# Patient Record
Sex: FEMALE | Race: WHITE | Employment: OTHER | URBAN - METROPOLITAN AREA
[De-identification: names, ages, dates, MRNs, and addresses within clinical notes are randomized per-mention and may not be internally consistent; named-entity substitution may affect disease eponyms.]

---

## 2018-02-09 VITALS
BODY MASS INDEX: 30.91 KG/M2 | DIASTOLIC BLOOD PRESSURE: 70 MMHG | WEIGHT: 168 LBS | HEIGHT: 62 IN | HEART RATE: 68 BPM | SYSTOLIC BLOOD PRESSURE: 136 MMHG

## 2018-02-09 RX ORDER — SIMVASTATIN 20 MG
20 TABLET ORAL NIGHTLY
COMMUNITY
End: 2018-04-02 | Stop reason: CLARIF

## 2018-02-19 PROBLEM — M81.0 OSTEOPOROSIS: Status: ACTIVE | Noted: 2018-02-19

## 2018-02-19 PROBLEM — E78.5 HYPERLIPIDEMIA: Status: ACTIVE | Noted: 2018-02-19

## 2018-02-19 PROBLEM — E03.9 HYPOTHYROIDISM: Status: ACTIVE | Noted: 2018-02-19

## 2018-02-19 PROBLEM — I10 HYPERTENSION: Status: ACTIVE | Noted: 2018-02-19

## 2018-03-26 ENCOUNTER — HOSPITAL ENCOUNTER (OUTPATIENT)
Age: 83
Discharge: HOME | End: 2018-03-28
Payer: MEDICARE

## 2018-03-26 DIAGNOSIS — E78.2 MIXED HYPERLIPIDEMIA: ICD-10-CM

## 2018-03-26 DIAGNOSIS — I10 ESSENTIAL HYPERTENSION: ICD-10-CM

## 2018-03-26 LAB
ALBUMIN SERPL-MCNC: 3.8 G/DL (ref 3.5–5.2)
ALP BLD-CCNC: 90 U/L (ref 35–104)
ALT SERPL-CCNC: 16 U/L (ref 0–32)
ANION GAP SERPL CALCULATED.3IONS-SCNC: 6 MMOL/L (ref 7–16)
AST SERPL-CCNC: 21 U/L (ref 0–31)
BILIRUB SERPL-MCNC: 0.3 MG/DL (ref 0–1.2)
BUN BLDV-MCNC: 25 MG/DL (ref 8–23)
CALCIUM SERPL-MCNC: 9.4 MG/DL (ref 8.6–10.2)
CHLORIDE BLD-SCNC: 105 MMOL/L (ref 98–107)
CHOLESTEROL, TOTAL: 162 MG/DL (ref 0–199)
CO2: 33 MMOL/L (ref 22–29)
CREAT SERPL-MCNC: 0.7 MG/DL (ref 0.5–1)
GFR AFRICAN AMERICAN: >60
GFR NON-AFRICAN AMERICAN: >60 ML/MIN/1.73
GLUCOSE BLD-MCNC: 90 MG/DL (ref 74–109)
HDLC SERPL-MCNC: 69 MG/DL
LDL CHOLESTEROL CALCULATED: 79 MG/DL (ref 0–99)
POTASSIUM SERPL-SCNC: 5 MMOL/L (ref 3.5–5)
SODIUM BLD-SCNC: 144 MMOL/L (ref 132–146)
TOTAL PROTEIN: 6.5 G/DL (ref 6.4–8.3)
TRIGL SERPL-MCNC: 69 MG/DL (ref 0–149)
VLDLC SERPL CALC-MCNC: 14 MG/DL

## 2018-03-26 PROCEDURE — 80053 COMPREHEN METABOLIC PANEL: CPT

## 2018-03-26 PROCEDURE — 36415 COLL VENOUS BLD VENIPUNCTURE: CPT

## 2018-03-26 PROCEDURE — 80061 LIPID PANEL: CPT

## 2018-04-02 ENCOUNTER — OFFICE VISIT (OUTPATIENT)
Dept: PRIMARY CARE CLINIC | Age: 83
End: 2018-04-02
Payer: MEDICARE

## 2018-04-02 VITALS
DIASTOLIC BLOOD PRESSURE: 70 MMHG | HEART RATE: 74 BPM | BODY MASS INDEX: 28.34 KG/M2 | OXYGEN SATURATION: 96 % | WEIGHT: 166 LBS | HEIGHT: 64 IN | RESPIRATION RATE: 16 BRPM | SYSTOLIC BLOOD PRESSURE: 120 MMHG

## 2018-04-02 DIAGNOSIS — E78.2 MIXED HYPERLIPIDEMIA: Primary | ICD-10-CM

## 2018-04-02 DIAGNOSIS — I10 ESSENTIAL HYPERTENSION: ICD-10-CM

## 2018-04-02 DIAGNOSIS — E03.9 ACQUIRED HYPOTHYROIDISM: ICD-10-CM

## 2018-04-02 PROCEDURE — 99213 OFFICE O/P EST LOW 20 MIN: CPT | Performed by: FAMILY MEDICINE

## 2018-04-02 RX ORDER — AMPICILLIN TRIHYDRATE 250 MG
600 CAPSULE ORAL 2 TIMES DAILY
COMMUNITY
End: 2018-07-12

## 2018-04-02 RX ORDER — SAFFLOWER OIL
1 OIL (ML) MISCELLANEOUS
COMMUNITY
End: 2018-08-16

## 2018-04-02 ASSESSMENT — ENCOUNTER SYMPTOMS
HEMOPTYSIS: 0
ORTHOPNEA: 0
BLURRED VISION: 0
ABDOMINAL PAIN: 0
NAUSEA: 0
EYE DISCHARGE: 0
SHORTNESS OF BREATH: 0
BLOOD IN STOOL: 0

## 2018-04-13 RX ORDER — MECLIZINE HYDROCHLORIDE 25 MG/1
25 TABLET ORAL 3 TIMES DAILY PRN
Qty: 30 TABLET | Refills: 0 | Status: SHIPPED | OUTPATIENT
Start: 2018-04-13 | End: 2018-05-21 | Stop reason: SDUPTHER

## 2018-05-18 RX ORDER — LEVOTHYROXINE SODIUM 0.1 MG/1
100 TABLET ORAL DAILY
Qty: 90 TABLET | Refills: 0 | Status: SHIPPED | OUTPATIENT
Start: 2018-05-18 | End: 2018-08-16 | Stop reason: SDUPTHER

## 2018-05-21 RX ORDER — MECLIZINE HYDROCHLORIDE 25 MG/1
25 TABLET ORAL 3 TIMES DAILY PRN
Qty: 30 TABLET | Refills: 0 | Status: SHIPPED | OUTPATIENT
Start: 2018-05-21 | End: 2018-07-06 | Stop reason: SDUPTHER

## 2018-06-28 ENCOUNTER — HOSPITAL ENCOUNTER (OUTPATIENT)
Age: 83
Discharge: HOME | End: 2018-06-28
Payer: MEDICARE

## 2018-06-28 DIAGNOSIS — E78.2 MIXED HYPERLIPIDEMIA: ICD-10-CM

## 2018-06-28 DIAGNOSIS — E03.9 ACQUIRED HYPOTHYROIDISM: ICD-10-CM

## 2018-06-28 DIAGNOSIS — I10 ESSENTIAL HYPERTENSION: ICD-10-CM

## 2018-06-28 LAB
ALBUMIN SERPL-MCNC: 3.9 G/DL (ref 3.5–5.2)
ALP BLD-CCNC: 79 U/L (ref 35–104)
ALT SERPL-CCNC: 15 U/L (ref 0–32)
ANION GAP SERPL CALCULATED.3IONS-SCNC: 11 MMOL/L (ref 7–16)
AST SERPL-CCNC: 24 U/L (ref 0–31)
BILIRUB SERPL-MCNC: 0.4 MG/DL (ref 0–1.2)
BUN BLDV-MCNC: 22 MG/DL (ref 8–23)
CALCIUM SERPL-MCNC: 9.7 MG/DL (ref 8.6–10.2)
CHLORIDE BLD-SCNC: 101 MMOL/L (ref 98–107)
CHOLESTEROL, TOTAL: 150 MG/DL (ref 0–199)
CO2: 30 MMOL/L (ref 22–29)
CREAT SERPL-MCNC: 0.8 MG/DL (ref 0.5–1)
GFR AFRICAN AMERICAN: >60
GFR NON-AFRICAN AMERICAN: >60 ML/MIN/1.73
GLUCOSE FASTING: 85 MG/DL (ref 74–109)
HCT VFR BLD CALC: 36.1 % (ref 34–48)
HDLC SERPL-MCNC: 63 MG/DL
HEMOGLOBIN: 12 G/DL (ref 11.5–15.5)
LDL CHOLESTEROL CALCULATED: 73 MG/DL (ref 0–99)
MCH RBC QN AUTO: 29.5 PG (ref 26–35)
MCHC RBC AUTO-ENTMCNC: 33.2 % (ref 32–34.5)
MCV RBC AUTO: 88.7 FL (ref 80–99.9)
PDW BLD-RTO: 14.5 FL (ref 11.5–15)
PLATELET # BLD: 249 E9/L (ref 130–450)
PMV BLD AUTO: 10.8 FL (ref 7–12)
POTASSIUM SERPL-SCNC: 4.4 MMOL/L (ref 3.5–5)
RBC # BLD: 4.07 E12/L (ref 3.5–5.5)
SODIUM BLD-SCNC: 142 MMOL/L (ref 132–146)
T4 TOTAL: 11.1 MCG/DL (ref 4.5–11.7)
TOTAL PROTEIN: 6.7 G/DL (ref 6.4–8.3)
TRIGL SERPL-MCNC: 72 MG/DL (ref 0–149)
TSH SERPL DL<=0.05 MIU/L-ACNC: 0.83 UIU/ML (ref 0.27–4.2)
VLDLC SERPL CALC-MCNC: 14 MG/DL
WBC # BLD: 4.6 E9/L (ref 4.5–11.5)

## 2018-06-28 PROCEDURE — 84443 ASSAY THYROID STIM HORMONE: CPT

## 2018-06-28 PROCEDURE — 36415 COLL VENOUS BLD VENIPUNCTURE: CPT

## 2018-06-28 PROCEDURE — 80061 LIPID PANEL: CPT

## 2018-06-28 PROCEDURE — 80053 COMPREHEN METABOLIC PANEL: CPT

## 2018-06-28 PROCEDURE — 85027 COMPLETE CBC AUTOMATED: CPT

## 2018-06-28 PROCEDURE — 84436 ASSAY OF TOTAL THYROXINE: CPT

## 2018-07-02 ENCOUNTER — OFFICE VISIT (OUTPATIENT)
Dept: PRIMARY CARE CLINIC | Age: 83
End: 2018-07-02
Payer: MEDICARE

## 2018-07-02 VITALS
WEIGHT: 165 LBS | TEMPERATURE: 97 F | SYSTOLIC BLOOD PRESSURE: 120 MMHG | DIASTOLIC BLOOD PRESSURE: 64 MMHG | HEART RATE: 76 BPM | BODY MASS INDEX: 28.32 KG/M2

## 2018-07-02 DIAGNOSIS — R35.0 URINARY FREQUENCY: Primary | ICD-10-CM

## 2018-07-02 DIAGNOSIS — E03.9 ACQUIRED HYPOTHYROIDISM: ICD-10-CM

## 2018-07-02 DIAGNOSIS — E78.2 MIXED HYPERLIPIDEMIA: ICD-10-CM

## 2018-07-02 DIAGNOSIS — I10 ESSENTIAL HYPERTENSION: ICD-10-CM

## 2018-07-02 LAB
BILIRUBIN, POC: NEGATIVE
BLOOD URINE, POC: NEGATIVE
CLARITY, POC: ABNORMAL
COLOR, POC: YELLOW
GLUCOSE URINE, POC: NEGATIVE
KETONES, POC: ABNORMAL
LEUKOCYTE EST, POC: ABNORMAL
NITRITE, POC: ABNORMAL
PH, POC: 6
PROTEIN, POC: ABNORMAL
SPECIFIC GRAVITY, POC: 1.02
UROBILINOGEN, POC: 4

## 2018-07-02 PROCEDURE — 81002 URINALYSIS NONAUTO W/O SCOPE: CPT | Performed by: FAMILY MEDICINE

## 2018-07-02 PROCEDURE — 99213 OFFICE O/P EST LOW 20 MIN: CPT | Performed by: FAMILY MEDICINE

## 2018-07-02 PROCEDURE — G8419 CALC BMI OUT NRM PARAM NOF/U: HCPCS | Performed by: FAMILY MEDICINE

## 2018-07-02 PROCEDURE — 1090F PRES/ABSN URINE INCON ASSESS: CPT | Performed by: FAMILY MEDICINE

## 2018-07-02 PROCEDURE — 4040F PNEUMOC VAC/ADMIN/RCVD: CPT | Performed by: FAMILY MEDICINE

## 2018-07-02 PROCEDURE — 1123F ACP DISCUSS/DSCN MKR DOCD: CPT | Performed by: FAMILY MEDICINE

## 2018-07-02 PROCEDURE — 1036F TOBACCO NON-USER: CPT | Performed by: FAMILY MEDICINE

## 2018-07-02 PROCEDURE — G8427 DOCREV CUR MEDS BY ELIG CLIN: HCPCS | Performed by: FAMILY MEDICINE

## 2018-07-02 RX ORDER — SULFAMETHOXAZOLE AND TRIMETHOPRIM 800; 160 MG/1; MG/1
1 TABLET ORAL 2 TIMES DAILY
Qty: 20 TABLET | Refills: 0 | Status: SHIPPED | OUTPATIENT
Start: 2018-07-02 | End: 2018-07-12

## 2018-07-02 ASSESSMENT — ENCOUNTER SYMPTOMS
NAUSEA: 0
BLURRED VISION: 0
SHORTNESS OF BREATH: 0
ORTHOPNEA: 0
HEMOPTYSIS: 0
BLOOD IN STOOL: 0
EYE DISCHARGE: 0
ABDOMINAL PAIN: 0

## 2018-07-02 NOTE — PROGRESS NOTES
OFFICE PROGRESS NOTE      SUBJECTIVE:        Patient ID:   Corinne Dyer is a 80 y.o. female who presents for   Chief Complaint   Patient presents with    Urinary Tract Infection     C/o  urinary  frequency with scant results. States symptoms appeared this  am.    Hypothyroidism     Here for recheck on HT,Hyperlipidemia and Hypothyroidism. Lab work done,here for review. HPI:   Patient got urinary difficulty  Lab work is within normal limits  Patient stated that she is not exercising  No other specific problems  Have been taking medication as prescribed  Urine shows nitrates      Prior to Admission medications    Medication Sig Start Date End Date Taking?  Authorizing Provider   Omega-3 Fatty Acids (OMEGA-3 PLUS PO) Take 2 capsules by mouth daily   Yes Historical Provider, MD   GARCINIA CAMBOGIA-CHROMIUM PO Take 1 tablet by mouth daily   Yes Historical Provider, MD   Misc Natural Products (7-KETO LEAN) CAPS Take 1 capsule by mouth daily   Yes Historical Provider, MD   Misc Natural Products (COLON CARE PO) Take 1 capsule by mouth daily   Yes Historical Provider, MD   sulfamethoxazole-trimethoprim (BACTRIM DS) 800-160 MG per tablet Take 1 tablet by mouth 2 times daily for 10 days 7/2/18 7/12/18 Yes Fanny Florence MD   meclizine (ANTIVERT) 25 MG tablet Take 1 tablet by mouth 3 times daily as needed for Dizziness 5/21/18  Yes Fanny Florence MD   levothyroxine (SYNTHROID) 100 MCG tablet Take 1 tablet by mouth Daily 5/18/18  Yes Fanny Florence MD   BIOTIN 5000 PO Take 5,000 mcg by mouth Daily with lunch   Yes Historical Provider, MD   Safflower Oil OIL 1 capsule by Does not apply route   Yes Historical Provider, MD   simvastatin (ZOCOR) 20 MG tablet Take 1 tablet by mouth every evening 2/19/18 2/19/19 Yes Fanny Florence MD   GARLIC PO Take 1 tablet by mouth daily   Yes Historical Provider, MD   hydrochlorothiazide (MICROZIDE) 12.5 MG capsule Take 12.5 mg by mouth daily   Yes Historical Provider, MD   Pyridoxine HCl (B-6 PO) Take 1 tablet by mouth daily   Yes Historical Provider, MD   lisinopril (PRINIVIL;ZESTRIL) 20 MG tablet Take 20 mg by mouth daily  11/9/15  Yes Historical Provider, MD   Cholecalciferol (VITAMIN D-3 PO) Take by mouth daily   Yes Historical Provider, MD   vitamin B-12 (CYANOCOBALAMIN) 1000 MCG tablet Take 1,000 mcg by mouth daily   Yes Historical Provider, MD   Multiple Vitamin (ONE-A-DAY 55 PLUS PO) Take by mouth daily   Yes Historical Provider, MD   atenolol (TENORMIN) 25 MG tablet Take 25 mg by mouth daily   Yes Historical Provider, MD   Red Yeast Rice 600 MG CAPS Take 600 mg by mouth 2 times daily    Historical Provider, MD   isosorbide mononitrate (IMDUR) 30 MG extended release tablet Take 30 mg by mouth daily    Historical Provider, MD   cetirizine (ZYRTEC ALLERGY) 10 MG tablet Take 10 mg by mouth daily    Historical Provider, MD   Magnesium 500 MG CAPS Take by mouth    Historical Provider, MD     Social History     Social History    Marital status:      Spouse name: N/A    Number of children: N/A    Years of education: N/A     Occupational History    retired      Social History Main Topics    Smoking status: Never Smoker    Smokeless tobacco: Never Used    Alcohol use No    Drug use: No    Sexual activity: No     Other Topics Concern    Not on file     Social History Narrative    No narrative on file       I have reviewed Jena's allergies, medications, problem list, medical, social and family history and have updated as needed in the electronic medical record  Review Of Systems:    Review of Systems   Constitutional: Negative for chills and fever. HENT: Negative for congestion and ear pain. Eyes: Negative for blurred vision and discharge. Respiratory: Negative for hemoptysis and shortness of breath (No new SOB). Cardiovascular: Negative for chest pain, orthopnea and leg swelling.    Gastrointestinal: Negative for 06/28/2018    TSH 0.830 06/28/2018    GLUF 85 06/28/2018     Lab Results   Component Value Date    COLORU yellow 07/02/2018    COLORU Yellow 06/26/2015    NITRU Negative 06/26/2015    GLUCOSEU negative 07/02/2018    GLUCOSEU Negative 06/26/2015    KETUA trace 07/02/2018    KETUA Negative 06/26/2015    UROBILINOGEN 0.2 06/26/2015    BILIRUBINUR negative 07/02/2018     No results found for: PSA, CEA, , [id number],       Controlled Substances Monitoring:                                    ASSESSMENT     Patient Active Problem List    Diagnosis Date Noted    Hypertension 02/19/2018    Hypothyroidism 02/19/2018    Hyperlipidemia 02/19/2018    Osteoporosis 02/19/2018        Diagnosis:     ICD-10-CM ICD-9-CM    1. Urinary frequency R35.0 788.41 POCT Urinalysis no Micro   2. Essential hypertension I10 401.9    3. Acquired hypothyroidism E03.9 244.9    4. Mixed hyperlipidemia E78.2 272.2        PLAN:   Force fluids  Bactrim DS 1 twice a day  Continue the medications  Regular exercise program  Low-sodium low-fat diet        Patient Instructions   Continue present medication  Continued low-sodium low-fat diet  Force fluids  Take antibiotics as prescribed      Return in about 1 week (around 7/9/2018). I have reviewed my findings and recommendations with Dallin Lemos.     Electronically signed by Ivania Delgado MD on 7/2/18 at 2:31 PM

## 2018-07-06 RX ORDER — MECLIZINE HYDROCHLORIDE 25 MG/1
25 TABLET ORAL 3 TIMES DAILY PRN
Qty: 30 TABLET | Refills: 0 | Status: SHIPPED | OUTPATIENT
Start: 2018-07-06 | End: 2018-08-16

## 2018-07-12 ENCOUNTER — OFFICE VISIT (OUTPATIENT)
Dept: PRIMARY CARE CLINIC | Age: 83
End: 2018-07-12
Payer: MEDICARE

## 2018-07-12 VITALS
TEMPERATURE: 97.2 F | SYSTOLIC BLOOD PRESSURE: 118 MMHG | HEART RATE: 80 BPM | DIASTOLIC BLOOD PRESSURE: 72 MMHG | BODY MASS INDEX: 28.84 KG/M2 | WEIGHT: 168 LBS

## 2018-07-12 DIAGNOSIS — F32.A DEPRESSION, UNSPECIFIED DEPRESSION TYPE: ICD-10-CM

## 2018-07-12 DIAGNOSIS — I10 ESSENTIAL HYPERTENSION: Primary | ICD-10-CM

## 2018-07-12 DIAGNOSIS — E78.2 MIXED HYPERLIPIDEMIA: ICD-10-CM

## 2018-07-12 DIAGNOSIS — E03.9 ACQUIRED HYPOTHYROIDISM: ICD-10-CM

## 2018-07-12 DIAGNOSIS — M81.0 AGE-RELATED OSTEOPOROSIS WITHOUT CURRENT PATHOLOGICAL FRACTURE: ICD-10-CM

## 2018-07-12 PROCEDURE — 1101F PT FALLS ASSESS-DOCD LE1/YR: CPT | Performed by: FAMILY MEDICINE

## 2018-07-12 PROCEDURE — 4040F PNEUMOC VAC/ADMIN/RCVD: CPT | Performed by: FAMILY MEDICINE

## 2018-07-12 PROCEDURE — 1123F ACP DISCUSS/DSCN MKR DOCD: CPT | Performed by: FAMILY MEDICINE

## 2018-07-12 PROCEDURE — G8419 CALC BMI OUT NRM PARAM NOF/U: HCPCS | Performed by: FAMILY MEDICINE

## 2018-07-12 PROCEDURE — G8427 DOCREV CUR MEDS BY ELIG CLIN: HCPCS | Performed by: FAMILY MEDICINE

## 2018-07-12 PROCEDURE — 1090F PRES/ABSN URINE INCON ASSESS: CPT | Performed by: FAMILY MEDICINE

## 2018-07-12 PROCEDURE — 1036F TOBACCO NON-USER: CPT | Performed by: FAMILY MEDICINE

## 2018-07-12 PROCEDURE — 99213 OFFICE O/P EST LOW 20 MIN: CPT | Performed by: FAMILY MEDICINE

## 2018-07-12 RX ORDER — CITALOPRAM 10 MG/1
10 TABLET ORAL DAILY
Qty: 30 TABLET | Refills: 3 | Status: SHIPPED | OUTPATIENT
Start: 2018-07-12 | End: 2018-08-16

## 2018-07-12 ASSESSMENT — ENCOUNTER SYMPTOMS
BLOOD IN STOOL: 0
NAUSEA: 0
HEMOPTYSIS: 0
SHORTNESS OF BREATH: 0
ORTHOPNEA: 0
EYE DISCHARGE: 0
BLURRED VISION: 0
ABDOMINAL PAIN: 0

## 2018-07-12 NOTE — PROGRESS NOTES
daily   Yes Historical Provider, MD   lisinopril (PRINIVIL;ZESTRIL) 20 MG tablet Take 20 mg by mouth daily  11/9/15  Yes Historical Provider, MD   Cholecalciferol (VITAMIN D-3 PO) Take by mouth daily   Yes Historical Provider, MD   vitamin B-12 (CYANOCOBALAMIN) 1000 MCG tablet Take 1,000 mcg by mouth daily   Yes Historical Provider, MD   Multiple Vitamin (ONE-A-DAY 55 PLUS PO) Take by mouth daily   Yes Historical Provider, MD   atenolol (TENORMIN) 25 MG tablet Take 25 mg by mouth daily   Yes Historical Provider, MD   isosorbide mononitrate (IMDUR) 30 MG extended release tablet Take 30 mg by mouth daily    Historical Provider, MD   Magnesium 500 MG CAPS Take by mouth    Historical Provider, MD     Social History     Social History    Marital status:      Spouse name: N/A    Number of children: N/A    Years of education: N/A     Occupational History    retired      Social History Main Topics    Smoking status: Never Smoker    Smokeless tobacco: Never Used    Alcohol use No    Drug use: No    Sexual activity: No     Other Topics Concern    Not on file     Social History Narrative    No narrative on file       I have reviewed Jena's allergies, medications, problem list, medical, social and family history and have updated as needed in the electronic medical record  Review Of Systems:    Review of Systems   Constitutional: Negative for chills and fever. HENT: Negative for congestion and ear pain. Eyes: Negative for blurred vision and discharge. Respiratory: Negative for hemoptysis and shortness of breath (No new SOB). Cardiovascular: Negative for chest pain, orthopnea and leg swelling. Gastrointestinal: Negative for abdominal pain, blood in stool and nausea. Genitourinary: Negative for flank pain and hematuria. Musculoskeletal: Negative for myalgias and neck pain. Skin: Negative for rash. Neurological: Negative for dizziness, focal weakness and seizures.    Endo/Heme/Allergies: 06/26/2015    UROBILINOGEN 0.2 06/26/2015    BILIRUBINUR negative 07/02/2018     No results found for: PSA, CEA, , [id number],       Controlled Substances Monitoring:                                    ASSESSMENT     Patient Active Problem List    Diagnosis Date Noted    Hypertension 02/19/2018    Hypothyroidism 02/19/2018    Hyperlipidemia 02/19/2018    Osteoporosis 02/19/2018        Diagnosis:     ICD-10-CM ICD-9-CM    1. Essential hypertension I10 401.9    2. Acquired hypothyroidism E03.9 244.9    3. Mixed hyperlipidemia E78.2 272.2    4. Age-related osteoporosis without current pathological fracture M81.0 733.01    5. Depression, unspecified depression type F32.9 311        PLAN:      Celexa 10 mg daily  Continue the medication  Continue low-sodium low-fat diet      Patient Instructions   Take the medication as prescribed  Continue diet and exercise  Return to clinic earlier if any problems      Return in about 4 weeks (around 8/9/2018). I have reviewed my findings and recommendations with Julian Poe.     Electronically signed by Rustam Willoughby MD on 7/12/18 at 11:56 AM

## 2018-08-16 ENCOUNTER — OFFICE VISIT (OUTPATIENT)
Dept: PRIMARY CARE CLINIC | Age: 83
End: 2018-08-16
Payer: MEDICARE

## 2018-08-16 VITALS
SYSTOLIC BLOOD PRESSURE: 136 MMHG | TEMPERATURE: 96.2 F | DIASTOLIC BLOOD PRESSURE: 60 MMHG | WEIGHT: 165 LBS | HEART RATE: 88 BPM | BODY MASS INDEX: 28.32 KG/M2

## 2018-08-16 DIAGNOSIS — K59.1 FUNCTIONAL DIARRHEA: ICD-10-CM

## 2018-08-16 DIAGNOSIS — M81.0 AGE-RELATED OSTEOPOROSIS WITHOUT CURRENT PATHOLOGICAL FRACTURE: ICD-10-CM

## 2018-08-16 DIAGNOSIS — I10 ESSENTIAL HYPERTENSION: Primary | ICD-10-CM

## 2018-08-16 DIAGNOSIS — E03.9 ACQUIRED HYPOTHYROIDISM: ICD-10-CM

## 2018-08-16 DIAGNOSIS — E78.2 MIXED HYPERLIPIDEMIA: ICD-10-CM

## 2018-08-16 DIAGNOSIS — Z23 NEED FOR PROPHYLACTIC VACCINATION AND INOCULATION AGAINST VARICELLA: ICD-10-CM

## 2018-08-16 DIAGNOSIS — Z23 NEED FOR PROPHYLACTIC VACCINATION AGAINST DIPHTHERIA-TETANUS-PERTUSSIS (DTP): ICD-10-CM

## 2018-08-16 PROCEDURE — 4040F PNEUMOC VAC/ADMIN/RCVD: CPT | Performed by: FAMILY MEDICINE

## 2018-08-16 PROCEDURE — 1090F PRES/ABSN URINE INCON ASSESS: CPT | Performed by: FAMILY MEDICINE

## 2018-08-16 PROCEDURE — G8427 DOCREV CUR MEDS BY ELIG CLIN: HCPCS | Performed by: FAMILY MEDICINE

## 2018-08-16 PROCEDURE — 1036F TOBACCO NON-USER: CPT | Performed by: FAMILY MEDICINE

## 2018-08-16 PROCEDURE — 1123F ACP DISCUSS/DSCN MKR DOCD: CPT | Performed by: FAMILY MEDICINE

## 2018-08-16 PROCEDURE — 1101F PT FALLS ASSESS-DOCD LE1/YR: CPT | Performed by: FAMILY MEDICINE

## 2018-08-16 PROCEDURE — 99213 OFFICE O/P EST LOW 20 MIN: CPT | Performed by: FAMILY MEDICINE

## 2018-08-16 PROCEDURE — G8419 CALC BMI OUT NRM PARAM NOF/U: HCPCS | Performed by: FAMILY MEDICINE

## 2018-08-16 RX ORDER — LEVOTHYROXINE SODIUM 0.1 MG/1
100 TABLET ORAL DAILY
Qty: 90 TABLET | Refills: 1 | Status: SHIPPED | OUTPATIENT
Start: 2018-08-16 | End: 2019-03-01 | Stop reason: SDUPTHER

## 2018-08-16 ASSESSMENT — ENCOUNTER SYMPTOMS
ABDOMINAL PAIN: 0
SHORTNESS OF BREATH: 0
HEMOPTYSIS: 0
EYE DISCHARGE: 0
BLURRED VISION: 0
BLOOD IN STOOL: 0
NAUSEA: 0
DIARRHEA: 1
ORTHOPNEA: 0

## 2018-11-19 DIAGNOSIS — E78.2 MIXED HYPERLIPIDEMIA: ICD-10-CM

## 2018-11-19 RX ORDER — SIMVASTATIN 20 MG
20 TABLET ORAL EVERY EVENING
Qty: 90 TABLET | Refills: 1 | Status: SHIPPED | OUTPATIENT
Start: 2018-11-19 | End: 2019-06-13 | Stop reason: SDUPTHER

## 2018-11-21 ENCOUNTER — HOSPITAL ENCOUNTER (OUTPATIENT)
Age: 83
Discharge: HOME | End: 2018-11-21
Payer: MEDICARE

## 2018-11-21 DIAGNOSIS — E78.2 MIXED HYPERLIPIDEMIA: ICD-10-CM

## 2018-11-21 DIAGNOSIS — E03.9 ACQUIRED HYPOTHYROIDISM: ICD-10-CM

## 2018-11-21 DIAGNOSIS — I10 ESSENTIAL HYPERTENSION: ICD-10-CM

## 2018-11-21 DIAGNOSIS — M81.0 AGE-RELATED OSTEOPOROSIS WITHOUT CURRENT PATHOLOGICAL FRACTURE: ICD-10-CM

## 2018-11-21 LAB
ALBUMIN SERPL-MCNC: 4.2 G/DL (ref 3.5–5.2)
ALP BLD-CCNC: 78 U/L (ref 35–104)
ALT SERPL-CCNC: 17 U/L (ref 0–32)
ANION GAP SERPL CALCULATED.3IONS-SCNC: 8 MMOL/L (ref 7–16)
AST SERPL-CCNC: 23 U/L (ref 0–31)
BILIRUB SERPL-MCNC: 0.3 MG/DL (ref 0–1.2)
BUN BLDV-MCNC: 26 MG/DL (ref 8–23)
CALCIUM SERPL-MCNC: 10 MG/DL (ref 8.6–10.2)
CHLORIDE BLD-SCNC: 102 MMOL/L (ref 98–107)
CHOLESTEROL, TOTAL: 172 MG/DL (ref 0–199)
CO2: 31 MMOL/L (ref 22–29)
CREAT SERPL-MCNC: 0.7 MG/DL (ref 0.5–1)
GFR AFRICAN AMERICAN: >60
GFR NON-AFRICAN AMERICAN: >60 ML/MIN/1.73
GLUCOSE BLD-MCNC: 88 MG/DL (ref 74–99)
HCT VFR BLD CALC: 36.4 % (ref 34–48)
HDLC SERPL-MCNC: 62 MG/DL
HEMOGLOBIN: 12.2 G/DL (ref 11.5–15.5)
LDL CHOLESTEROL CALCULATED: 93 MG/DL (ref 0–99)
MCH RBC QN AUTO: 30 PG (ref 26–35)
MCHC RBC AUTO-ENTMCNC: 33.5 % (ref 32–34.5)
MCV RBC AUTO: 89.4 FL (ref 80–99.9)
PDW BLD-RTO: 14.2 FL (ref 11.5–15)
PLATELET # BLD: 278 E9/L (ref 130–450)
PMV BLD AUTO: 9.9 FL (ref 7–12)
POTASSIUM SERPL-SCNC: 4.7 MMOL/L (ref 3.5–5)
RBC # BLD: 4.07 E12/L (ref 3.5–5.5)
SODIUM BLD-SCNC: 141 MMOL/L (ref 132–146)
TOTAL PROTEIN: 7.4 G/DL (ref 6.4–8.3)
TRIGL SERPL-MCNC: 86 MG/DL (ref 0–149)
TSH SERPL DL<=0.05 MIU/L-ACNC: 0.62 UIU/ML (ref 0.27–4.2)
VLDLC SERPL CALC-MCNC: 17 MG/DL
WBC # BLD: 6.3 E9/L (ref 4.5–11.5)

## 2018-11-21 PROCEDURE — 80053 COMPREHEN METABOLIC PANEL: CPT

## 2018-11-21 PROCEDURE — 84443 ASSAY THYROID STIM HORMONE: CPT

## 2018-11-21 PROCEDURE — 80061 LIPID PANEL: CPT

## 2018-11-21 PROCEDURE — 85027 COMPLETE CBC AUTOMATED: CPT

## 2018-11-21 PROCEDURE — 36415 COLL VENOUS BLD VENIPUNCTURE: CPT

## 2018-11-26 ENCOUNTER — OFFICE VISIT (OUTPATIENT)
Dept: PRIMARY CARE CLINIC | Age: 83
End: 2018-11-26
Payer: MEDICARE

## 2018-11-26 VITALS
DIASTOLIC BLOOD PRESSURE: 66 MMHG | HEART RATE: 80 BPM | WEIGHT: 166 LBS | SYSTOLIC BLOOD PRESSURE: 130 MMHG | TEMPERATURE: 96.6 F | BODY MASS INDEX: 28.49 KG/M2

## 2018-11-26 DIAGNOSIS — E78.2 MIXED HYPERLIPIDEMIA: Primary | ICD-10-CM

## 2018-11-26 DIAGNOSIS — I10 ESSENTIAL HYPERTENSION: ICD-10-CM

## 2018-11-26 DIAGNOSIS — L30.9 DERMATITIS: ICD-10-CM

## 2018-11-26 DIAGNOSIS — Z12.39 BREAST CANCER SCREENING: ICD-10-CM

## 2018-11-26 DIAGNOSIS — I25.10 CORONARY ARTERY DISEASE INVOLVING NATIVE CORONARY ARTERY OF NATIVE HEART WITHOUT ANGINA PECTORIS: ICD-10-CM

## 2018-11-26 DIAGNOSIS — E03.9 ACQUIRED HYPOTHYROIDISM: ICD-10-CM

## 2018-11-26 DIAGNOSIS — Z12.11 COLON CANCER SCREENING: ICD-10-CM

## 2018-11-26 DIAGNOSIS — M81.0 AGE-RELATED OSTEOPOROSIS WITHOUT CURRENT PATHOLOGICAL FRACTURE: ICD-10-CM

## 2018-11-26 PROCEDURE — G8482 FLU IMMUNIZE ORDER/ADMIN: HCPCS | Performed by: FAMILY MEDICINE

## 2018-11-26 PROCEDURE — 1036F TOBACCO NON-USER: CPT | Performed by: FAMILY MEDICINE

## 2018-11-26 PROCEDURE — 1123F ACP DISCUSS/DSCN MKR DOCD: CPT | Performed by: FAMILY MEDICINE

## 2018-11-26 PROCEDURE — 90662 IIV NO PRSV INCREASED AG IM: CPT | Performed by: FAMILY MEDICINE

## 2018-11-26 PROCEDURE — 99213 OFFICE O/P EST LOW 20 MIN: CPT | Performed by: FAMILY MEDICINE

## 2018-11-26 PROCEDURE — 4040F PNEUMOC VAC/ADMIN/RCVD: CPT | Performed by: FAMILY MEDICINE

## 2018-11-26 PROCEDURE — G0008 ADMIN INFLUENZA VIRUS VAC: HCPCS | Performed by: FAMILY MEDICINE

## 2018-11-26 PROCEDURE — 1090F PRES/ABSN URINE INCON ASSESS: CPT | Performed by: FAMILY MEDICINE

## 2018-11-26 PROCEDURE — G8419 CALC BMI OUT NRM PARAM NOF/U: HCPCS | Performed by: FAMILY MEDICINE

## 2018-11-26 PROCEDURE — G8427 DOCREV CUR MEDS BY ELIG CLIN: HCPCS | Performed by: FAMILY MEDICINE

## 2018-11-26 PROCEDURE — G8599 NO ASA/ANTIPLAT THER USE RNG: HCPCS | Performed by: FAMILY MEDICINE

## 2018-11-26 PROCEDURE — 1101F PT FALLS ASSESS-DOCD LE1/YR: CPT | Performed by: FAMILY MEDICINE

## 2018-11-26 RX ORDER — NEOMYCIN SULFATE, POLYMYXIN B SULFATE AND DEXAMETHASONE 3.5; 10000; 1 MG/ML; [USP'U]/ML; MG/ML
SUSPENSION/ DROPS OPHTHALMIC
Refills: 0 | COMMUNITY
Start: 2018-10-17 | End: 2019-07-18

## 2018-11-26 RX ORDER — KETOROLAC TROMETHAMINE 5 MG/ML
SOLUTION OPHTHALMIC
Refills: 0 | COMMUNITY
Start: 2018-10-17 | End: 2019-04-18

## 2018-11-26 RX ORDER — TRIAMCINOLONE ACETONIDE 0.25 MG/G
CREAM TOPICAL
Qty: 30 G | Refills: 0 | Status: SHIPPED | OUTPATIENT
Start: 2018-11-26 | End: 2019-07-18

## 2018-11-26 ASSESSMENT — ENCOUNTER SYMPTOMS
EYES NEGATIVE: 1
ALLERGIC/IMMUNOLOGIC NEGATIVE: 1
RESPIRATORY NEGATIVE: 1
GASTROINTESTINAL NEGATIVE: 1

## 2018-11-26 NOTE — PROGRESS NOTES
OFFICE PROGRESS NOTE      SUBJECTIVE:        Patient ID:   Keith Adkins is a 80 y.o. female who presents for   Chief Complaint   Patient presents with    Hypertension     Here for  recheck on Hypertension and Hyperlipidemia. Lab work done,here for review. C/o pain in Lt upper arm where nodules are present. Has had areas for 3 years and was evaluated in [facility] in the past.           HPI:   Complaining of rash on the neck  Lab work is within normal limits  Patient said that she has some lump on the left arm which has been followed up by back doctor in [facility]  He said does not need a biopsy  Patient says her depression is better        Prior to Admission medications    Medication Sig Start Date End Date Taking? Authorizing Provider   neomycin-polymyxin-dexameth (MAXITROL) 3.5-08382-0.1 ophthalmic suspension instill 1 drop into affected eye once daily 10/17/18  Yes Historical Provider, MD   triamcinolone (KENALOG) 0.025 % cream Apply topically 2 times daily.  11/26/18  Yes Miesha Mercer MD   simvastatin (ZOCOR) 20 MG tablet Take 1 tablet by mouth every evening 11/19/18 11/19/19 Yes Joshua Solis MD   levothyroxine (SYNTHROID) 100 MCG tablet Take 1 tablet by mouth Daily 8/16/18  Yes Miesha Mercer MD   Omega-3 Fatty Acids (OMEGA-3 PLUS PO) Take 2 capsules by mouth daily   Yes Historical Provider, MD   GARCINIA CAMBOGIA-CHROMIUM PO Take 1 tablet by mouth daily   Yes Historical Provider, MD   BIOTIN 5000 PO Take 5,000 mcg by mouth Daily with lunch   Yes Historical Provider, MD   GARLIC PO Take 1 tablet by mouth daily   Yes Historical Provider, MD   hydrochlorothiazide (MICROZIDE) 12.5 MG capsule Take 12.5 mg by mouth daily   Yes Historical Provider, MD   Pyridoxine HCl (B-6 PO) Take 1 tablet by mouth daily   Yes Historical Provider, MD   lisinopril (PRINIVIL;ZESTRIL) 20 MG tablet Take 20 mg by mouth daily  11/9/15  Yes Historical Provider, MD   Cholecalciferol (VITAMIN D-3

## 2018-12-13 ENCOUNTER — OFFICE VISIT (OUTPATIENT)
Dept: PRIMARY CARE CLINIC | Age: 83
End: 2018-12-13
Payer: MEDICARE

## 2018-12-13 VITALS
SYSTOLIC BLOOD PRESSURE: 120 MMHG | DIASTOLIC BLOOD PRESSURE: 60 MMHG | HEART RATE: 76 BPM | BODY MASS INDEX: 28.32 KG/M2 | WEIGHT: 165 LBS | TEMPERATURE: 97 F

## 2018-12-13 DIAGNOSIS — E78.2 MIXED HYPERLIPIDEMIA: ICD-10-CM

## 2018-12-13 DIAGNOSIS — Z12.11 COLON CANCER SCREENING: ICD-10-CM

## 2018-12-13 DIAGNOSIS — K62.5 RECTAL BLEED: ICD-10-CM

## 2018-12-13 DIAGNOSIS — I25.10 CORONARY ARTERY DISEASE INVOLVING NATIVE CORONARY ARTERY OF NATIVE HEART WITHOUT ANGINA PECTORIS: ICD-10-CM

## 2018-12-13 DIAGNOSIS — I10 ESSENTIAL HYPERTENSION: ICD-10-CM

## 2018-12-13 DIAGNOSIS — L30.9 DERMATITIS: ICD-10-CM

## 2018-12-13 DIAGNOSIS — Z12.9 SCREENING FOR CANCER: Primary | ICD-10-CM

## 2018-12-13 DIAGNOSIS — E03.9 ACQUIRED HYPOTHYROIDISM: ICD-10-CM

## 2018-12-13 LAB
CONTROL: POSITIVE
HEMOCCULT STL QL: POSITIVE

## 2018-12-13 PROCEDURE — G8599 NO ASA/ANTIPLAT THER USE RNG: HCPCS | Performed by: FAMILY MEDICINE

## 2018-12-13 PROCEDURE — G8419 CALC BMI OUT NRM PARAM NOF/U: HCPCS | Performed by: FAMILY MEDICINE

## 2018-12-13 PROCEDURE — 1101F PT FALLS ASSESS-DOCD LE1/YR: CPT | Performed by: FAMILY MEDICINE

## 2018-12-13 PROCEDURE — 4040F PNEUMOC VAC/ADMIN/RCVD: CPT | Performed by: FAMILY MEDICINE

## 2018-12-13 PROCEDURE — 82274 ASSAY TEST FOR BLOOD FECAL: CPT | Performed by: FAMILY MEDICINE

## 2018-12-13 PROCEDURE — G8427 DOCREV CUR MEDS BY ELIG CLIN: HCPCS | Performed by: FAMILY MEDICINE

## 2018-12-13 PROCEDURE — 1123F ACP DISCUSS/DSCN MKR DOCD: CPT | Performed by: FAMILY MEDICINE

## 2018-12-13 PROCEDURE — 99213 OFFICE O/P EST LOW 20 MIN: CPT | Performed by: FAMILY MEDICINE

## 2018-12-13 PROCEDURE — G8482 FLU IMMUNIZE ORDER/ADMIN: HCPCS | Performed by: FAMILY MEDICINE

## 2018-12-13 PROCEDURE — 1036F TOBACCO NON-USER: CPT | Performed by: FAMILY MEDICINE

## 2018-12-13 PROCEDURE — 1090F PRES/ABSN URINE INCON ASSESS: CPT | Performed by: FAMILY MEDICINE

## 2018-12-13 ASSESSMENT — ENCOUNTER SYMPTOMS
EYES NEGATIVE: 1
RESPIRATORY NEGATIVE: 1
BLOOD IN STOOL: 1
ALLERGIC/IMMUNOLOGIC NEGATIVE: 1

## 2019-03-01 RX ORDER — LEVOTHYROXINE SODIUM 0.1 MG/1
100 TABLET ORAL DAILY
Qty: 90 TABLET | Refills: 1 | Status: SHIPPED | OUTPATIENT
Start: 2019-03-01 | End: 2019-08-19 | Stop reason: SDUPTHER

## 2019-03-14 DIAGNOSIS — R92.2 INCONCLUSIVE MAMMOGRAM: Primary | ICD-10-CM

## 2019-03-18 ENCOUNTER — OFFICE VISIT (OUTPATIENT)
Dept: PRIMARY CARE CLINIC | Age: 84
End: 2019-03-18
Payer: MEDICARE

## 2019-03-18 VITALS
TEMPERATURE: 98.1 F | HEART RATE: 79 BPM | OXYGEN SATURATION: 91 % | SYSTOLIC BLOOD PRESSURE: 138 MMHG | BODY MASS INDEX: 30.62 KG/M2 | DIASTOLIC BLOOD PRESSURE: 82 MMHG | RESPIRATION RATE: 16 BRPM | WEIGHT: 162.2 LBS | HEIGHT: 61 IN

## 2019-03-18 DIAGNOSIS — E78.2 MIXED HYPERLIPIDEMIA: Primary | ICD-10-CM

## 2019-03-18 DIAGNOSIS — E03.9 ACQUIRED HYPOTHYROIDISM: ICD-10-CM

## 2019-03-18 DIAGNOSIS — I10 ESSENTIAL HYPERTENSION: ICD-10-CM

## 2019-03-18 DIAGNOSIS — I25.10 CORONARY ARTERY DISEASE INVOLVING NATIVE CORONARY ARTERY OF NATIVE HEART WITHOUT ANGINA PECTORIS: ICD-10-CM

## 2019-03-18 PROCEDURE — 1123F ACP DISCUSS/DSCN MKR DOCD: CPT | Performed by: FAMILY MEDICINE

## 2019-03-18 PROCEDURE — G8427 DOCREV CUR MEDS BY ELIG CLIN: HCPCS | Performed by: FAMILY MEDICINE

## 2019-03-18 PROCEDURE — G8482 FLU IMMUNIZE ORDER/ADMIN: HCPCS | Performed by: FAMILY MEDICINE

## 2019-03-18 PROCEDURE — 1036F TOBACCO NON-USER: CPT | Performed by: FAMILY MEDICINE

## 2019-03-18 PROCEDURE — 4040F PNEUMOC VAC/ADMIN/RCVD: CPT | Performed by: FAMILY MEDICINE

## 2019-03-18 PROCEDURE — G8599 NO ASA/ANTIPLAT THER USE RNG: HCPCS | Performed by: FAMILY MEDICINE

## 2019-03-18 PROCEDURE — 99213 OFFICE O/P EST LOW 20 MIN: CPT | Performed by: FAMILY MEDICINE

## 2019-03-18 PROCEDURE — 1101F PT FALLS ASSESS-DOCD LE1/YR: CPT | Performed by: FAMILY MEDICINE

## 2019-03-18 PROCEDURE — G8417 CALC BMI ABV UP PARAM F/U: HCPCS | Performed by: FAMILY MEDICINE

## 2019-03-18 PROCEDURE — 1090F PRES/ABSN URINE INCON ASSESS: CPT | Performed by: FAMILY MEDICINE

## 2019-03-18 RX ORDER — ATENOLOL 25 MG/1
25 TABLET ORAL DAILY
Qty: 30 TABLET | Refills: 1 | Status: SHIPPED | OUTPATIENT
Start: 2019-03-18 | End: 2019-05-23 | Stop reason: SDUPTHER

## 2019-03-18 ASSESSMENT — ENCOUNTER SYMPTOMS
RESPIRATORY NEGATIVE: 1
EYES NEGATIVE: 1
GASTROINTESTINAL NEGATIVE: 1
ALLERGIC/IMMUNOLOGIC NEGATIVE: 1

## 2019-03-18 ASSESSMENT — PATIENT HEALTH QUESTIONNAIRE - PHQ9
SUM OF ALL RESPONSES TO PHQ QUESTIONS 1-9: 0
SUM OF ALL RESPONSES TO PHQ QUESTIONS 1-9: 0
1. LITTLE INTEREST OR PLEASURE IN DOING THINGS: 0
2. FEELING DOWN, DEPRESSED OR HOPELESS: 0
SUM OF ALL RESPONSES TO PHQ9 QUESTIONS 1 & 2: 0

## 2019-04-08 ENCOUNTER — HOSPITAL ENCOUNTER (OUTPATIENT)
Age: 84
Discharge: HOME | End: 2019-04-08
Payer: MEDICARE

## 2019-04-08 DIAGNOSIS — E78.2 MIXED HYPERLIPIDEMIA: ICD-10-CM

## 2019-04-08 DIAGNOSIS — E03.9 ACQUIRED HYPOTHYROIDISM: ICD-10-CM

## 2019-04-08 DIAGNOSIS — I25.10 CORONARY ARTERY DISEASE INVOLVING NATIVE CORONARY ARTERY OF NATIVE HEART WITHOUT ANGINA PECTORIS: ICD-10-CM

## 2019-04-08 DIAGNOSIS — I10 ESSENTIAL HYPERTENSION: ICD-10-CM

## 2019-04-08 LAB
ALBUMIN SERPL-MCNC: 3.9 G/DL (ref 3.5–5.2)
ALP BLD-CCNC: 94 U/L (ref 35–104)
ALT SERPL-CCNC: 15 U/L (ref 0–32)
ANION GAP SERPL CALCULATED.3IONS-SCNC: 7 MMOL/L (ref 7–16)
AST SERPL-CCNC: 24 U/L (ref 0–31)
BILIRUB SERPL-MCNC: 0.3 MG/DL (ref 0–1.2)
BUN BLDV-MCNC: 27 MG/DL (ref 8–23)
CALCIUM SERPL-MCNC: 9.7 MG/DL (ref 8.6–10.2)
CHLORIDE BLD-SCNC: 103 MMOL/L (ref 98–107)
CHOLESTEROL, TOTAL: 126 MG/DL (ref 0–199)
CO2: 31 MMOL/L (ref 22–29)
CREAT SERPL-MCNC: 0.8 MG/DL (ref 0.5–1)
GFR AFRICAN AMERICAN: >60
GFR NON-AFRICAN AMERICAN: >60 ML/MIN/1.73
GLUCOSE BLD-MCNC: 96 MG/DL (ref 74–99)
HCT VFR BLD CALC: 36.1 % (ref 34–48)
HDLC SERPL-MCNC: 63 MG/DL
HEMOGLOBIN: 11.5 G/DL (ref 11.5–15.5)
LDL CHOLESTEROL CALCULATED: 51 MG/DL (ref 0–99)
MCH RBC QN AUTO: 28.5 PG (ref 26–35)
MCHC RBC AUTO-ENTMCNC: 31.9 % (ref 32–34.5)
MCV RBC AUTO: 89.4 FL (ref 80–99.9)
PDW BLD-RTO: 13.9 FL (ref 11.5–15)
PLATELET # BLD: 241 E9/L (ref 130–450)
PMV BLD AUTO: 9.4 FL (ref 7–12)
POTASSIUM SERPL-SCNC: 4.7 MMOL/L (ref 3.5–5)
RBC # BLD: 4.04 E12/L (ref 3.5–5.5)
SODIUM BLD-SCNC: 141 MMOL/L (ref 132–146)
TOTAL PROTEIN: 6.6 G/DL (ref 6.4–8.3)
TRIGL SERPL-MCNC: 58 MG/DL (ref 0–149)
TSH SERPL DL<=0.05 MIU/L-ACNC: 1.12 UIU/ML (ref 0.27–4.2)
VLDLC SERPL CALC-MCNC: 12 MG/DL
WBC # BLD: 4.8 E9/L (ref 4.5–11.5)

## 2019-04-08 PROCEDURE — 85027 COMPLETE CBC AUTOMATED: CPT

## 2019-04-08 PROCEDURE — 80053 COMPREHEN METABOLIC PANEL: CPT

## 2019-04-08 PROCEDURE — 84443 ASSAY THYROID STIM HORMONE: CPT

## 2019-04-08 PROCEDURE — 80061 LIPID PANEL: CPT

## 2019-04-08 PROCEDURE — 36415 COLL VENOUS BLD VENIPUNCTURE: CPT

## 2019-04-18 ENCOUNTER — OFFICE VISIT (OUTPATIENT)
Dept: PRIMARY CARE CLINIC | Age: 84
End: 2019-04-18
Payer: MEDICARE

## 2019-04-18 VITALS
HEART RATE: 72 BPM | DIASTOLIC BLOOD PRESSURE: 80 MMHG | BODY MASS INDEX: 31.55 KG/M2 | TEMPERATURE: 98.4 F | SYSTOLIC BLOOD PRESSURE: 120 MMHG | WEIGHT: 167 LBS

## 2019-04-18 DIAGNOSIS — R92.2 INCONCLUSIVE MAMMOGRAM: ICD-10-CM

## 2019-04-18 DIAGNOSIS — E78.2 MIXED HYPERLIPIDEMIA: Primary | ICD-10-CM

## 2019-04-18 DIAGNOSIS — E03.9 ACQUIRED HYPOTHYROIDISM: ICD-10-CM

## 2019-04-18 DIAGNOSIS — I10 ESSENTIAL HYPERTENSION: ICD-10-CM

## 2019-04-18 DIAGNOSIS — I25.10 CORONARY ARTERY DISEASE INVOLVING NATIVE CORONARY ARTERY OF NATIVE HEART WITHOUT ANGINA PECTORIS: ICD-10-CM

## 2019-04-18 PROBLEM — K62.5 RECTAL BLEED: Status: RESOLVED | Noted: 2018-12-13 | Resolved: 2019-04-18

## 2019-04-18 PROBLEM — K59.1 FUNCTIONAL DIARRHEA: Status: RESOLVED | Noted: 2018-08-16 | Resolved: 2019-04-18

## 2019-04-18 PROCEDURE — G8427 DOCREV CUR MEDS BY ELIG CLIN: HCPCS | Performed by: FAMILY MEDICINE

## 2019-04-18 PROCEDURE — 1036F TOBACCO NON-USER: CPT | Performed by: FAMILY MEDICINE

## 2019-04-18 PROCEDURE — 1090F PRES/ABSN URINE INCON ASSESS: CPT | Performed by: FAMILY MEDICINE

## 2019-04-18 PROCEDURE — 1123F ACP DISCUSS/DSCN MKR DOCD: CPT | Performed by: FAMILY MEDICINE

## 2019-04-18 PROCEDURE — 4040F PNEUMOC VAC/ADMIN/RCVD: CPT | Performed by: FAMILY MEDICINE

## 2019-04-18 PROCEDURE — 99213 OFFICE O/P EST LOW 20 MIN: CPT | Performed by: FAMILY MEDICINE

## 2019-04-18 PROCEDURE — G8599 NO ASA/ANTIPLAT THER USE RNG: HCPCS | Performed by: FAMILY MEDICINE

## 2019-04-18 PROCEDURE — G8417 CALC BMI ABV UP PARAM F/U: HCPCS | Performed by: FAMILY MEDICINE

## 2019-04-18 ASSESSMENT — ENCOUNTER SYMPTOMS
ALLERGIC/IMMUNOLOGIC NEGATIVE: 1
EYES NEGATIVE: 1
RESPIRATORY NEGATIVE: 1
GASTROINTESTINAL NEGATIVE: 1

## 2019-04-18 NOTE — PROGRESS NOTES
OFFICE PROGRESS NOTE      SUBJECTIVE:        Patient ID:   Paz Jones is a 80 y.o. female who presents for   Chief Complaint   Patient presents with    Hyperlipidemia     Here for recheck on Hypertension, Hyperlipidemia and Hypothyroidism. Patient reports feeling well. Lab work and mammogram done,here for review. HPI:   Patient feeling better  Lab work is within normal limit  Breast sonogram is nonconclusive  Radiologist has recommended to repeat in 6 months  Patient is not exercising        Prior to Admission medications    Medication Sig Start Date End Date Taking?  Authorizing Provider   atenolol (TENORMIN) 25 MG tablet Take 1 tablet by mouth daily 3/18/19  Yes Robyn Felty, MD   levothyroxine (SYNTHROID) 100 MCG tablet Take 1 tablet by mouth Daily 3/1/19  Yes Robyn Felty, MD   simvastatin (ZOCOR) 20 MG tablet Take 1 tablet by mouth every evening 11/19/18 11/19/19 Yes Pat Lawrence MD   Omega-3 Fatty Acids (OMEGA-3 PLUS PO) Take 2 capsules by mouth daily   Yes Historical Provider, MD   GARCINIA CAMBOGIA-CHROMIUM PO Take 1 tablet by mouth daily   Yes Historical Provider, MD   GARLIC PO Take 1 tablet by mouth daily   Yes Historical Provider, MD   hydrochlorothiazide (MICROZIDE) 12.5 MG capsule Take 12.5 mg by mouth daily   Yes Historical Provider, MD   isosorbide mononitrate (IMDUR) 30 MG extended release tablet Take 30 mg by mouth daily   Yes Historical Provider, MD   Pyridoxine HCl (B-6 PO) Take 1 tablet by mouth daily   Yes Historical Provider, MD   lisinopril (PRINIVIL;ZESTRIL) 20 MG tablet Take 20 mg by mouth daily  11/9/15  Yes Historical Provider, MD   Cholecalciferol (VITAMIN D-3 PO) Take by mouth daily   Yes Historical Provider, MD   vitamin B-12 (CYANOCOBALAMIN) 1000 MCG tablet Take 1,000 mcg by mouth daily   Yes Historical Provider, MD   Multiple Vitamin (ONE-A-DAY 55 PLUS PO) Take by mouth daily   Yes Historical Provider, MD neomycin-polymyxin-dexameth (MAXITROL) 3.5-68058-6.1 ophthalmic suspension instill 1 drop into affected eye once daily 10/17/18   Historical Provider, MD   triamcinolone (KENALOG) 0.025 % cream Apply topically 2 times daily. 11/26/18   Nithya Cazares MD        Social History     Socioeconomic History    Marital status:      Spouse name: None    Number of children: None    Years of education: None    Highest education level: None   Occupational History    Occupation: retired   Social Needs    Financial resource strain: None    Food insecurity:     Worry: None     Inability: None    Transportation needs:     Medical: None     Non-medical: None   Tobacco Use    Smoking status: Never Smoker    Smokeless tobacco: Never Used   Substance and Sexual Activity    Alcohol use: No    Drug use: No    Sexual activity: Never   Lifestyle    Physical activity:     Days per week: None     Minutes per session: None    Stress: None   Relationships    Social connections:     Talks on phone: None     Gets together: None     Attends [employer] service: None     Active member of club or organization: None     Attends meetings of clubs or organizations: None     Relationship status: None    Intimate partner violence:     Fear of current or ex partner: None     Emotionally abused: None     Physically abused: None     Forced sexual activity: None   Other Topics Concern    None   Social History Narrative    None       I have reviewed Jena's allergies, medications, problem list, medical, social and family history and have updated as needed in the electronic medical record  Review Of Systems:    Review of Systems   Constitutional: Negative. HENT: Negative. Eyes: Negative. Respiratory: Negative. Cardiovascular: Negative. Gastrointestinal: Negative. Endocrine: Negative. Musculoskeletal: Negative. Skin: Negative. Allergic/Immunologic: Negative. Neurological: Negative.     Hematological: Negative. Psychiatric/Behavioral: Negative. OBJECTIVE:     VS:  Wt Readings from Last 3 Encounters:   04/18/19 167 lb (75.8 kg)   03/18/19 162 lb 3.2 oz (73.6 kg)   12/13/18 165 lb (74.8 kg)     Temp Readings from Last 3 Encounters:   04/18/19 98.4 °F (36.9 °C) (Oral)   03/18/19 98.1 °F (36.7 °C) (Oral)   12/13/18 97 °F (36.1 °C) (Temporal)     BP Readings from Last 3 Encounters:   04/18/19 120/80   03/18/19 138/82   12/13/18 120/60        Physical Exam   Constitutional: She is oriented to person, place, and time. She appears well-developed and well-nourished. HENT:   Head: Normocephalic and atraumatic. Mouth/Throat: Oropharynx is clear and moist.   Eyes: Pupils are equal, round, and reactive to light. Conjunctivae are normal.   Neck: Normal range of motion. Neck supple. Cardiovascular: Normal rate, regular rhythm, normal heart sounds and intact distal pulses. Pulmonary/Chest: Effort normal and breath sounds normal.   Abdominal: Soft. Bowel sounds are normal.   Musculoskeletal: Normal range of motion. Neurological: She is alert and oriented to person, place, and time. Skin: Skin is warm and dry.    Psychiatric: Thought content normal.          Labs :    Lab Results   Component Value Date    WBC 4.8 04/08/2019    HGB 11.5 04/08/2019    HCT 36.1 04/08/2019     04/08/2019    CHOL 126 04/08/2019    TRIG 58 04/08/2019    HDL 63 04/08/2019    ALT 15 04/08/2019    AST 24 04/08/2019     04/08/2019    K 4.7 04/08/2019     04/08/2019    CREATININE 0.8 04/08/2019    BUN 27 (H) 04/08/2019    CO2 31 (H) 04/08/2019    TSH 1.120 04/08/2019    GLUF 85 06/28/2018     Lab Results   Component Value Date    COLORU yellow 07/02/2018    COLORU Yellow 06/26/2015    NITRU Negative 06/26/2015    GLUCOSEU negative 07/02/2018    GLUCOSEU Negative 06/26/2015    KETUA trace 07/02/2018    KETUA Negative 06/26/2015    UROBILINOGEN 0.2 06/26/2015    BILIRUBINUR negative 07/02/2018     No results found

## 2019-04-18 NOTE — PATIENT INSTRUCTIONS
Continue present treatment  Low-sodium low-fat diet  Regular exercises at his  Repeat the lab work  Return to clinic earlier if any problems  Repeat the breast studies again in 6 months

## 2019-05-23 RX ORDER — ATENOLOL 25 MG/1
TABLET ORAL
Qty: 30 TABLET | Refills: 1 | Status: SHIPPED | OUTPATIENT
Start: 2019-05-23 | End: 2019-07-18 | Stop reason: SDUPTHER

## 2019-06-13 DIAGNOSIS — E78.2 MIXED HYPERLIPIDEMIA: ICD-10-CM

## 2019-06-13 RX ORDER — SIMVASTATIN 20 MG
20 TABLET ORAL EVERY EVENING
Qty: 90 TABLET | Refills: 1 | Status: SHIPPED | OUTPATIENT
Start: 2019-06-13 | End: 2019-12-09 | Stop reason: SDUPTHER

## 2019-07-12 ENCOUNTER — HOSPITAL ENCOUNTER (OUTPATIENT)
Age: 84
Discharge: HOME | End: 2019-07-12
Payer: MEDICARE

## 2019-07-12 DIAGNOSIS — E78.2 MIXED HYPERLIPIDEMIA: ICD-10-CM

## 2019-07-12 LAB
ALBUMIN SERPL-MCNC: 4.1 G/DL (ref 3.5–5.2)
ALP BLD-CCNC: 97 U/L (ref 35–104)
ALT SERPL-CCNC: 15 U/L (ref 0–32)
ANION GAP SERPL CALCULATED.3IONS-SCNC: 9 MMOL/L (ref 7–16)
AST SERPL-CCNC: 21 U/L (ref 0–31)
BILIRUB SERPL-MCNC: 0.4 MG/DL (ref 0–1.2)
BUN BLDV-MCNC: 21 MG/DL (ref 8–23)
CALCIUM SERPL-MCNC: 9.8 MG/DL (ref 8.6–10.2)
CHLORIDE BLD-SCNC: 102 MMOL/L (ref 98–107)
CHOLESTEROL, TOTAL: 151 MG/DL (ref 0–199)
CO2: 31 MMOL/L (ref 22–29)
CREAT SERPL-MCNC: 0.8 MG/DL (ref 0.5–1)
GFR AFRICAN AMERICAN: >60
GFR NON-AFRICAN AMERICAN: >60 ML/MIN/1.73
GLUCOSE BLD-MCNC: 99 MG/DL (ref 74–99)
HDLC SERPL-MCNC: 65 MG/DL
LDL CHOLESTEROL CALCULATED: 69 MG/DL (ref 0–99)
POTASSIUM SERPL-SCNC: 4.6 MMOL/L (ref 3.5–5)
SODIUM BLD-SCNC: 142 MMOL/L (ref 132–146)
TOTAL PROTEIN: 6.8 G/DL (ref 6.4–8.3)
TRIGL SERPL-MCNC: 86 MG/DL (ref 0–149)
VLDLC SERPL CALC-MCNC: 17 MG/DL

## 2019-07-12 PROCEDURE — 80053 COMPREHEN METABOLIC PANEL: CPT

## 2019-07-12 PROCEDURE — 80061 LIPID PANEL: CPT

## 2019-07-12 PROCEDURE — 36415 COLL VENOUS BLD VENIPUNCTURE: CPT

## 2019-07-18 ENCOUNTER — OFFICE VISIT (OUTPATIENT)
Dept: PRIMARY CARE CLINIC | Age: 84
End: 2019-07-18
Payer: MEDICARE

## 2019-07-18 VITALS
WEIGHT: 160 LBS | HEART RATE: 80 BPM | SYSTOLIC BLOOD PRESSURE: 130 MMHG | BODY MASS INDEX: 30.23 KG/M2 | DIASTOLIC BLOOD PRESSURE: 70 MMHG | TEMPERATURE: 98.6 F

## 2019-07-18 DIAGNOSIS — E03.9 ACQUIRED HYPOTHYROIDISM: ICD-10-CM

## 2019-07-18 DIAGNOSIS — E78.2 MIXED HYPERLIPIDEMIA: ICD-10-CM

## 2019-07-18 DIAGNOSIS — G45.9 TIA (TRANSIENT ISCHEMIC ATTACK): ICD-10-CM

## 2019-07-18 DIAGNOSIS — I77.9 CAROTID ARTERY DISEASE, UNSPECIFIED LATERALITY, UNSPECIFIED TYPE (HCC): ICD-10-CM

## 2019-07-18 DIAGNOSIS — I10 ESSENTIAL HYPERTENSION: Primary | ICD-10-CM

## 2019-07-18 PROCEDURE — 1036F TOBACCO NON-USER: CPT | Performed by: FAMILY MEDICINE

## 2019-07-18 PROCEDURE — G8417 CALC BMI ABV UP PARAM F/U: HCPCS | Performed by: FAMILY MEDICINE

## 2019-07-18 PROCEDURE — G8599 NO ASA/ANTIPLAT THER USE RNG: HCPCS | Performed by: FAMILY MEDICINE

## 2019-07-18 PROCEDURE — 99214 OFFICE O/P EST MOD 30 MIN: CPT | Performed by: FAMILY MEDICINE

## 2019-07-18 PROCEDURE — G8427 DOCREV CUR MEDS BY ELIG CLIN: HCPCS | Performed by: FAMILY MEDICINE

## 2019-07-18 PROCEDURE — 1090F PRES/ABSN URINE INCON ASSESS: CPT | Performed by: FAMILY MEDICINE

## 2019-07-18 PROCEDURE — 4040F PNEUMOC VAC/ADMIN/RCVD: CPT | Performed by: FAMILY MEDICINE

## 2019-07-18 PROCEDURE — 1123F ACP DISCUSS/DSCN MKR DOCD: CPT | Performed by: FAMILY MEDICINE

## 2019-07-18 RX ORDER — ATENOLOL 25 MG/1
25 TABLET ORAL DAILY
Qty: 90 TABLET | Refills: 1 | Status: SHIPPED
Start: 2019-07-18 | End: 2020-02-10 | Stop reason: SDUPTHER

## 2019-07-18 NOTE — PROGRESS NOTES
04/18/19 167 lb (75.8 kg)   03/18/19 162 lb 3.2 oz (73.6 kg)     Temp Readings from Last 3 Encounters:   07/18/19 98.6 °F (37 °C) (Oral)   04/18/19 98.4 °F (36.9 °C) (Oral)   03/18/19 98.1 °F (36.7 °C) (Oral)     BP Readings from Last 3 Encounters:   07/18/19 130/70   04/18/19 120/80   03/18/19 138/82        Physical Exam   Constitutional: She is oriented to person, place, and time. She appears well-developed and well-nourished. HENT:   Head: Normocephalic and atraumatic. Mouth/Throat: Oropharynx is clear and moist.   Eyes: Pupils are equal, round, and reactive to light. Conjunctivae are normal.   Neck: Normal range of motion. Neck supple. Cardiovascular: Normal rate, regular rhythm, normal heart sounds and intact distal pulses. Pulmonary/Chest: Effort normal and breath sounds normal.   Abdominal: Soft. Bowel sounds are normal.   Musculoskeletal: Normal range of motion. Neurological: She is alert and oriented to person, place, and time. Skin: Skin is warm and dry.    Psychiatric: Thought content normal.          Labs :    Lab Results   Component Value Date    WBC 4.8 04/08/2019    HGB 11.5 04/08/2019    HCT 36.1 04/08/2019     04/08/2019    CHOL 151 07/12/2019    TRIG 86 07/12/2019    HDL 65 07/12/2019    ALT 15 07/12/2019    AST 21 07/12/2019     07/12/2019    K 4.6 07/12/2019     07/12/2019    CREATININE 0.8 07/12/2019    BUN 21 07/12/2019    CO2 31 (H) 07/12/2019    TSH 1.120 04/08/2019    GLUF 85 06/28/2018     Lab Results   Component Value Date    COLORU yellow 07/02/2018    COLORU Yellow 06/26/2015    NITRU Negative 06/26/2015    GLUCOSEU negative 07/02/2018    GLUCOSEU Negative 06/26/2015    KETUA trace 07/02/2018    KETUA Negative 06/26/2015    UROBILINOGEN 0.2 06/26/2015    BILIRUBINUR negative 07/02/2018     No results found for: PSA, CEA, , [id number],       Controlled Substances Monitoring:                                    ASSESSMENT     Patient Active Problem List

## 2019-07-19 ENCOUNTER — HOSPITAL ENCOUNTER (OUTPATIENT)
Dept: CT IMAGING | Age: 84
Discharge: HOME | End: 2019-07-19
Payer: MEDICARE

## 2019-07-19 DIAGNOSIS — G45.9 TIA (TRANSIENT ISCHEMIC ATTACK): ICD-10-CM

## 2019-07-19 PROCEDURE — 70470 CT HEAD/BRAIN W/O & W/DYE: CPT

## 2019-07-19 PROCEDURE — 6360000004 HC RX CONTRAST MEDICATION: Performed by: RADIOLOGY

## 2019-07-19 RX ADMIN — IOPAMIDOL 50 ML: 755 INJECTION, SOLUTION INTRAVENOUS at 16:25

## 2019-08-19 ENCOUNTER — OFFICE VISIT (OUTPATIENT)
Dept: PRIMARY CARE CLINIC | Age: 84
End: 2019-08-19
Payer: MEDICARE

## 2019-08-19 VITALS
WEIGHT: 164 LBS | SYSTOLIC BLOOD PRESSURE: 130 MMHG | BODY MASS INDEX: 30.99 KG/M2 | DIASTOLIC BLOOD PRESSURE: 62 MMHG | TEMPERATURE: 99.2 F | HEART RATE: 80 BPM

## 2019-08-19 DIAGNOSIS — I10 ESSENTIAL HYPERTENSION: Primary | ICD-10-CM

## 2019-08-19 DIAGNOSIS — I25.10 CORONARY ARTERY DISEASE INVOLVING NATIVE CORONARY ARTERY OF NATIVE HEART WITHOUT ANGINA PECTORIS: ICD-10-CM

## 2019-08-19 DIAGNOSIS — E78.2 MIXED HYPERLIPIDEMIA: ICD-10-CM

## 2019-08-19 DIAGNOSIS — E03.9 ACQUIRED HYPOTHYROIDISM: ICD-10-CM

## 2019-08-19 DIAGNOSIS — G45.9 TIA (TRANSIENT ISCHEMIC ATTACK): ICD-10-CM

## 2019-08-19 PROCEDURE — G8599 NO ASA/ANTIPLAT THER USE RNG: HCPCS | Performed by: FAMILY MEDICINE

## 2019-08-19 PROCEDURE — G8427 DOCREV CUR MEDS BY ELIG CLIN: HCPCS | Performed by: FAMILY MEDICINE

## 2019-08-19 PROCEDURE — G8417 CALC BMI ABV UP PARAM F/U: HCPCS | Performed by: FAMILY MEDICINE

## 2019-08-19 PROCEDURE — 99214 OFFICE O/P EST MOD 30 MIN: CPT | Performed by: FAMILY MEDICINE

## 2019-08-19 PROCEDURE — 1090F PRES/ABSN URINE INCON ASSESS: CPT | Performed by: FAMILY MEDICINE

## 2019-08-19 PROCEDURE — 4040F PNEUMOC VAC/ADMIN/RCVD: CPT | Performed by: FAMILY MEDICINE

## 2019-08-19 PROCEDURE — 1036F TOBACCO NON-USER: CPT | Performed by: FAMILY MEDICINE

## 2019-08-19 PROCEDURE — 1123F ACP DISCUSS/DSCN MKR DOCD: CPT | Performed by: FAMILY MEDICINE

## 2019-08-19 RX ORDER — LEVOTHYROXINE SODIUM 0.1 MG/1
TABLET ORAL
Qty: 90 TABLET | Refills: 1 | Status: SHIPPED
Start: 2019-08-19 | End: 2020-02-14

## 2019-08-19 ASSESSMENT — ENCOUNTER SYMPTOMS
ALLERGIC/IMMUNOLOGIC NEGATIVE: 1
GASTROINTESTINAL NEGATIVE: 1
RESPIRATORY NEGATIVE: 1

## 2019-08-19 NOTE — PROGRESS NOTES
Last 3 Encounters:   08/19/19 99.2 °F (37.3 °C) (Oral)   07/18/19 98.6 °F (37 °C) (Oral)   04/18/19 98.4 °F (36.9 °C) (Oral)     BP Readings from Last 3 Encounters:   08/19/19 130/62   07/18/19 130/70   04/18/19 120/80        Physical Exam   Constitutional: She is oriented to person, place, and time. She appears well-developed and well-nourished. HENT:   Head: Normocephalic and atraumatic. Mouth/Throat: Oropharynx is clear and moist.   Eyes: Pupils are equal, round, and reactive to light. Conjunctivae are normal.   Neck: Normal range of motion. Neck supple. Cardiovascular: Normal rate, regular rhythm, normal heart sounds and intact distal pulses. Pulmonary/Chest: Effort normal and breath sounds normal.   Abdominal: Soft. Bowel sounds are normal.   Musculoskeletal: Normal range of motion. Neurological: She is alert and oriented to person, place, and time. Skin: Skin is warm and dry.    Psychiatric: Thought content normal.          Labs :    Lab Results   Component Value Date    WBC 4.8 04/08/2019    HGB 11.5 04/08/2019    HCT 36.1 04/08/2019     04/08/2019    CHOL 151 07/12/2019    TRIG 86 07/12/2019    HDL 65 07/12/2019    ALT 15 07/12/2019    AST 21 07/12/2019     07/12/2019    K 4.6 07/12/2019     07/12/2019    CREATININE 0.8 07/12/2019    BUN 21 07/12/2019    CO2 31 (H) 07/12/2019    TSH 1.120 04/08/2019    GLUF 85 06/28/2018     Lab Results   Component Value Date    COLORU yellow 07/02/2018    COLORU Yellow 06/26/2015    NITRU Negative 06/26/2015    GLUCOSEU negative 07/02/2018    GLUCOSEU Negative 06/26/2015    KETUA trace 07/02/2018    KETUA Negative 06/26/2015    UROBILINOGEN 0.2 06/26/2015    BILIRUBINUR negative 07/02/2018     No results found for: PSA, CEA, , [id number],       Controlled Substances Monitoring:                                    ASSESSMENT     Patient Active Problem List    Diagnosis Date Noted    Coronary artery disease involving native coronary artery

## 2019-09-04 ENCOUNTER — OFFICE VISIT (OUTPATIENT)
Dept: NEUROLOGY | Age: 84
End: 2019-09-04
Payer: MEDICARE

## 2019-09-04 VITALS
HEIGHT: 64 IN | SYSTOLIC BLOOD PRESSURE: 170 MMHG | WEIGHT: 164 LBS | HEART RATE: 76 BPM | BODY MASS INDEX: 28 KG/M2 | DIASTOLIC BLOOD PRESSURE: 90 MMHG

## 2019-09-04 DIAGNOSIS — R42 VERTIGO: Primary | ICD-10-CM

## 2019-09-04 DIAGNOSIS — G46.3 BRAIN STEM STROKE SYNDROME: ICD-10-CM

## 2019-09-04 DIAGNOSIS — H81.09 MENIERE'S DISEASE, UNSPECIFIED LATERALITY: ICD-10-CM

## 2019-09-04 PROCEDURE — 99205 OFFICE O/P NEW HI 60 MIN: CPT | Performed by: PSYCHIATRY & NEUROLOGY

## 2019-09-04 RX ORDER — MECLIZINE HCL 12.5 MG/1
12.5 TABLET ORAL 3 TIMES DAILY PRN
Qty: 15 TABLET | Refills: 0 | Status: SHIPPED | OUTPATIENT
Start: 2019-09-04 | End: 2019-09-14

## 2019-09-04 ASSESSMENT — ENCOUNTER SYMPTOMS
SHORTNESS OF BREATH: 0
VOMITING: 0
NAUSEA: 0
TROUBLE SWALLOWING: 0
PHOTOPHOBIA: 0

## 2019-09-04 NOTE — PROGRESS NOTES
euthymic. CRANIAL NERVES:  CN I: Not tested. CN II: Fundoscopic exam deferred. CN III, IV, VI: Pupils equal, round and reactive to light. Extraocular movements full and intact. Smooth pursuit noted for saccadic breakdown. CN V: Facial sensation intact. CN VII: Facial asymmetry absent. CN VIII: Hearing grossly normal and equal bilaterally. No pathologic nystagmus. CN IX, X: Palate elevates symmetrically. Speech/articulation is clear without dysarthria. CN XI: Shoulder shrug and chin rotation equal with good strength. CN XII: Tongue protrusion midline. MOTOR:   Muscle bulk normal. Tone normal and symmetric throughout. Abnormal movements absent. Tremor: not present at rest.  Strength 5/5 throughout. REFLEXES:  DTRs 2+ throughout. Plantar response downgoing bilaterally. SENSATION:   Fine touch grossly intact throughout. Pain/temperature  grossly intact throughout. Vibration/position  grossly intact throughout. COORDINATION: normal finger-to-nose. STATION: normal.  GAIT: normal.  Nystagmus was noted with the right lateral gaze with fast phase towards the right.   DIAGNOSTIC TESTS:   Sodium   Date Value Ref Range Status   07/12/2019 142 132 - 146 mmol/L Final   04/08/2019 141 132 - 146 mmol/L Final   11/21/2018 141 132 - 146 mmol/L Final     Potassium   Date Value Ref Range Status   07/12/2019 4.6 3.5 - 5.0 mmol/L Final   04/08/2019 4.7 3.5 - 5.0 mmol/L Final   11/21/2018 4.7 3.5 - 5.0 mmol/L Final     Chloride   Date Value Ref Range Status   07/12/2019 102 98 - 107 mmol/L Final   04/08/2019 103 98 - 107 mmol/L Final   11/21/2018 102 98 - 107 mmol/L Final     CO2   Date Value Ref Range Status   07/12/2019 31 (H) 22 - 29 mmol/L Final   04/08/2019 31 (H) 22 - 29 mmol/L Final   11/21/2018 31 (H) 22 - 29 mmol/L Final     BUN   Date Value Ref Range Status   07/12/2019 21 8 - 23 mg/dL Final   04/08/2019 27 (H) 8 - 23 mg/dL Final   11/21/2018 26 (H) 8 - 23 mg/dL Final     CREATININE   Date Value Final     Total Protein   Date Value Ref Range Status   07/12/2019 6.8 6.4 - 8.3 g/dL Final   04/08/2019 6.6 6.4 - 8.3 g/dL Final   11/21/2018 7.4 6.4 - 8.3 g/dL Final     Total Bilirubin   Date Value Ref Range Status   07/12/2019 0.4 0.0 - 1.2 mg/dL Final   04/08/2019 0.3 0.0 - 1.2 mg/dL Final   11/21/2018 0.3 0.0 - 1.2 mg/dL Final     Alkaline Phosphatase   Date Value Ref Range Status   07/12/2019 97 35 - 104 U/L Final   04/08/2019 94 35 - 104 U/L Final   11/21/2018 78 35 - 104 U/L Final     ALT   Date Value Ref Range Status   07/12/2019 15 0 - 32 U/L Final   04/08/2019 15 0 - 32 U/L Final   11/21/2018 17 0 - 32 U/L Final     AST   Date Value Ref Range Status   07/12/2019 21 0 - 31 U/L Final   04/08/2019 24 0 - 31 U/L Final   11/21/2018 23 0 - 31 U/L Final     No results found for: PTT, INR  Lab Results   Component Value Date    TRIG 86 07/12/2019    HDL 65 07/12/2019     No components found for: HGBA1C  No results found for: PROTEINCSF, GLUCCSF, WBCCSF    Controlled Substance Monitoring:    Acute and Chronic Pain Monitoring:   No flowsheet data found. MEDICAL DECISION MAKING  ASSESSMENT/PLAN    Waldo Bruce was seen today for transient ischemic attack. Diagnoses and all orders for this visit:    Vertigo  -     meclizine (ANTIVERT) 12.5 MG tablet; Take 1 tablet by mouth 3 times daily as needed for Dizziness    Brain stem stroke syndrome    Meniere's disease, unspecified laterality       · I have recommended MRI brain to look for any brainstem stroke and MRA head and neck to look for intracranial stenosis. However the patient would like to hold off on it. · The patient has hearing loss associated with ringing in her ear and vertigo, the other differential diagnosis also includes Ménière's disease, she is already on hydrochlorothiazide, however the patient does eat a lot of packaged food like chips which has a lot of salt in it.   I have advised her to decrease her salt intake and we will monitor her for resolution of symptoms. She would like to hold off on ENT consultation at this time. · Meclizine as needed for dizziness. Return in about 6 months (around 3/4/2020). Today, I personally spent 60 minutes in direct face-to-face time with the patient, of which greater than 50% was spent in patient education, counseling,about etiology causes diagnosis and management of central and peripheral vertigo, Ménière's disease, brainstem stroke, stroke prevention strategies, importance of diet and exercise and coordination of care as described above.         Jennifer Monroe MD    [facility]  [address] [place] [address]

## 2019-09-19 ENCOUNTER — OFFICE VISIT (OUTPATIENT)
Dept: PRIMARY CARE CLINIC | Age: 84
End: 2019-09-19
Payer: MEDICARE

## 2019-09-19 VITALS
WEIGHT: 161 LBS | BODY MASS INDEX: 27.64 KG/M2 | SYSTOLIC BLOOD PRESSURE: 116 MMHG | HEART RATE: 76 BPM | DIASTOLIC BLOOD PRESSURE: 82 MMHG | TEMPERATURE: 98.2 F

## 2019-09-19 DIAGNOSIS — E78.2 MIXED HYPERLIPIDEMIA: ICD-10-CM

## 2019-09-19 DIAGNOSIS — Z23 NEED FOR PROPHYLACTIC VACCINATION AND INOCULATION AGAINST INFLUENZA: Primary | ICD-10-CM

## 2019-09-19 DIAGNOSIS — E03.9 ACQUIRED HYPOTHYROIDISM: ICD-10-CM

## 2019-09-19 DIAGNOSIS — I10 ESSENTIAL HYPERTENSION: ICD-10-CM

## 2019-09-19 DIAGNOSIS — R42 VERTIGO: ICD-10-CM

## 2019-09-19 DIAGNOSIS — I25.10 CORONARY ARTERY DISEASE INVOLVING NATIVE CORONARY ARTERY OF NATIVE HEART WITHOUT ANGINA PECTORIS: ICD-10-CM

## 2019-09-19 PROCEDURE — 99214 OFFICE O/P EST MOD 30 MIN: CPT | Performed by: FAMILY MEDICINE

## 2019-09-19 PROCEDURE — G8427 DOCREV CUR MEDS BY ELIG CLIN: HCPCS | Performed by: FAMILY MEDICINE

## 2019-09-19 PROCEDURE — 4040F PNEUMOC VAC/ADMIN/RCVD: CPT | Performed by: FAMILY MEDICINE

## 2019-09-19 PROCEDURE — G8417 CALC BMI ABV UP PARAM F/U: HCPCS | Performed by: FAMILY MEDICINE

## 2019-09-19 PROCEDURE — 1036F TOBACCO NON-USER: CPT | Performed by: FAMILY MEDICINE

## 2019-09-19 PROCEDURE — G8599 NO ASA/ANTIPLAT THER USE RNG: HCPCS | Performed by: FAMILY MEDICINE

## 2019-09-19 PROCEDURE — 90756 CCIIV4 VACC ABX FREE IM: CPT | Performed by: FAMILY MEDICINE

## 2019-09-19 PROCEDURE — G0008 ADMIN INFLUENZA VIRUS VAC: HCPCS | Performed by: FAMILY MEDICINE

## 2019-09-19 PROCEDURE — 1123F ACP DISCUSS/DSCN MKR DOCD: CPT | Performed by: FAMILY MEDICINE

## 2019-09-19 PROCEDURE — 1090F PRES/ABSN URINE INCON ASSESS: CPT | Performed by: FAMILY MEDICINE

## 2019-09-19 RX ORDER — MECLIZINE HCL 12.5 MG/1
12.5 TABLET ORAL 3 TIMES DAILY PRN
COMMUNITY
End: 2020-08-13 | Stop reason: SDUPTHER

## 2019-09-19 ASSESSMENT — ENCOUNTER SYMPTOMS
RESPIRATORY NEGATIVE: 1
EYES NEGATIVE: 1
ALLERGIC/IMMUNOLOGIC NEGATIVE: 1
GASTROINTESTINAL NEGATIVE: 1

## 2019-09-19 NOTE — PROGRESS NOTES
ASSESSMENT     Patient Active Problem List    Diagnosis Date Noted    Coronary artery disease involving native coronary artery of native heart without angina pectoris 11/26/2018    Hypertension 02/19/2018    Hypothyroidism 02/19/2018    Hyperlipidemia 02/19/2018    Osteoporosis 02/19/2018        Diagnosis:     ICD-10-CM    1. Need for prophylactic vaccination and inoculation against influenza Z23 INFLUENZA, MDCK QUADV, 4 YRS AND OLDER, IM, MDV, 0.5ML (FLUCELVAX QUADV)   2. Essential hypertension I10    3. Mixed hyperlipidemia E78.2    4. Acquired hypothyroidism E03.9    5. Coronary artery disease involving native coronary artery of native heart without angina pectoris I25.10    6. Vertigo R42        PLAN:   Continue present treatment  Low-sodium low-fat diet  Regular exercises  Vertigo exercises  Low with the consultants  Clinic earlier if any problems        Patient Instructions   Continue present treatment  Low-sodium low-fat diet  Regular exercises  Vertigo exercises  Follow-up with the consultants  Gweneth November for the wellness exam  Return to clinic earlier for any other problems      Return in about 4 weeks (around 10/17/2019) for annaul visit. Susana Rivas reviewed my findings and recommendations with Raegan Hatch.     Electronicallysigned by Kristyn Shaver MD on 9/19/19 at 2:14 PM

## 2019-10-17 ENCOUNTER — OFFICE VISIT (OUTPATIENT)
Dept: PRIMARY CARE CLINIC | Age: 84
End: 2019-10-17
Payer: MEDICARE

## 2019-10-17 VITALS
HEIGHT: 62 IN | DIASTOLIC BLOOD PRESSURE: 80 MMHG | SYSTOLIC BLOOD PRESSURE: 136 MMHG | WEIGHT: 164 LBS | BODY MASS INDEX: 30.18 KG/M2 | HEART RATE: 80 BPM | TEMPERATURE: 98 F

## 2019-10-17 DIAGNOSIS — I25.10 CORONARY ARTERY DISEASE INVOLVING NATIVE CORONARY ARTERY OF NATIVE HEART WITHOUT ANGINA PECTORIS: ICD-10-CM

## 2019-10-17 DIAGNOSIS — Z00.00 ROUTINE GENERAL MEDICAL EXAMINATION AT A HEALTH CARE FACILITY: ICD-10-CM

## 2019-10-17 DIAGNOSIS — I10 ESSENTIAL HYPERTENSION: Primary | ICD-10-CM

## 2019-10-17 DIAGNOSIS — E78.2 MIXED HYPERLIPIDEMIA: ICD-10-CM

## 2019-10-17 DIAGNOSIS — E03.9 ACQUIRED HYPOTHYROIDISM: ICD-10-CM

## 2019-10-17 PROCEDURE — G0438 PPPS, INITIAL VISIT: HCPCS | Performed by: FAMILY MEDICINE

## 2019-10-17 PROCEDURE — 1123F ACP DISCUSS/DSCN MKR DOCD: CPT | Performed by: FAMILY MEDICINE

## 2019-10-17 PROCEDURE — G8599 NO ASA/ANTIPLAT THER USE RNG: HCPCS | Performed by: FAMILY MEDICINE

## 2019-10-17 PROCEDURE — G8482 FLU IMMUNIZE ORDER/ADMIN: HCPCS | Performed by: FAMILY MEDICINE

## 2019-10-17 PROCEDURE — 4040F PNEUMOC VAC/ADMIN/RCVD: CPT | Performed by: FAMILY MEDICINE

## 2019-10-17 ASSESSMENT — PATIENT HEALTH QUESTIONNAIRE - PHQ9
SUM OF ALL RESPONSES TO PHQ QUESTIONS 1-9: 0
SUM OF ALL RESPONSES TO PHQ QUESTIONS 1-9: 0

## 2019-10-17 ASSESSMENT — LIFESTYLE VARIABLES: HOW OFTEN DO YOU HAVE A DRINK CONTAINING ALCOHOL: 0

## 2019-11-12 ENCOUNTER — TELEPHONE (OUTPATIENT)
Dept: PRIMARY CARE CLINIC | Age: 84
End: 2019-11-12

## 2019-11-12 DIAGNOSIS — R92.8 ABNORMAL FINDINGS ON DIAGNOSTIC IMAGING OF BREAST: Primary | ICD-10-CM

## 2019-12-10 DIAGNOSIS — R92.8 ABNORMAL FINDINGS ON DIAGNOSTIC IMAGING OF BREAST: ICD-10-CM

## 2020-01-11 ENCOUNTER — HOSPITAL ENCOUNTER (OUTPATIENT)
Age: 85
Discharge: HOME | End: 2020-01-11
Payer: MEDICARE

## 2020-01-11 LAB
ALBUMIN SERPL-MCNC: 4 G/DL (ref 3.5–5.2)
ALP BLD-CCNC: 83 U/L (ref 35–104)
ALT SERPL-CCNC: 17 U/L (ref 0–32)
ANION GAP SERPL CALCULATED.3IONS-SCNC: 10 MMOL/L (ref 7–16)
AST SERPL-CCNC: 22 U/L (ref 0–31)
BASOPHILS ABSOLUTE: 0.01 E9/L (ref 0–0.2)
BASOPHILS RELATIVE PERCENT: 0.2 % (ref 0–2)
BILIRUB SERPL-MCNC: 0.5 MG/DL (ref 0–1.2)
BUN BLDV-MCNC: 17 MG/DL (ref 8–23)
CALCIUM SERPL-MCNC: 10.3 MG/DL (ref 8.6–10.2)
CHLORIDE BLD-SCNC: 98 MMOL/L (ref 98–107)
CHOLESTEROL, TOTAL: 137 MG/DL (ref 0–199)
CO2: 30 MMOL/L (ref 22–29)
CREAT SERPL-MCNC: 0.8 MG/DL (ref 0.5–1)
EOSINOPHILS ABSOLUTE: 0.08 E9/L (ref 0.05–0.5)
EOSINOPHILS RELATIVE PERCENT: 1.3 % (ref 0–6)
GFR AFRICAN AMERICAN: >60
GFR NON-AFRICAN AMERICAN: >60 ML/MIN/1.73
GLUCOSE BLD-MCNC: 98 MG/DL (ref 74–99)
HCT VFR BLD CALC: 37.5 % (ref 34–48)
HDLC SERPL-MCNC: 66 MG/DL
HEMOGLOBIN: 12.3 G/DL (ref 11.5–15.5)
IMMATURE GRANULOCYTES #: 0.01 E9/L
IMMATURE GRANULOCYTES %: 0.2 % (ref 0–5)
LDL CHOLESTEROL CALCULATED: 58 MG/DL (ref 0–99)
LYMPHOCYTES ABSOLUTE: 1.1 E9/L (ref 1.5–4)
LYMPHOCYTES RELATIVE PERCENT: 17.7 % (ref 20–42)
MCH RBC QN AUTO: 29.5 PG (ref 26–35)
MCHC RBC AUTO-ENTMCNC: 32.8 % (ref 32–34.5)
MCV RBC AUTO: 89.9 FL (ref 80–99.9)
MONOCYTES ABSOLUTE: 0.61 E9/L (ref 0.1–0.95)
MONOCYTES RELATIVE PERCENT: 9.8 % (ref 2–12)
NEUTROPHILS ABSOLUTE: 4.42 E9/L (ref 1.8–7.3)
NEUTROPHILS RELATIVE PERCENT: 70.8 % (ref 43–80)
PDW BLD-RTO: 14.7 FL (ref 11.5–15)
PLATELET # BLD: 374 E9/L (ref 130–450)
PMV BLD AUTO: 9.2 FL (ref 7–12)
POTASSIUM SERPL-SCNC: 4.8 MMOL/L (ref 3.5–5)
RBC # BLD: 4.17 E12/L (ref 3.5–5.5)
SODIUM BLD-SCNC: 138 MMOL/L (ref 132–146)
TOTAL PROTEIN: 7 G/DL (ref 6.4–8.3)
TRIGL SERPL-MCNC: 67 MG/DL (ref 0–149)
TSH SERPL DL<=0.05 MIU/L-ACNC: 0.69 UIU/ML (ref 0.27–4.2)
VLDLC SERPL CALC-MCNC: 13 MG/DL
WBC # BLD: 6.2 E9/L (ref 4.5–11.5)

## 2020-01-11 PROCEDURE — 36415 COLL VENOUS BLD VENIPUNCTURE: CPT

## 2020-01-11 PROCEDURE — 85025 COMPLETE CBC W/AUTO DIFF WBC: CPT

## 2020-01-11 PROCEDURE — 80061 LIPID PANEL: CPT

## 2020-01-11 PROCEDURE — 80053 COMPREHEN METABOLIC PANEL: CPT

## 2020-01-11 PROCEDURE — 84443 ASSAY THYROID STIM HORMONE: CPT

## 2020-01-16 ENCOUNTER — OFFICE VISIT (OUTPATIENT)
Dept: PRIMARY CARE CLINIC | Age: 85
End: 2020-01-16
Payer: MEDICARE

## 2020-01-16 VITALS
BODY MASS INDEX: 28.76 KG/M2 | WEIGHT: 156 LBS | TEMPERATURE: 98.4 F | DIASTOLIC BLOOD PRESSURE: 70 MMHG | HEART RATE: 60 BPM | SYSTOLIC BLOOD PRESSURE: 136 MMHG

## 2020-01-16 PROCEDURE — 4040F PNEUMOC VAC/ADMIN/RCVD: CPT | Performed by: FAMILY MEDICINE

## 2020-01-16 PROCEDURE — G8427 DOCREV CUR MEDS BY ELIG CLIN: HCPCS | Performed by: FAMILY MEDICINE

## 2020-01-16 PROCEDURE — 1123F ACP DISCUSS/DSCN MKR DOCD: CPT | Performed by: FAMILY MEDICINE

## 2020-01-16 PROCEDURE — G8417 CALC BMI ABV UP PARAM F/U: HCPCS | Performed by: FAMILY MEDICINE

## 2020-01-16 PROCEDURE — 99214 OFFICE O/P EST MOD 30 MIN: CPT | Performed by: FAMILY MEDICINE

## 2020-01-16 PROCEDURE — G8482 FLU IMMUNIZE ORDER/ADMIN: HCPCS | Performed by: FAMILY MEDICINE

## 2020-01-16 PROCEDURE — 1090F PRES/ABSN URINE INCON ASSESS: CPT | Performed by: FAMILY MEDICINE

## 2020-01-16 PROCEDURE — 1036F TOBACCO NON-USER: CPT | Performed by: FAMILY MEDICINE

## 2020-01-16 ASSESSMENT — ENCOUNTER SYMPTOMS
EYES NEGATIVE: 1
ALLERGIC/IMMUNOLOGIC NEGATIVE: 1
GASTROINTESTINAL NEGATIVE: 1
RESPIRATORY NEGATIVE: 1

## 2020-01-16 ASSESSMENT — PATIENT HEALTH QUESTIONNAIRE - PHQ9
SUM OF ALL RESPONSES TO PHQ9 QUESTIONS 1 & 2: 0
2. FEELING DOWN, DEPRESSED OR HOPELESS: 0
SUM OF ALL RESPONSES TO PHQ QUESTIONS 1-9: 0
1. LITTLE INTEREST OR PLEASURE IN DOING THINGS: 0
SUM OF ALL RESPONSES TO PHQ QUESTIONS 1-9: 0

## 2020-01-16 NOTE — PROGRESS NOTES
01/16/20 98.4 °F (36.9 °C) (Oral)   10/17/19 98 °F (36.7 °C) (Oral)   09/19/19 98.2 °F (36.8 °C) (Oral)     BP Readings from Last 3 Encounters:   01/16/20 136/70   10/17/19 136/80   09/19/19 116/82        Physical Exam  Constitutional:       Appearance: She is well-developed. HENT:      Head: Normocephalic and atraumatic. Eyes:      Conjunctiva/sclera: Conjunctivae normal.      Pupils: Pupils are equal, round, and reactive to light. Neck:      Musculoskeletal: Normal range of motion and neck supple. Cardiovascular:      Rate and Rhythm: Normal rate and regular rhythm. Heart sounds: Normal heart sounds. Pulmonary:      Effort: Pulmonary effort is normal.      Breath sounds: Normal breath sounds. Abdominal:      General: Bowel sounds are normal.      Palpations: Abdomen is soft. Musculoskeletal: Normal range of motion. Skin:     General: Skin is warm and dry. Neurological:      Mental Status: She is alert and oriented to person, place, and time. Psychiatric:         Thought Content:  Thought content normal.            Labs :    Lab Results   Component Value Date    WBC 6.2 01/11/2020    HGB 12.3 01/11/2020    HCT 37.5 01/11/2020     01/11/2020    CHOL 137 01/11/2020    TRIG 67 01/11/2020    HDL 66 01/11/2020    ALT 17 01/11/2020    AST 22 01/11/2020     01/11/2020    K 4.8 01/11/2020    CL 98 01/11/2020    CREATININE 0.8 01/11/2020    BUN 17 01/11/2020    CO2 30 (H) 01/11/2020    TSH 0.694 01/11/2020    GLUF 85 06/28/2018     Lab Results   Component Value Date    COLORU yellow 07/02/2018    COLORU Yellow 06/26/2015    NITRU Negative 06/26/2015    GLUCOSEU negative 07/02/2018    GLUCOSEU Negative 06/26/2015    KETUA trace 07/02/2018    KETUA Negative 06/26/2015    UROBILINOGEN 0.2 06/26/2015    BILIRUBINUR negative 07/02/2018     No results found for: PSA, CEA, , [id number],       Controlled Substances Monitoring:                                    ASSESSMENT     Patient Active Problem List    Diagnosis Date Noted    Coronary artery disease involving native coronary artery of native heart without angina pectoris 11/26/2018    Hypertension 02/19/2018    Hypothyroidism 02/19/2018    Hyperlipidemia 02/19/2018    Osteoporosis 02/19/2018        Diagnosis:     ICD-10-CM    1. Mixed hyperlipidemia E78.2 CBC WITH AUTO DIFFERENTIAL     Comprehensive Metabolic Panel     Lipid Panel   2. Essential hypertension I10 CBC WITH AUTO DIFFERENTIAL     Comprehensive Metabolic Panel   3. Acquired hypothyroidism E03.9 CBC WITH AUTO DIFFERENTIAL   4. Coronary artery disease involving native coronary artery of native heart without angina pectoris I25.10 CBC WITH AUTO DIFFERENTIAL       PLAN:   Continue present treatment  Low-sodium low-fat diet  Regular exercises  Follow-up with the consultants  Return to clinic earlier if any problems        Patient Instructions   Continue present treatment  Low-sodium low-fat diet  Regular exercises  Repeat the lab work  Return to clinic earlier if any problems      No follow-ups on file. Charisma Ford reviewed my findings and recommendations with José Brumfield.     Electronicallysigned by Chase Carmona MD on 1/16/20 at 2:36 PM

## 2020-02-10 RX ORDER — ATENOLOL 25 MG/1
25 TABLET ORAL DAILY
Qty: 90 TABLET | Refills: 1 | Status: SHIPPED
Start: 2020-02-10 | End: 2020-09-14

## 2020-02-14 RX ORDER — LEVOTHYROXINE SODIUM 0.1 MG/1
TABLET ORAL
Qty: 90 TABLET | Refills: 1 | Status: SHIPPED
Start: 2020-02-14 | End: 2020-09-16

## 2020-03-12 RX ORDER — SIMVASTATIN 20 MG
20 TABLET ORAL EVERY EVENING
Qty: 90 TABLET | Refills: 0 | OUTPATIENT
Start: 2020-03-12 | End: 2021-03-12

## 2020-06-24 ENCOUNTER — TELEPHONE (OUTPATIENT)
Dept: ADMINISTRATIVE | Age: 85
End: 2020-06-24

## 2020-06-24 ENCOUNTER — NURSE TRIAGE (OUTPATIENT)
Dept: OTHER | Facility: CLINIC | Age: 85
End: 2020-06-24

## 2020-06-24 NOTE — TELEPHONE ENCOUNTER
Nurse triage wanted patient seen today. Dr [facility] office said patient needs to go to emergency room because she has a history of pneumonia. Patient said she will go today.

## 2020-06-24 NOTE — TELEPHONE ENCOUNTER
80 y.o calling with c/o CHF flare with moderate SOB and cough  for last few days; not on O2, but would like to request such. May need a med adjustment. Hospitalized in March 2020 for 21 days with pneumonia and was O2 there. Reason for Disposition   Longstanding difficulty breathing (e.g., CHF, COPD, emphysema) and worse than normal    Answer Assessment - Initial Assessment Questions  1. RESPIRATORY STATUS: \"Describe your breathing? \" (e.g., wheezing, shortness of breath, unable to speak, severe coughing)       SOB, labored, wheezing at times with a cough  2. ONSET: \"When did this breathing problem begin? \"       Last few days  3. PATTERN \"Does the difficult breathing come and go, or has it been constant since it started? \"       Intermittent; lasts for a few hours at a time. 4. SEVERITY: \"How bad is your breathing? \" (e.g., mild, moderate, severe)     - MILD: No SOB at rest, mild SOB with walking, speaks normally in sentences, can lay down, no retractions, pulse < 100.     - MODERATE: SOB at rest, SOB with minimal exertion and prefers to sit, cannot lie down flat, speaks in phrases, mild retractions, audible wheezing, pulse 100-120.     - SEVERE: Very SOB at rest, speaks in single words, struggling to breathe, sitting hunched forward, retractions, pulse > 120       Moderate to severe  5. RECURRENT SYMPTOM: \"Have you had difficulty breathing before? \" If so, ask: \"When was the last time? \" and \"What happened that time? \"       Yes, pt has CHF  6. CARDIAC HISTORY: \"Do you have any history of heart disease? \" (e.g., heart attack, angina, bypass surgery, angioplasty)       CHF; history of a stroke in the past some years ago. 7. LUNG HISTORY: \"Do you have any history of lung disease? \"  (e.g., pulmonary embolus, asthma, emphysema)      No  8. CAUSE: \"What do you think is causing the breathing problem? \"       Maybe r/t the CHF  9. OTHER SYMPTOMS: \"Do you have any other symptoms? (e.g., dizziness, runny nose, cough, chest pain, fever)      Cough only none of others; hospitalized in March 202 for pneumonia. 10. PREGNANCY: \"Is there any chance you are pregnant? \" \"When was your last menstrual period? \"        N/A  11. TRAVEL: \"Have you traveled out of the country in the last month? \" (e.g., travel history, exposures)        No    Protocols used: BREATHING DIFFICULTY-ADULT-OH  Pt triaged to be seen today. Transferred to [facility], [place] for appointment.

## 2020-06-30 ENCOUNTER — TELEPHONE (OUTPATIENT)
Dept: FAMILY MEDICINE CLINIC | Age: 85
End: 2020-06-30

## 2020-07-22 ENCOUNTER — HOSPITAL ENCOUNTER (OUTPATIENT)
Age: 85
Discharge: HOME | End: 2020-07-22
Payer: MEDICARE

## 2020-07-22 ENCOUNTER — OFFICE VISIT (OUTPATIENT)
Dept: FAMILY MEDICINE CLINIC | Age: 85
End: 2020-07-22
Payer: MEDICARE

## 2020-07-22 VITALS
SYSTOLIC BLOOD PRESSURE: 136 MMHG | TEMPERATURE: 98.2 F | BODY MASS INDEX: 30.24 KG/M2 | WEIGHT: 164 LBS | HEART RATE: 81 BPM | OXYGEN SATURATION: 93 % | DIASTOLIC BLOOD PRESSURE: 67 MMHG

## 2020-07-22 PROBLEM — I77.9 CAROTID ARTERY DISEASE (HCC): Status: ACTIVE | Noted: 2020-07-22

## 2020-07-22 LAB
ALBUMIN SERPL-MCNC: 4.3 G/DL (ref 3.5–5.2)
ALP BLD-CCNC: 95 U/L (ref 35–104)
ALT SERPL-CCNC: 13 U/L (ref 0–32)
ANION GAP SERPL CALCULATED.3IONS-SCNC: 10 MMOL/L (ref 7–16)
AST SERPL-CCNC: 22 U/L (ref 0–31)
BASOPHILS ABSOLUTE: 0.01 E9/L (ref 0–0.2)
BASOPHILS RELATIVE PERCENT: 0.2 % (ref 0–2)
BILIRUB SERPL-MCNC: 0.5 MG/DL (ref 0–1.2)
BUN BLDV-MCNC: 21 MG/DL (ref 8–23)
CALCIUM SERPL-MCNC: 10 MG/DL (ref 8.6–10.2)
CHLORIDE BLD-SCNC: 99 MMOL/L (ref 98–107)
CHOLESTEROL, TOTAL: 142 MG/DL (ref 0–199)
CO2: 32 MMOL/L (ref 22–29)
CREAT SERPL-MCNC: 0.8 MG/DL (ref 0.5–1)
EOSINOPHILS ABSOLUTE: 0.08 E9/L (ref 0.05–0.5)
EOSINOPHILS RELATIVE PERCENT: 1.5 % (ref 0–6)
GFR AFRICAN AMERICAN: >60
GFR NON-AFRICAN AMERICAN: >60 ML/MIN/1.73
GLUCOSE BLD-MCNC: 90 MG/DL (ref 74–99)
HCT VFR BLD CALC: 36.8 % (ref 34–48)
HDLC SERPL-MCNC: 70 MG/DL
HEMOGLOBIN: 12.1 G/DL (ref 11.5–15.5)
IMMATURE GRANULOCYTES #: 0.01 E9/L
IMMATURE GRANULOCYTES %: 0.2 % (ref 0–5)
LDL CHOLESTEROL CALCULATED: 60 MG/DL (ref 0–99)
LYMPHOCYTES ABSOLUTE: 0.91 E9/L (ref 1.5–4)
LYMPHOCYTES RELATIVE PERCENT: 16.7 % (ref 20–42)
MCH RBC QN AUTO: 30 PG (ref 26–35)
MCHC RBC AUTO-ENTMCNC: 32.9 % (ref 32–34.5)
MCV RBC AUTO: 91.1 FL (ref 80–99.9)
MONOCYTES ABSOLUTE: 0.57 E9/L (ref 0.1–0.95)
MONOCYTES RELATIVE PERCENT: 10.5 % (ref 2–12)
NEUTROPHILS ABSOLUTE: 3.87 E9/L (ref 1.8–7.3)
NEUTROPHILS RELATIVE PERCENT: 70.9 % (ref 43–80)
PDW BLD-RTO: 13.9 FL (ref 11.5–15)
PLATELET # BLD: 212 E9/L (ref 130–450)
PMV BLD AUTO: 9 FL (ref 7–12)
POTASSIUM SERPL-SCNC: 4.8 MMOL/L (ref 3.5–5)
RBC # BLD: 4.04 E12/L (ref 3.5–5.5)
SODIUM BLD-SCNC: 141 MMOL/L (ref 132–146)
TOTAL PROTEIN: 7.2 G/DL (ref 6.4–8.3)
TRIGL SERPL-MCNC: 58 MG/DL (ref 0–149)
TSH SERPL DL<=0.05 MIU/L-ACNC: 0.59 UIU/ML (ref 0.27–4.2)
VLDLC SERPL CALC-MCNC: 12 MG/DL
WBC # BLD: 5.5 E9/L (ref 4.5–11.5)

## 2020-07-22 PROCEDURE — 36415 COLL VENOUS BLD VENIPUNCTURE: CPT

## 2020-07-22 PROCEDURE — 1123F ACP DISCUSS/DSCN MKR DOCD: CPT | Performed by: FAMILY MEDICINE

## 2020-07-22 PROCEDURE — 4040F PNEUMOC VAC/ADMIN/RCVD: CPT | Performed by: FAMILY MEDICINE

## 2020-07-22 PROCEDURE — G8427 DOCREV CUR MEDS BY ELIG CLIN: HCPCS | Performed by: FAMILY MEDICINE

## 2020-07-22 PROCEDURE — G8417 CALC BMI ABV UP PARAM F/U: HCPCS | Performed by: FAMILY MEDICINE

## 2020-07-22 PROCEDURE — 1036F TOBACCO NON-USER: CPT | Performed by: FAMILY MEDICINE

## 2020-07-22 PROCEDURE — 80061 LIPID PANEL: CPT

## 2020-07-22 PROCEDURE — 1090F PRES/ABSN URINE INCON ASSESS: CPT | Performed by: FAMILY MEDICINE

## 2020-07-22 PROCEDURE — 99214 OFFICE O/P EST MOD 30 MIN: CPT | Performed by: FAMILY MEDICINE

## 2020-07-22 PROCEDURE — 84443 ASSAY THYROID STIM HORMONE: CPT

## 2020-07-22 PROCEDURE — 85025 COMPLETE CBC W/AUTO DIFF WBC: CPT

## 2020-07-22 PROCEDURE — 80053 COMPREHEN METABOLIC PANEL: CPT

## 2020-07-22 RX ORDER — FUROSEMIDE 40 MG/1
40 TABLET ORAL 2 TIMES DAILY
Qty: 180 TABLET | Refills: 1 | Status: SHIPPED
Start: 2020-07-22 | End: 2021-08-23 | Stop reason: SDUPTHER

## 2020-07-22 RX ORDER — ROSUVASTATIN CALCIUM 5 MG/1
5 TABLET, COATED ORAL DAILY
Qty: 90 TABLET | Refills: 1 | Status: SHIPPED
Start: 2020-07-22 | End: 2020-08-24

## 2020-07-22 RX ORDER — FUROSEMIDE 40 MG/1
TABLET ORAL 2 TIMES DAILY
COMMUNITY
Start: 2020-06-23 | End: 2020-07-22 | Stop reason: SDUPTHER

## 2020-07-22 RX ORDER — ROSUVASTATIN CALCIUM 5 MG/1
TABLET, COATED ORAL DAILY
COMMUNITY
Start: 2020-04-20 | End: 2020-07-22 | Stop reason: SDUPTHER

## 2020-07-22 ASSESSMENT — ENCOUNTER SYMPTOMS
RESPIRATORY NEGATIVE: 1
ALLERGIC/IMMUNOLOGIC NEGATIVE: 1
EYES NEGATIVE: 1
GASTROINTESTINAL NEGATIVE: 1

## 2020-07-22 NOTE — PROGRESS NOTES
OFFICE PROGRESS NOTE      SUBJECTIVE:        Patient ID:   No Emmanuel is a 80 y.o. female who presents for   Chief Complaint   Patient presents with    Hypertension     Here for recheck on Hypertension and Hyperlipidemia. Recently discharged from [facility] for pneumonia. Does use oxygen at home. Is past due for Lt breast ultrasound  recheck.  Other     1           HPI:   Patient states he is feeling okay now  Feeling hospital for pneumonia in March  Had not got the lab work done  Did not get breast exam done  Is not taking her Zocor  Is not exercising        Prior to Visit Medications    Medication Sig Taking?  Authorizing Provider   rosuvastatin (CRESTOR) 5 MG tablet daily Yes Historical Provider, MD   levothyroxine (SYNTHROID) 100 MCG tablet take 1 tablet by mouth once daily Yes Maritza Quezada MD   atenolol (TENORMIN) 25 MG tablet Take 1 tablet by mouth daily Yes Maritza Quezada MD   meclizine (ANTIVERT) 12.5 MG tablet Take 12.5 mg by mouth 3 times daily as needed  Yes Historical Provider, MD   Omega-3 Fatty Acids (OMEGA-3 PLUS PO) Take 2 capsules by mouth daily Yes Historical Provider, MD   GARCINIA CAMBOGIA-CHROMIUM PO Take 1 tablet by mouth daily Yes Historical Provider, MD   GARLIC PO Take 1 tablet by mouth daily Yes Historical Provider, MD   hydrochlorothiazide (MICROZIDE) 12.5 MG capsule Take 12.5 mg by mouth daily Yes Historical Provider, MD   isosorbide mononitrate (IMDUR) 30 MG extended release tablet Take 30 mg by mouth daily Yes Historical Provider, MD   Pyridoxine HCl (B-6 PO) Take 1 tablet by mouth daily Yes Historical Provider, MD   lisinopril (PRINIVIL;ZESTRIL) 20 MG tablet Take 20 mg by mouth daily  Yes Historical Provider, MD   Cholecalciferol (VITAMIN D-3 PO) Take by mouth daily Yes Historical Provider, MD   vitamin B-12 (CYANOCOBALAMIN) 1000 MCG tablet Take 1,000 mcg by mouth daily as needed  Yes Historical Provider, MD   Multiple Vitamin Hematological: Negative. Psychiatric/Behavioral: Negative. OBJECTIVE:     VS:  Wt Readings from Last 3 Encounters:   07/22/20 164 lb (74.4 kg)   01/16/20 156 lb (70.8 kg)   10/17/19 164 lb (74.4 kg)     Temp Readings from Last 3 Encounters:   07/22/20 98.2 °F (36.8 °C) (Oral)   01/16/20 98.4 °F (36.9 °C) (Oral)   10/17/19 98 °F (36.7 °C) (Oral)     BP Readings from Last 3 Encounters:   07/22/20 136/67   01/16/20 136/70   10/17/19 136/80        Physical Exam  Constitutional:       Appearance: She is well-developed. HENT:      Head: Normocephalic and atraumatic. Eyes:      Conjunctiva/sclera: Conjunctivae normal.      Pupils: Pupils are equal, round, and reactive to light. Neck:      Musculoskeletal: Normal range of motion and neck supple. Cardiovascular:      Rate and Rhythm: Normal rate and regular rhythm. Heart sounds: Normal heart sounds. Pulmonary:      Effort: Pulmonary effort is normal.      Breath sounds: Normal breath sounds. Abdominal:      General: Bowel sounds are normal.      Palpations: Abdomen is soft. Musculoskeletal: Normal range of motion. Skin:     General: Skin is warm and dry. Neurological:      Mental Status: She is alert and oriented to person, place, and time. Psychiatric:         Thought Content:  Thought content normal.            Labs :    Lab Results   Component Value Date    WBC 6.2 01/11/2020    HGB 12.3 01/11/2020    HCT 37.5 01/11/2020     01/11/2020    CHOL 137 01/11/2020    TRIG 67 01/11/2020    HDL 66 01/11/2020    ALT 17 01/11/2020    AST 22 01/11/2020     01/11/2020    K 4.8 01/11/2020    CL 98 01/11/2020    CREATININE 0.8 01/11/2020    BUN 17 01/11/2020    CO2 30 (H) 01/11/2020    TSH 0.694 01/11/2020    GLUF 85 06/28/2018     Lab Results   Component Value Date    COLORU yellow 07/02/2018    COLORU Yellow 06/26/2015    NITRU Negative 06/26/2015    GLUCOSEU negative 07/02/2018    GLUCOSEU Negative 06/26/2015    KETUA trace 07/02/2018    KETUA Negative 06/26/2015    UROBILINOGEN 0.2 06/26/2015    BILIRUBINUR negative 07/02/2018     No results found for: PSA, CEA, , [id number],       Controlled Substances Monitoring:                                    ASSESSMENT     Patient Active Problem List    Diagnosis Date Noted    Carotid artery disease ([facility] [address] 07/22/2020    Coronary artery disease involving native coronary artery of native heart without angina pectoris 11/26/2018    Hypertension 02/19/2018    Hypothyroidism 02/19/2018    Hyperlipidemia 02/19/2018    Osteoporosis 02/19/2018        Diagnosis:     ICD-10-CM    1. Abnormal radiologic density  R93.89 US BREAST COMPLETE LEFT   2. Hyperlipidemia, unspecified hyperlipidemia type  E78.5 rosuvastatin (CRESTOR) 5 MG tablet     COMPREHENSIVE METABOLIC PANEL     LIPID PANEL   3. Hypertension, unspecified type  I10 furosemide (LASIX) 40 MG tablet     CBC WITH AUTO DIFFERENTIAL     COMPREHENSIVE METABOLIC PANEL   4. Acquired hypothyroidism  E03.9 CBC WITH AUTO DIFFERENTIAL     TSH   5. Carotid artery disease, unspecified laterality, unspecified type (HCC)  I73.9    6. Age-related osteoporosis without current pathological fracture  M81.0 CBC WITH AUTO DIFFERENTIAL   7. Coronary artery disease involving native coronary artery of native heart without angina pectoris  I25.10    8. Mixed hyperlipidemia  E78.2        PLAN:   Continue present treatment  Low-sodium low-fat diet  Regular exercises  Breast imaging to be done  Return to clinic earlier if any problems        Patient Instructions   Continue present treatment  Low-sodium low-fat diet  Regular exercises  Get your lab work done today  Get the breast exam done  Return to clinic earlier if any problems      Return in about 4 weeks (around 8/19/2020). Stewart Chacon reviewed my findings and recommendations with Joy Le.     Electronicallysigned by Jessa Baer MD on 7/22/20 at 11:38 AM EDT

## 2020-07-24 ENCOUNTER — TELEPHONE (OUTPATIENT)
Dept: FAMILY MEDICINE CLINIC | Age: 85
End: 2020-07-24

## 2020-07-24 NOTE — TELEPHONE ENCOUNTER
Returned patient call. No message left other than she needed to talk to me. Message left on answering machine.

## 2020-07-27 NOTE — PROGRESS NOTES
[facility] called and diagnosis needed changed for billing department.

## 2020-07-28 RX ORDER — SIMVASTATIN 20 MG
20 TABLET ORAL NIGHTLY
COMMUNITY
End: 2020-07-28 | Stop reason: SDUPTHER

## 2020-07-29 RX ORDER — SIMVASTATIN 20 MG
20 TABLET ORAL NIGHTLY
Qty: 90 TABLET | Refills: 1 | Status: SHIPPED
Start: 2020-07-29 | End: 2021-01-19 | Stop reason: SDUPTHER

## 2020-08-13 RX ORDER — MECLIZINE HCL 12.5 MG/1
12.5 TABLET ORAL 3 TIMES DAILY PRN
Qty: 30 TABLET | Refills: 0 | Status: SHIPPED | OUTPATIENT
Start: 2020-08-13

## 2020-08-24 ENCOUNTER — OFFICE VISIT (OUTPATIENT)
Dept: FAMILY MEDICINE CLINIC | Age: 85
End: 2020-08-24
Payer: MEDICARE

## 2020-08-24 VITALS
OXYGEN SATURATION: 90 % | TEMPERATURE: 98.5 F | BODY MASS INDEX: 30.79 KG/M2 | HEART RATE: 92 BPM | SYSTOLIC BLOOD PRESSURE: 142 MMHG | DIASTOLIC BLOOD PRESSURE: 71 MMHG | WEIGHT: 167 LBS

## 2020-08-24 PROCEDURE — 1123F ACP DISCUSS/DSCN MKR DOCD: CPT | Performed by: FAMILY MEDICINE

## 2020-08-24 PROCEDURE — 1036F TOBACCO NON-USER: CPT | Performed by: FAMILY MEDICINE

## 2020-08-24 PROCEDURE — 4040F PNEUMOC VAC/ADMIN/RCVD: CPT | Performed by: FAMILY MEDICINE

## 2020-08-24 PROCEDURE — G8427 DOCREV CUR MEDS BY ELIG CLIN: HCPCS | Performed by: FAMILY MEDICINE

## 2020-08-24 PROCEDURE — G8417 CALC BMI ABV UP PARAM F/U: HCPCS | Performed by: FAMILY MEDICINE

## 2020-08-24 PROCEDURE — 1090F PRES/ABSN URINE INCON ASSESS: CPT | Performed by: FAMILY MEDICINE

## 2020-08-24 PROCEDURE — 99214 OFFICE O/P EST MOD 30 MIN: CPT | Performed by: FAMILY MEDICINE

## 2020-08-24 ASSESSMENT — ENCOUNTER SYMPTOMS
ALLERGIC/IMMUNOLOGIC NEGATIVE: 1
RESPIRATORY NEGATIVE: 1
EYES NEGATIVE: 1
GASTROINTESTINAL NEGATIVE: 1

## 2020-08-24 NOTE — PROGRESS NOTES
OFFICE PROGRESS NOTE      SUBJECTIVE:        Patient ID:   Ronnie Mora is a 80 y.o. female who presents for   Chief Complaint   Patient presents with    Hypertension     Here for recheck on Hypertension and Hyperlipidemia. Reports her oxygen tank is to heavy to carry. Needs evaluated for Oxi-light system. HPI:     Patient been doing okay  Lab work is within normal limits  Dizziness is better  Has been taking her medication as prescribed  States that she is following the diet      Prior to Visit Medications    Medication Sig Taking?  Authorizing Provider   meclizine (ANTIVERT) 12.5 MG tablet Take 1 tablet by mouth 3 times daily as needed for Dizziness Yes Tello Savage MD   simvastatin (ZOCOR) 20 MG tablet Take 1 tablet by mouth nightly Yes Tello Savage MD   furosemide (LASIX) 40 MG tablet Take 1 tablet by mouth 2 times daily  Patient taking differently: Take 40 mg by mouth 2 times daily Takes when she is at home Yes Tello Savage MD   levothyroxine (SYNTHROID) 100 MCG tablet take 1 tablet by mouth once daily Yes Tello Savage MD   Omega-3 Fatty Acids (OMEGA-3 PLUS PO) Take 2 capsules by mouth daily Yes Historical Provider, MD   GARLIC PO Take 1 tablet by mouth daily Yes Historical Provider, MD   hydrochlorothiazide (MICROZIDE) 12.5 MG capsule Take 12.5 mg by mouth daily Yes Historical Provider, MD   lisinopril (PRINIVIL;ZESTRIL) 20 MG tablet Take 20 mg by mouth daily  Yes Historical Provider, MD   Cholecalciferol (VITAMIN D-3 PO) Take by mouth daily Yes Historical Provider, MD   vitamin B-12 (CYANOCOBALAMIN) 1000 MCG tablet Take 1,000 mcg by mouth daily as needed  Yes Historical Provider, MD   Multiple Vitamin (ONE-A-DAY 55 PLUS PO) Take by mouth daily Yes Historical Provider, MD   rosuvastatin (CRESTOR) 5 MG tablet Take 1 tablet by mouth daily  Patient not taking: Reported on 8/24/2020  Tello Savage MD   atenolol (TENORMIN) 25 MG tablet Take 1 tablet by mouth daily  MD ANTON Jaramillo-CHROMIUM PO Take 1 tablet by mouth daily  Historical Provider, MD   isosorbide mononitrate (IMDUR) 30 MG extended release tablet Take 30 mg by mouth daily  Historical Provider, MD   Pyridoxine HCl (B-6 PO) Take 1 tablet by mouth daily  Historical Provider, MD      Social History     Socioeconomic History    Marital status:      Spouse name: None    Number of children: None    Years of education: None    Highest education level: None   Occupational History    Occupation: retired   Social Needs    Financial resource strain: None    Food insecurity     Worry: None     Inability: None    Transportation needs     Medical: None     Non-medical: None   Tobacco Use    Smoking status: Never Smoker    Smokeless tobacco: Never Used   Substance and Sexual Activity    Alcohol use: No    Drug use: No    Sexual activity: Never   Lifestyle    Physical activity     Days per week: None     Minutes per session: None    Stress: None   Relationships    Social connections     Talks on phone: None     Gets together: None     Attends [employer] service: None     Active member of club or organization: None     Attends meetings of clubs or organizations: None     Relationship status: None    Intimate partner violence     Fear of current or ex partner: None     Emotionally abused: None     Physically abused: None     Forced sexual activity: None   Other Topics Concern    None   Social History Narrative    None       I have reviewed Jena's allergies, medications, problem list, medical, social and family history and have updated as needed in the electronic medical record  Review Of Systems:    Review of Systems   Constitutional: Negative. HENT: Negative. Eyes: Negative. Respiratory: Negative. Cardiovascular: Negative. Gastrointestinal: Negative. Endocrine: Negative. Musculoskeletal: Negative. Skin: Negative. negative 07/02/2018    GLUCOSEU Negative 06/26/2015    KETUA trace 07/02/2018    KETUA Negative 06/26/2015    UROBILINOGEN 0.2 06/26/2015    BILIRUBINUR negative 07/02/2018     No results found for: PSA, CEA, , [id number],       Controlled Substances Monitoring:                                    ASSESSMENT     Patient Active Problem List    Diagnosis Date Noted    Carotid artery disease ([facility] 07/22/2020    Coronary artery disease involving native coronary artery of native heart without angina pectoris 11/26/2018    Hypertension 02/19/2018    Hypothyroidism 02/19/2018    Hyperlipidemia 02/19/2018    Osteoporosis 02/19/2018        Diagnosis:     ICD-10-CM    1. Hyperlipidemia, unspecified hyperlipidemia type  E78.5 CBC WITH AUTO DIFFERENTIAL     Comprehensive Metabolic Panel     Lipid Panel   2. Hypertension, unspecified type  I10 CBC WITH AUTO DIFFERENTIAL   3. Acquired hypothyroidism  E03.9 CBC WITH AUTO DIFFERENTIAL     TSH   4. Carotid artery disease, unspecified laterality, unspecified type ([facility]  I73.9 CBC WITH AUTO DIFFERENTIAL       PLAN:   Continue present treatment  Low-sodium low-fat diet  Regular exercises  Lab work before the next visit  Return to clinic earlier if any problem        Patient Instructions   Continue present treatment  Low-sodium low-fat diet  Regular exercises    Lab work before the next visit  Return to clinic earlier if any problems      Return in about 3 months (around 11/24/2020). Nirmal Dunbar reviewed my findings and recommendations with Rocky Deleon.     Electronicallysigned by Katy Yi MD on 8/24/20 at 2:41 PM EDT

## 2020-08-24 NOTE — PATIENT INSTRUCTIONS
Continue present treatment  Low-sodium low-fat diet  Regular exercises    Lab work before the next visit  Return to clinic earlier if any problems

## 2020-09-09 ENCOUNTER — TELEPHONE (OUTPATIENT)
Dept: FAMILY MEDICINE CLINIC | Age: 85
End: 2020-09-09

## 2020-09-09 NOTE — TELEPHONE ENCOUNTER
Patient phoned and left message regarding wanting information on how to obtain one of those emogen oxygen tanks that you can carry over your shoulder and is lighter that the big tanks that gets from [employer]. Wants to have nurse call back regarding this.

## 2020-09-11 NOTE — TELEPHONE ENCOUNTER
Called and LVM with the patient, this is usually not covered by insurance and she will have to verify that they do and the DME location that accepts her insurance.

## 2020-09-14 RX ORDER — ATENOLOL 25 MG/1
TABLET ORAL
Qty: 90 TABLET | Refills: 1 | Status: SHIPPED
Start: 2020-09-14 | End: 2021-05-17 | Stop reason: ALTCHOICE

## 2020-09-15 ENCOUNTER — TELEPHONE (OUTPATIENT)
Dept: FAMILY MEDICINE CLINIC | Age: 85
End: 2020-09-15

## 2020-09-16 RX ORDER — LEVOTHYROXINE SODIUM 0.1 MG/1
TABLET ORAL
Qty: 90 TABLET | Refills: 1 | Status: SHIPPED
Start: 2020-09-16 | End: 2021-04-02 | Stop reason: SDUPTHER

## 2020-11-19 ENCOUNTER — HOSPITAL ENCOUNTER (OUTPATIENT)
Age: 85
Discharge: HOME | End: 2020-11-19
Payer: MEDICARE

## 2020-11-19 LAB
ALBUMIN SERPL-MCNC: 4 G/DL (ref 3.5–5.2)
ALP BLD-CCNC: 108 U/L (ref 35–104)
ALT SERPL-CCNC: 13 U/L (ref 0–32)
ANION GAP SERPL CALCULATED.3IONS-SCNC: 8 MMOL/L (ref 7–16)
AST SERPL-CCNC: 22 U/L (ref 0–31)
BASOPHILS ABSOLUTE: 0 E9/L (ref 0–0.2)
BASOPHILS RELATIVE PERCENT: 0 % (ref 0–2)
BILIRUB SERPL-MCNC: 0.4 MG/DL (ref 0–1.2)
BUN BLDV-MCNC: 21 MG/DL (ref 8–23)
CALCIUM SERPL-MCNC: 9.8 MG/DL (ref 8.6–10.2)
CHLORIDE BLD-SCNC: 103 MMOL/L (ref 98–107)
CHOLESTEROL, TOTAL: 131 MG/DL (ref 0–199)
CO2: 29 MMOL/L (ref 22–29)
CREAT SERPL-MCNC: 0.6 MG/DL (ref 0.5–1)
EOSINOPHILS ABSOLUTE: 0.08 E9/L (ref 0.05–0.5)
EOSINOPHILS RELATIVE PERCENT: 1.6 % (ref 0–6)
GFR AFRICAN AMERICAN: >60
GFR NON-AFRICAN AMERICAN: >60 ML/MIN/1.73
GLUCOSE BLD-MCNC: 100 MG/DL (ref 74–99)
HCT VFR BLD CALC: 34.1 % (ref 34–48)
HDLC SERPL-MCNC: 60 MG/DL
HEMOGLOBIN: 11 G/DL (ref 11.5–15.5)
IMMATURE GRANULOCYTES #: 0.01 E9/L
IMMATURE GRANULOCYTES %: 0.2 % (ref 0–5)
LDL CHOLESTEROL CALCULATED: 59 MG/DL (ref 0–99)
LYMPHOCYTES ABSOLUTE: 0.68 E9/L (ref 1.5–4)
LYMPHOCYTES RELATIVE PERCENT: 13.7 % (ref 20–42)
MCH RBC QN AUTO: 29.2 PG (ref 26–35)
MCHC RBC AUTO-ENTMCNC: 32.3 % (ref 32–34.5)
MCV RBC AUTO: 90.5 FL (ref 80–99.9)
MONOCYTES ABSOLUTE: 0.45 E9/L (ref 0.1–0.95)
MONOCYTES RELATIVE PERCENT: 9.1 % (ref 2–12)
NEUTROPHILS ABSOLUTE: 3.75 E9/L (ref 1.8–7.3)
NEUTROPHILS RELATIVE PERCENT: 75.4 % (ref 43–80)
PDW BLD-RTO: 14.6 FL (ref 11.5–15)
PLATELET # BLD: 200 E9/L (ref 130–450)
PMV BLD AUTO: 9.7 FL (ref 7–12)
POTASSIUM SERPL-SCNC: 4.4 MMOL/L (ref 3.5–5)
RBC # BLD: 3.77 E12/L (ref 3.5–5.5)
SODIUM BLD-SCNC: 140 MMOL/L (ref 132–146)
TOTAL PROTEIN: 6.8 G/DL (ref 6.4–8.3)
TRIGL SERPL-MCNC: 61 MG/DL (ref 0–149)
TSH SERPL DL<=0.05 MIU/L-ACNC: 0.53 UIU/ML (ref 0.27–4.2)
VLDLC SERPL CALC-MCNC: 12 MG/DL
WBC # BLD: 5 E9/L (ref 4.5–11.5)

## 2020-11-19 PROCEDURE — 80053 COMPREHEN METABOLIC PANEL: CPT

## 2020-11-19 PROCEDURE — 84443 ASSAY THYROID STIM HORMONE: CPT

## 2020-11-19 PROCEDURE — 80061 LIPID PANEL: CPT

## 2020-11-19 PROCEDURE — 85025 COMPLETE CBC W/AUTO DIFF WBC: CPT

## 2020-11-19 PROCEDURE — 36415 COLL VENOUS BLD VENIPUNCTURE: CPT

## 2020-11-23 ENCOUNTER — OFFICE VISIT (OUTPATIENT)
Dept: FAMILY MEDICINE CLINIC | Age: 85
End: 2020-11-23
Payer: MEDICARE

## 2020-11-23 VITALS
WEIGHT: 163 LBS | SYSTOLIC BLOOD PRESSURE: 150 MMHG | HEART RATE: 84 BPM | TEMPERATURE: 98.8 F | BODY MASS INDEX: 30.05 KG/M2 | DIASTOLIC BLOOD PRESSURE: 63 MMHG | OXYGEN SATURATION: 93 %

## 2020-11-23 PROCEDURE — 4040F PNEUMOC VAC/ADMIN/RCVD: CPT | Performed by: FAMILY MEDICINE

## 2020-11-23 PROCEDURE — G8427 DOCREV CUR MEDS BY ELIG CLIN: HCPCS | Performed by: FAMILY MEDICINE

## 2020-11-23 PROCEDURE — 1090F PRES/ABSN URINE INCON ASSESS: CPT | Performed by: FAMILY MEDICINE

## 2020-11-23 PROCEDURE — G8484 FLU IMMUNIZE NO ADMIN: HCPCS | Performed by: FAMILY MEDICINE

## 2020-11-23 PROCEDURE — G8417 CALC BMI ABV UP PARAM F/U: HCPCS | Performed by: FAMILY MEDICINE

## 2020-11-23 PROCEDURE — 1036F TOBACCO NON-USER: CPT | Performed by: FAMILY MEDICINE

## 2020-11-23 PROCEDURE — 1123F ACP DISCUSS/DSCN MKR DOCD: CPT | Performed by: FAMILY MEDICINE

## 2020-11-23 PROCEDURE — 99214 OFFICE O/P EST MOD 30 MIN: CPT | Performed by: FAMILY MEDICINE

## 2020-11-23 NOTE — PROGRESS NOTES
OFFICE PROGRESS NOTE      SUBJECTIVE:        Patient ID:   Flash Pop is a 80 y.o. female who presents for   Chief Complaint   Patient presents with    Hypertension     Here for recheck on Hypertension,Hyperlipidemia and Hypothyroidism. Lab work done,here for review. C/o pain in bilateral upper arms. Reports hearing a grinding sounds in her upper arms. HPI:   Patient states is feeling better  Does the pain in both the shoulders  Lab work is within normal limit  Complaining of backache radiating to both legs  Patient states is following the diet and exercise          Prior to Visit Medications    Medication Sig Taking?  Authorizing Provider   levothyroxine (SYNTHROID) 100 MCG tablet take 1 tablet by mouth once daily Yes Marge Dykes MD   atenolol (TENORMIN) 25 MG tablet take 1 tablet by mouth once daily Yes Marge Dykes MD   meclizine (ANTIVERT) 12.5 MG tablet Take 1 tablet by mouth 3 times daily as needed for Dizziness Yes Marge Dykes MD   simvastatin (ZOCOR) 20 MG tablet Take 1 tablet by mouth nightly Yes Marge Dykes MD   furosemide (LASIX) 40 MG tablet Take 1 tablet by mouth 2 times daily  Patient taking differently: Take 40 mg by mouth 2 times daily Takes when she is at home Yes Marge Dykes MD   Omega-3 Fatty Acids (OMEGA-3 PLUS PO) Take 2 capsules by mouth daily Yes Historical Provider, MD   GARLIC PO Take 1 tablet by mouth daily Yes Historical Provider, MD   hydrochlorothiazide (MICROZIDE) 12.5 MG capsule Take 12.5 mg by mouth daily Yes Historical Provider, MD   isosorbide mononitrate (IMDUR) 30 MG extended release tablet Take 30 mg by mouth daily Yes Historical Provider, MD   Pyridoxine HCl (B-6 PO) Take 1 tablet by mouth daily Yes Historical Provider, MD   lisinopril (PRINIVIL;ZESTRIL) 20 MG tablet Take 20 mg by mouth daily  Yes Historical Provider, MD   Cholecalciferol (VITAMIN D-3 PO) Take by mouth daily Yes Historical Provider, MD   vitamin B-12 (CYANOCOBALAMIN) 1000 MCG tablet Take 1,000 mcg by mouth daily as needed  Yes Historical Provider, MD   Multiple Vitamin (ONE-A-DAY 55 PLUS PO) Take by mouth daily Yes Historical Provider, MD ANTON OLMOS-CHROMIUM PO Take 1 tablet by mouth daily  Historical Provider, MD      Social History     Socioeconomic History    Marital status:      Spouse name: None    Number of children: None    Years of education: None    Highest education level: None   Occupational History    Occupation: retired   Social Needs    Financial resource strain: None    Food insecurity     Worry: None     Inability: None    Transportation needs     Medical: None     Non-medical: None   Tobacco Use    Smoking status: Never Smoker    Smokeless tobacco: Never Used   Substance and Sexual Activity    Alcohol use: No    Drug use: No    Sexual activity: Never   Lifestyle    Physical activity     Days per week: None     Minutes per session: None    Stress: None   Relationships    Social connections     Talks on phone: None     Gets together: None     Attends Roman Catholic service: None     Active member of club or organization: None     Attends meetings of clubs or organizations: None     Relationship status: None    Intimate partner violence     Fear of current or ex partner: None     Emotionally abused: None     Physically abused: None     Forced sexual activity: None   Other Topics Concern    None   Social History Narrative    None       I have reviewed Jena's allergies, medications, problem list, medical, social and family history and have updated as needed in the electronic medical record  Review Of Systems:    Review of Systems   Constitutional: Negative. HENT: Negative. Eyes: Negative. Respiratory: Negative. Cardiovascular: Negative. Gastrointestinal: Negative. Endocrine: Negative. Musculoskeletal: Positive for arthralgias. Skin: Negative.     Allergic/Immunologic: Negative. Neurological: Negative. Hematological: Negative. Psychiatric/Behavioral: Negative. OBJECTIVE:     VS:  Wt Readings from Last 3 Encounters:   11/23/20 163 lb (73.9 kg)   08/24/20 167 lb (75.8 kg)   07/22/20 164 lb (74.4 kg)     Temp Readings from Last 3 Encounters:   11/23/20 98.8 °F (37.1 °C) (Oral)   08/24/20 98.5 °F (36.9 °C) (Oral)   07/22/20 98.2 °F (36.8 °C) (Oral)     BP Readings from Last 3 Encounters:   11/23/20 (!) 150/63   08/24/20 (!) 142/71   07/22/20 136/67        Physical Exam  Constitutional:       Appearance: She is well-developed. HENT:      Head: Normocephalic and atraumatic. Eyes:      Conjunctiva/sclera: Conjunctivae normal.      Pupils: Pupils are equal, round, and reactive to light. Neck:      Musculoskeletal: Normal range of motion and neck supple. Cardiovascular:      Rate and Rhythm: Normal rate and regular rhythm. Heart sounds: Normal heart sounds. Pulmonary:      Effort: Pulmonary effort is normal.      Breath sounds: Normal breath sounds. Abdominal:      General: Bowel sounds are normal.      Palpations: Abdomen is soft. Musculoskeletal: Normal range of motion. Skin:     General: Skin is warm and dry. Neurological:      Mental Status: She is alert and oriented to person, place, and time. Psychiatric:         Thought Content:  Thought content normal.            Labs :    Lab Results   Component Value Date    WBC 5.0 11/19/2020    HGB 11.0 (L) 11/19/2020    HCT 34.1 11/19/2020     11/19/2020    CHOL 131 11/19/2020    TRIG 61 11/19/2020    HDL 60 11/19/2020    ALT 13 11/19/2020    AST 22 11/19/2020     11/19/2020    K 4.4 11/19/2020     11/19/2020    CREATININE 0.6 11/19/2020    BUN 21 11/19/2020    CO2 29 11/19/2020    TSH 0.533 11/19/2020    GLUF 85 06/28/2018     Lab Results   Component Value Date    COLORU yellow 07/02/2018    COLORU Yellow 06/26/2015    NITRU Negative 06/26/2015    GLUCOSEU negative 07/02/2018 GLUCOSEU Negative 06/26/2015    KETUA trace 07/02/2018    KETUA Negative 06/26/2015    UROBILINOGEN 0.2 06/26/2015    BILIRUBINUR negative 07/02/2018     No results found for: PSA, CEA, , VH2132,       Controlled Substances Monitoring:                                    ASSESSMENT     Patient Active Problem List    Diagnosis Date Noted    Carotid artery disease ([facility] 07/22/2020    Coronary artery disease involving native coronary artery of native heart without angina pectoris 11/26/2018    Hypertension 02/19/2018    Hypothyroidism 02/19/2018    Hyperlipidemia 02/19/2018    Osteoporosis 02/19/2018        Diagnosis:     ICD-10-CM    1. Hyperlipidemia, unspecified hyperlipidemia type  E78.5    2. Hypertension, unspecified type  I10    3. Acquired hypothyroidism  E03.9    4. Carotid artery disease, unspecified laterality, unspecified type ([facility]  I77.9        PLAN:   Continue present treatment  X-ray of the shoulders patient refuses  Low-sodium low-fat diet  With the medication as prescribed  Keep the legs elevated  Return to clinic earlier if any problems  Follow-up with the back doctor      Patient Instructions   Continue present treatment  Low-sodium low-fat diet  Keep the legs elevated  Take the medication as prescribed  Follow-up with the back doctor  We'll order x-ray of the shoulder if you're not better  Compresses to the shoulders      Return in about 3 months (around 2/23/2021) for annaul visit. Travon Biggs reviewed my findings and recommendations with Raisa Ramirez.     Electronicallysigned by Escobar Johns MD on 11/23/20 at 2:26 PM EST

## 2020-11-23 NOTE — PATIENT INSTRUCTIONS
Continue present treatment  Low-sodium low-fat diet  Keep the legs elevated  Take the medication as prescribed  Follow-up with the back doctor  We'll order x-ray of the shoulder if you're not better  Compresses to the shoulders

## 2021-01-19 DIAGNOSIS — E78.5 HYPERLIPIDEMIA, UNSPECIFIED HYPERLIPIDEMIA TYPE: Primary | ICD-10-CM

## 2021-01-19 RX ORDER — SIMVASTATIN 20 MG
20 TABLET ORAL NIGHTLY
Qty: 90 TABLET | Refills: 1 | Status: SHIPPED
Start: 2021-01-19 | End: 2021-07-21

## 2021-02-19 DIAGNOSIS — N63.0 BREAST NODULE: Primary | ICD-10-CM

## 2021-02-22 DIAGNOSIS — R92.2 BREAST DENSITY: Primary | ICD-10-CM

## 2021-03-19 ENCOUNTER — TELEPHONE (OUTPATIENT)
Dept: FAMILY MEDICINE CLINIC | Age: 86
End: 2021-03-19

## 2021-03-19 DIAGNOSIS — I25.10 CORONARY ARTERY DISEASE INVOLVING NATIVE CORONARY ARTERY OF NATIVE HEART WITHOUT ANGINA PECTORIS: Primary | ICD-10-CM

## 2021-03-25 ENCOUNTER — OFFICE VISIT (OUTPATIENT)
Dept: FAMILY MEDICINE CLINIC | Age: 86
End: 2021-03-25
Payer: MEDICARE

## 2021-03-25 VITALS
WEIGHT: 162 LBS | BODY MASS INDEX: 28.7 KG/M2 | TEMPERATURE: 98.4 F | DIASTOLIC BLOOD PRESSURE: 82 MMHG | HEIGHT: 63 IN | HEART RATE: 83 BPM | OXYGEN SATURATION: 93 % | SYSTOLIC BLOOD PRESSURE: 132 MMHG

## 2021-03-25 DIAGNOSIS — I10 HYPERTENSION, UNSPECIFIED TYPE: ICD-10-CM

## 2021-03-25 DIAGNOSIS — R92.30 BREAST DENSITY: ICD-10-CM

## 2021-03-25 DIAGNOSIS — E78.5 HYPERLIPIDEMIA, UNSPECIFIED HYPERLIPIDEMIA TYPE: ICD-10-CM

## 2021-03-25 DIAGNOSIS — E03.9 ACQUIRED HYPOTHYROIDISM: ICD-10-CM

## 2021-03-25 DIAGNOSIS — I25.10 CORONARY ARTERY DISEASE INVOLVING NATIVE CORONARY ARTERY OF NATIVE HEART WITHOUT ANGINA PECTORIS: Primary | ICD-10-CM

## 2021-03-25 DIAGNOSIS — I77.9 CAROTID ARTERY DISEASE, UNSPECIFIED LATERALITY, UNSPECIFIED TYPE (HCC): ICD-10-CM

## 2021-03-25 PROBLEM — Z99.81 DEPENDENCE ON SUPPLEMENTAL OXYGEN: Status: ACTIVE | Noted: 2021-03-25

## 2021-03-25 PROBLEM — I35.2 AORTIC VALVE STENOSIS WITH INSUFFICIENCY: Status: ACTIVE | Noted: 2021-03-25

## 2021-03-25 PROBLEM — N90.89 IRRITATION OF VULVA: Status: ACTIVE | Noted: 2021-03-25

## 2021-03-25 PROBLEM — G45.9 TRANSIENT ISCHEMIC ATTACK: Status: ACTIVE | Noted: 2020-07-22

## 2021-03-25 PROBLEM — I27.20 PULMONARY HYPERTENSION (HCC): Status: ACTIVE | Noted: 2021-03-25

## 2021-03-25 PROBLEM — R06.09 DYSPNEA ON EXERTION: Status: ACTIVE | Noted: 2021-03-25

## 2021-03-25 PROCEDURE — 1123F ACP DISCUSS/DSCN MKR DOCD: CPT | Performed by: FAMILY MEDICINE

## 2021-03-25 PROCEDURE — 1036F TOBACCO NON-USER: CPT | Performed by: FAMILY MEDICINE

## 2021-03-25 PROCEDURE — 99214 OFFICE O/P EST MOD 30 MIN: CPT | Performed by: FAMILY MEDICINE

## 2021-03-25 PROCEDURE — G8427 DOCREV CUR MEDS BY ELIG CLIN: HCPCS | Performed by: FAMILY MEDICINE

## 2021-03-25 PROCEDURE — 1090F PRES/ABSN URINE INCON ASSESS: CPT | Performed by: FAMILY MEDICINE

## 2021-03-25 PROCEDURE — G8417 CALC BMI ABV UP PARAM F/U: HCPCS | Performed by: FAMILY MEDICINE

## 2021-03-25 PROCEDURE — 4040F PNEUMOC VAC/ADMIN/RCVD: CPT | Performed by: FAMILY MEDICINE

## 2021-03-25 PROCEDURE — G8484 FLU IMMUNIZE NO ADMIN: HCPCS | Performed by: FAMILY MEDICINE

## 2021-03-25 SDOH — ECONOMIC STABILITY: INCOME INSECURITY: HOW HARD IS IT FOR YOU TO PAY FOR THE VERY BASICS LIKE FOOD, HOUSING, MEDICAL CARE, AND HEATING?: NOT HARD AT ALL

## 2021-03-25 SDOH — ECONOMIC STABILITY: FOOD INSECURITY: WITHIN THE PAST 12 MONTHS, THE FOOD YOU BOUGHT JUST DIDN'T LAST AND YOU DIDN'T HAVE MONEY TO GET MORE.: NEVER TRUE

## 2021-03-25 ASSESSMENT — PATIENT HEALTH QUESTIONNAIRE - PHQ9
2. FEELING DOWN, DEPRESSED OR HOPELESS: 0
1. LITTLE INTEREST OR PLEASURE IN DOING THINGS: 0
SUM OF ALL RESPONSES TO PHQ QUESTIONS 1-9: 0

## 2021-03-25 ASSESSMENT — ENCOUNTER SYMPTOMS
SHORTNESS OF BREATH: 1
ALLERGIC/IMMUNOLOGIC NEGATIVE: 1
GASTROINTESTINAL NEGATIVE: 1
EYES NEGATIVE: 1

## 2021-03-25 NOTE — PROGRESS NOTES
OFFICE PROGRESS NOTE      SUBJECTIVE:        Patient ID:   Danny Black is a 80 y.o. female who presents for   Chief Complaint   Patient presents with    Hypertension     Here for recheck on Hypertension. Scheduled for Heart Valve replacement surgery on 4-9 at [facility]. HPI:   Patient states is feeling okay  He is to go to [facility] for the cardiac surgery  Patient states is following the diet  He has to use oxygen because of shortness of breath  Been followed by the cardiologist        Prior to Visit Medications    Medication Sig Taking?  Authorizing Provider   simvastatin (ZOCOR) 20 MG tablet Take 1 tablet by mouth nightly  Nesha Amador MD   levothyroxine (SYNTHROID) 100 MCG tablet take 1 tablet by mouth once daily  Nesha Amador MD   atenolol (TENORMIN) 25 MG tablet take 1 tablet by mouth once daily  Nesha Amador MD   meclizine (ANTIVERT) 12.5 MG tablet Take 1 tablet by mouth 3 times daily as needed for Dizziness  Nesha Amador MD   furosemide (LASIX) 40 MG tablet Take 1 tablet by mouth 2 times daily  Patient taking differently: Take 40 mg by mouth 2 times daily Takes when she is at home  Nesha Amador MD   Omega-3 Fatty Acids (OMEGA-3 PLUS PO) Take 2 capsules by mouth daily  Historical Provider, MD   GARCINIA CAMBOGIA-CHROMIUM PO Take 1 tablet by mouth daily  Historical Provider, MD   GARLIC PO Take 1 tablet by mouth daily  Historical Provider, MD   hydrochlorothiazide (MICROZIDE) 12.5 MG capsule Take 12.5 mg by mouth daily  Historical Provider, MD   isosorbide mononitrate (IMDUR) 30 MG extended release tablet Take 30 mg by mouth daily  Historical Provider, MD   Pyridoxine HCl (B-6 PO) Take 1 tablet by mouth daily  Historical Provider, MD   lisinopril (PRINIVIL;ZESTRIL) 20 MG tablet Take 20 mg by mouth daily   Historical Provider, MD   Cholecalciferol (VITAMIN D-3 PO) Take by mouth daily  Historical Provider, MD   vitamin B-12 (CYANOCOBALAMIN) 1000 MCG tablet Take 1,000 mcg by mouth daily as needed   Historical Provider, MD   Multiple Vitamin (ONE-A-DAY 55 PLUS PO) Take by mouth daily  Historical Provider, MD      Social History     Socioeconomic History    Marital status:      Spouse name: None    Number of children: None    Years of education: None    Highest education level: None   Occupational History    Occupation: retired   Social Needs    Financial resource strain: Not hard at all   [place] insecurity     Worry: Never true     Inability: Never true   [employer] needs     Medical: No     Non-medical: No   Tobacco Use    Smoking status: Never Smoker    Smokeless tobacco: Never Used   Substance and Sexual Activity    Alcohol use: No    Drug use: No    Sexual activity: Never   Lifestyle    Physical activity     Days per week: None     Minutes per session: None    Stress: None   Relationships    Social connections     Talks on phone: None     Gets together: None     Attends Buddhist service: None     Active member of club or organization: None     Attends meetings of clubs or organizations: None     Relationship status: None    Intimate partner violence     Fear of current or ex partner: None     Emotionally abused: None     Physically abused: None     Forced sexual activity: None   Other Topics Concern    None   Social History Narrative    None       I have reviewed Jena's allergies, medications, problem list, medical, social and family history and have updated as needed in the electronic medical record  Review Of Systems:    Review of Systems   Constitutional: Negative. HENT: Negative. Eyes: Negative. Respiratory: Positive for shortness of breath. Cardiovascular: Negative. Gastrointestinal: Negative. Endocrine: Negative. Musculoskeletal: Negative. Skin: Negative. Allergic/Immunologic: Negative. Neurological: Negative. Hematological: Negative.     Psychiatric/Behavioral: Negative. OBJECTIVE:     VS:  Wt Readings from Last 3 Encounters:   03/25/21 162 lb (73.5 kg)   11/23/20 163 lb (73.9 kg)   08/24/20 167 lb (75.8 kg)     Temp Readings from Last 3 Encounters:   03/25/21 98.4 °F (36.9 °C) (Oral)   11/23/20 98.8 °F (37.1 °C) (Oral)   08/24/20 98.5 °F (36.9 °C) (Oral)     BP Readings from Last 3 Encounters:   03/25/21 132/82   11/23/20 (!) 150/63   08/24/20 (!) 142/71        Physical Exam  Constitutional:       Appearance: She is well-developed. HENT:      Head: Normocephalic and atraumatic. Eyes:      Conjunctiva/sclera: Conjunctivae normal.      Pupils: Pupils are equal, round, and reactive to light. Neck:      Musculoskeletal: Normal range of motion and neck supple. Cardiovascular:      Rate and Rhythm: Normal rate and regular rhythm. Heart sounds: Normal heart sounds. Pulmonary:      Effort: Pulmonary effort is normal.      Breath sounds: Normal breath sounds. Abdominal:      General: Bowel sounds are normal.      Palpations: Abdomen is soft. Musculoskeletal: Normal range of motion. Skin:     General: Skin is warm and dry. Neurological:      Mental Status: She is alert and oriented to person, place, and time. Psychiatric:         Thought Content:  Thought content normal.            Labs :    Lab Results   Component Value Date    WBC 5.0 11/19/2020    HGB 11.0 (L) 11/19/2020    HCT 34.1 11/19/2020     11/19/2020    CHOL 131 11/19/2020    TRIG 61 11/19/2020    HDL 60 11/19/2020    ALT 13 11/19/2020    AST 22 11/19/2020     11/19/2020    K 4.4 11/19/2020     11/19/2020    CREATININE 0.6 11/19/2020    BUN 21 11/19/2020    CO2 29 11/19/2020    TSH 0.533 11/19/2020    GLUF 85 06/28/2018     Lab Results   Component Value Date    COLORU yellow 07/02/2018    COLORU Yellow 06/26/2015    NITRU Negative 06/26/2015    GLUCOSEU negative 07/02/2018    GLUCOSEU Negative 06/26/2015    KETUA trace 07/02/2018    KETUA Negative 06/26/2015 UROBILINOGEN 0.2 06/26/2015    BILIRUBINUR negative 07/02/2018     No results found for: PSA, CEA, , [id number],       Controlled Substances Monitoring:                                    ASSESSMENT     Patient Active Problem List    Diagnosis Date Noted    Aortic valve stenosis with insufficiency 03/25/2021    Dependence on supplemental oxygen 03/25/2021    Dyspnea on exertion 03/25/2021    Irritation of vulva 03/25/2021    Pulmonary hypertension ([facility] 03/25/2021    Transient ischemic attack 07/22/2020    Coronary artery disease involving native coronary artery of native heart without angina pectoris 11/26/2018    Hypertensive disorder 02/19/2018    Hypothyroidism 02/19/2018    Hyperlipidemia 02/19/2018    Osteoporosis 02/19/2018        Diagnosis:     ICD-10-CM    1. Coronary artery disease involving native coronary artery of native heart without angina pectoris  I25.10    2. Hyperlipidemia, unspecified hyperlipidemia type  E78.5    3. Hypertension, unspecified type  I10    4. Acquired hypothyroidism  E03.9    5. Carotid artery disease, unspecified laterality, unspecified type ([facility]  I77.9        PLAN:   Continue present treatment  Follow-up with the cardiologist  Low-sodium low-fat diet  Continue oxygen  Lab work to be ordered patient wants to wait till she comes back  Return to clinic earlier if any problems      Patient Instructions   Continue present treatment  Low-sodium low-fat diet  Follow-up with the cardiologist  We will do the lab work after he come back as per your request  Return to clinic earlier if any problems  Continue using oxygen      Return in about 6 weeks (around 5/6/2021). Dorys Mayer reviewed my findings and recommendations with Xander Waldron.     Electronicallysigned by Gabriel Barbosa MD on 3/25/21 at 1:36 PM EDT

## 2021-03-25 NOTE — PATIENT INSTRUCTIONS
Continue present treatment  Low-sodium low-fat diet  Follow-up with the cardiologist  We will do the lab work after he come back as per your request  Return to clinic earlier if any problems  Continue using oxygen

## 2021-04-02 RX ORDER — LEVOTHYROXINE SODIUM 0.1 MG/1
TABLET ORAL
Qty: 90 TABLET | Refills: 1 | Status: SHIPPED
Start: 2021-04-02 | End: 2021-10-05

## 2021-05-17 ENCOUNTER — OFFICE VISIT (OUTPATIENT)
Dept: FAMILY MEDICINE CLINIC | Age: 86
End: 2021-05-17
Payer: MEDICARE

## 2021-05-17 VITALS
BODY MASS INDEX: 28.87 KG/M2 | OXYGEN SATURATION: 96 % | HEART RATE: 105 BPM | TEMPERATURE: 97 F | SYSTOLIC BLOOD PRESSURE: 135 MMHG | DIASTOLIC BLOOD PRESSURE: 72 MMHG | WEIGHT: 163 LBS

## 2021-05-17 DIAGNOSIS — E78.5 HYPERLIPIDEMIA, UNSPECIFIED HYPERLIPIDEMIA TYPE: ICD-10-CM

## 2021-05-17 DIAGNOSIS — E03.9 ACQUIRED HYPOTHYROIDISM: ICD-10-CM

## 2021-05-17 DIAGNOSIS — R14.0 ABDOMINAL BLOATING: ICD-10-CM

## 2021-05-17 DIAGNOSIS — R32 URINARY INCONTINENCE, UNSPECIFIED TYPE: ICD-10-CM

## 2021-05-17 DIAGNOSIS — I25.10 CORONARY ARTERY DISEASE INVOLVING NATIVE CORONARY ARTERY OF NATIVE HEART WITHOUT ANGINA PECTORIS: Primary | ICD-10-CM

## 2021-05-17 DIAGNOSIS — I10 HYPERTENSION, UNSPECIFIED TYPE: ICD-10-CM

## 2021-05-17 DIAGNOSIS — I27.20 PULMONARY HYPERTENSION (HCC): ICD-10-CM

## 2021-05-17 PROBLEM — I35.0 NONRHEUMATIC AORTIC VALVE STENOSIS: Status: ACTIVE | Noted: 2021-03-25

## 2021-05-17 PROCEDURE — 1123F ACP DISCUSS/DSCN MKR DOCD: CPT | Performed by: FAMILY MEDICINE

## 2021-05-17 PROCEDURE — 1036F TOBACCO NON-USER: CPT | Performed by: FAMILY MEDICINE

## 2021-05-17 PROCEDURE — 4040F PNEUMOC VAC/ADMIN/RCVD: CPT | Performed by: FAMILY MEDICINE

## 2021-05-17 PROCEDURE — 0509F URINE INCON PLAN DOCD: CPT | Performed by: FAMILY MEDICINE

## 2021-05-17 PROCEDURE — G8427 DOCREV CUR MEDS BY ELIG CLIN: HCPCS | Performed by: FAMILY MEDICINE

## 2021-05-17 PROCEDURE — 99214 OFFICE O/P EST MOD 30 MIN: CPT | Performed by: FAMILY MEDICINE

## 2021-05-17 PROCEDURE — G8417 CALC BMI ABV UP PARAM F/U: HCPCS | Performed by: FAMILY MEDICINE

## 2021-05-17 PROCEDURE — 1090F PRES/ABSN URINE INCON ASSESS: CPT | Performed by: FAMILY MEDICINE

## 2021-05-17 RX ORDER — CLOPIDOGREL BISULFATE 75 MG/1
75 TABLET ORAL DAILY
COMMUNITY

## 2021-05-17 RX ORDER — ASPIRIN 81 MG/1
81 TABLET ORAL DAILY
COMMUNITY

## 2021-05-17 ASSESSMENT — ENCOUNTER SYMPTOMS
EYES NEGATIVE: 1
CONSTIPATION: 1
ABDOMINAL DISTENTION: 1
ALLERGIC/IMMUNOLOGIC NEGATIVE: 1
RESPIRATORY NEGATIVE: 1

## 2021-05-17 NOTE — PROGRESS NOTES
OFFICE PROGRESS NOTE      SUBJECTIVE:        Patient ID:   Rojelio Boyle is a 80 y.o. female who presents for   Chief Complaint   Patient presents with    Coronary Artery Disease     Here for recheck on CAD. Had heart valve placed since last visit at [facility]. Patient reports feeling well. HPI:   Patient is here with the daughter  A lot of questions about patient's care  Patient been complaining of urinary incontinence  Complaining of constipation abdominal bloating  Patient and her got the lab work done in a long time  Had not seen a gynecologist in a long time  States that she had a colonoscopy about 3 years ago  Her shortness of breath is better  Complaining of chronic cough        Prior to Visit Medications    Medication Sig Taking?  Authorizing Provider   aspirin 81 MG EC tablet Take 81 mg by mouth daily Yes Historical Provider, MD   clopidogrel (PLAVIX) 75 MG tablet Take 75 mg by mouth daily Yes Historical Provider, MD   levothyroxine (SYNTHROID) 100 MCG tablet take 1 tablet by mouth once daily Yes Fani Nielsen MD   simvastatin (ZOCOR) 20 MG tablet Take 1 tablet by mouth nightly Yes Fani Nielsen MD   meclizine (ANTIVERT) 12.5 MG tablet Take 1 tablet by mouth 3 times daily as needed for Dizziness Yes Fani Nielsen MD   furosemide (LASIX) 40 MG tablet Take 1 tablet by mouth 2 times daily  Patient taking differently: Take 40 mg by mouth daily Takes when she is at home Yes Fani Nielsen MD   Omega-3 Fatty Acids (OMEGA-3 PLUS PO) Take 2 capsules by mouth daily Yes Historical Provider, MD   GARLIC PO Take 1 tablet by mouth daily Yes Historical Provider, MD   Pyridoxine HCl (B-6 PO) Take 1 tablet by mouth daily Yes Historical Provider, MD   lisinopril (PRINIVIL;ZESTRIL) 20 MG tablet Take 20 mg by mouth daily  Yes Historical Provider, MD   Cholecalciferol (VITAMIN D-3 PO) Take by mouth daily Yes Historical Provider, MD   vitamin B-12 (CYANOCOBALAMIN) 1000 MCG tablet Take 1,000 mcg by mouth daily as needed  Yes Historical Provider, MD   Multiple Vitamin (ONE-A-DAY 55 PLUS PO) Take by mouth daily Yes Historical Provider, MD   hydrochlorothiazide (MICROZIDE) 12.5 MG capsule Take 12.5 mg by mouth daily  Historical Provider, MD   isosorbide mononitrate (IMDUR) 30 MG extended release tablet Take 30 mg by mouth daily  Historical Provider, MD      Social History     Socioeconomic History    Marital status:      Spouse name: None    Number of children: None    Years of education: None    Highest education level: None   Occupational History    Occupation: retired   Tobacco Use    Smoking status: Never Smoker    Smokeless tobacco: Never Used   Vaping Use    Vaping Use: Never used   Substance and Sexual Activity    Alcohol use: No    Drug use: No    Sexual activity: Never   Other Topics Concern    None   Social History Narrative    None     Social Determinants of Health     Financial Resource Strain: Low Risk     Difficulty of Paying Living Expenses: Not hard at all   Food Insecurity: No Food Insecurity    Worried About Running Out of Food in the Last Year: Never true    [place] of Food in the Last Year: Never true   Transportation Needs: No Transportation Needs    Lack of Transportation (Medical): No    Lack of Transportation (Non-Medical):  No   Physical Activity:     Days of Exercise per Week:     Minutes of Exercise per Session:    Stress:     Feeling of Stress :    Social Connections:     Frequency of Communication with Friends and Family:     Frequency of Social Gatherings with Friends and Family:     Attends Druze Services:     Active Member of Clubs or Organizations:     Attends Club or Organization Meetings:     Marital Status:    Intimate Partner Violence:     Fear of Current or Ex-Partner:     Emotionally Abused:     Physically Abused:     Sexually Abused:        I have reviewed Jena's allergies, medications, problem list, 11/19/2020    HCT 34.1 11/19/2020     11/19/2020    CHOL 131 11/19/2020    TRIG 61 11/19/2020    HDL 60 11/19/2020    ALT 13 11/19/2020    AST 22 11/19/2020     11/19/2020    K 4.4 11/19/2020     11/19/2020    CREATININE 0.6 11/19/2020    BUN 21 11/19/2020    CO2 29 11/19/2020    TSH 0.533 11/19/2020    GLUF 85 06/28/2018     Lab Results   Component Value Date    COLORU yellow 07/02/2018    COLORU Yellow 06/26/2015    NITRU Negative 06/26/2015    GLUCOSEU negative 07/02/2018    GLUCOSEU Negative 06/26/2015    [place] trace 07/02/2018    KETUA Negative 06/26/2015    UROBILINOGEN 0.2 06/26/2015    BILIRUBINUR negative 07/02/2018     No results found for: PSA, CEA, , [id number],       Controlled Substances Monitoring:                                    ASSESSMENT     Patient Active Problem List    Diagnosis Date Noted    Nonrheumatic aortic valve stenosis 03/25/2021    Dependence on supplemental oxygen 03/25/2021    Dyspnea on exertion 03/25/2021    Irritation of vulva 03/25/2021    Pulmonary hypertension ([facility] 03/25/2021    Transient ischemic attack 07/22/2020    Coronary artery disease involving native coronary artery of native heart without angina pectoris 11/26/2018    Hypertension 02/19/2018    Hypothyroidism 02/19/2018    Hyperlipidemia 02/19/2018    Osteoporosis 02/19/2018        Diagnosis:     ICD-10-CM    1. Coronary artery disease involving native coronary artery of native heart without angina pectoris  I25.10    2. Pulmonary hypertension (HCC)  I27.20    3. Hypertension, unspecified type  I10    4. Hyperlipidemia, unspecified hyperlipidemia type  E78.5 COMPREHENSIVE METABOLIC PANEL     LIPID PANEL     XR CHEST PA INSPIRATION 1 VW   5. Acquired hypothyroidism  E03.9 TSH   6. Abdominal bloating  R14.0 POCT Occult Blood Stool     CBC WITH AUTO DIFFERENTIAL     XR ABDOMEN (2 VIEWS)   7.  Urinary incontinence, unspecified type  R32        PLAN:   Continue present treatment  Low-sodium low-fat diet  Follow with the gynecologist  Follow-up with the cardiologist  Get the lab work x-ray done before the next visit  Return to clinic earlier if any problems        Patient Instructions   Continue present treatment  Get the x-rays and lab work done  Follow-up with a gynecologist for the urinary incontinence  Follow-up with a cardiologist  Continue low-sodium low-fat diet  Return to clinic earlier if any problems      Return in about 2 weeks (around 5/31/2021). [facility] reviewed my findings and recommendations with Irina Henderson.     Electronicallysigned by Fred Saxena MD on 5/17/21 at 1:12 PM EDT

## 2021-05-17 NOTE — PATIENT INSTRUCTIONS
Continue present treatment  Get the x-rays and lab work done  Follow-up with a gynecologist for the urinary incontinence  Follow-up with a cardiologist  Continue low-sodium low-fat diet  Return to clinic earlier if any problems

## 2021-05-29 ENCOUNTER — HOSPITAL ENCOUNTER (OUTPATIENT)
Age: 86
Discharge: HOME | End: 2021-05-31
Payer: MEDICARE

## 2021-05-29 ENCOUNTER — HOSPITAL ENCOUNTER (OUTPATIENT)
Age: 86
Discharge: HOME | End: 2021-05-29
Payer: MEDICARE

## 2021-05-29 ENCOUNTER — HOSPITAL ENCOUNTER (OUTPATIENT)
Dept: GENERAL RADIOLOGY | Age: 86
Discharge: HOME | End: 2021-05-31
Payer: MEDICARE

## 2021-05-29 DIAGNOSIS — E78.5 HYPERLIPIDEMIA, UNSPECIFIED HYPERLIPIDEMIA TYPE: ICD-10-CM

## 2021-05-29 DIAGNOSIS — R14.0 ABDOMINAL BLOATING: ICD-10-CM

## 2021-05-29 DIAGNOSIS — E03.9 ACQUIRED HYPOTHYROIDISM: ICD-10-CM

## 2021-05-29 LAB
ALBUMIN SERPL-MCNC: 4.1 G/DL (ref 3.5–5.2)
ALP BLD-CCNC: 129 U/L (ref 35–104)
ALT SERPL-CCNC: 15 U/L (ref 0–32)
ANION GAP SERPL CALCULATED.3IONS-SCNC: 9 MMOL/L (ref 7–16)
AST SERPL-CCNC: 22 U/L (ref 0–31)
BASOPHILS ABSOLUTE: 0.01 E9/L (ref 0–0.2)
BASOPHILS RELATIVE PERCENT: 0.2 % (ref 0–2)
BILIRUB SERPL-MCNC: 0.3 MG/DL (ref 0–1.2)
BUN BLDV-MCNC: 20 MG/DL (ref 6–23)
CALCIUM SERPL-MCNC: 10 MG/DL (ref 8.6–10.2)
CHLORIDE BLD-SCNC: 104 MMOL/L (ref 98–107)
CHOLESTEROL, TOTAL: 149 MG/DL (ref 0–199)
CO2: 28 MMOL/L (ref 22–29)
CREAT SERPL-MCNC: 0.7 MG/DL (ref 0.5–1)
EOSINOPHILS ABSOLUTE: 0.35 E9/L (ref 0.05–0.5)
EOSINOPHILS RELATIVE PERCENT: 5.6 % (ref 0–6)
GFR AFRICAN AMERICAN: >60
GFR NON-AFRICAN AMERICAN: >60 ML/MIN/1.73
GLUCOSE BLD-MCNC: 95 MG/DL (ref 74–99)
HCT VFR BLD CALC: 33.4 % (ref 34–48)
HDLC SERPL-MCNC: 72 MG/DL
HEMOGLOBIN: 10.7 G/DL (ref 11.5–15.5)
IMMATURE GRANULOCYTES #: 0.02 E9/L
IMMATURE GRANULOCYTES %: 0.3 % (ref 0–5)
LDL CHOLESTEROL CALCULATED: 66 MG/DL (ref 0–99)
LYMPHOCYTES ABSOLUTE: 0.82 E9/L (ref 1.5–4)
LYMPHOCYTES RELATIVE PERCENT: 13.1 % (ref 20–42)
MCH RBC QN AUTO: 29 PG (ref 26–35)
MCHC RBC AUTO-ENTMCNC: 32 % (ref 32–34.5)
MCV RBC AUTO: 90.5 FL (ref 80–99.9)
MONOCYTES ABSOLUTE: 0.59 E9/L (ref 0.1–0.95)
MONOCYTES RELATIVE PERCENT: 9.4 % (ref 2–12)
NEUTROPHILS ABSOLUTE: 4.48 E9/L (ref 1.8–7.3)
NEUTROPHILS RELATIVE PERCENT: 71.4 % (ref 43–80)
PDW BLD-RTO: 14.3 FL (ref 11.5–15)
PLATELET # BLD: 294 E9/L (ref 130–450)
PMV BLD AUTO: 9.2 FL (ref 7–12)
POTASSIUM SERPL-SCNC: 4.8 MMOL/L (ref 3.5–5)
RBC # BLD: 3.69 E12/L (ref 3.5–5.5)
SODIUM BLD-SCNC: 141 MMOL/L (ref 132–146)
TOTAL PROTEIN: 7.2 G/DL (ref 6.4–8.3)
TRIGL SERPL-MCNC: 57 MG/DL (ref 0–149)
TSH SERPL DL<=0.05 MIU/L-ACNC: 1.93 UIU/ML (ref 0.27–4.2)
VLDLC SERPL CALC-MCNC: 11 MG/DL
WBC # BLD: 6.3 E9/L (ref 4.5–11.5)

## 2021-05-29 PROCEDURE — 80053 COMPREHEN METABOLIC PANEL: CPT

## 2021-05-29 PROCEDURE — 80061 LIPID PANEL: CPT

## 2021-05-29 PROCEDURE — 84443 ASSAY THYROID STIM HORMONE: CPT

## 2021-05-29 PROCEDURE — 36415 COLL VENOUS BLD VENIPUNCTURE: CPT

## 2021-05-29 PROCEDURE — 85025 COMPLETE CBC W/AUTO DIFF WBC: CPT

## 2021-05-29 PROCEDURE — 74022 RADEX COMPL AQT ABD SERIES: CPT

## 2021-06-02 DIAGNOSIS — Z12.11 SCREEN FOR COLON CANCER: ICD-10-CM

## 2021-06-02 DIAGNOSIS — R14.0 ABDOMINAL BLOATING: ICD-10-CM

## 2021-06-02 DIAGNOSIS — Z12.11 SCREEN FOR COLON CANCER: Primary | ICD-10-CM

## 2021-06-02 LAB
CONTROL: POSITIVE
HEMOCCULT STL QL: NEGATIVE

## 2021-06-02 PROCEDURE — 82274 ASSAY TEST FOR BLOOD FECAL: CPT | Performed by: FAMILY MEDICINE

## 2021-06-03 ENCOUNTER — OFFICE VISIT (OUTPATIENT)
Dept: FAMILY MEDICINE CLINIC | Age: 86
End: 2021-06-03
Payer: MEDICARE

## 2021-06-03 VITALS
TEMPERATURE: 97.6 F | DIASTOLIC BLOOD PRESSURE: 80 MMHG | WEIGHT: 161 LBS | SYSTOLIC BLOOD PRESSURE: 153 MMHG | BODY MASS INDEX: 28.52 KG/M2 | OXYGEN SATURATION: 94 % | HEART RATE: 96 BPM

## 2021-06-03 DIAGNOSIS — E78.5 HYPERLIPIDEMIA, UNSPECIFIED HYPERLIPIDEMIA TYPE: ICD-10-CM

## 2021-06-03 DIAGNOSIS — I10 HYPERTENSION, UNSPECIFIED TYPE: ICD-10-CM

## 2021-06-03 DIAGNOSIS — I27.20 PULMONARY HYPERTENSION (HCC): ICD-10-CM

## 2021-06-03 DIAGNOSIS — K59.00 CONSTIPATION, UNSPECIFIED CONSTIPATION TYPE: Primary | ICD-10-CM

## 2021-06-03 DIAGNOSIS — I25.10 CORONARY ARTERY DISEASE INVOLVING NATIVE CORONARY ARTERY OF NATIVE HEART WITHOUT ANGINA PECTORIS: ICD-10-CM

## 2021-06-03 PROCEDURE — 1090F PRES/ABSN URINE INCON ASSESS: CPT | Performed by: FAMILY MEDICINE

## 2021-06-03 PROCEDURE — 4040F PNEUMOC VAC/ADMIN/RCVD: CPT | Performed by: FAMILY MEDICINE

## 2021-06-03 PROCEDURE — 99213 OFFICE O/P EST LOW 20 MIN: CPT | Performed by: FAMILY MEDICINE

## 2021-06-03 PROCEDURE — G8417 CALC BMI ABV UP PARAM F/U: HCPCS | Performed by: FAMILY MEDICINE

## 2021-06-03 PROCEDURE — 1123F ACP DISCUSS/DSCN MKR DOCD: CPT | Performed by: FAMILY MEDICINE

## 2021-06-03 PROCEDURE — G8427 DOCREV CUR MEDS BY ELIG CLIN: HCPCS | Performed by: FAMILY MEDICINE

## 2021-06-03 PROCEDURE — 1036F TOBACCO NON-USER: CPT | Performed by: FAMILY MEDICINE

## 2021-06-03 ASSESSMENT — ENCOUNTER SYMPTOMS
CONSTIPATION: 1
RESPIRATORY NEGATIVE: 1
EYES NEGATIVE: 1
ALLERGIC/IMMUNOLOGIC NEGATIVE: 1

## 2021-06-03 NOTE — PROGRESS NOTES
Provider, MD   vitamin B-12 (CYANOCOBALAMIN) 1000 MCG tablet Take 1,000 mcg by mouth daily as needed  Yes Historical Provider, MD   Multiple Vitamin (ONE-A-DAY 55 PLUS PO) Take by mouth daily Yes Historical Provider, MD      Social History     Socioeconomic History    Marital status:      Spouse name: None    Number of children: None    Years of education: None    Highest education level: None   Occupational History    Occupation: retired   Tobacco Use    Smoking status: Never Smoker    Smokeless tobacco: Never Used   Vaping Use    Vaping Use: Never used   Substance and Sexual Activity    Alcohol use: No    Drug use: No    Sexual activity: Never   Other Topics Concern    None   Social History Narrative    None     Social Determinants of Health     Financial Resource Strain: Low Risk     Difficulty of Paying Living Expenses: Not hard at all   Food Insecurity: No Food Insecurity    Worried About Running Out of Food in the Last Year: Never true    Dexter of Food in the Last Year: Never true   Transportation Needs: No Transportation Needs    Lack of Transportation (Medical): No    Lack of Transportation (Non-Medical): No   Physical Activity:     Days of Exercise per Week:     Minutes of Exercise per Session:    Stress:     Feeling of Stress :    Social Connections:     Frequency of Communication with Friends and Family:     Frequency of Social Gatherings with Friends and Family:     Attends Yarsanism Services:     Active Member of Clubs or Organizations:     Attends Club or Organization Meetings:     Marital Status:    Intimate Partner Violence:     Fear of Current or Ex-Partner:     Emotionally Abused:     Physically Abused:     Sexually Abused:        I have reviewed Jena's allergies, medications, problem list, medical, social and family history and have updated as needed in the electronic medical record  Review Of Systems:    Review of Systems   Constitutional: Negative. HENT: Negative. Eyes: Negative. Respiratory: Negative. Cardiovascular: Negative. Gastrointestinal: Positive for constipation. Endocrine: Negative. Musculoskeletal: Negative. Skin: Negative. Allergic/Immunologic: Negative. Neurological: Negative. Hematological: Negative. Psychiatric/Behavioral: Negative. OBJECTIVE:     VS:  Wt Readings from Last 3 Encounters:   06/03/21 161 lb (73 kg)   05/17/21 163 lb (73.9 kg)   03/25/21 162 lb (73.5 kg)     Temp Readings from Last 3 Encounters:   06/03/21 97.6 °F (36.4 °C) (Infrared)   05/17/21 97 °F (36.1 °C) (Infrared)   03/25/21 98.4 °F (36.9 °C) (Oral)     BP Readings from Last 3 Encounters:   06/03/21 (!) 153/80   05/17/21 135/72   03/25/21 132/82        Physical Exam  Constitutional:       Appearance: She is well-developed. HENT:      Head: Normocephalic and atraumatic. Eyes:      Conjunctiva/sclera: Conjunctivae normal.      Pupils: Pupils are equal, round, and reactive to light. Cardiovascular:      Rate and Rhythm: Normal rate and regular rhythm. Heart sounds: Normal heart sounds. Pulmonary:      Effort: Pulmonary effort is normal.      Breath sounds: Normal breath sounds. Abdominal:      General: Bowel sounds are normal.      Palpations: Abdomen is soft. Musculoskeletal:         General: Normal range of motion. Cervical back: Normal range of motion and neck supple. Skin:     General: Skin is warm and dry. Neurological:      Mental Status: She is alert and oriented to person, place, and time. Psychiatric:         Thought Content:  Thought content normal.            Labs :    Lab Results   Component Value Date    WBC 6.3 05/29/2021    HGB 10.7 (L) 05/29/2021    HCT 33.4 (L) 05/29/2021     05/29/2021    CHOL 149 05/29/2021    TRIG 57 05/29/2021    HDL 72 05/29/2021    ALT 15 05/29/2021    AST 22 05/29/2021     05/29/2021    K 4.8 05/29/2021     05/29/2021    CREATININE 0.7 by Isela Mathew MD on 6/3/21 at 12:01 PM EDT

## 2021-06-03 NOTE — PATIENT INSTRUCTIONS
Take the stool softener and the MiraLAX  Low-sodium low-fat diet  Follow-up with a cardiologist  Continue the medication  Return to clinic earlier if any problems

## 2021-06-08 ENCOUNTER — HOSPITAL ENCOUNTER (EMERGENCY)
Age: 86
Discharge: HOME | End: 2021-06-08
Attending: FAMILY MEDICINE
Payer: MEDICARE

## 2021-06-08 VITALS
BODY MASS INDEX: 28.17 KG/M2 | HEART RATE: 100 BPM | SYSTOLIC BLOOD PRESSURE: 159 MMHG | WEIGHT: 159 LBS | RESPIRATION RATE: 20 BRPM | TEMPERATURE: 97.1 F | OXYGEN SATURATION: 90 % | DIASTOLIC BLOOD PRESSURE: 89 MMHG

## 2021-06-08 DIAGNOSIS — S29.019A THORACIC MYOFASCIAL STRAIN, INITIAL ENCOUNTER: Primary | ICD-10-CM

## 2021-06-08 PROCEDURE — 96372 THER/PROPH/DIAG INJ SC/IM: CPT

## 2021-06-08 PROCEDURE — 6360000002 HC RX W HCPCS: Performed by: FAMILY MEDICINE

## 2021-06-08 PROCEDURE — 99283 EMERGENCY DEPT VISIT LOW MDM: CPT

## 2021-06-08 RX ORDER — METHYLPREDNISOLONE SODIUM SUCCINATE 125 MG/2ML
125 INJECTION, POWDER, LYOPHILIZED, FOR SOLUTION INTRAMUSCULAR; INTRAVENOUS ONCE
Status: COMPLETED | OUTPATIENT
Start: 2021-06-08 | End: 2021-06-08

## 2021-06-08 RX ORDER — PREDNISONE 20 MG/1
40 TABLET ORAL DAILY
Qty: 8 TABLET | Refills: 0 | Status: SHIPPED | OUTPATIENT
Start: 2021-06-08 | End: 2021-06-12

## 2021-06-08 RX ADMIN — METHYLPREDNISOLONE SODIUM SUCCINATE 125 MG: 125 INJECTION, POWDER, FOR SOLUTION INTRAMUSCULAR; INTRAVENOUS at 14:14

## 2021-06-08 ASSESSMENT — PAIN DESCRIPTION - PAIN TYPE: TYPE: ACUTE PAIN

## 2021-06-08 ASSESSMENT — PAIN - FUNCTIONAL ASSESSMENT: PAIN_FUNCTIONAL_ASSESSMENT: 0-10

## 2021-06-08 ASSESSMENT — PAIN SCALES - GENERAL
PAINLEVEL_OUTOF10: 10
PAINLEVEL_OUTOF10: 10

## 2021-06-08 ASSESSMENT — PAIN DESCRIPTION - ORIENTATION: ORIENTATION: UPPER;POSTERIOR

## 2021-06-08 ASSESSMENT — PAIN DESCRIPTION - FREQUENCY: FREQUENCY: CONTINUOUS

## 2021-06-08 ASSESSMENT — PAIN DESCRIPTION - LOCATION: LOCATION: BACK

## 2021-06-08 ASSESSMENT — PAIN DESCRIPTION - DESCRIPTORS: DESCRIPTORS: STABBING

## 2021-06-08 NOTE — ED PROVIDER NOTES
HPI:  6/8/21,   Time: 1:56 PM EDT         Kashmir Galvan is a 80 y.o. female presenting to the ED for 1 day history of cute onset of pain in the left upper back in the scapular area. She describes as a sharp, intermittent pain that is episodic. It lasts only a few seconds, but occurring her variable intervals, sometimes every few minutes and sometimes up to an hour. She denies injury. She denies any rash. ROS:   Pertinent positives and negatives are stated within HPI, all other systems reviewed and are negative.  --------------------------------------------- PAST HISTORY ---------------------------------------------  Past Medical History:  has a past medical history of Aortic valve insufficiency, CAD (coronary artery disease), Cancer ([facility] [address] Cardiac arrhythmia, Fracture of femur ([facility] [address] GERD (gastroesophageal reflux disease), Hyperlipidemia, Hypertension, Hypothyroidism, Incontinence, Mass, Mitral valve prolapse, Osteoarthritis, Osteoporosis, Peripheral vascular disease ([facility] [address] Radiation, Sciatica, Thyroid disease, Urinary incontinence, and Vertigo. Past Surgical History:  has a past surgical history that includes Soft Tissue Tumor Resection (Left, 2002); other surgical history (Left); and Cardiac surgery. Social History:  reports that she has never smoked. She has never used smokeless tobacco. She reports that she does not drink alcohol and does not use drugs. Family History: family history includes Cancer in her father and mother; Rheumatologic Disease in her mother; Thyroid Disease in her daughter, sister, and son. The patients home medications have been reviewed.     Allergies: Adhesive tape, Hydrocodone, and Lamictal [lamotrigine]    -------------------------------------------------- RESULTS -------------------------------------------------  All laboratory and radiology results have been personally reviewed by myself   LABS:  No results found for this visit on 06/08/21. RADIOLOGY:  Interpreted by Radiologist.  No orders to display       ------------------------- NURSING NOTES AND VITALS REVIEWED ---------------------------   The nursing notes within the ED encounter and vital signs as below have been reviewed. BP (!) 159/89   Pulse 100   Temp 97.1 °F (36.2 °C) (Temporal)   Resp 20   Wt 159 lb (72.1 kg)   SpO2 90% Comment: pt states she recently \"to off oxygen\"  BMI 28.17 kg/m²   Oxygen Saturation Interpretation: Normal      ---------------------------------------------------PHYSICAL EXAM--------------------------------------    Constitutional/General: Alert and oriented x3, well appearing, non toxic in NAD  Head: NC/AT  Eyes: PERRL, EOMI  Mouth: Oropharynx clear, handling secretions, no trismus  Neck: Supple, full ROM, no meningeal signs  Pulmonary: Lungs clear to auscultation bilaterally, no wheezes, rales, or rhonchi. Not in respiratory distress  Cardiovascular:  Regular rate and rhythm, no murmurs, gallops, or rubs. 2+ distal pulses  Abdomen: Soft, non tender, non distended,   Back:  There is a localized area in the left side of the mid thoracic area, close to the inferior border the scapula, it is severely tender to palpation. No rashes visualized. There are no vesicles. Extremities: Moves all extremities x 4. Warm and well perfused  Skin: warm and dry without rash  Neurologic: GCS 15,  Psych: Normal Affect      ------------------------------ ED COURSE/MEDICAL DECISION MAKING----------------------  Medications   methylPREDNISolone sodium (SOLU-MEDROL) injection 125 mg (has no administration in time range)         Medical Decision Making:    Simple    I counseled the patient regarding any kind of rash that she develops on her back or rash that goes from the back around to the front of her chest on the left side that it could be as indication that she is she is developing shingles. Sometimes the pain from shingles preempt the rash.   Otherwise, I think she

## 2021-07-01 ENCOUNTER — TELEPHONE (OUTPATIENT)
Dept: FAMILY MEDICINE CLINIC | Age: 86
End: 2021-07-01

## 2021-07-01 NOTE — TELEPHONE ENCOUNTER
Patient called stating that she has not been using the oxygen since May and wants it out of her house, got it from [facility], fax to [phone] order needs to day \"d/c O2\"        6/3/2021     Last Appointment  Visit date not found

## 2021-07-12 ENCOUNTER — OFFICE VISIT (OUTPATIENT)
Dept: FAMILY MEDICINE CLINIC | Age: 86
End: 2021-07-12
Payer: MEDICARE

## 2021-07-12 VITALS
DIASTOLIC BLOOD PRESSURE: 80 MMHG | HEART RATE: 90 BPM | BODY MASS INDEX: 28.34 KG/M2 | TEMPERATURE: 97.5 F | WEIGHT: 160 LBS | SYSTOLIC BLOOD PRESSURE: 138 MMHG | OXYGEN SATURATION: 92 %

## 2021-07-12 DIAGNOSIS — I77.9 CAROTID ARTERY DISEASE, UNSPECIFIED LATERALITY, UNSPECIFIED TYPE (HCC): ICD-10-CM

## 2021-07-12 DIAGNOSIS — E03.9 ACQUIRED HYPOTHYROIDISM: ICD-10-CM

## 2021-07-12 DIAGNOSIS — I25.10 CORONARY ARTERY DISEASE INVOLVING NATIVE CORONARY ARTERY OF NATIVE HEART WITHOUT ANGINA PECTORIS: Primary | ICD-10-CM

## 2021-07-12 DIAGNOSIS — I27.20 PULMONARY HYPERTENSION (HCC): ICD-10-CM

## 2021-07-12 DIAGNOSIS — I10 HYPERTENSION, UNSPECIFIED TYPE: ICD-10-CM

## 2021-07-12 DIAGNOSIS — E78.5 HYPERLIPIDEMIA, UNSPECIFIED HYPERLIPIDEMIA TYPE: ICD-10-CM

## 2021-07-12 PROCEDURE — 1036F TOBACCO NON-USER: CPT | Performed by: FAMILY MEDICINE

## 2021-07-12 PROCEDURE — 99214 OFFICE O/P EST MOD 30 MIN: CPT | Performed by: FAMILY MEDICINE

## 2021-07-12 PROCEDURE — 1090F PRES/ABSN URINE INCON ASSESS: CPT | Performed by: FAMILY MEDICINE

## 2021-07-12 PROCEDURE — 1123F ACP DISCUSS/DSCN MKR DOCD: CPT | Performed by: FAMILY MEDICINE

## 2021-07-12 PROCEDURE — G8417 CALC BMI ABV UP PARAM F/U: HCPCS | Performed by: FAMILY MEDICINE

## 2021-07-12 PROCEDURE — G8427 DOCREV CUR MEDS BY ELIG CLIN: HCPCS | Performed by: FAMILY MEDICINE

## 2021-07-12 PROCEDURE — 4040F PNEUMOC VAC/ADMIN/RCVD: CPT | Performed by: FAMILY MEDICINE

## 2021-07-12 ASSESSMENT — ENCOUNTER SYMPTOMS
ALLERGIC/IMMUNOLOGIC NEGATIVE: 1
GASTROINTESTINAL NEGATIVE: 1
RESPIRATORY NEGATIVE: 1
EYES NEGATIVE: 1

## 2021-07-12 NOTE — PROGRESS NOTES
mouth daily as needed  Yes Historical Provider, MD   Multiple Vitamin (ONE-A-DAY 55 PLUS PO) Take by mouth daily Yes Historical Provider, MD      Social History     Socioeconomic History    Marital status:      Spouse name: None    Number of children: None    Years of education: None    Highest education level: None   Occupational History    Occupation: retired   Tobacco Use    Smoking status: Never Smoker    Smokeless tobacco: Never Used   Vaping Use    Vaping Use: Never used   Substance and Sexual Activity    Alcohol use: No    Drug use: No    Sexual activity: Never   Other Topics Concern    None   Social History Narrative    None     Social Determinants of Health     Financial Resource Strain: Low Risk     Difficulty of Paying Living Expenses: Not hard at all   Food Insecurity: No Food Insecurity    Worried About Running Out of Food in the Last Year: Never true    Dexter of Food in the Last Year: Never true   Transportation Needs: No Transportation Needs    Lack of Transportation (Medical): No    Lack of Transportation (Non-Medical): No   Physical Activity:     Days of Exercise per Week:     Minutes of Exercise per Session:    Stress:     Feeling of Stress :    Social Connections:     Frequency of Communication with Friends and Family:     Frequency of Social Gatherings with Friends and Family:     Attends Uatsdin Services:     Active Member of Clubs or Organizations:     Attends Club or Organization Meetings:     Marital Status:    Intimate Partner Violence:     Fear of Current or Ex-Partner:     Emotionally Abused:     Physically Abused:     Sexually Abused:        I have reviewed Jena's allergies, medications, problem list, medical, social and family history and have updated as needed in the electronic medical record  Review Of Systems:    Review of Systems   Constitutional: Negative. HENT: Negative. Eyes: Negative. Respiratory: Negative.     Cardiovascular: Negative. Gastrointestinal: Negative. Endocrine: Negative. Musculoskeletal: Negative. Skin: Negative. Allergic/Immunologic: Negative. Neurological: Negative. Hematological: Negative. Psychiatric/Behavioral: Negative. OBJECTIVE:     VS:  Wt Readings from Last 3 Encounters:   07/12/21 160 lb (72.6 kg)   06/08/21 159 lb (72.1 kg)   06/03/21 161 lb (73 kg)     Temp Readings from Last 3 Encounters:   07/12/21 97.5 °F (36.4 °C) (Infrared)   06/08/21 97.1 °F (36.2 °C) (Temporal)   06/03/21 97.6 °F (36.4 °C) (Infrared)     BP Readings from Last 3 Encounters:   07/12/21 138/80   06/08/21 (!) 159/89   06/03/21 (!) 153/80        Physical Exam  Constitutional:       Appearance: She is well-developed. HENT:      Head: Normocephalic and atraumatic. Eyes:      Conjunctiva/sclera: Conjunctivae normal.      Pupils: Pupils are equal, round, and reactive to light. Cardiovascular:      Rate and Rhythm: Normal rate and regular rhythm. Heart sounds: Normal heart sounds. Pulmonary:      Effort: Pulmonary effort is normal.      Breath sounds: Normal breath sounds. Abdominal:      General: Bowel sounds are normal.      Palpations: Abdomen is soft. Musculoskeletal:         General: Normal range of motion. Cervical back: Normal range of motion and neck supple. Skin:     General: Skin is warm and dry. Neurological:      Mental Status: She is alert and oriented to person, place, and time. Psychiatric:         Thought Content:  Thought content normal.            Labs :    Lab Results   Component Value Date    WBC 6.3 05/29/2021    HGB 10.7 (L) 05/29/2021    HCT 33.4 (L) 05/29/2021     05/29/2021    CHOL 149 05/29/2021    TRIG 57 05/29/2021    HDL 72 05/29/2021    ALT 15 05/29/2021    AST 22 05/29/2021     05/29/2021    K 4.8 05/29/2021     05/29/2021    CREATININE 0.7 05/29/2021    BUN 20 05/29/2021    CO2 28 05/29/2021    TSH 1.930 05/29/2021    GLUF 85 06/28/2018     Lab Results   Component Value Date    COLORU yellow 07/02/2018    COLORU Yellow 06/26/2015    NITRU Negative 06/26/2015    GLUCOSEU negative 07/02/2018    GLUCOSEU Negative 06/26/2015    KETUA trace 07/02/2018    KETUA Negative 06/26/2015    UROBILINOGEN 0.2 06/26/2015    BILIRUBINUR negative 07/02/2018     No results found for: PSA, CEA, , US1138,       Controlled Substances Monitoring:                                    ASSESSMENT     Patient Active Problem List    Diagnosis Date Noted    Nonrheumatic aortic valve stenosis 03/25/2021    Dependence on supplemental oxygen 03/25/2021    Dyspnea on exertion 03/25/2021    Irritation of vulva 03/25/2021    Pulmonary hypertension ([facility] [address] 03/25/2021    Transient ischemic attack 07/22/2020    Coronary artery disease involving native coronary artery of native heart without angina pectoris 11/26/2018    Hypertension 02/19/2018    Hypothyroidism 02/19/2018    Hyperlipidemia 02/19/2018    Osteoporosis 02/19/2018        Diagnosis:     ICD-10-CM    1. Coronary artery disease involving native coronary artery of native heart without angina pectoris  I25.10 CBC WITH AUTO DIFFERENTIAL   2. Hypertension, unspecified type  I10 CBC WITH AUTO DIFFERENTIAL   3. Pulmonary hypertension (HCC)  I27.20 CBC WITH AUTO DIFFERENTIAL   4. Hyperlipidemia, unspecified hyperlipidemia type  E78.5 CBC WITH AUTO DIFFERENTIAL     COMPREHENSIVE METABOLIC PANEL     LIPID PANEL   5. Acquired hypothyroidism  E03.9 TSH   6.  Carotid artery disease, unspecified laterality, unspecified type ([facility] [address]  I77.9 CBC WITH AUTO DIFFERENTIAL       PLAN:   Continue present treatment  Low-sodium low-fat diet   continue using the oxygen  Regular exercises  Follow with the cardiologist  Get the lab work done before the next visit  Return to clinic earlier if any problems        Patient Instructions   Continue present treatment  Low-sodium low-fat diet  Continue using the oxygen  Get the lab work done before the next visit  Follow-up with a cardiologist  Return to the clinic earlier if any problems      Return in about 6 weeks (around 8/23/2021) for annaul visit. Teri Mendoza reviewed my findings and recommendations with Shaista Lindsey.     Electronicallysigned by Vick Lozada MD on 7/12/21 at 2:58 PM EDT

## 2021-07-12 NOTE — PATIENT INSTRUCTIONS
Continue present treatment  Low-sodium low-fat diet  Continue using the oxygen  Get the lab work done before the next visit  Follow-up with a cardiologist  Return to the clinic earlier if any problems

## 2021-07-13 ENCOUNTER — TELEPHONE (OUTPATIENT)
Dept: FAMILY MEDICINE CLINIC | Age: 86
End: 2021-07-13

## 2021-07-13 NOTE — TELEPHONE ENCOUNTER
Patient states she was to call and let you know the name of the firm handling her oxygen    [address]  Fax  [phone]    Office note and update why she still needs her oxygen.

## 2021-07-16 ENCOUNTER — TELEPHONE (OUTPATIENT)
Dept: FAMILY MEDICINE CLINIC | Age: 86
End: 2021-07-16

## 2021-07-16 DIAGNOSIS — I27.20 PULMONARY HYPERTENSION (HCC): Primary | ICD-10-CM

## 2021-07-16 RX ORDER — LISINOPRIL 20 MG/1
20 TABLET ORAL DAILY
Qty: 90 TABLET | Refills: 0 | Status: SHIPPED
Start: 2021-07-16 | End: 2022-04-12 | Stop reason: SDUPTHER

## 2021-07-21 DIAGNOSIS — E78.5 HYPERLIPIDEMIA, UNSPECIFIED HYPERLIPIDEMIA TYPE: ICD-10-CM

## 2021-07-21 RX ORDER — SIMVASTATIN 20 MG
TABLET ORAL
Qty: 90 TABLET | Refills: 1 | Status: SHIPPED
Start: 2021-07-21 | End: 2022-01-31 | Stop reason: SDUPTHER

## 2021-08-17 ENCOUNTER — HOSPITAL ENCOUNTER (OUTPATIENT)
Age: 86
Discharge: HOME | End: 2021-08-17
Payer: MEDICARE

## 2021-08-17 DIAGNOSIS — E03.9 ACQUIRED HYPOTHYROIDISM: ICD-10-CM

## 2021-08-17 DIAGNOSIS — I27.20 PULMONARY HYPERTENSION (HCC): ICD-10-CM

## 2021-08-17 DIAGNOSIS — I25.10 CORONARY ARTERY DISEASE INVOLVING NATIVE CORONARY ARTERY OF NATIVE HEART WITHOUT ANGINA PECTORIS: ICD-10-CM

## 2021-08-17 DIAGNOSIS — I10 HYPERTENSION, UNSPECIFIED TYPE: ICD-10-CM

## 2021-08-17 DIAGNOSIS — I77.9 CAROTID ARTERY DISEASE, UNSPECIFIED LATERALITY, UNSPECIFIED TYPE (HCC): ICD-10-CM

## 2021-08-17 DIAGNOSIS — E78.5 HYPERLIPIDEMIA, UNSPECIFIED HYPERLIPIDEMIA TYPE: ICD-10-CM

## 2021-08-17 LAB
ALBUMIN SERPL-MCNC: 4.1 G/DL (ref 3.5–5.2)
ALP BLD-CCNC: 107 U/L (ref 35–104)
ALT SERPL-CCNC: 14 U/L (ref 0–32)
ANION GAP SERPL CALCULATED.3IONS-SCNC: 7 MMOL/L (ref 7–16)
AST SERPL-CCNC: 21 U/L (ref 0–31)
BASOPHILS ABSOLUTE: 0.01 E9/L (ref 0–0.2)
BASOPHILS RELATIVE PERCENT: 0.2 % (ref 0–2)
BILIRUB SERPL-MCNC: 0.3 MG/DL (ref 0–1.2)
BUN BLDV-MCNC: 18 MG/DL (ref 6–23)
CALCIUM SERPL-MCNC: 10.2 MG/DL (ref 8.6–10.2)
CHLORIDE BLD-SCNC: 105 MMOL/L (ref 98–107)
CHOLESTEROL, TOTAL: 139 MG/DL (ref 0–199)
CO2: 30 MMOL/L (ref 22–29)
CREAT SERPL-MCNC: 0.8 MG/DL (ref 0.5–1)
EOSINOPHILS ABSOLUTE: 0.12 E9/L (ref 0.05–0.5)
EOSINOPHILS RELATIVE PERCENT: 2.4 % (ref 0–6)
GFR AFRICAN AMERICAN: >60
GFR NON-AFRICAN AMERICAN: >60 ML/MIN/1.73
GLUCOSE BLD-MCNC: 95 MG/DL (ref 74–99)
HCT VFR BLD CALC: 35.5 % (ref 34–48)
HDLC SERPL-MCNC: 67 MG/DL
HEMOGLOBIN: 11.5 G/DL (ref 11.5–15.5)
IMMATURE GRANULOCYTES #: 0.01 E9/L
IMMATURE GRANULOCYTES %: 0.2 % (ref 0–5)
LDL CHOLESTEROL CALCULATED: 54 MG/DL (ref 0–99)
LYMPHOCYTES ABSOLUTE: 0.82 E9/L (ref 1.5–4)
LYMPHOCYTES RELATIVE PERCENT: 16.1 % (ref 20–42)
MCH RBC QN AUTO: 28.3 PG (ref 26–35)
MCHC RBC AUTO-ENTMCNC: 32.4 % (ref 32–34.5)
MCV RBC AUTO: 87.2 FL (ref 80–99.9)
MONOCYTES ABSOLUTE: 0.5 E9/L (ref 0.1–0.95)
MONOCYTES RELATIVE PERCENT: 9.8 % (ref 2–12)
NEUTROPHILS ABSOLUTE: 3.62 E9/L (ref 1.8–7.3)
NEUTROPHILS RELATIVE PERCENT: 71.3 % (ref 43–80)
PDW BLD-RTO: 17 FL (ref 11.5–15)
PLATELET # BLD: 229 E9/L (ref 130–450)
PMV BLD AUTO: 9 FL (ref 7–12)
POTASSIUM SERPL-SCNC: 4.7 MMOL/L (ref 3.5–5)
RBC # BLD: 4.07 E12/L (ref 3.5–5.5)
SODIUM BLD-SCNC: 142 MMOL/L (ref 132–146)
TOTAL PROTEIN: 7.1 G/DL (ref 6.4–8.3)
TRIGL SERPL-MCNC: 89 MG/DL (ref 0–149)
TSH SERPL DL<=0.05 MIU/L-ACNC: 0.64 UIU/ML (ref 0.27–4.2)
VLDLC SERPL CALC-MCNC: 18 MG/DL
WBC # BLD: 5.1 E9/L (ref 4.5–11.5)

## 2021-08-17 PROCEDURE — 85025 COMPLETE CBC W/AUTO DIFF WBC: CPT

## 2021-08-17 PROCEDURE — 36415 COLL VENOUS BLD VENIPUNCTURE: CPT

## 2021-08-17 PROCEDURE — 80061 LIPID PANEL: CPT

## 2021-08-17 PROCEDURE — 84443 ASSAY THYROID STIM HORMONE: CPT

## 2021-08-17 PROCEDURE — 80053 COMPREHEN METABOLIC PANEL: CPT

## 2021-08-23 ENCOUNTER — OFFICE VISIT (OUTPATIENT)
Dept: FAMILY MEDICINE CLINIC | Age: 86
End: 2021-08-23
Payer: MEDICARE

## 2021-08-23 VITALS
OXYGEN SATURATION: 93 % | SYSTOLIC BLOOD PRESSURE: 158 MMHG | TEMPERATURE: 97.6 F | HEIGHT: 61 IN | DIASTOLIC BLOOD PRESSURE: 81 MMHG | HEART RATE: 99 BPM | WEIGHT: 158 LBS | BODY MASS INDEX: 29.83 KG/M2

## 2021-08-23 DIAGNOSIS — I10 HYPERTENSION, UNSPECIFIED TYPE: ICD-10-CM

## 2021-08-23 DIAGNOSIS — E03.9 ACQUIRED HYPOTHYROIDISM: ICD-10-CM

## 2021-08-23 DIAGNOSIS — I27.20 PULMONARY HYPERTENSION (HCC): ICD-10-CM

## 2021-08-23 DIAGNOSIS — E78.5 HYPERLIPIDEMIA, UNSPECIFIED HYPERLIPIDEMIA TYPE: ICD-10-CM

## 2021-08-23 DIAGNOSIS — Z00.00 ROUTINE GENERAL MEDICAL EXAMINATION AT A HEALTH CARE FACILITY: Primary | ICD-10-CM

## 2021-08-23 DIAGNOSIS — I25.10 CORONARY ARTERY DISEASE INVOLVING NATIVE CORONARY ARTERY OF NATIVE HEART WITHOUT ANGINA PECTORIS: ICD-10-CM

## 2021-08-23 PROCEDURE — 4040F PNEUMOC VAC/ADMIN/RCVD: CPT | Performed by: FAMILY MEDICINE

## 2021-08-23 PROCEDURE — G0439 PPPS, SUBSEQ VISIT: HCPCS | Performed by: FAMILY MEDICINE

## 2021-08-23 PROCEDURE — 1123F ACP DISCUSS/DSCN MKR DOCD: CPT | Performed by: FAMILY MEDICINE

## 2021-08-23 RX ORDER — FUROSEMIDE 40 MG/1
40 TABLET ORAL DAILY
Qty: 90 TABLET | Refills: 1 | Status: SHIPPED
Start: 2021-08-23 | End: 2022-04-05 | Stop reason: SDUPTHER

## 2021-08-23 ASSESSMENT — LIFESTYLE VARIABLES: HOW OFTEN DO YOU HAVE A DRINK CONTAINING ALCOHOL: 0

## 2021-08-23 ASSESSMENT — PATIENT HEALTH QUESTIONNAIRE - PHQ9
SUM OF ALL RESPONSES TO PHQ QUESTIONS 1-9: 0
SUM OF ALL RESPONSES TO PHQ QUESTIONS 1-9: 0
1. LITTLE INTEREST OR PLEASURE IN DOING THINGS: 0
2. FEELING DOWN, DEPRESSED OR HOPELESS: 0
SUM OF ALL RESPONSES TO PHQ QUESTIONS 1-9: 0
SUM OF ALL RESPONSES TO PHQ9 QUESTIONS 1 & 2: 0

## 2021-08-23 NOTE — PROGRESS NOTES
Medicare Annual Wellness Visit  Name: Gillian Al Date: 2021   MRN: <[id number]> Sex: Female   Age: 80 y.o. Ethnicity: Non- / Non    : 3/27/1929 Race: White (non-)      Fritz Avila is here for Medicare AWV (Here for AWV and discuss lab results.)    Screenings for behavioral, psychosocial and functional/safety risks, and cognitive dysfunction are all negative except as indicated below. These results, as well as other patient data from the [address] form, are documented in Flowsheets linked to this Encounter. Allergies   Allergen Reactions    Adhesive Tape Rash     Burns skin    Hydrocodone     Lamictal [Lamotrigine]          Prior to Visit Medications    Medication Sig Taking?  Authorizing Provider   furosemide (LASIX) 40 MG tablet Take 1 tablet by mouth daily Takes when she is at home Yes Jia Powers MD   simvastatin (ZOCOR) 20 MG tablet take 1 tablet by mouth at bedtime Yes Jia Powers MD   lisinopril (PRINIVIL;ZESTRIL) 20 MG tablet Take 1 tablet by mouth daily Yes Jia Powers MD   aspirin 81 MG EC tablet Take 81 mg by mouth daily Yes Historical Provider, MD   clopidogrel (PLAVIX) 75 MG tablet Take 75 mg by mouth daily Yes Historical Provider, MD   levothyroxine (SYNTHROID) 100 MCG tablet take 1 tablet by mouth once daily Yes Jia Powers MD   meclizine (ANTIVERT) 12.5 MG tablet Take 1 tablet by mouth 3 times daily as needed for Dizziness Yes Jia Powers MD   Omega-3 Fatty Acids (OMEGA-3 PLUS PO) Take 2 capsules by mouth daily Yes Historical Provider, MD   GARLIC PO Take 1 tablet by mouth daily Yes Historical Provider, MD   hydrochlorothiazide (MICROZIDE) 12.5 MG capsule Take 12.5 mg by mouth daily Yes Historical Provider, MD   isosorbide mononitrate (IMDUR) 30 MG extended release tablet Take 30 mg by mouth daily Yes Historical Provider, MD   Pyridoxine HCl (B-6 PO) Take 1 tablet by mouth daily Yes Historical Provider, MD Cholecalciferol (VITAMIN D-3 PO) Take by mouth daily Yes Historical Provider, MD   vitamin B-12 (CYANOCOBALAMIN) 1000 MCG tablet Take 1,000 mcg by mouth daily as needed  Yes Historical Provider, MD   Multiple Vitamin (ONE-A-DAY 55 PLUS PO) Take by mouth daily Yes Historical Provider, MD         Past Medical History:   Diagnosis Date    Aortic valve insufficiency 2004    CAD (coronary artery disease)     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    Cardiac arrhythmia 2005    Fracture of femur ([address] 2008    GERD (gastroesophageal reflux disease)     Hyperlipidemia     Hypertension     Hypothyroidism     Incontinence     Mass 2003    [id number], unspecified location    Mitral valve prolapse 2008    Osteoarthritis     Osteoporosis     Peripheral vascular disease ([address]     stent placement 2014    Radiation     Sciatica 2003    Thyroid disease     Urinary incontinence     Vertigo 2009       Past Surgical History:   Procedure Laterality Date    CARDIAC SURGERY      OTHER SURGICAL HISTORY Left     Stent Placement Left leg    SOFT TISSUE TUMOR RESECTION Left 2002    sarcoma left posterior thigh         Family History   Problem Relation Age of Onset    Cancer Mother         multiple myeloma    Rheumatologic Disease Mother     Thyroid Disease Sister     Thyroid Disease Daughter     Thyroid Disease Son     Cancer Father         Lung       CareTeam (Including outside providers/suppliers regularly involved in providing care):   Patient Care Team:  Dustin Monaco MD as PCP - Barbara Patrick MD as PCP - [facility] Empaneled Provider    Wt Readings from Last 3 Encounters:   08/23/21 158 lb (71.7 kg)   07/12/21 160 lb (72.6 kg)   06/08/21 159 lb (72.1 kg)     Vitals:    08/23/21 1410 08/23/21 1426   BP: (!) 158/95 (!) 158/81   Pulse: 99    Temp: 97.6 °F (36.4 °C)    TempSrc: Infrared    SpO2: 93%    Weight: 158 lb (71.7 kg)    Height: 5' 1\" (1.549 m)      Body mass index is 29.85 kg/m². Based upon direct observation of the patient, evaluation of cognition reveals recent and remote memory intact. General Appearance: alert and oriented to person, place and time, well developed and well- nourished, in no acute distress  Skin: warm and dry, no rash or erythema  Head: normocephalic and atraumatic  Eyes: pupils equal, round, and reactive to light, extraocular eye movements intact, conjunctivae normal  ENT: tympanic membrane, external ear and ear canal normal bilaterally, nose without deformity, nasal mucosa and turbinates normal without polyps  Neck: supple and non-tender without mass, no thyromegaly or thyroid nodules, no cervical lymphadenopathy  Pulmonary/Chest: clear to auscultation bilaterally- no wheezes, rales or rhonchi, normal air movement, no respiratory distress  Cardiovascular: normal rate, regular rhythm, normal S1 and S2, no murmurs, rubs, clicks, or gallops, distal pulses intact, no carotid bruits  Abdomen: soft, non-tender, non-distended, normal bowel sounds, no masses or organomegaly  Extremities: no cyanosis, clubbing or edema  Musculoskeletal: normal range of motion, no joint swelling, deformity or tenderness  Neurologic: reflexes normal and symmetric, no cranial nerve deficit, gait, coordination and speech normal      Patient's complete Health Risk Assessment and screening values have been reviewed and are found in Flowsheets. The following problems were reviewed today and where indicated follow up appointments were made and/or referrals ordered. Positive Risk Factor Screenings with Interventions:            General Health and ACP:  General  In general, how would you say your health is?: Very Good  In the past 7 days, have you experienced any of the following?  New or Increased Pain, New or Increased Fatigue, Loneliness, Social Isolation, Stress or Anger?: (!) New or Increased Fatigue  Do you get the social and emotional support that you need?: Yes  Do you have a Living Will?: Yes  Advance Directives     Power of  Living Will ACP-Advance Directive ACP-Power of     Not on File Filed on 06/29/12 Filed Not on File      General Health Risk Interventions:  · Fatigue: patient declines any further evaluation/treatment for this issue    Health Habits/Nutrition:  Health Habits/Nutrition  Do you exercise for at least 20 minutes 2-3 times per week?: (!) No  Have you lost any weight without trying in the past 3 months?: No  Do you eat only one meal per day?: No  Have you seen the dentist within the past year?: Yes  Body mass index: (!) 29.85  Health Habits/Nutrition Interventions:  · Nutritional issues:  educational materials for healthy, well-balanced diet provided    Hearing/Vision:  No exam data present  Hearing/Vision  Do you or your family notice any trouble with your hearing that hasn't been managed with hearing aids?: (!) Yes  Do you have difficulty driving, watching TV, or doing any of your daily activities because of your eyesight?: No  Have you had an eye exam within the past year?: (!) No  Hearing/Vision Interventions:  · Hearing concerns:  patient declines any further evaluation/treatment for hearing issues      Personalized Preventive Plan   Current Health Maintenance Status  Immunization History   Administered Date(s) Administered    Influenza Virus Vaccine 10/10/2016    Influenza, High Dose (Fluzone 65 yrs and older) 11/26/2018    Influenza, MDCK Quadv, with preserv IM (Flucelvax 4 yrs and older) 09/19/2019    Pneumococcal Conjugate 13-valent ([id number]) 10/10/2016    Pneumococcal Polysaccharide ([id number]) 10/10/2013        Health Maintenance   Topic Date Due    Annual Wellness Visit (AWV)  Never done    DTaP/Tdap/Td vaccine (1 - Tdap) 05/17/2022 (Originally 3/27/1948)    Shingles Vaccine (1 of 2) 05/17/2022 (Originally 3/27/1979)    COVID-19 Vaccine (1) 05/17/2022 (Originally 3/27/1941)    Flu vaccine (1) 09/01/2021    Lipid screen  08/17/2022    TSH testing  08/17/2022    Potassium monitoring  08/17/2022    Creatinine monitoring  08/17/2022    Pneumococcal 65+ years Vaccine  Completed    Hepatitis A vaccine  Aged Out    Hepatitis B vaccine  Aged Out    Hib vaccine  Aged Out    Meningococcal (ACWY) vaccine  Aged Out     Recommendations for Dagne Dover Due: see orders and patient instructions/AVS.  . Recommended screening schedule for the next 5-10 years is provided to the patient in written form: see Patient Kendy Goff was seen today for medicare awv. Diagnoses and all orders for this visit:    Routine general medical examination at a health care facility    Hypertension, unspecified type  -     furosemide (LASIX) 40 MG tablet;  Take 1 tablet by mouth daily Takes when she is at home    Hyperlipidemia, unspecified hyperlipidemia type    Pulmonary hypertension ([address]    Coronary artery disease involving native coronary artery of native heart without angina pectoris    Acquired hypothyroidism

## 2021-08-23 NOTE — PATIENT INSTRUCTIONS
Personalized Preventive Plan for Dimple Davidson - 8/23/2021  Medicare offers a range of preventive health benefits. Some of the tests and screenings are paid in full while other may be subject to a deductible, co-insurance, and/or copay. Some of these benefits include a comprehensive review of your medical history including lifestyle, illnesses that may run in your family, and various assessments and screenings as appropriate. After reviewing your medical record and screening and assessments performed today your provider may have ordered immunizations, labs, imaging, and/or referrals for you. A list of these orders (if applicable) as well as your Preventive Care list are included within your After Visit Summary for your review. Other Preventive Recommendations:    · A preventive eye exam performed by an eye specialist is recommended every 1-2 years to screen for glaucoma; cataracts, macular degeneration, and other eye disorders. · A preventive dental visit is recommended every 6 months. · Try to get at least 150 minutes of exercise per week or 10,000 steps per day on a pedometer . · Order or download the FREE \"Exercise & Physical Activity: Your Everyday Guide\" from [employer]. Call 9-625.178.9206 or search [employer] online. · You need 6018-5811 mg of calcium and 8776-8266 IU of vitamin D per day. It is possible to meet your calcium requirement with diet alone, but a vitamin D supplement is usually necessary to meet this goal.  · When exposed to the sun, use a sunscreen that protects against both UVA and UVB radiation with an SPF of 30 or greater. Reapply every 2 to 3 hours or after sweating, drying off with a towel, or swimming. · Always wear a seat belt when traveling in a car. Always wear a helmet when riding a bicycle or motorcycle.

## 2021-09-24 DIAGNOSIS — R04.0 FREQUENT NOSEBLEEDS: Primary | ICD-10-CM

## 2021-09-27 ENCOUNTER — TELEPHONE (OUTPATIENT)
Dept: FAMILY MEDICINE CLINIC | Age: 86
End: 2021-09-27

## 2021-10-05 RX ORDER — LEVOTHYROXINE SODIUM 0.1 MG/1
TABLET ORAL
Qty: 90 TABLET | Refills: 1 | Status: SHIPPED
Start: 2021-10-05 | End: 2022-01-06 | Stop reason: SDUPTHER

## 2021-12-29 ENCOUNTER — OFFICE VISIT (OUTPATIENT)
Dept: FAMILY MEDICINE CLINIC | Age: 86
End: 2021-12-29
Payer: MEDICARE

## 2021-12-29 ENCOUNTER — HOSPITAL ENCOUNTER (OUTPATIENT)
Age: 86
Discharge: HOME | End: 2021-12-29
Payer: MEDICARE

## 2021-12-29 VITALS
OXYGEN SATURATION: 96 % | DIASTOLIC BLOOD PRESSURE: 86 MMHG | BODY MASS INDEX: 30.42 KG/M2 | SYSTOLIC BLOOD PRESSURE: 130 MMHG | WEIGHT: 161 LBS | TEMPERATURE: 97.4 F | HEART RATE: 108 BPM

## 2021-12-29 DIAGNOSIS — I10 HYPERTENSION, UNSPECIFIED TYPE: Primary | ICD-10-CM

## 2021-12-29 DIAGNOSIS — E78.5 HYPERLIPIDEMIA, UNSPECIFIED HYPERLIPIDEMIA TYPE: ICD-10-CM

## 2021-12-29 DIAGNOSIS — E03.9 ACQUIRED HYPOTHYROIDISM: ICD-10-CM

## 2021-12-29 DIAGNOSIS — I25.10 CORONARY ARTERY DISEASE INVOLVING NATIVE CORONARY ARTERY OF NATIVE HEART WITHOUT ANGINA PECTORIS: ICD-10-CM

## 2021-12-29 DIAGNOSIS — I10 HYPERTENSION, UNSPECIFIED TYPE: ICD-10-CM

## 2021-12-29 LAB
ALBUMIN SERPL-MCNC: 4.3 G/DL (ref 3.5–5.2)
ALP BLD-CCNC: 124 U/L (ref 35–104)
ALT SERPL-CCNC: 14 U/L (ref 0–32)
ANION GAP SERPL CALCULATED.3IONS-SCNC: 8 MMOL/L (ref 7–16)
AST SERPL-CCNC: 22 U/L (ref 0–31)
BASOPHILS ABSOLUTE: 0.01 E9/L (ref 0–0.2)
BASOPHILS RELATIVE PERCENT: 0.2 % (ref 0–2)
BILIRUB SERPL-MCNC: 0.4 MG/DL (ref 0–1.2)
BUN BLDV-MCNC: 19 MG/DL (ref 6–23)
CALCIUM SERPL-MCNC: 10.3 MG/DL (ref 8.6–10.2)
CHLORIDE BLD-SCNC: 99 MMOL/L (ref 98–107)
CHOLESTEROL, TOTAL: 147 MG/DL (ref 0–199)
CO2: 29 MMOL/L (ref 22–29)
CREAT SERPL-MCNC: 0.7 MG/DL (ref 0.5–1)
EOSINOPHILS ABSOLUTE: 0.07 E9/L (ref 0.05–0.5)
EOSINOPHILS RELATIVE PERCENT: 1.2 % (ref 0–6)
GFR AFRICAN AMERICAN: >60
GFR NON-AFRICAN AMERICAN: >60 ML/MIN/1.73
GLUCOSE BLD-MCNC: 97 MG/DL (ref 74–99)
HCT VFR BLD CALC: 36.5 % (ref 34–48)
HDLC SERPL-MCNC: 70 MG/DL
HEMOGLOBIN: 11.9 G/DL (ref 11.5–15.5)
IMMATURE GRANULOCYTES #: 0.02 E9/L
IMMATURE GRANULOCYTES %: 0.3 % (ref 0–5)
LDL CHOLESTEROL CALCULATED: 63 MG/DL (ref 0–99)
LYMPHOCYTES ABSOLUTE: 0.93 E9/L (ref 1.5–4)
LYMPHOCYTES RELATIVE PERCENT: 15.8 % (ref 20–42)
MCH RBC QN AUTO: 29.2 PG (ref 26–35)
MCHC RBC AUTO-ENTMCNC: 32.6 % (ref 32–34.5)
MCV RBC AUTO: 89.7 FL (ref 80–99.9)
MONOCYTES ABSOLUTE: 0.6 E9/L (ref 0.1–0.95)
MONOCYTES RELATIVE PERCENT: 10.2 % (ref 2–12)
NEUTROPHILS ABSOLUTE: 4.27 E9/L (ref 1.8–7.3)
NEUTROPHILS RELATIVE PERCENT: 72.3 % (ref 43–80)
PDW BLD-RTO: 14.4 FL (ref 11.5–15)
PLATELET # BLD: 278 E9/L (ref 130–450)
PMV BLD AUTO: 8.7 FL (ref 7–12)
POTASSIUM SERPL-SCNC: 4.4 MMOL/L (ref 3.5–5)
RBC # BLD: 4.07 E12/L (ref 3.5–5.5)
SODIUM BLD-SCNC: 136 MMOL/L (ref 132–146)
TOTAL PROTEIN: 7.3 G/DL (ref 6.4–8.3)
TRIGL SERPL-MCNC: 72 MG/DL (ref 0–149)
TSH SERPL DL<=0.05 MIU/L-ACNC: 0.52 UIU/ML (ref 0.27–4.2)
VLDLC SERPL CALC-MCNC: 14 MG/DL
WBC # BLD: 5.9 E9/L (ref 4.5–11.5)

## 2021-12-29 PROCEDURE — 36415 COLL VENOUS BLD VENIPUNCTURE: CPT

## 2021-12-29 PROCEDURE — 99214 OFFICE O/P EST MOD 30 MIN: CPT | Performed by: FAMILY MEDICINE

## 2021-12-29 PROCEDURE — 90694 VACC AIIV4 NO PRSRV 0.5ML IM: CPT | Performed by: FAMILY MEDICINE

## 2021-12-29 PROCEDURE — 4040F PNEUMOC VAC/ADMIN/RCVD: CPT | Performed by: FAMILY MEDICINE

## 2021-12-29 PROCEDURE — 1123F ACP DISCUSS/DSCN MKR DOCD: CPT | Performed by: FAMILY MEDICINE

## 2021-12-29 PROCEDURE — G0008 ADMIN INFLUENZA VIRUS VAC: HCPCS | Performed by: FAMILY MEDICINE

## 2021-12-29 PROCEDURE — 80061 LIPID PANEL: CPT

## 2021-12-29 PROCEDURE — G8484 FLU IMMUNIZE NO ADMIN: HCPCS | Performed by: FAMILY MEDICINE

## 2021-12-29 PROCEDURE — 1036F TOBACCO NON-USER: CPT | Performed by: FAMILY MEDICINE

## 2021-12-29 PROCEDURE — G8417 CALC BMI ABV UP PARAM F/U: HCPCS | Performed by: FAMILY MEDICINE

## 2021-12-29 PROCEDURE — 1090F PRES/ABSN URINE INCON ASSESS: CPT | Performed by: FAMILY MEDICINE

## 2021-12-29 PROCEDURE — G8427 DOCREV CUR MEDS BY ELIG CLIN: HCPCS | Performed by: FAMILY MEDICINE

## 2021-12-29 PROCEDURE — 84443 ASSAY THYROID STIM HORMONE: CPT

## 2021-12-29 PROCEDURE — 85025 COMPLETE CBC W/AUTO DIFF WBC: CPT

## 2021-12-29 PROCEDURE — 80053 COMPREHEN METABOLIC PANEL: CPT

## 2021-12-29 ASSESSMENT — ENCOUNTER SYMPTOMS
ALLERGIC/IMMUNOLOGIC NEGATIVE: 1
BACK PAIN: 1
RESPIRATORY NEGATIVE: 1
EYES NEGATIVE: 1
GASTROINTESTINAL NEGATIVE: 1

## 2021-12-29 NOTE — PROGRESS NOTES
OFFICE PROGRESS NOTE      SUBJECTIVE:        Patient ID:   Linda Denton is a 80 y.o. female who presents for   Chief Complaint   Patient presents with    Hypertension     Here for recheck on CAD,Hypertension,Hyperlipidemia and Hypothyroidism. Requesting to d/c oxygen. HPI:   Patient states is feeling okay  Did not do the oxygen for more than a year  No shortness of breath  Did not get the lab work done  She states she is taking the medication as prescribed  Complaining of back pain radiating to the left leg          Prior to Visit Medications    Medication Sig Taking?  Authorizing Provider   levothyroxine (SYNTHROID) 100 MCG tablet take 1 tablet by mouth once daily Yes Elvia Nick MD   Respiratory Therapy Supplies MISC 1 each by Does not apply route daily humidifier unit for oxygen tank Yes Elvia Nick MD   Respiratory Therapy Supplies MISC 1 each by Does not apply route daily humidifier unit for her oxygen tank Yes Elvia Nick MD   furosemide (LASIX) 40 MG tablet Take 1 tablet by mouth daily Takes when she is at home Yes Elvia Nick MD   simvastatin (ZOCOR) 20 MG tablet take 1 tablet by mouth at bedtime Yes Elvia Nick MD   lisinopril (PRINIVIL;ZESTRIL) 20 MG tablet Take 1 tablet by mouth daily Yes Elvia Nick MD   aspirin 81 MG EC tablet Take 81 mg by mouth daily Yes Historical Provider, MD   clopidogrel (PLAVIX) 75 MG tablet Take 75 mg by mouth daily Yes Historical Provider, MD   meclizine (ANTIVERT) 12.5 MG tablet Take 1 tablet by mouth 3 times daily as needed for Dizziness Yes Elvia Nick MD   Omega-3 Fatty Acids (OMEGA-3 PLUS PO) Take 2 capsules by mouth daily Yes Historical Provider, MD   GARLIC PO Take 1 tablet by mouth daily Yes Historical Provider, MD   hydrochlorothiazide (MICROZIDE) 12.5 MG capsule Take 12.5 mg by mouth daily Yes Historical Provider, MD   isosorbide mononitrate (IMDUR) 30 MG extended release tablet Take 30 mg by mouth daily Yes Historical Provider, MD   Pyridoxine HCl (B-6 PO) Take 1 tablet by mouth daily Yes Historical Provider, MD   Cholecalciferol (VITAMIN D-3 PO) Take by mouth daily Yes Historical Provider, MD   vitamin B-12 (CYANOCOBALAMIN) 1000 MCG tablet Take 1,000 mcg by mouth daily as needed  Yes Historical Provider, MD   Multiple Vitamin (ONE-A-DAY 55 PLUS PO) Take by mouth daily Yes Historical Provider, MD      Social History     Socioeconomic History    Marital status:      Spouse name: None    Number of children: None    Years of education: None    Highest education level: None   Occupational History    Occupation: retired   Tobacco Use    Smoking status: Never Smoker    Smokeless tobacco: Never Used   Vaping Use    Vaping Use: Never used   Substance and Sexual Activity    Alcohol use: No    Drug use: No    Sexual activity: Never   Other Topics Concern    None   Social History Narrative    None     Social Determinants of Health     Financial Resource Strain: Low Risk     Difficulty of Paying Living Expenses: Not hard at all   Food Insecurity: No Food Insecurity    Worried About Running Out of Food in the Last Year: Never true    [place] of Food in the Last Year: Never true   Transportation Needs: No Transportation Needs    Lack of Transportation (Medical): No    Lack of Transportation (Non-Medical):  No   Physical Activity:     Days of Exercise per Week: Not on file    Minutes of Exercise per Session: Not on file   Stress:     Feeling of Stress : Not on file   Social Connections:     Frequency of Communication with Friends and Family: Not on file    Frequency of Social Gatherings with Friends and Family: Not on file    Attends Zoroastrian Services: Not on file    Active Member of Clubs or Organizations: Not on file    Attends Club or Organization Meetings: Not on file    Marital Status: Not on file   Intimate Partner Violence:     Fear of Current or Ex-Partner: Not on file    Emotionally Abused: Not on file    Physically Abused: Not on file    Sexually Abused: Not on file   Housing Stability:     Unable to Pay for Housing in the Last Year: Not on file    Number of Places Lived in the Last Year: Not on file    Unstable Housing in the Last Year: Not on file       I have reviewed Jena's allergies, medications, problem list, medical, social and family history and have updated as needed in the electronic medical record  Review Of Systems:    Review of Systems   Constitutional: Negative. HENT: Negative. Eyes: Negative. Respiratory: Negative. Cardiovascular: Negative. Gastrointestinal: Negative. Endocrine: Negative. Musculoskeletal: Positive for back pain. Skin: Negative. Allergic/Immunologic: Negative. Neurological: Negative. Hematological: Negative. Psychiatric/Behavioral: Negative. OBJECTIVE:     VS:  Wt Readings from Last 3 Encounters:   12/29/21 161 lb (73 kg)   08/23/21 158 lb (71.7 kg)   07/12/21 160 lb (72.6 kg)     Temp Readings from Last 3 Encounters:   12/29/21 97.4 °F (36.3 °C) (Infrared)   08/23/21 97.6 °F (36.4 °C) (Infrared)   07/12/21 97.5 °F (36.4 °C) (Infrared)     BP Readings from Last 3 Encounters:   12/29/21 130/86   08/23/21 (!) 158/81   07/12/21 138/80        Physical Exam  Constitutional:       Appearance: She is well-developed. HENT:      Head: Normocephalic and atraumatic. Eyes:      Conjunctiva/sclera: Conjunctivae normal.      Pupils: Pupils are equal, round, and reactive to light. Cardiovascular:      Rate and Rhythm: Normal rate and regular rhythm. Heart sounds: Normal heart sounds. Pulmonary:      Effort: Pulmonary effort is normal.      Breath sounds: Normal breath sounds. Abdominal:      General: Bowel sounds are normal.      Palpations: Abdomen is soft. Musculoskeletal:         General: Normal range of motion.       Cervical back: Normal range of motion and neck supple. Skin:     General: Skin is warm and dry. Neurological:      Mental Status: She is alert and oriented to person, place, and time. Psychiatric:         Thought Content: Thought content normal.            Labs :    Lab Results   Component Value Date    WBC 5.1 08/17/2021    HGB 11.5 08/17/2021    HCT 35.5 08/17/2021     08/17/2021    CHOL 139 08/17/2021    TRIG 89 08/17/2021    HDL 67 08/17/2021    ALT 14 08/17/2021    AST 21 08/17/2021     08/17/2021    K 4.7 08/17/2021     08/17/2021    CREATININE 0.8 08/17/2021    BUN 18 08/17/2021    CO2 30 (H) 08/17/2021    TSH 0.636 08/17/2021    GLUF 85 06/28/2018     Lab Results   Component Value Date    COLORU yellow 07/02/2018    COLORU Yellow 06/26/2015    NITRU Negative 06/26/2015    GLUCOSEU negative 07/02/2018    GLUCOSEU Negative 06/26/2015    KETUA trace 07/02/2018    KETUA Negative 06/26/2015    UROBILINOGEN 0.2 06/26/2015    BILIRUBINUR negative 07/02/2018     No results found for: PSA, CEA, , [id number],       Controlled Substances Monitoring:                                    ASSESSMENT     Patient Active Problem List    Diagnosis Date Noted    Nonrheumatic aortic valve stenosis 03/25/2021    Dependence on supplemental oxygen 03/25/2021    Dyspnea on exertion 03/25/2021    Irritation of vulva 03/25/2021    Pulmonary hypertension ([facility] [address] 03/25/2021    Transient ischemic attack 07/22/2020    Coronary artery disease involving native coronary artery of native heart without angina pectoris 11/26/2018    Hypertension 02/19/2018    Hypothyroidism 02/19/2018    Hyperlipidemia 02/19/2018    Osteoporosis 02/19/2018        Diagnosis:     ICD-10-CM    1. Hypertension, unspecified type  I10 CBC WITH AUTO DIFFERENTIAL   2. Hyperlipidemia, unspecified hyperlipidemia type  E78.5 CBC WITH AUTO DIFFERENTIAL     COMPREHENSIVE METABOLIC PANEL     LIPID PANEL   3.  Coronary artery disease involving native coronary artery of native

## 2021-12-29 NOTE — PATIENT INSTRUCTIONS
Continue present treatment  Low-sodium low-fat diet  Discontinue oxygen  Get the lab work done today  Return to clinic earlier if any problems

## 2022-01-06 RX ORDER — LEVOTHYROXINE SODIUM 0.1 MG/1
TABLET ORAL
Qty: 90 TABLET | Refills: 1 | Status: SHIPPED
Start: 2022-01-06 | End: 2022-04-05 | Stop reason: SDUPTHER

## 2022-01-31 DIAGNOSIS — E78.5 HYPERLIPIDEMIA, UNSPECIFIED HYPERLIPIDEMIA TYPE: ICD-10-CM

## 2022-01-31 RX ORDER — SIMVASTATIN 20 MG
TABLET ORAL
Qty: 90 TABLET | Refills: 1 | Status: SHIPPED
Start: 2022-01-31 | End: 2022-04-12 | Stop reason: SDUPTHER

## 2022-04-05 DIAGNOSIS — E03.9 ACQUIRED HYPOTHYROIDISM: Primary | ICD-10-CM

## 2022-04-05 DIAGNOSIS — I10 HYPERTENSION, UNSPECIFIED TYPE: ICD-10-CM

## 2022-04-05 RX ORDER — LEVOTHYROXINE SODIUM 0.1 MG/1
TABLET ORAL
Qty: 90 TABLET | Refills: 0 | Status: SHIPPED
Start: 2022-04-05 | End: 2022-07-01 | Stop reason: SDUPTHER

## 2022-04-05 RX ORDER — FUROSEMIDE 40 MG/1
40 TABLET ORAL DAILY
Qty: 90 TABLET | Refills: 0 | Status: SHIPPED | OUTPATIENT
Start: 2022-04-05

## 2022-04-12 DIAGNOSIS — E78.5 HYPERLIPIDEMIA, UNSPECIFIED HYPERLIPIDEMIA TYPE: ICD-10-CM

## 2022-04-12 DIAGNOSIS — I27.20 PULMONARY HYPERTENSION (HCC): ICD-10-CM

## 2022-04-12 RX ORDER — LISINOPRIL 20 MG/1
20 TABLET ORAL DAILY
Qty: 90 TABLET | Refills: 1 | Status: SHIPPED
Start: 2022-04-12 | End: 2022-10-04 | Stop reason: SDUPTHER

## 2022-04-12 RX ORDER — SIMVASTATIN 20 MG
TABLET ORAL
Qty: 90 TABLET | Refills: 1 | Status: SHIPPED | OUTPATIENT
Start: 2022-04-12

## 2022-04-27 ENCOUNTER — OFFICE VISIT (OUTPATIENT)
Dept: FAMILY MEDICINE CLINIC | Age: 87
End: 2022-04-27
Payer: COMMERCIAL

## 2022-04-27 VITALS
SYSTOLIC BLOOD PRESSURE: 120 MMHG | DIASTOLIC BLOOD PRESSURE: 84 MMHG | HEART RATE: 101 BPM | BODY MASS INDEX: 30.99 KG/M2 | TEMPERATURE: 97.4 F | OXYGEN SATURATION: 94 % | WEIGHT: 164 LBS

## 2022-04-27 DIAGNOSIS — R60.0 LEG EDEMA, LEFT: ICD-10-CM

## 2022-04-27 DIAGNOSIS — I25.10 CORONARY ARTERY DISEASE INVOLVING NATIVE CORONARY ARTERY OF NATIVE HEART WITHOUT ANGINA PECTORIS: ICD-10-CM

## 2022-04-27 DIAGNOSIS — E03.9 ACQUIRED HYPOTHYROIDISM: ICD-10-CM

## 2022-04-27 DIAGNOSIS — M19.90 ARTHRITIS: ICD-10-CM

## 2022-04-27 DIAGNOSIS — E78.5 HYPERLIPIDEMIA, UNSPECIFIED HYPERLIPIDEMIA TYPE: ICD-10-CM

## 2022-04-27 DIAGNOSIS — I10 HYPERTENSION, UNSPECIFIED TYPE: ICD-10-CM

## 2022-04-27 DIAGNOSIS — I27.20 PULMONARY HYPERTENSION (HCC): Primary | ICD-10-CM

## 2022-04-27 PROCEDURE — 99214 OFFICE O/P EST MOD 30 MIN: CPT | Performed by: FAMILY MEDICINE

## 2022-04-27 SDOH — ECONOMIC STABILITY: FOOD INSECURITY: WITHIN THE PAST 12 MONTHS, YOU WORRIED THAT YOUR FOOD WOULD RUN OUT BEFORE YOU GOT MONEY TO BUY MORE.: NEVER TRUE

## 2022-04-27 SDOH — ECONOMIC STABILITY: FOOD INSECURITY: WITHIN THE PAST 12 MONTHS, THE FOOD YOU BOUGHT JUST DIDN'T LAST AND YOU DIDN'T HAVE MONEY TO GET MORE.: NEVER TRUE

## 2022-04-27 ASSESSMENT — ENCOUNTER SYMPTOMS
GASTROINTESTINAL NEGATIVE: 1
EYES NEGATIVE: 1
ALLERGIC/IMMUNOLOGIC NEGATIVE: 1
RESPIRATORY NEGATIVE: 1

## 2022-04-27 ASSESSMENT — SOCIAL DETERMINANTS OF HEALTH (SDOH): HOW HARD IS IT FOR YOU TO PAY FOR THE VERY BASICS LIKE FOOD, HOUSING, MEDICAL CARE, AND HEATING?: NOT HARD AT ALL

## 2022-04-27 NOTE — PROGRESS NOTES
OFFICE PROGRESS NOTE      SUBJECTIVE:        Patient ID:   Munira Code is a 80 y.o. female who presents for   Chief Complaint   Patient presents with    Hypertension     Here for recheck on Hypertension. Wishes to discuss lab results from December. HPI:   Patient here for the follow  Complaining of left shoulder pain for the last few months  No history of injury  Also complaining of swelling of the left leg for years  Lab work reviewed  Lab work is within normal  Patient states is feeling good otherwise  Does try to follow the diet and exercise        Prior to Visit Medications    Medication Sig Taking?  Authorizing Provider   lisinopril (PRINIVIL;ZESTRIL) 20 MG tablet Take 1 tablet by mouth daily Yes Aleshia Son MD   simvastatin (ZOCOR) 20 MG tablet take 1 tablet by mouth at bedtime Yes Aleshia Son MD   levothyroxine (SYNTHROID) 100 MCG tablet One daily Yes Aleshia Son MD   furosemide (LASIX) 40 MG tablet Take 1 tablet by mouth daily Takes when she is at home Yes Aleshia Son MD   Respiratory Therapy Supplies MISC 1 each by Does not apply route daily humidifier unit for oxygen tank Yes Aleshia Son MD   Respiratory Therapy Supplies MISC 1 each by Does not apply route daily humidifier unit for her oxygen tank Yes Aleshia Son MD   aspirin 81 MG EC tablet Take 81 mg by mouth daily Yes Historical Provider, MD   clopidogrel (PLAVIX) 75 MG tablet Take 75 mg by mouth daily Yes Historical Provider, MD   meclizine (ANTIVERT) 12.5 MG tablet Take 1 tablet by mouth 3 times daily as needed for Dizziness Yes Aleshia Son MD   Omega-3 Fatty Acids (OMEGA-3 PLUS PO) Take 2 capsules by mouth daily Yes Historical Provider, MD   GARLIC PO Take 1 tablet by mouth daily Yes Historical Provider, MD   hydrochlorothiazide (MICROZIDE) 12.5 MG capsule Take 12.5 mg by mouth daily Yes Historical Provider, MD   isosorbide mononitrate (IMDUR) 30 MG extended release tablet Take 30 mg by mouth daily Yes Historical Provider, MD   Pyridoxine HCl (B-6 PO) Take 1 tablet by mouth daily Yes Historical Provider, MD   Cholecalciferol (VITAMIN D-3 PO) Take by mouth daily Yes Historical Provider, MD   vitamin B-12 (CYANOCOBALAMIN) 1000 MCG tablet Take 1,000 mcg by mouth daily as needed  Yes Historical Provider, MD   Multiple Vitamin (ONE-A-DAY 55 PLUS PO) Take by mouth daily Yes Historical Provider, MD      Social History     Socioeconomic History    Marital status:      Spouse name: None    Number of children: None    Years of education: None    Highest education level: None   Occupational History    Occupation: retired   Tobacco Use    Smoking status: Never Smoker    Smokeless tobacco: Never Used   Vaping Use    Vaping Use: Never used   Substance and Sexual Activity    Alcohol use: No    Drug use: No    Sexual activity: Never   Other Topics Concern    None   Social History Narrative    None     Social Determinants of Health     Financial Resource Strain: Low Risk     Difficulty of Paying Living Expenses: Not hard at all   Food Insecurity: No Food Insecurity    Worried About Running Out of Food in the Last Year: Never true    [address] in the Last Year: Never true   Transportation Needs:     Lack of Transportation (Medical): Not on file    Lack of Transportation (Non-Medical):  Not on file   Physical Activity:     Days of Exercise per Week: Not on file    Minutes of Exercise per Session: Not on file   Stress:     Feeling of Stress : Not on file   Social Connections:     Frequency of Communication with Friends and Family: Not on file    Frequency of Social Gatherings with Friends and Family: Not on file    Attends Scientology Services: Not on file    Active Member of Clubs or Organizations: Not on file    Attends Club or Organization Meetings: Not on file    Marital Status: Not on file   Intimate Partner Violence:     Fear of Current or Ex-Partner: Not on file    Emotionally Abused: Not on file    Physically Abused: Not on file    Sexually Abused: Not on file   Housing Stability:     Unable to Pay for Housing in the Last Year: Not on file    Number of Places Lived in the Last Year: Not on file    Unstable Housing in the Last Year: Not on file       I have reviewed Jena's allergies, medications, problem list, medical, social and family history and have updated as needed in the electronic medical record  Review Of Systems:    Review of Systems   Constitutional: Negative. HENT: Negative. Eyes: Negative. Respiratory: Negative. Cardiovascular: Positive for leg swelling (Left leg). Gastrointestinal: Negative. Endocrine: Negative. Musculoskeletal: Positive for arthralgias (Left shoulder). Skin: Negative. Allergic/Immunologic: Negative. Neurological: Negative. Hematological: Negative. Psychiatric/Behavioral: Negative. OBJECTIVE:     VS:  Wt Readings from Last 3 Encounters:   04/27/22 164 lb (74.4 kg)   12/29/21 161 lb (73 kg)   08/23/21 158 lb (71.7 kg)     Temp Readings from Last 3 Encounters:   04/27/22 97.4 °F (36.3 °C) (Infrared)   12/29/21 97.4 °F (36.3 °C) (Infrared)   08/23/21 97.6 °F (36.4 °C) (Infrared)     BP Readings from Last 3 Encounters:   04/27/22 120/84   12/29/21 130/86   08/23/21 (!) 158/81        Physical Exam  Constitutional:       Appearance: She is well-developed. HENT:      Head: Normocephalic and atraumatic. Eyes:      Conjunctiva/sclera: Conjunctivae normal.      Pupils: Pupils are equal, round, and reactive to light. Cardiovascular:      Rate and Rhythm: Normal rate and regular rhythm. Heart sounds: Normal heart sounds. Pulmonary:      Effort: Pulmonary effort is normal.      Breath sounds: Normal breath sounds. Abdominal:      General: Bowel sounds are normal.      Palpations: Abdomen is soft.    Musculoskeletal:         General: Tenderness (Left shoulder) present. Normal range of motion. Cervical back: Normal range of motion and neck supple. Left lower leg: Edema (Left leg) present. Comments: Decreased range of motion   Skin:     General: Skin is warm and dry. Neurological:      Mental Status: She is alert and oriented to person, place, and time. Psychiatric:         Thought Content: Thought content normal.            Labs :    Lab Results   Component Value Date    WBC 5.9 12/29/2021    HGB 11.9 12/29/2021    HCT 36.5 12/29/2021     12/29/2021    CHOL 147 12/29/2021    TRIG 72 12/29/2021    HDL 70 12/29/2021    ALT 14 12/29/2021    AST 22 12/29/2021     12/29/2021    K 4.4 12/29/2021    CL 99 12/29/2021    CREATININE 0.7 12/29/2021    BUN 19 12/29/2021    CO2 29 12/29/2021    TSH 0.522 12/29/2021    GLUF 85 06/28/2018     Lab Results   Component Value Date    COLORU yellow 07/02/2018    COLORU Yellow 06/26/2015    NITRU Negative 06/26/2015    GLUCOSEU negative 07/02/2018    GLUCOSEU Negative 06/26/2015    [address] trace 07/02/2018    KETUA Negative 06/26/2015    UROBILINOGEN 0.2 06/26/2015    BILIRUBINUR negative 07/02/2018     No results found for: PSA, CEA, , [id number],       Controlled Substances Monitoring:                                    ASSESSMENT     Patient Active Problem List    Diagnosis Date Noted    Nonrheumatic aortic (valve) stenosis 03/25/2021    Dependence on supplemental oxygen 03/25/2021    Dyspnea on exertion 03/25/2021    Irritation of vulva 03/25/2021    Pulmonary hypertension ([facility] [address] 03/25/2021    Transient ischemic attack 07/22/2020    Coronary artery disease involving native coronary artery of native heart without angina pectoris 11/26/2018    Hypertension 02/19/2018    Hypothyroidism 02/19/2018    Hyperlipidemia 02/19/2018    Osteoporosis 02/19/2018        Diagnosis:     ICD-10-CM    1. Pulmonary hypertension (HCC)  I27.20    2.  Hyperlipidemia, unspecified hyperlipidemia type  E78.5 CBC with Auto Differential     Comprehensive Metabolic Panel     LIPID PANEL   3. Acquired hypothyroidism  E03.9 TSH   4. Hypertension, unspecified type  I10    5. Coronary artery disease involving native coronary artery of native heart without angina pectoris  I25.10    6. Arthritis  M19.90 XR SHOULDER LEFT 1 VW   7. Leg edema, left  R60.0 US DUP LOWER EXTREMITIES BILATERAL VENOUS       PLAN:   Continue present treatment  Low-sodium low-fat diet  Venous Doppler study  Lab work before the next visit  X-rays ordered  Return to clinic earlier if any problem        Patient Instructions   Continue present treatment  Low-sodium low-fat diet  Get the lab work and x-rays done  Return to clinic earlier if any problem  Get the Doppler study done  Return to clinic earlier if any problems      Return in about 4 weeks (around 5/25/2022) for Medication Check, test results. Davis Llanos reviewed my findings and recommendations with Porfirio Bhandari.     Electronicallysigned by Ariel Gaxiola MD on 4/27/22 at 11:05 AM EDT

## 2022-04-27 NOTE — PATIENT INSTRUCTIONS
Continue present treatment  Low-sodium low-fat diet  Get the lab work and x-rays done  Return to clinic earlier if any problem  Get the Doppler study done  Return to clinic earlier if any problems

## 2022-05-05 ENCOUNTER — HOSPITAL ENCOUNTER (OUTPATIENT)
Dept: ULTRASOUND IMAGING | Age: 87
Discharge: HOME | End: 2022-05-05
Payer: COMMERCIAL

## 2022-05-05 ENCOUNTER — HOSPITAL ENCOUNTER (OUTPATIENT)
Dept: GENERAL RADIOLOGY | Age: 87
Discharge: HOME | End: 2022-05-07
Payer: COMMERCIAL

## 2022-05-05 DIAGNOSIS — M19.90 ARTHRITIS: ICD-10-CM

## 2022-05-05 DIAGNOSIS — R60.0 LEG EDEMA, LEFT: ICD-10-CM

## 2022-05-05 PROCEDURE — 93970 EXTREMITY STUDY: CPT

## 2022-05-05 PROCEDURE — 73030 X-RAY EXAM OF SHOULDER: CPT

## 2022-05-19 ENCOUNTER — HOSPITAL ENCOUNTER (OUTPATIENT)
Age: 87
Discharge: HOME | End: 2022-05-19
Payer: MEDICARE

## 2022-05-19 DIAGNOSIS — E03.9 ACQUIRED HYPOTHYROIDISM: ICD-10-CM

## 2022-05-19 DIAGNOSIS — E78.5 HYPERLIPIDEMIA, UNSPECIFIED HYPERLIPIDEMIA TYPE: ICD-10-CM

## 2022-05-19 LAB
ALBUMIN SERPL-MCNC: 4 G/DL (ref 3.5–5.2)
ALP BLD-CCNC: 109 U/L (ref 35–104)
ALT SERPL-CCNC: 14 U/L (ref 0–32)
ANION GAP SERPL CALCULATED.3IONS-SCNC: 9 MMOL/L (ref 7–16)
AST SERPL-CCNC: 21 U/L (ref 0–31)
BASOPHILS ABSOLUTE: 0.01 E9/L (ref 0–0.2)
BASOPHILS RELATIVE PERCENT: 0.2 % (ref 0–2)
BILIRUB SERPL-MCNC: 0.3 MG/DL (ref 0–1.2)
BUN BLDV-MCNC: 22 MG/DL (ref 6–23)
CALCIUM SERPL-MCNC: 9.8 MG/DL (ref 8.6–10.2)
CHLORIDE BLD-SCNC: 101 MMOL/L (ref 98–107)
CHOLESTEROL, TOTAL: 154 MG/DL (ref 0–199)
CO2: 30 MMOL/L (ref 22–29)
CREAT SERPL-MCNC: 0.7 MG/DL (ref 0.5–1)
EOSINOPHILS ABSOLUTE: 0.11 E9/L (ref 0.05–0.5)
EOSINOPHILS RELATIVE PERCENT: 1.7 % (ref 0–6)
GFR AFRICAN AMERICAN: >60
GFR NON-AFRICAN AMERICAN: >60 ML/MIN/1.73
GLUCOSE BLD-MCNC: 100 MG/DL (ref 74–99)
HCT VFR BLD CALC: 38.4 % (ref 34–48)
HDLC SERPL-MCNC: 66 MG/DL
HEMOGLOBIN: 12.4 G/DL (ref 11.5–15.5)
IMMATURE GRANULOCYTES #: 0.02 E9/L
IMMATURE GRANULOCYTES %: 0.3 % (ref 0–5)
LDL CHOLESTEROL CALCULATED: 76 MG/DL (ref 0–99)
LYMPHOCYTES ABSOLUTE: 1.22 E9/L (ref 1.5–4)
LYMPHOCYTES RELATIVE PERCENT: 19.2 % (ref 20–42)
MCH RBC QN AUTO: 29.2 PG (ref 26–35)
MCHC RBC AUTO-ENTMCNC: 32.3 % (ref 32–34.5)
MCV RBC AUTO: 90.4 FL (ref 80–99.9)
MONOCYTES ABSOLUTE: 0.64 E9/L (ref 0.1–0.95)
MONOCYTES RELATIVE PERCENT: 10.1 % (ref 2–12)
NEUTROPHILS ABSOLUTE: 4.34 E9/L (ref 1.8–7.3)
NEUTROPHILS RELATIVE PERCENT: 68.5 % (ref 43–80)
PDW BLD-RTO: 14.9 FL (ref 11.5–15)
PLATELET # BLD: 259 E9/L (ref 130–450)
PMV BLD AUTO: 9 FL (ref 7–12)
POTASSIUM SERPL-SCNC: 4.6 MMOL/L (ref 3.5–5)
RBC # BLD: 4.25 E12/L (ref 3.5–5.5)
SODIUM BLD-SCNC: 140 MMOL/L (ref 132–146)
TOTAL PROTEIN: 7.2 G/DL (ref 6.4–8.3)
TRIGL SERPL-MCNC: 62 MG/DL (ref 0–149)
TSH SERPL DL<=0.05 MIU/L-ACNC: 0.37 UIU/ML (ref 0.27–4.2)
VLDLC SERPL CALC-MCNC: 12 MG/DL
WBC # BLD: 6.3 E9/L (ref 4.5–11.5)

## 2022-05-19 PROCEDURE — 36415 COLL VENOUS BLD VENIPUNCTURE: CPT

## 2022-05-19 PROCEDURE — 80053 COMPREHEN METABOLIC PANEL: CPT

## 2022-05-19 PROCEDURE — 80061 LIPID PANEL: CPT

## 2022-05-19 PROCEDURE — 84443 ASSAY THYROID STIM HORMONE: CPT

## 2022-05-19 PROCEDURE — 85025 COMPLETE CBC W/AUTO DIFF WBC: CPT

## 2022-05-25 ENCOUNTER — OFFICE VISIT (OUTPATIENT)
Dept: FAMILY MEDICINE CLINIC | Age: 87
End: 2022-05-25
Payer: COMMERCIAL

## 2022-05-25 VITALS
WEIGHT: 166 LBS | HEART RATE: 88 BPM | DIASTOLIC BLOOD PRESSURE: 86 MMHG | TEMPERATURE: 98 F | BODY MASS INDEX: 31.37 KG/M2 | SYSTOLIC BLOOD PRESSURE: 138 MMHG

## 2022-05-25 DIAGNOSIS — I25.10 CORONARY ARTERY DISEASE INVOLVING NATIVE CORONARY ARTERY OF NATIVE HEART WITHOUT ANGINA PECTORIS: ICD-10-CM

## 2022-05-25 DIAGNOSIS — R60.0 LEG EDEMA, LEFT: ICD-10-CM

## 2022-05-25 DIAGNOSIS — I77.9 CAROTID ARTERY DISEASE, UNSPECIFIED LATERALITY, UNSPECIFIED TYPE (HCC): ICD-10-CM

## 2022-05-25 DIAGNOSIS — E78.5 HYPERLIPIDEMIA, UNSPECIFIED HYPERLIPIDEMIA TYPE: Primary | ICD-10-CM

## 2022-05-25 DIAGNOSIS — E03.9 ACQUIRED HYPOTHYROIDISM: ICD-10-CM

## 2022-05-25 DIAGNOSIS — I10 HYPERTENSION, UNSPECIFIED TYPE: ICD-10-CM

## 2022-05-25 DIAGNOSIS — M19.90 ARTHRITIS: ICD-10-CM

## 2022-05-25 PROCEDURE — 93925 LOWER EXTREMITY STUDY: CPT | Performed by: FAMILY MEDICINE

## 2022-05-25 PROCEDURE — 1123F ACP DISCUSS/DSCN MKR DOCD: CPT | Performed by: FAMILY MEDICINE

## 2022-05-25 PROCEDURE — 99213 OFFICE O/P EST LOW 20 MIN: CPT | Performed by: FAMILY MEDICINE

## 2022-05-25 RX ORDER — CALCIUM/MAGNESIUM/ZINC 333-133 MG
1 TABLET ORAL DAILY
COMMUNITY

## 2022-05-25 ASSESSMENT — PATIENT HEALTH QUESTIONNAIRE - PHQ9
SUM OF ALL RESPONSES TO PHQ QUESTIONS 1-9: 0
SUM OF ALL RESPONSES TO PHQ9 QUESTIONS 1 & 2: 0
1. LITTLE INTEREST OR PLEASURE IN DOING THINGS: 0
SUM OF ALL RESPONSES TO PHQ QUESTIONS 1-9: 0
SUM OF ALL RESPONSES TO PHQ QUESTIONS 1-9: 0
2. FEELING DOWN, DEPRESSED OR HOPELESS: 0
SUM OF ALL RESPONSES TO PHQ QUESTIONS 1-9: 0

## 2022-05-25 NOTE — PATIENT INSTRUCTIONS
Continue present treatment  Low-sodium low-fat diet  Compresses to the left shoulder  Get the arterial studies of the left leg  Return to clinic earlier if any problems

## 2022-05-25 NOTE — PROGRESS NOTES
OFFICE PROGRESS NOTE      SUBJECTIVE:        Patient ID:   Fritz Avila is a 80 y.o. female who presents for   Chief Complaint   Patient presents with    Hypertension     Here for recheck on Hypertension,Hyperlipidemia and Hypothyroidism. Patient reports feeling well. Lab work done,here for review. 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  Authorizing Provider   CALCIUM-MAGNESUIUM-ZINC 333-133-8.3 MG TABS Take 1 tablet by mouth daily Yes Historical Provider, MD   lisinopril (PRINIVIL;ZESTRIL) 20 MG tablet Take 1 tablet by mouth daily Yes Jia Powers MD   simvastatin (ZOCOR) 20 MG tablet take 1 tablet by mouth at bedtime Yes Jia Powers MD   levothyroxine (SYNTHROID) 100 MCG tablet One daily Yes Jia Powers MD   furosemide (LASIX) 40 MG tablet Take 1 tablet by mouth daily Takes when she is at home Yes Jia Powers MD   aspirin 81 MG EC tablet Take 81 mg by mouth daily Yes Historical Provider, MD   clopidogrel (PLAVIX) 75 MG tablet Take 75 mg by mouth daily Yes Historical Provider, MD   meclizine (ANTIVERT) 12.5 MG tablet Take 1 tablet by mouth 3 times daily as needed for Dizziness Yes Jia Powers MD   Omega-3 Fatty Acids (OMEGA-3 PLUS PO) Take 2 capsules by mouth daily Yes Historical Provider, MD   GARLIC PO Take 1 tablet by mouth daily Yes Historical Provider, MD   hydrochlorothiazide (MICROZIDE) 12.5 MG capsule Take 12.5 mg by mouth daily Yes Historical Provider, MD   isosorbide mononitrate (IMDUR) 30 MG extended release tablet Take 30 mg by mouth daily Yes Historical Provider, MD   Pyridoxine HCl (B-6 PO) Take 1 tablet by mouth daily Yes Historical Provider, MD   Cholecalciferol (VITAMIN D-3 PO) Take by mouth daily Yes Historical Provider, MD   vitamin B-12 (CYANOCOBALAMIN) 1000 MCG tablet Take 1,000 mcg by mouth daily as needed  Yes Historical Provider, MD   Multiple Vitamin (ONE-A-DAY 55 PLUS PO) Take by mouth daily Yes Historical Provider, MD      Social History     Socioeconomic History    Marital status:      Spouse name: None    Number of children: None    Years of education: None    Highest education level: None   Occupational History    Occupation: retired   Tobacco Use    Smoking status: Never Smoker    Smokeless tobacco: Never Used   Vaping Use    Vaping Use: Never used   Substance and Sexual Activity    Alcohol use: No    Drug use: No    Sexual activity: Never   Other Topics Concern    None   Social History Narrative    None     Social Determinants of Health     Financial Resource Strain: Low Risk     Difficulty of Paying Living Expenses: Not hard at all   Food Insecurity: No Food Insecurity    Worried About Running Out of Food in the Last Year: Never true    [address] in the Last Year: Never true   Transportation Needs:     Lack of Transportation (Medical): Not on file    Lack of Transportation (Non-Medical):  Not on file   Physical Activity:     Days of Exercise per Week: Not on file    Minutes of Exercise per Session: Not on file   Stress:     Feeling of Stress : Not on file   Social Connections:     Frequency of Communication with Friends and Family: Not on file    Frequency of Social Gatherings with Friends and Family: Not on file    Attends Alevism Services: Not on file    Active Member of Clubs or Organizations: Not on file    Attends Club or Organization Meetings: Not on file    Marital Status: Not on file   Intimate Partner Violence:     Fear of Current and tenderness (Left shoulder) present. Normal range of motion. Cervical back: Normal range of motion and neck supple. Skin:     General: Skin is warm and dry. Neurological:      Mental Status: She is alert and oriented to person, place, and time. Psychiatric:         Thought Content: Thought content normal.            Labs :    Lab Results   Component Value Date    WBC 6.3 05/19/2022    HGB 12.4 05/19/2022    HCT 38.4 05/19/2022     05/19/2022    CHOL 154 05/19/2022    TRIG 62 05/19/2022    HDL 66 05/19/2022    ALT 14 05/19/2022    AST 21 05/19/2022     05/19/2022    K 4.6 05/19/2022     05/19/2022    CREATININE 0.7 05/19/2022    BUN 22 05/19/2022    CO2 30 (H) 05/19/2022    TSH 0.368 05/19/2022    GLUF 85 06/28/2018     Lab Results   Component Value Date    COLORU yellow 07/02/2018    COLORU Yellow 06/26/2015    NITRU Negative 06/26/2015    GLUCOSEU negative 07/02/2018    GLUCOSEU Negative 06/26/2015    KETUA trace 07/02/2018    KETUA Negative 06/26/2015    UROBILINOGEN 0.2 06/26/2015    BILIRUBINUR negative 07/02/2018     No results found for: PSA, CEA, , [id number],       Controlled Substances Monitoring:                                    ASSESSMENT     Patient Active Problem List    Diagnosis Date Noted    Nonrheumatic aortic (valve) stenosis 03/25/2021    Dependence on supplemental oxygen 03/25/2021    Dyspnea on exertion 03/25/2021    Irritation of vulva 03/25/2021    Pulmonary hypertension ([facility] [address] 03/25/2021    Transient ischemic attack 07/22/2020    Coronary artery disease involving native coronary artery of native heart without angina pectoris 11/26/2018    Hypertension 02/19/2018    Hypothyroidism 02/19/2018    Hyperlipidemia 02/19/2018    Osteoporosis 02/19/2018        Diagnosis:     ICD-10-CM    1. Hyperlipidemia, unspecified hyperlipidemia type  E78.5    2. Hypertension, unspecified type  I10    3. Acquired hypothyroidism  E03.9    4.  Coronary artery disease involving native coronary artery of native heart without angina pectoris  I25.10    5. Arthritis  M19.90    6. Leg edema, left  R60.0 62794 - MI Duplex Lo Extrem Art Bilat   7. Carotid artery disease, unspecified laterality, unspecified type ([facility] [address]  I77.9        PLAN:   Orthopedic referral patient did not want it  Warm compresses to the left shoulder  Continue the medication  Keep the leg elevated  Use the compression hose  Return to clinic earlier if any problems        Patient Instructions   Continue present treatment  Low-sodium low-fat diet  Compresses to the left shoulder  Get the arterial studies of the left leg  Return to clinic earlier if any problems      Return in about 4 weeks (around 6/22/2022) for Medication Check, test results. Sophie De Jesus reviewed my findings and recommendations with Lida Nicole.     Electronicallysigned by Rita Underwood MD on 5/25/22 at 1:13 PM EDT

## 2022-06-27 ENCOUNTER — OFFICE VISIT (OUTPATIENT)
Dept: FAMILY MEDICINE CLINIC | Age: 87
End: 2022-06-27
Payer: MEDICARE

## 2022-06-27 VITALS
SYSTOLIC BLOOD PRESSURE: 122 MMHG | TEMPERATURE: 97.3 F | BODY MASS INDEX: 30.8 KG/M2 | DIASTOLIC BLOOD PRESSURE: 86 MMHG | HEART RATE: 82 BPM | WEIGHT: 163 LBS | OXYGEN SATURATION: 95 %

## 2022-06-27 DIAGNOSIS — I25.10 CORONARY ARTERY DISEASE INVOLVING NATIVE CORONARY ARTERY OF NATIVE HEART WITHOUT ANGINA PECTORIS: ICD-10-CM

## 2022-06-27 DIAGNOSIS — E78.5 HYPERLIPIDEMIA, UNSPECIFIED HYPERLIPIDEMIA TYPE: ICD-10-CM

## 2022-06-27 DIAGNOSIS — E03.9 ACQUIRED HYPOTHYROIDISM: ICD-10-CM

## 2022-06-27 DIAGNOSIS — R60.0 LEG EDEMA, LEFT: Primary | ICD-10-CM

## 2022-06-27 DIAGNOSIS — I10 HYPERTENSION, UNSPECIFIED TYPE: ICD-10-CM

## 2022-06-27 PROCEDURE — 99214 OFFICE O/P EST MOD 30 MIN: CPT | Performed by: FAMILY MEDICINE

## 2022-06-27 PROCEDURE — 1123F ACP DISCUSS/DSCN MKR DOCD: CPT | Performed by: FAMILY MEDICINE

## 2022-06-27 ASSESSMENT — ENCOUNTER SYMPTOMS
RESPIRATORY NEGATIVE: 1
ALLERGIC/IMMUNOLOGIC NEGATIVE: 1
COLOR CHANGE: 1
GASTROINTESTINAL NEGATIVE: 1
EYES NEGATIVE: 1

## 2022-06-27 NOTE — PROGRESS NOTES
OFFICE PROGRESS NOTE      SUBJECTIVE:        Patient ID:   Thalia Iraheta is a 80 y.o. female who presents for   Chief Complaint   Patient presents with    Hypertension     Here for recheck on Hypertension,Cad,Hypothyroidism and Osteoporosis. Continues to experience swelling and redness in Lt lower leg. HPI:   Patient states she is feeling better  Leg edema is improved  Rash on the leg has been improving  Lab work has been within normal limit  Doppler studies are negative  Patient does have a cardiologist        Prior to Visit Medications    Medication Sig Taking?  Authorizing Provider   CALCIUM-MAGNESUIUM-ZINC 333-133-8.3 MG TABS Take 1 tablet by mouth daily Yes Historical Provider, MD   lisinopril (PRINIVIL;ZESTRIL) 20 MG tablet Take 1 tablet by mouth daily Yes Alyce Flores MD   simvastatin (ZOCOR) 20 MG tablet take 1 tablet by mouth at bedtime Yes Alyce Flores MD   levothyroxine (SYNTHROID) 100 MCG tablet One daily Yes Alyce Flores MD   furosemide (LASIX) 40 MG tablet Take 1 tablet by mouth daily Takes when she is at home Yes Alyce Flores MD   aspirin 81 MG EC tablet Take 81 mg by mouth daily Yes Historical Provider, MD   clopidogrel (PLAVIX) 75 MG tablet Take 75 mg by mouth daily Yes Historical Provider, MD   meclizine (ANTIVERT) 12.5 MG tablet Take 1 tablet by mouth 3 times daily as needed for Dizziness Yes Alyce Flores MD   Omega-3 Fatty Acids (OMEGA-3 PLUS PO) Take 2 capsules by mouth daily Yes Historical Provider, MD   GARLIC PO Take 1 tablet by mouth daily Yes Historical Provider, MD   hydrochlorothiazide (MICROZIDE) 12.5 MG capsule Take 12.5 mg by mouth daily Yes Historical Provider, MD   isosorbide mononitrate (IMDUR) 30 MG extended release tablet Take 30 mg by mouth daily Yes Historical Provider, MD   Pyridoxine HCl (B-6 PO) Take 1 tablet by mouth daily Yes Historical Provider, MD   Cholecalciferol (VITAMIN D-3 PO) Take by mouth daily Yes Historical Provider, MD   vitamin B-12 (CYANOCOBALAMIN) 1000 MCG tablet Take 1,000 mcg by mouth daily as needed  Yes Historical Provider, MD   Multiple Vitamin (ONE-A-DAY 55 PLUS PO) Take by mouth daily Yes Historical Provider, MD      Social History     Socioeconomic History    Marital status:      Spouse name: None    Number of children: None    Years of education: None    Highest education level: None   Occupational History    Occupation: retired   Tobacco Use    Smoking status: Never Smoker    Smokeless tobacco: Never Used   Vaping Use    Vaping Use: Never used   Substance and Sexual Activity    Alcohol use: No    Drug use: No    Sexual activity: Never   Other Topics Concern    None   Social History Narrative    None     Social Determinants of Health     Financial Resource Strain: Low Risk     Difficulty of Paying Living Expenses: Not hard at all   Food Insecurity: No Food Insecurity    Worried About Running Out of Food in the Last Year: Never true    [address] in the Last Year: Never true   Transportation Needs:     Lack of Transportation (Medical): Not on file    Lack of Transportation (Non-Medical):  Not on file   Physical Activity:     Days of Exercise per Week: Not on file    Minutes of Exercise per Session: Not on file   Stress:     Feeling of Stress : Not on file   Social Connections:     Frequency of Communication with Friends and Family: Not on file    Frequency of Social Gatherings with Friends and Family: Not on file    Attends Rastafari Services: Not on file    Active Member of Clubs or Organizations: Not on file    Attends Club or Organization Meetings: Not on file    Marital Status: Not on file   Intimate Partner Violence:     Fear of Current or Ex-Partner: Not on file    Emotionally Abused: Not on file    Physically Abused: Not on file    Sexually Abused: Not on file   Housing Stability:     Unable to Pay for Housing in the Last Year: Not on file    Number of Places Lived in the Last Year: Not on file    Unstable Housing in the Last Year: Not on file       I have reviewed Jena's allergies, medications, problem list, medical, social and family history and have updated as needed in the electronic medical record  Review Of Systems:    Review of Systems   Constitutional: Negative. HENT: Negative. Eyes: Negative. Respiratory: Negative. Cardiovascular: Positive for leg swelling (Improving). Gastrointestinal: Negative. Endocrine: Negative. Musculoskeletal: Negative. Skin: Positive for color change (Of the leg is improving). Allergic/Immunologic: Negative. Neurological: Negative. Hematological: Negative. Psychiatric/Behavioral: Negative. OBJECTIVE:     VS:  Wt Readings from Last 3 Encounters:   06/27/22 163 lb (73.9 kg)   05/25/22 166 lb (75.3 kg)   04/27/22 164 lb (74.4 kg)     Temp Readings from Last 3 Encounters:   06/27/22 97.3 °F (36.3 °C) (Infrared)   05/25/22 98 °F (36.7 °C) (Infrared)   04/27/22 97.4 °F (36.3 °C) (Infrared)     BP Readings from Last 3 Encounters:   06/27/22 122/86   05/25/22 138/86   04/27/22 120/84        Physical Exam  Constitutional:       Appearance: She is well-developed. HENT:      Head: Normocephalic and atraumatic. Eyes:      Conjunctiva/sclera: Conjunctivae normal.      Pupils: Pupils are equal, round, and reactive to light. Cardiovascular:      Rate and Rhythm: Normal rate and regular rhythm. Heart sounds: Normal heart sounds. Pulmonary:      Effort: Pulmonary effort is normal.      Breath sounds: Normal breath sounds. Abdominal:      General: Bowel sounds are normal.      Palpations: Abdomen is soft. Musculoskeletal:         General: Normal range of motion. Cervical back: Normal range of motion and neck supple. Skin:     General: Skin is warm and dry. Neurological:      Mental Status: She is alert and oriented to person, place, and time. the leg elevated  Lab work before the next visit  Return to clinic earlier if any problems        Patient Instructions   Continue present treatment  Keep the leg elevated  Low-sodium low-fat diet  Follow-up with the consultant  Return to clinic earlier if any problem      Return in about 6 weeks (around 8/8/2022) for Medication Check, test results. Jazmín Fulton reviewed my findings and recommendations with Carmen Mishra.     Electronicallysigned by Merle Ford MD on 6/27/22 at 1:17 PM EDT

## 2022-06-27 NOTE — PATIENT INSTRUCTIONS
Continue present treatment  Keep the leg elevated  Low-sodium low-fat diet  Follow-up with the consultant  Return to clinic earlier if any problem

## 2022-07-01 DIAGNOSIS — E03.9 ACQUIRED HYPOTHYROIDISM: ICD-10-CM

## 2022-07-01 RX ORDER — LEVOTHYROXINE SODIUM 0.1 MG/1
TABLET ORAL
Qty: 90 TABLET | Refills: 0 | Status: SHIPPED
Start: 2022-07-01 | End: 2022-10-07 | Stop reason: SDUPTHER

## 2022-07-01 NOTE — TELEPHONE ENCOUNTER
Patient received all meds but there was no refill on Levothyroxie    Last Appointment:  6/27/2022  Future Appointments   Date Time Provider Pam Rodríguezi   8/8/2022  1:45 PM Perla Tan MD [facility]   8/24/2022  2:00 PM Perla Tan MD [facility]      Wellness for [address] in pharmacy

## 2022-08-01 ENCOUNTER — HOSPITAL ENCOUNTER (OUTPATIENT)
Age: 87
Discharge: HOME | End: 2022-08-01
Payer: MEDICARE

## 2022-08-01 DIAGNOSIS — R60.0 LEG EDEMA, LEFT: ICD-10-CM

## 2022-08-01 DIAGNOSIS — E78.5 HYPERLIPIDEMIA, UNSPECIFIED HYPERLIPIDEMIA TYPE: ICD-10-CM

## 2022-08-01 LAB
ALBUMIN SERPL-MCNC: 4.1 G/DL (ref 3.5–5.2)
ALP BLD-CCNC: 104 U/L (ref 35–104)
ALT SERPL-CCNC: 13 U/L (ref 0–32)
ANION GAP SERPL CALCULATED.3IONS-SCNC: 5 MMOL/L (ref 7–16)
AST SERPL-CCNC: 24 U/L (ref 0–31)
BASOPHILS ABSOLUTE: 0.01 E9/L (ref 0–0.2)
BASOPHILS RELATIVE PERCENT: 0.2 % (ref 0–2)
BILIRUB SERPL-MCNC: 0.4 MG/DL (ref 0–1.2)
BUN BLDV-MCNC: 19 MG/DL (ref 6–23)
CALCIUM SERPL-MCNC: 9.7 MG/DL (ref 8.6–10.2)
CHLORIDE BLD-SCNC: 104 MMOL/L (ref 98–107)
CHOLESTEROL, TOTAL: 138 MG/DL (ref 0–199)
CO2: 29 MMOL/L (ref 22–29)
CREAT SERPL-MCNC: 0.7 MG/DL (ref 0.5–1)
EOSINOPHILS ABSOLUTE: 0.13 E9/L (ref 0.05–0.5)
EOSINOPHILS RELATIVE PERCENT: 2.3 % (ref 0–6)
GFR AFRICAN AMERICAN: >60
GFR NON-AFRICAN AMERICAN: >60 ML/MIN/1.73
GLUCOSE BLD-MCNC: 94 MG/DL (ref 74–99)
HCT VFR BLD CALC: 36.3 % (ref 34–48)
HDLC SERPL-MCNC: 60 MG/DL
HEMOGLOBIN: 11.8 G/DL (ref 11.5–15.5)
IMMATURE GRANULOCYTES #: 0.02 E9/L
IMMATURE GRANULOCYTES %: 0.4 % (ref 0–5)
LDL CHOLESTEROL CALCULATED: 64 MG/DL (ref 0–99)
LYMPHOCYTES ABSOLUTE: 1.09 E9/L (ref 1.5–4)
LYMPHOCYTES RELATIVE PERCENT: 19.2 % (ref 20–42)
MCH RBC QN AUTO: 29.2 PG (ref 26–35)
MCHC RBC AUTO-ENTMCNC: 32.5 % (ref 32–34.5)
MCV RBC AUTO: 89.9 FL (ref 80–99.9)
MONOCYTES ABSOLUTE: 0.55 E9/L (ref 0.1–0.95)
MONOCYTES RELATIVE PERCENT: 9.7 % (ref 2–12)
NEUTROPHILS ABSOLUTE: 3.88 E9/L (ref 1.8–7.3)
NEUTROPHILS RELATIVE PERCENT: 68.2 % (ref 43–80)
PDW BLD-RTO: 14.6 FL (ref 11.5–15)
PLATELET # BLD: 242 E9/L (ref 130–450)
PMV BLD AUTO: 8.9 FL (ref 7–12)
POTASSIUM SERPL-SCNC: 4.3 MMOL/L (ref 3.5–5)
RBC # BLD: 4.04 E12/L (ref 3.5–5.5)
SODIUM BLD-SCNC: 138 MMOL/L (ref 132–146)
TOTAL PROTEIN: 6.9 G/DL (ref 6.4–8.3)
TRIGL SERPL-MCNC: 71 MG/DL (ref 0–149)
VLDLC SERPL CALC-MCNC: 14 MG/DL
WBC # BLD: 5.7 E9/L (ref 4.5–11.5)

## 2022-08-01 PROCEDURE — 80053 COMPREHEN METABOLIC PANEL: CPT

## 2022-08-01 PROCEDURE — 85025 COMPLETE CBC W/AUTO DIFF WBC: CPT

## 2022-08-01 PROCEDURE — 36415 COLL VENOUS BLD VENIPUNCTURE: CPT

## 2022-08-01 PROCEDURE — 80061 LIPID PANEL: CPT

## 2022-08-08 ENCOUNTER — OFFICE VISIT (OUTPATIENT)
Dept: FAMILY MEDICINE CLINIC | Age: 87
End: 2022-08-08
Payer: MEDICARE

## 2022-08-08 VITALS — TEMPERATURE: 97.2 F | WEIGHT: 163 LBS | OXYGEN SATURATION: 96 % | HEART RATE: 92 BPM | BODY MASS INDEX: 30.8 KG/M2

## 2022-08-08 DIAGNOSIS — I10 HYPERTENSION, UNSPECIFIED TYPE: ICD-10-CM

## 2022-08-08 DIAGNOSIS — E78.5 HYPERLIPIDEMIA, UNSPECIFIED HYPERLIPIDEMIA TYPE: ICD-10-CM

## 2022-08-08 DIAGNOSIS — I25.10 CORONARY ARTERY DISEASE INVOLVING NATIVE CORONARY ARTERY OF NATIVE HEART WITHOUT ANGINA PECTORIS: ICD-10-CM

## 2022-08-08 DIAGNOSIS — E03.9 ACQUIRED HYPOTHYROIDISM: Primary | ICD-10-CM

## 2022-08-08 PROCEDURE — 1123F ACP DISCUSS/DSCN MKR DOCD: CPT | Performed by: FAMILY MEDICINE

## 2022-08-08 PROCEDURE — 99214 OFFICE O/P EST MOD 30 MIN: CPT | Performed by: FAMILY MEDICINE

## 2022-08-08 ASSESSMENT — ENCOUNTER SYMPTOMS
GASTROINTESTINAL NEGATIVE: 1
EYES NEGATIVE: 1
RESPIRATORY NEGATIVE: 1
ALLERGIC/IMMUNOLOGIC NEGATIVE: 1

## 2022-08-08 NOTE — PROGRESS NOTES
OFFICE PROGRESS NOTE      SUBJECTIVE:        Patient ID:   Rell Marlow is a 80 y.o. female who presents for   Chief Complaint   Patient presents with    Hypertension     Here for recheck on Hypertension,Hyperlipidemia and Hypothyroidism. Patient report feeling well. Lab work done,here for review. HPI:   Patient is doing well  Lab work within normal limit  Blood pressure is stable  Patient states her echocardiogram was normal according to cardiology  She been taking her medication as prescribed  No chest pain  Blood pressure is stable        Prior to Visit Medications    Medication Sig Taking?  Authorizing Provider   levothyroxine (SYNTHROID) 100 MCG tablet One daily Yes Negra Pimentel MD   CALCIUM-MAGNESUIUM-ZINC 333-133-8.3 MG TABS Take 1 tablet by mouth daily Yes Historical Provider, MD   lisinopril (PRINIVIL;ZESTRIL) 20 MG tablet Take 1 tablet by mouth daily Yes Negra Pimentel MD   simvastatin (ZOCOR) 20 MG tablet take 1 tablet by mouth at bedtime Yes Negra Pimentel MD   furosemide (LASIX) 40 MG tablet Take 1 tablet by mouth daily Takes when she is at home Yes Negra Pimentel MD   aspirin 81 MG EC tablet Take 81 mg by mouth daily Yes Historical Provider, MD   clopidogrel (PLAVIX) 75 MG tablet Take 75 mg by mouth daily Yes Historical Provider, MD   meclizine (ANTIVERT) 12.5 MG tablet Take 1 tablet by mouth 3 times daily as needed for Dizziness Yes Negra Pimentel MD   Omega-3 Fatty Acids (OMEGA-3 PLUS PO) Take 2 capsules by mouth daily Yes Historical Provider, MD   GARLIC PO Take 1 tablet by mouth daily Yes Historical Provider, MD   hydrochlorothiazide (MICROZIDE) 12.5 MG capsule Take 12.5 mg by mouth daily Yes Historical Provider, MD   isosorbide mononitrate (IMDUR) 30 MG extended release tablet Take 30 mg by mouth daily Yes Historical Provider, MD   Pyridoxine HCl (B-6 PO) Take 1 tablet by mouth daily Yes Historical Provider, MD   Cholecalciferol (VITAMIN D-3 PO) Take by mouth daily Yes Historical Provider, MD   vitamin B-12 (CYANOCOBALAMIN) 1000 MCG tablet Take 1,000 mcg by mouth daily as needed  Yes Historical Provider, MD   Multiple Vitamin (ONE-A-DAY 55 PLUS PO) Take by mouth daily Yes Historical Provider, MD      Social History     Socioeconomic History    Marital status:      Spouse name: None    Number of children: None    Years of education: None    Highest education level: None   Occupational History    Occupation: retired   Tobacco Use    Smoking status: Never    Smokeless tobacco: Never   Vaping Use    Vaping Use: Never used   Substance and Sexual Activity    Alcohol use: No    Drug use: No    Sexual activity: Never     Social Determinants of Health     Financial Resource Strain: Low Risk     Difficulty of Paying Living Expenses: Not hard at all   Food Insecurity: No Food Insecurity    Worried About Running Out of Food in the Last Year: Never true    [address] in the Last Year: Never true       I have reviewed Jena's allergies, medications, problem list, medical, social and family history and have updated as needed in the electronic medical record  Review Of Systems:    Review of Systems   Constitutional: Negative. HENT: Negative. Eyes: Negative. Respiratory: Negative. Cardiovascular: Negative. Gastrointestinal: Negative. Endocrine: Negative. Musculoskeletal: Negative. Skin: Negative. Allergic/Immunologic: Negative. Neurological: Negative. Hematological: Negative. Psychiatric/Behavioral: Negative.               OBJECTIVE:     VS:  Wt Readings from Last 3 Encounters:   08/08/22 163 lb (73.9 kg)   06/27/22 163 lb (73.9 kg)   05/25/22 166 lb (75.3 kg)     Temp Readings from Last 3 Encounters:   08/08/22 97.2 °F (36.2 °C) (Infrared)   06/27/22 97.3 °F (36.3 °C) (Infrared)   05/25/22 98 °F (36.7 °C) (Infrared)     BP Readings from Last 3 Encounters:   06/27/22 122/86   05/25/22 138/86   04/27/22 120/84        Physical Exam  Constitutional:       Appearance: She is well-developed. HENT:      Head: Normocephalic and atraumatic. Eyes:      Conjunctiva/sclera: Conjunctivae normal.      Pupils: Pupils are equal, round, and reactive to light. Cardiovascular:      Rate and Rhythm: Normal rate and regular rhythm. Heart sounds: Normal heart sounds. Pulmonary:      Effort: Pulmonary effort is normal.      Breath sounds: Normal breath sounds. Abdominal:      General: Bowel sounds are normal.      Palpations: Abdomen is soft. Musculoskeletal:         General: Normal range of motion. Cervical back: Normal range of motion and neck supple. Skin:     General: Skin is warm and dry. Neurological:      Mental Status: She is alert and oriented to person, place, and time. Psychiatric:         Thought Content:  Thought content normal.          Labs :    Lab Results   Component Value Date    WBC 5.7 08/01/2022    HGB 11.8 08/01/2022    HCT 36.3 08/01/2022     08/01/2022    CHOL 138 08/01/2022    TRIG 71 08/01/2022    HDL 60 08/01/2022    ALT 13 08/01/2022    AST 24 08/01/2022     08/01/2022    K 4.3 08/01/2022     08/01/2022    CREATININE 0.7 08/01/2022    BUN 19 08/01/2022    CO2 29 08/01/2022    TSH 0.368 05/19/2022    GLUF 85 06/28/2018     Lab Results   Component Value Date/Time    COLORU yellow 07/02/2018 02:23 PM    COLORU Yellow 06/26/2015 12:48 PM    NITRU Negative 06/26/2015 12:48 PM    GLUCOSEU negative 07/02/2018 02:23 PM    GLUCOSEU Negative 06/26/2015 12:48 PM    Alver Mitzi trace 07/02/2018 02:23 PM    KETUA Negative 06/26/2015 12:48 PM    UROBILINOGEN 0.2 06/26/2015 12:48 PM    BILIRUBINUR negative 07/02/2018 02:23 PM     No results found for: PSA, CEA, , [id number],       Controlled Substances Monitoring:                                    ASSESSMENT     Patient Active Problem List    Diagnosis Date Noted    Nonrheumatic aortic (valve) stenosis 03/25/2021    Dependence on supplemental oxygen 03/25/2021    Dyspnea on exertion 03/25/2021    Irritation of vulva 03/25/2021    Pulmonary hypertension ([facility] [address] 03/25/2021    Transient ischemic attack 07/22/2020    Coronary artery disease involving native coronary artery of native heart without angina pectoris 11/26/2018    Hypertension 02/19/2018    Hypothyroidism 02/19/2018    Hyperlipidemia 02/19/2018    Osteoporosis 02/19/2018        Diagnosis:     ICD-10-CM    1. Acquired hypothyroidism  E03.9 CBC with Auto Differential     TSH      2. Hypertension, unspecified type  I10 CBC with Auto Differential     Comprehensive Metabolic Panel      3. Hyperlipidemia, unspecified hyperlipidemia type  E78.5 CBC with Auto Differential     Comprehensive Metabolic Panel     Lipid Panel      4. Coronary artery disease involving native coronary artery of native heart without angina pectoris  I25.10 CBC with Auto Differential          PLAN:   Continue present treatment  Low-sodium low-fat low-carb diet  Regular exercises  Follow-up with the consultants  Lab work before the next visit  Return to clinic earlier if any problems        Patient Instructions   Continue present treatment  Low-sodium low-fat low-carb diet  Regular exercises  Annual wellness exam 08/24/2022  Lab work before the next visit  Return to clinic earlier if any problems  Follow-up with the consultants    Return in about 3 months (around 11/8/2022) for Medication Check, test results. Tricia Arizmendi reviewed my findings and recommendations with Katelyn Her.     Electronicallysigned by Katheryn Saenz MD on 8/8/22 at 2:19 PM EDT

## 2022-08-08 NOTE — PATIENT INSTRUCTIONS
Continue present treatment  Low-sodium low-fat low-carb diet  Regular exercises  Annual wellness exam 08/24/2022  Lab work before the next visit  Return to clinic earlier if any problems  Follow-up with the consultants

## 2022-08-24 ENCOUNTER — OFFICE VISIT (OUTPATIENT)
Dept: FAMILY MEDICINE CLINIC | Age: 87
End: 2022-08-24
Payer: MEDICARE

## 2022-08-24 VITALS
DIASTOLIC BLOOD PRESSURE: 82 MMHG | TEMPERATURE: 97.2 F | BODY MASS INDEX: 31.34 KG/M2 | WEIGHT: 166 LBS | SYSTOLIC BLOOD PRESSURE: 136 MMHG | OXYGEN SATURATION: 95 % | HEART RATE: 97 BPM | HEIGHT: 61 IN

## 2022-08-24 DIAGNOSIS — Z00.00 MEDICARE ANNUAL WELLNESS VISIT, SUBSEQUENT: Primary | ICD-10-CM

## 2022-08-24 PROCEDURE — 1123F ACP DISCUSS/DSCN MKR DOCD: CPT | Performed by: FAMILY MEDICINE

## 2022-08-24 PROCEDURE — G0439 PPPS, SUBSEQ VISIT: HCPCS | Performed by: FAMILY MEDICINE

## 2022-08-24 ASSESSMENT — PATIENT HEALTH QUESTIONNAIRE - PHQ9
SUM OF ALL RESPONSES TO PHQ QUESTIONS 1-9: 0
SUM OF ALL RESPONSES TO PHQ9 QUESTIONS 1 & 2: 0
1. LITTLE INTEREST OR PLEASURE IN DOING THINGS: 0
SUM OF ALL RESPONSES TO PHQ QUESTIONS 1-9: 0
2. FEELING DOWN, DEPRESSED OR HOPELESS: 0

## 2022-08-24 ASSESSMENT — LIFESTYLE VARIABLES
HOW MANY STANDARD DRINKS CONTAINING ALCOHOL DO YOU HAVE ON A TYPICAL DAY: PATIENT DOES NOT DRINK
HOW OFTEN DO YOU HAVE A DRINK CONTAINING ALCOHOL: NEVER

## 2022-08-24 NOTE — PROGRESS NOTES
Medicare Annual Wellness Visit    Yoon Block is here for Medicare AWV (Here for AWV)    Assessment & Plan   Medicare annual wellness visit, subsequent    Recommendations for Preventive Services Due: see orders and patient instructions/AVS.  Recommended screening schedule for the next 5-10 years is provided to the patient in written form: see Patient Instructions/AVS.     Return Regular appointment, for Medicare Annual Wellness Visit in 1 year. Subjective   The following acute and/or chronic problems were also addressed today:      Patient's complete Health Risk Assessment and screening values have been reviewed and are found in Flowsheets. The following problems were reviewed today and where indicated follow up appointments were made and/or referrals ordered. Positive Risk Factor Screenings with Interventions:              Health Habits/Nutrition:  Physical Activity: Insufficiently Active    Days of Exercise per Week: 1 day    Minutes of Exercise per Session: 60 min     Have you lost any weight without trying in the past 3 months?: No  Body mass index: (!) 31.36  Have you seen the dentist within the past year?: (!) No  Health Habits/Nutrition Interventions:  Dental exam overdue:  patient encouraged to make appointment with his/her dentist    Hearing/Vision:  Do you or your family notice any trouble with your hearing that hasn't been managed with hearing aids?: (!) Yes  Do you have difficulty driving, watching TV, or doing any of your daily activities because of your eyesight?: No  Have you had an eye exam within the past year?: Yes  No results found. Hearing/Vision Interventions:  Hearing concerns:  patient declines any further evaluation/treatment for hearing issues            Objective   Vitals:    08/24/22 1406   BP: 136/82   Pulse: 97   Temp: 97.2 °F (36.2 °C)   TempSrc: Infrared   SpO2: 95%   Weight: 166 lb (75.3 kg)   Height: 5' 1\" (1.549 m)      Body mass index is 31.37 kg/m². Reviewed and updated this visit:  Tobacco  Allergies  Meds  Med Hx  Surg Hx  Soc Hx  Fam Hx

## 2022-08-24 NOTE — PATIENT INSTRUCTIONS
Personalized Preventive Plan for Brenda Hanna - 8/24/2022  Medicare offers a range of preventive health benefits. Some of the tests and screenings are paid in full while other may be subject to a deductible, co-insurance, and/or copay. Some of these benefits include a comprehensive review of your medical history including lifestyle, illnesses that may run in your family, and various assessments and screenings as appropriate. After reviewing your medical record and screening and assessments performed today your provider may have ordered immunizations, labs, imaging, and/or referrals for you. A list of these orders (if applicable) as well as your Preventive Care list are included within your After Visit Summary for your review. Other Preventive Recommendations:    A preventive eye exam performed by an eye specialist is recommended every 1-2 years to screen for glaucoma; cataracts, macular degeneration, and other eye disorders. A preventive dental visit is recommended every 6 months. Try to get at least 150 minutes of exercise per week or 10,000 steps per day on a pedometer . Order or download the FREE \"Exercise & Physical Activity: Your Everyday Guide\" from [employer]. Call 0-341.293.8431 or search [employer] online. You need 2295-3448 mg of calcium and 5391-4830 IU of vitamin D per day. It is possible to meet your calcium requirement with diet alone, but a vitamin D supplement is usually necessary to meet this goal.  When exposed to the sun, use a sunscreen that protects against both UVA and UVB radiation with an SPF of 30 or greater. Reapply every 2 to 3 hours or after sweating, drying off with a towel, or swimming. Always wear a seat belt when traveling in a car. Always wear a helmet when riding a bicycle or motorcycle.

## 2022-10-03 ENCOUNTER — TELEPHONE (OUTPATIENT)
Dept: FAMILY MEDICINE CLINIC | Age: 87
End: 2022-10-03

## 2022-10-03 NOTE — TELEPHONE ENCOUNTER
Cough has been lingering for several years. Denies fever, SOB, chest pain, sore throat. States the tussin is no longer effective. Please advise.    Last seen 08/24/2022  Next appt 11/14/2022  [facility]

## 2022-10-04 DIAGNOSIS — I27.20 PULMONARY HYPERTENSION (HCC): ICD-10-CM

## 2022-10-04 RX ORDER — LISINOPRIL 20 MG/1
20 TABLET ORAL DAILY
Qty: 90 TABLET | Refills: 1 | Status: SHIPPED
Start: 2022-10-04 | End: 2022-10-07 | Stop reason: SDUPTHER

## 2022-10-04 NOTE — TELEPHONE ENCOUNTER
----- Message from PostedInat 2 sent at 10/3/2022  3:14 PM EDT -----  Subject: Refill Request    QUESTIONS  Name of Medication? lisinopril (PRINIVIL;ZESTRIL) 20 MG tablet  Patient-reported dosage and instructions? 1/day  How many days do you have left? 14  Preferred Pharmacy? [facility] ([address])  Pharmacy phone number (if available)? [phone]  ---------------------------------------------------------------------------  --------------,  Name of Medication? levothyroxine (SYNTHROID) 100 MCG tablet  Patient-reported dosage and instructions? 1/day  How many days do you have left? 14  Preferred Pharmacy? [facility] ([address])  Pharmacy phone number (if available)? [phone]  ---------------------------------------------------------------------------  --------------  CALL BACK INFO  What is the best way for the office to contact you? OK to leave message on   voicemail  Preferred Call Back Phone Number? [phone]  ---------------------------------------------------------------------------  --------------  SCRIPT ANSWERS  Relationship to Patient?  Self

## 2022-10-04 NOTE — TELEPHONE ENCOUNTER
Last Appointment:  8/24/2022  Future Appointments   Date Time Provider Pam Rodríguezi   11/14/2022  2:30 PM MD Mayelin Cordova [facility]   8/28/2023  1:30 PM Penny Ding MD [facility]

## 2022-10-04 NOTE — TELEPHONE ENCOUNTER
Message left for patient that she may take OTC cough medicine and if wants to see the  I can give her a appointment at the [facility] office on Thursday.

## 2022-10-06 DIAGNOSIS — I27.20 PULMONARY HYPERTENSION (HCC): ICD-10-CM

## 2022-10-06 DIAGNOSIS — E03.9 ACQUIRED HYPOTHYROIDISM: ICD-10-CM

## 2022-10-06 NOTE — TELEPHONE ENCOUNTER
Medications need sent to [facility]. Her lisinopril was sent to the wrong pharmacy and she is due for synthroid. Medications pended and correct pharmacy is selected.      Last Appointment:  8/24/2022  Future Appointments   Date Time Provider Pam Alcantara   11/14/2022  2:30 PM [facility]   8/28/2023  1:30 PM George Loo MD [facility]

## 2022-10-07 RX ORDER — LISINOPRIL 20 MG/1
20 TABLET ORAL DAILY
Qty: 90 TABLET | Refills: 1 | Status: SHIPPED | OUTPATIENT
Start: 2022-10-07

## 2022-10-07 RX ORDER — LEVOTHYROXINE SODIUM 0.1 MG/1
TABLET ORAL
Qty: 90 TABLET | Refills: 1 | Status: SHIPPED | OUTPATIENT
Start: 2022-10-07

## 2022-11-10 ENCOUNTER — HOSPITAL ENCOUNTER (OUTPATIENT)
Age: 87
Discharge: HOME | End: 2022-11-10
Payer: MEDICARE

## 2022-11-10 DIAGNOSIS — I25.10 CORONARY ARTERY DISEASE INVOLVING NATIVE CORONARY ARTERY OF NATIVE HEART WITHOUT ANGINA PECTORIS: ICD-10-CM

## 2022-11-10 DIAGNOSIS — I10 HYPERTENSION, UNSPECIFIED TYPE: ICD-10-CM

## 2022-11-10 DIAGNOSIS — E03.9 ACQUIRED HYPOTHYROIDISM: ICD-10-CM

## 2022-11-10 DIAGNOSIS — E78.5 HYPERLIPIDEMIA, UNSPECIFIED HYPERLIPIDEMIA TYPE: ICD-10-CM

## 2022-11-10 LAB
ALBUMIN SERPL-MCNC: 3.9 G/DL (ref 3.5–5.2)
ALP BLD-CCNC: 107 U/L (ref 35–104)
ALT SERPL-CCNC: 13 U/L (ref 0–32)
ANION GAP SERPL CALCULATED.3IONS-SCNC: 8 MMOL/L (ref 7–16)
AST SERPL-CCNC: 22 U/L (ref 0–31)
BASOPHILS ABSOLUTE: 0.01 E9/L (ref 0–0.2)
BASOPHILS RELATIVE PERCENT: 0.2 % (ref 0–2)
BILIRUB SERPL-MCNC: 0.3 MG/DL (ref 0–1.2)
BUN BLDV-MCNC: 18 MG/DL (ref 6–23)
CALCIUM SERPL-MCNC: 9.7 MG/DL (ref 8.6–10.2)
CHLORIDE BLD-SCNC: 100 MMOL/L (ref 98–107)
CHOLESTEROL, TOTAL: 143 MG/DL (ref 0–199)
CO2: 30 MMOL/L (ref 22–29)
CREAT SERPL-MCNC: 0.7 MG/DL (ref 0.5–1)
EOSINOPHILS ABSOLUTE: 0.12 E9/L (ref 0.05–0.5)
EOSINOPHILS RELATIVE PERCENT: 1.9 % (ref 0–6)
GFR SERPL CREATININE-BSD FRML MDRD: >60 ML/MIN/1.73
GLUCOSE BLD-MCNC: 98 MG/DL (ref 74–99)
HCT VFR BLD CALC: 36.3 % (ref 34–48)
HDLC SERPL-MCNC: 63 MG/DL
HEMOGLOBIN: 11.9 G/DL (ref 11.5–15.5)
IMMATURE GRANULOCYTES #: 0.01 E9/L
IMMATURE GRANULOCYTES %: 0.2 % (ref 0–5)
LDL CHOLESTEROL CALCULATED: 65 MG/DL (ref 0–99)
LYMPHOCYTES ABSOLUTE: 1.13 E9/L (ref 1.5–4)
LYMPHOCYTES RELATIVE PERCENT: 18 % (ref 20–42)
MCH RBC QN AUTO: 29.2 PG (ref 26–35)
MCHC RBC AUTO-ENTMCNC: 32.8 % (ref 32–34.5)
MCV RBC AUTO: 89 FL (ref 80–99.9)
MONOCYTES ABSOLUTE: 0.56 E9/L (ref 0.1–0.95)
MONOCYTES RELATIVE PERCENT: 8.9 % (ref 2–12)
NEUTROPHILS ABSOLUTE: 4.46 E9/L (ref 1.8–7.3)
NEUTROPHILS RELATIVE PERCENT: 70.8 % (ref 43–80)
PDW BLD-RTO: 14.6 FL (ref 11.5–15)
PLATELET # BLD: 279 E9/L (ref 130–450)
PMV BLD AUTO: 9.4 FL (ref 7–12)
POTASSIUM SERPL-SCNC: 4.7 MMOL/L (ref 3.5–5)
RBC # BLD: 4.08 E12/L (ref 3.5–5.5)
SODIUM BLD-SCNC: 138 MMOL/L (ref 132–146)
TOTAL PROTEIN: 6.7 G/DL (ref 6.4–8.3)
TRIGL SERPL-MCNC: 74 MG/DL (ref 0–149)
TSH SERPL DL<=0.05 MIU/L-ACNC: 0.25 UIU/ML (ref 0.27–4.2)
VLDLC SERPL CALC-MCNC: 15 MG/DL
WBC # BLD: 6.3 E9/L (ref 4.5–11.5)

## 2022-11-10 PROCEDURE — 36415 COLL VENOUS BLD VENIPUNCTURE: CPT

## 2022-11-10 PROCEDURE — 80053 COMPREHEN METABOLIC PANEL: CPT

## 2022-11-10 PROCEDURE — 84443 ASSAY THYROID STIM HORMONE: CPT

## 2022-11-10 PROCEDURE — 85025 COMPLETE CBC W/AUTO DIFF WBC: CPT

## 2022-11-10 PROCEDURE — 80061 LIPID PANEL: CPT

## 2022-11-11 DIAGNOSIS — E78.5 HYPERLIPIDEMIA, UNSPECIFIED HYPERLIPIDEMIA TYPE: ICD-10-CM

## 2022-11-11 RX ORDER — SIMVASTATIN 20 MG
TABLET ORAL
Qty: 90 TABLET | Refills: 1 | Status: SHIPPED | OUTPATIENT
Start: 2022-11-11

## 2022-11-11 NOTE — TELEPHONE ENCOUNTER
Pt  called for refill. She has 4 pills left needs asap.     Last seen 8/24/2022  Next appt 11/14/2022  [facility] pharmacy in [place]

## 2022-11-14 ENCOUNTER — OFFICE VISIT (OUTPATIENT)
Dept: FAMILY MEDICINE CLINIC | Age: 87
End: 2022-11-14
Payer: MEDICARE

## 2022-11-14 VITALS
SYSTOLIC BLOOD PRESSURE: 130 MMHG | OXYGEN SATURATION: 94 % | BODY MASS INDEX: 31.37 KG/M2 | TEMPERATURE: 97.3 F | WEIGHT: 166 LBS | DIASTOLIC BLOOD PRESSURE: 86 MMHG | HEART RATE: 98 BPM

## 2022-11-14 DIAGNOSIS — L30.9 DERMATITIS: ICD-10-CM

## 2022-11-14 DIAGNOSIS — E78.5 HYPERLIPIDEMIA, UNSPECIFIED HYPERLIPIDEMIA TYPE: Primary | ICD-10-CM

## 2022-11-14 DIAGNOSIS — I27.20 PULMONARY HYPERTENSION (HCC): ICD-10-CM

## 2022-11-14 DIAGNOSIS — I10 HYPERTENSION, UNSPECIFIED TYPE: ICD-10-CM

## 2022-11-14 DIAGNOSIS — E03.9 ACQUIRED HYPOTHYROIDISM: ICD-10-CM

## 2022-11-14 DIAGNOSIS — I25.10 CORONARY ARTERY DISEASE INVOLVING NATIVE CORONARY ARTERY OF NATIVE HEART WITHOUT ANGINA PECTORIS: ICD-10-CM

## 2022-11-14 PROCEDURE — 90694 VACC AIIV4 NO PRSRV 0.5ML IM: CPT | Performed by: FAMILY MEDICINE

## 2022-11-14 PROCEDURE — 1123F ACP DISCUSS/DSCN MKR DOCD: CPT | Performed by: FAMILY MEDICINE

## 2022-11-14 PROCEDURE — G0008 ADMIN INFLUENZA VIRUS VAC: HCPCS | Performed by: FAMILY MEDICINE

## 2022-11-14 PROCEDURE — 99214 OFFICE O/P EST MOD 30 MIN: CPT | Performed by: FAMILY MEDICINE

## 2022-11-14 NOTE — PROGRESS NOTES
OFFICE PROGRESS NOTE      SUBJECTIVE:        Patient ID:   Nneka Wade is a 80 y.o. female who presents for   Chief Complaint   Patient presents with    Rash     C/o rash with itching on arms shoulders and and hips. States she has had symptoms since 2016. Lab work done,here for review. HPI:   Patient here for the follow-up  Complaining of rash on the arms and the back  Lab work showed low TSH  Rest of the lab work is normal  Patient taking medication as prescribed  She states she is following the diet        Prior to Visit Medications    Medication Sig Taking?  Authorizing Provider   simvastatin (ZOCOR) 20 MG tablet take 1 tablet by mouth at bedtime Yes George Loo MD   levothyroxine (SYNTHROID) 100 MCG tablet One daily Yes George Loo MD   lisinopril (PRINIVIL;ZESTRIL) 20 MG tablet Take 1 tablet by mouth daily Yes George Loo MD   CALCIUM-MAGNESUIUM-ZINC 549-154-4.4 MG TABS Take 1 tablet by mouth daily Yes Historical Provider, MD   furosemide (LASIX) 40 MG tablet Take 1 tablet by mouth daily Takes when she is at home Yes George Loo MD   aspirin 81 MG EC tablet Take 81 mg by mouth daily Yes Historical Provider, MD   clopidogrel (PLAVIX) 75 MG tablet Take 75 mg by mouth daily Yes Historical Provider, MD   meclizine (ANTIVERT) 12.5 MG tablet Take 1 tablet by mouth 3 times daily as needed for Dizziness Yes George Loo MD   Omega-3 Fatty Acids (OMEGA-3 PLUS PO) Take 2 capsules by mouth daily Yes Historical Provider, MD   GARLIC PO Take 1 tablet by mouth daily Yes Historical Provider, MD   hydrochlorothiazide (MICROZIDE) 12.5 MG capsule Take 12.5 mg by mouth daily Yes Historical Provider, MD   isosorbide mononitrate (IMDUR) 30 MG extended release tablet Take 30 mg by mouth daily Yes Historical Provider, MD   Pyridoxine HCl (B-6 PO) Take 1 tablet by mouth daily Yes Historical Provider, MD   Cholecalciferol (VITAMIN D-3 PO) Take by mouth daily Yes Historical Provider, MD   vitamin B-12 (CYANOCOBALAMIN) 1000 MCG tablet Take 1,000 mcg by mouth daily as needed  Yes Historical Provider, MD   Multiple Vitamin (ONE-A-DAY 55 PLUS PO) Take by mouth daily Yes Historical Provider, MD      Social History     Socioeconomic History    Marital status:      Spouse name: None    Number of children: None    Years of education: None    Highest education level: None   Occupational History    Occupation: retired   Tobacco Use    Smoking status: Never    Smokeless tobacco: Never   Vaping Use    Vaping Use: Never used   Substance and Sexual Activity    Alcohol use: No    Drug use: No    Sexual activity: Never     Social Determinants of Health     Financial Resource Strain: Low Risk     Difficulty of Paying Living Expenses: Not hard at all   Food Insecurity: No Food Insecurity    Worried About Running Out of Food in the Last Year: Never true    Ran Out of Food in the Last Year: Never true   Physical Activity: Insufficiently Active    Days of Exercise per Week: 1 day    Minutes of Exercise per Session: 60 min       I have reviewed Jena's allergies, medications, problem list, medical, social and family history and have updated as needed in the electronic medical record  Review Of Systems:    Review of Systems   Constitutional: Negative. HENT: Negative. Eyes: Negative. Respiratory: Negative. Cardiovascular: Negative. Gastrointestinal: Negative. Endocrine: Negative. Musculoskeletal: Negative. Skin:  Positive for rash. Allergic/Immunologic: Negative. Neurological: Negative. Hematological: Negative. Psychiatric/Behavioral: Negative.               OBJECTIVE:     VS:  Wt Readings from Last 3 Encounters:   11/14/22 166 lb (75.3 kg)   08/24/22 166 lb (75.3 kg)   08/08/22 163 lb (73.9 kg)     Temp Readings from Last 3 Encounters:   11/14/22 97.3 °F (36.3 °C) (Infrared)   08/24/22 97.2 °F (36.2 °C) (Infrared)   08/08/22 97.2 °F (36.2 °C) (Infrared)     BP Readings from Last 3 Encounters:   11/14/22 130/86   08/24/22 136/82   06/27/22 122/86        Physical Exam  Constitutional:       Appearance: She is well-developed. HENT:      Head: Normocephalic and atraumatic. Eyes:      Conjunctiva/sclera: Conjunctivae normal.      Pupils: Pupils are equal, round, and reactive to light. Cardiovascular:      Rate and Rhythm: Normal rate and regular rhythm. Heart sounds: Normal heart sounds. Pulmonary:      Effort: Pulmonary effort is normal.      Breath sounds: Normal breath sounds. Abdominal:      General: Bowel sounds are normal.      Palpations: Abdomen is soft. Musculoskeletal:         General: Normal range of motion. Cervical back: Normal range of motion and neck supple. Skin:     General: Skin is warm and dry. Findings: Rash present. Neurological:      Mental Status: She is alert and oriented to person, place, and time. Psychiatric:         Thought Content:  Thought content normal.          Labs :    Lab Results   Component Value Date    WBC 6.3 11/10/2022    HGB 11.9 11/10/2022    HCT 36.3 11/10/2022     11/10/2022    CHOL 143 11/10/2022    TRIG 74 11/10/2022    HDL 63 11/10/2022    ALT 13 11/10/2022    AST 22 11/10/2022     11/10/2022    K 4.7 11/10/2022     11/10/2022    CREATININE 0.7 11/10/2022    BUN 18 11/10/2022    CO2 30 (H) 11/10/2022    TSH 0.249 (L) 11/10/2022    GLUF 85 06/28/2018     Lab Results   Component Value Date/Time    COLORU yellow 07/02/2018 02:23 PM    COLORU Yellow 06/26/2015 12:48 PM    NITRU Negative 06/26/2015 12:48 PM    GLUCOSEU negative 07/02/2018 02:23 PM    GLUCOSEU Negative 06/26/2015 12:48 PM    Mayelin Nian trace 07/02/2018 02:23 PM    KETUA Negative 06/26/2015 12:48 PM    UROBILINOGEN 0.2 06/26/2015 12:48 PM    BILIRUBINUR negative 07/02/2018 02:23 PM     No results found for: PSA, CEA, , BA5683,       Controlled Substances Monitoring: ASSESSMENT     Patient Active Problem List    Diagnosis Date Noted    Nonrheumatic aortic (valve) stenosis 03/25/2021    Dependence on supplemental oxygen 03/25/2021    Dyspnea on exertion 03/25/2021    Irritation of vulva 03/25/2021    Pulmonary hypertension ([facility] [address] 03/25/2021    Transient ischemic attack 07/22/2020    Coronary artery disease involving native coronary artery of native heart without angina pectoris 11/26/2018    Hypertension 02/19/2018    Hypothyroidism 02/19/2018    Hyperlipidemia 02/19/2018    Osteoporosis 02/19/2018        Diagnosis:     ICD-10-CM    1. Hyperlipidemia, unspecified hyperlipidemia type  E78.5       2. Acquired hypothyroidism  E03.9 T4     TSH      3. Pulmonary hypertension (HCC)  I27.20       4. Coronary artery disease involving native coronary artery of native heart without angina pectoris  I25.10       5. Hypertension, unspecified type  I10       6. Dermatitis  L30.9           PLAN:   Kenalog cream 0.1% 3 times daily  Low-sodium low-fat diet  Regular exercises  Lab work before the next visit  Return to clinic earlier if any problems        Patient Instructions   Continue present treatment  Low-sodium low-fat diet  Regular exercises  Use the Kenalog 3 times a day  Lab work before the next visit  Return to clinic earlier if any problems    Return in about 4 weeks (around 12/12/2022) for Medication Check, test results. Liza Patiño reviewed my findings and recommendations with Dotty Garcia     Electronicallysigned by Haersh Huerta MD on 11/14/22 at 3:01 PM EST

## 2022-11-14 NOTE — PATIENT INSTRUCTIONS
Continue present treatment  Low-sodium low-fat diet  Regular exercises  Use the Kenalog 3 times a day  Lab work before the next visit  Return to clinic earlier if any problems

## 2022-11-29 ENCOUNTER — HOSPITAL ENCOUNTER (OUTPATIENT)
Age: 87
Discharge: HOME | End: 2022-11-29
Payer: MEDICARE

## 2022-11-29 DIAGNOSIS — E03.9 ACQUIRED HYPOTHYROIDISM: ICD-10-CM

## 2022-11-29 LAB
T4 TOTAL: 12.8 MCG/DL (ref 4.5–11.7)
TSH SERPL DL<=0.05 MIU/L-ACNC: 0.21 UIU/ML (ref 0.27–4.2)

## 2022-11-29 PROCEDURE — 36415 COLL VENOUS BLD VENIPUNCTURE: CPT

## 2022-11-29 PROCEDURE — 84443 ASSAY THYROID STIM HORMONE: CPT

## 2022-11-29 PROCEDURE — 84436 ASSAY OF TOTAL THYROXINE: CPT

## 2022-12-12 ENCOUNTER — OFFICE VISIT (OUTPATIENT)
Dept: FAMILY MEDICINE CLINIC | Age: 87
End: 2022-12-12
Payer: MEDICARE

## 2022-12-12 VITALS
DIASTOLIC BLOOD PRESSURE: 78 MMHG | OXYGEN SATURATION: 95 % | TEMPERATURE: 97.5 F | WEIGHT: 164 LBS | SYSTOLIC BLOOD PRESSURE: 136 MMHG | BODY MASS INDEX: 30.99 KG/M2 | HEART RATE: 105 BPM

## 2022-12-12 DIAGNOSIS — I27.20 PULMONARY HYPERTENSION (HCC): ICD-10-CM

## 2022-12-12 DIAGNOSIS — I25.10 CORONARY ARTERY DISEASE INVOLVING NATIVE CORONARY ARTERY OF NATIVE HEART WITHOUT ANGINA PECTORIS: ICD-10-CM

## 2022-12-12 DIAGNOSIS — E78.5 HYPERLIPIDEMIA, UNSPECIFIED HYPERLIPIDEMIA TYPE: Primary | ICD-10-CM

## 2022-12-12 DIAGNOSIS — E03.9 ACQUIRED HYPOTHYROIDISM: ICD-10-CM

## 2022-12-12 DIAGNOSIS — I10 HYPERTENSION, UNSPECIFIED TYPE: ICD-10-CM

## 2022-12-12 PROBLEM — Z95.2 HISTORY OF AORTIC VALVE REPLACEMENT: Status: ACTIVE | Noted: 2021-04-01

## 2022-12-12 PROCEDURE — 99214 OFFICE O/P EST MOD 30 MIN: CPT | Performed by: FAMILY MEDICINE

## 2022-12-12 PROCEDURE — 1123F ACP DISCUSS/DSCN MKR DOCD: CPT | Performed by: FAMILY MEDICINE

## 2022-12-12 RX ORDER — LEVOTHYROXINE SODIUM 88 MCG
88 TABLET ORAL DAILY
Qty: 30 TABLET | Refills: 3
Start: 2022-12-12 | End: 2022-12-12 | Stop reason: SDUPTHER

## 2022-12-12 RX ORDER — LEVOTHYROXINE SODIUM 88 MCG
88 TABLET ORAL DAILY
Qty: 30 TABLET | Refills: 3 | Status: SHIPPED | OUTPATIENT
Start: 2022-12-12

## 2022-12-12 NOTE — PROGRESS NOTES
OFFICE PROGRESS NOTE      SUBJECTIVE:        Patient ID:   Brianna Gao is a 80 y.o. female who presents for   Chief Complaint   Patient presents with    Hypertension     Here for recheck on Hypertension,Hypothyroidism and Hyperlipidemia. Patient reports feeling well. Lab work done,here for review. HPI:     Patient here for the follow-up  Complaining of skin rash  Has been followed by the dermatologist in the past  Lab work shows low TSH and high T4  Patient not having any symptoms      Prior to Visit Medications    Medication Sig Taking?  Authorizing Provider   simvastatin (ZOCOR) 20 MG tablet take 1 tablet by mouth at bedtime Yes Maryam Chandler MD   levothyroxine (SYNTHROID) 100 MCG tablet One daily Yes Maryam Chandelr MD   lisinopril (PRINIVIL;ZESTRIL) 20 MG tablet Take 1 tablet by mouth daily Yes Maryam Chandler MD   CALCIUM-MAGNESUIUM-ZINC 159-801-5.1 MG TABS Take 1 tablet by mouth daily Yes Historical Provider, MD   furosemide (LASIX) 40 MG tablet Take 1 tablet by mouth daily Takes when she is at home Yes Maryam Chandler MD   aspirin 81 MG EC tablet Take 81 mg by mouth daily Yes Historical Provider, MD   clopidogrel (PLAVIX) 75 MG tablet Take 75 mg by mouth daily Yes Historical Provider, MD   meclizine (ANTIVERT) 12.5 MG tablet Take 1 tablet by mouth 3 times daily as needed for Dizziness Yes Maryam Chandler MD   Omega-3 Fatty Acids (OMEGA-3 PLUS PO) Take 2 capsules by mouth daily Yes Historical Provider, MD   GARLIC PO Take 1 tablet by mouth daily Yes Historical Provider, MD   hydrochlorothiazide (MICROZIDE) 12.5 MG capsule Take 12.5 mg by mouth daily Yes Historical Provider, MD   isosorbide mononitrate (IMDUR) 30 MG extended release tablet Take 30 mg by mouth daily Yes Historical Provider, MD   Pyridoxine HCl (B-6 PO) Take 1 tablet by mouth daily Yes Historical Provider, MD   Cholecalciferol (VITAMIN D-3 PO) Take by mouth daily Yes Historical Provider, MD   vitamin B-12 (CYANOCOBALAMIN) 1000 MCG tablet Take 1,000 mcg by mouth daily as needed  Yes Historical Provider, MD   Multiple Vitamin (ONE-A-DAY 55 PLUS PO) Take by mouth daily Yes Historical Provider, MD      Social History     Socioeconomic History    Marital status:      Spouse name: None    Number of children: None    Years of education: None    Highest education level: None   Occupational History    Occupation: retired   Tobacco Use    Smoking status: Never    Smokeless tobacco: Never   Vaping Use    Vaping Use: Never used   Substance and Sexual Activity    Alcohol use: No    Drug use: No    Sexual activity: Never     Social Determinants of Health     Financial Resource Strain: Low Risk     Difficulty of Paying Living Expenses: Not hard at all   Food Insecurity: No Food Insecurity    Worried About Running Out of Food in the Last Year: Never true    Ran Out of Food in the Last Year: Never true   Physical Activity: Insufficiently Active    Days of Exercise per Week: 1 day    Minutes of Exercise per Session: 60 min       I have reviewed Jena's allergies, medications, problem list, medical, social and family history and have updated as needed in the electronic medical record  Review Of Systems:    Review of Systems   Constitutional: Negative. HENT: Negative. Eyes: Negative. Respiratory: Negative. Cardiovascular: Negative. Gastrointestinal: Negative. Endocrine: Negative. Musculoskeletal: Negative. Skin:  Positive for rash. Allergic/Immunologic: Negative. Neurological: Negative. Hematological: Negative. Psychiatric/Behavioral: Negative.               OBJECTIVE:     VS:  Wt Readings from Last 3 Encounters:   12/12/22 164 lb (74.4 kg)   11/14/22 166 lb (75.3 kg)   08/24/22 166 lb (75.3 kg)     Temp Readings from Last 3 Encounters:   12/12/22 97.5 °F (36.4 °C) (Infrared)   11/14/22 97.3 °F (36.3 °C) (Infrared)   08/24/22 97.2 °F (36.2 °C) (Infrared)     BP Readings from Last 3 Encounters:   12/12/22 136/78   11/14/22 130/86   08/24/22 136/82        Physical Exam  Constitutional:       Appearance: She is well-developed. HENT:      Head: Normocephalic and atraumatic. Eyes:      Conjunctiva/sclera: Conjunctivae normal.      Pupils: Pupils are equal, round, and reactive to light. Cardiovascular:      Rate and Rhythm: Normal rate and regular rhythm. Heart sounds: Normal heart sounds. Pulmonary:      Effort: Pulmonary effort is normal.      Breath sounds: Normal breath sounds. Abdominal:      General: Bowel sounds are normal.      Palpations: Abdomen is soft. Musculoskeletal:         General: Normal range of motion. Cervical back: Normal range of motion and neck supple. Skin:     General: Skin is warm and dry. Findings: Rash present. Neurological:      Mental Status: She is alert and oriented to person, place, and time. Psychiatric:         Thought Content:  Thought content normal.          Labs :    Lab Results   Component Value Date    WBC 6.3 11/10/2022    HGB 11.9 11/10/2022    HCT 36.3 11/10/2022     11/10/2022    CHOL 143 11/10/2022    TRIG 74 11/10/2022    HDL 63 11/10/2022    ALT 13 11/10/2022    AST 22 11/10/2022     11/10/2022    K 4.7 11/10/2022     11/10/2022    CREATININE 0.7 11/10/2022    BUN 18 11/10/2022    CO2 30 (H) 11/10/2022    TSH 0.213 (L) 11/29/2022    GLUF 85 06/28/2018     Lab Results   Component Value Date/Time    COLORU yellow 07/02/2018 02:23 PM    COLORU Yellow 06/26/2015 12:48 PM    NITRU Negative 06/26/2015 12:48 PM    GLUCOSEU negative 07/02/2018 02:23 PM    GLUCOSEU Negative 06/26/2015 12:48 PM    Tricia Beams trace 07/02/2018 02:23 PM    KETUA Negative 06/26/2015 12:48 PM    UROBILINOGEN 0.2 06/26/2015 12:48 PM    BILIRUBINUR negative 07/02/2018 02:23 PM     No results found for: PSA, CEA, , TS8130,       Controlled Substances Monitoring:                                    ASSESSMENT Patient Active Problem List    Diagnosis Date Noted    History of aortic valve replacement 04/01/2021    Nonrheumatic aortic (valve) stenosis 03/25/2021    Dependence on supplemental oxygen 03/25/2021    Dyspnea on exertion 03/25/2021    Irritation of vulva 03/25/2021    Pulmonary hypertension ([facility] [address] 03/25/2021    Transient ischemic attack 07/22/2020    Atherosclerosis of coronary artery without angina pectoris 11/26/2018    Hypertension 02/19/2018    Hypothyroidism 02/19/2018    Hyperlipidemia 02/19/2018    Osteoporosis 02/19/2018        Diagnosis:     ICD-10-CM    1. Hyperlipidemia, unspecified hyperlipidemia type  E78.5       2. Acquired hypothyroidism  E03.9 TSH     T4      3. Pulmonary hypertension (HCC)  I27.20       4. Coronary artery disease involving native coronary artery of native heart without angina pectoris  I25.10       5. Hypertension, unspecified type  I10           PLAN:   Stop taking Synthroid 100 mg  Start 88 mg daily  Low-sodium low-fat low-carb  Follow-up with a dermatologist  Lab work before the next visit  Return to clinic earlier if any problems        Patient Instructions   Stop taking Synthroid 100 mg  Start Synthroid 88 mg daily  Continue the medication  Low-sodium low-fat diet  Lab work before the next visit  Return to clinic earlier if any problems    Return in about 6 weeks (around 1/23/2023). Radha Bronson reviewed my findings and recommendations with Maya Bedolla.     Electronicallysigned by Lamonte Jackson MD on 12/12/22 at 3:05 PM EST

## 2022-12-12 NOTE — PATIENT INSTRUCTIONS
Stop taking Synthroid 100 mg  Start Synthroid 88 mg daily  Continue the medication  Low-sodium low-fat diet  Lab work before the next visit  Return to clinic earlier if any problems

## 2022-12-13 DIAGNOSIS — E03.9 ACQUIRED HYPOTHYROIDISM: Primary | ICD-10-CM

## 2022-12-13 RX ORDER — LEVOTHYROXINE SODIUM 88 UG/1
88 TABLET ORAL DAILY
Qty: 90 TABLET | Refills: 1 | Status: SHIPPED | OUTPATIENT
Start: 2022-12-13

## 2022-12-13 RX ORDER — LEVOTHYROXINE SODIUM 88 UG/1
88 TABLET ORAL DAILY
COMMUNITY
End: 2022-12-13 | Stop reason: SDUPTHER

## 2022-12-13 NOTE — TELEPHONE ENCOUNTER
Fax received from pharmacy stating patient would like changed to generic synthroid due to cost of medication

## 2023-01-17 ENCOUNTER — HOSPITAL ENCOUNTER (OUTPATIENT)
Age: 88
Discharge: HOME | End: 2023-01-17
Payer: MEDICARE

## 2023-01-17 DIAGNOSIS — E03.9 ACQUIRED HYPOTHYROIDISM: ICD-10-CM

## 2023-01-17 LAB
T4 TOTAL: 12.1 MCG/DL (ref 4.5–11.7)
TSH SERPL DL<=0.05 MIU/L-ACNC: 0.97 UIU/ML (ref 0.27–4.2)

## 2023-01-17 PROCEDURE — 84443 ASSAY THYROID STIM HORMONE: CPT

## 2023-01-17 PROCEDURE — 84436 ASSAY OF TOTAL THYROXINE: CPT

## 2023-01-17 PROCEDURE — 36415 COLL VENOUS BLD VENIPUNCTURE: CPT

## 2023-02-03 DIAGNOSIS — E78.5 HYPERLIPIDEMIA, UNSPECIFIED HYPERLIPIDEMIA TYPE: ICD-10-CM

## 2023-02-03 RX ORDER — SIMVASTATIN 20 MG
TABLET ORAL
Qty: 90 TABLET | Refills: 1 | Status: SHIPPED | OUTPATIENT
Start: 2023-02-03

## 2023-02-03 NOTE — TELEPHONE ENCOUNTER
----- Message from Samm Barragan sent at 2/3/2023 12:47 PM EST -----  Subject: Refill Request    QUESTIONS  Name of Medication? simvastatin (ZOCOR) 20 MG tablet  Patient-reported dosage and instructions? 1qd  How many days do you have left? 7  Preferred Pharmacy? [facility] ([address])  Pharmacy phone number (if available)? [phone]  ---------------------------------------------------------------------------  --------------  CALL BACK INFO  What is the best way for the office to contact you? OK to leave message on   voicemail  Preferred Call Back Phone Number? [phone]  ---------------------------------------------------------------------------  --------------  SCRIPT ANSWERS  Relationship to Patient?  Self

## 2023-02-08 ENCOUNTER — OFFICE VISIT (OUTPATIENT)
Dept: FAMILY MEDICINE CLINIC | Age: 88
End: 2023-02-08
Payer: MEDICARE

## 2023-02-08 VITALS
HEART RATE: 79 BPM | SYSTOLIC BLOOD PRESSURE: 132 MMHG | BODY MASS INDEX: 29.23 KG/M2 | HEIGHT: 63 IN | TEMPERATURE: 97.2 F | WEIGHT: 165 LBS | DIASTOLIC BLOOD PRESSURE: 80 MMHG | OXYGEN SATURATION: 95 %

## 2023-02-08 DIAGNOSIS — E78.5 HYPERLIPIDEMIA, UNSPECIFIED HYPERLIPIDEMIA TYPE: ICD-10-CM

## 2023-02-08 DIAGNOSIS — R60.0 LEG EDEMA, LEFT: ICD-10-CM

## 2023-02-08 DIAGNOSIS — G60.9 IDIOPATHIC PERIPHERAL NEUROPATHY: Primary | ICD-10-CM

## 2023-02-08 DIAGNOSIS — E03.9 ACQUIRED HYPOTHYROIDISM: ICD-10-CM

## 2023-02-08 DIAGNOSIS — I10 HYPERTENSION, UNSPECIFIED TYPE: ICD-10-CM

## 2023-02-08 PROCEDURE — 99214 OFFICE O/P EST MOD 30 MIN: CPT | Performed by: FAMILY MEDICINE

## 2023-02-08 PROCEDURE — 1123F ACP DISCUSS/DSCN MKR DOCD: CPT | Performed by: FAMILY MEDICINE

## 2023-02-08 RX ORDER — FUROSEMIDE 40 MG/1
40 TABLET ORAL DAILY
Qty: 90 TABLET | Refills: 0 | Status: SHIPPED | OUTPATIENT
Start: 2023-02-08

## 2023-02-08 SDOH — ECONOMIC STABILITY: INCOME INSECURITY: HOW HARD IS IT FOR YOU TO PAY FOR THE VERY BASICS LIKE FOOD, HOUSING, MEDICAL CARE, AND HEATING?: NOT HARD AT ALL

## 2023-02-08 SDOH — ECONOMIC STABILITY: FOOD INSECURITY: WITHIN THE PAST 12 MONTHS, YOU WORRIED THAT YOUR FOOD WOULD RUN OUT BEFORE YOU GOT MONEY TO BUY MORE.: NEVER TRUE

## 2023-02-08 SDOH — ECONOMIC STABILITY: HOUSING INSECURITY
IN THE LAST 12 MONTHS, WAS THERE A TIME WHEN YOU DID NOT HAVE A STEADY PLACE TO SLEEP OR SLEPT IN A SHELTER (INCLUDING NOW)?: NO

## 2023-02-08 SDOH — ECONOMIC STABILITY: FOOD INSECURITY: WITHIN THE PAST 12 MONTHS, THE FOOD YOU BOUGHT JUST DIDN'T LAST AND YOU DIDN'T HAVE MONEY TO GET MORE.: NEVER TRUE

## 2023-02-08 ASSESSMENT — PATIENT HEALTH QUESTIONNAIRE - PHQ9
2. FEELING DOWN, DEPRESSED OR HOPELESS: 0
SUM OF ALL RESPONSES TO PHQ9 QUESTIONS 1 & 2: 0
SUM OF ALL RESPONSES TO PHQ QUESTIONS 1-9: 0
1. LITTLE INTEREST OR PLEASURE IN DOING THINGS: 0

## 2023-02-08 NOTE — PROGRESS NOTES
SUBJECTIVE:        Patient ID:   Governor Patterson is a 80 y.o. female who presents for   Chief Complaint   Patient presents with    Leg Pain     C/o pain in Lt lower leg into foot in the pm hours. States she has had discomfort for several years but recently symptoms have increased. Painful left leg    HPI:   Patient states she has been getting pain in the left for a long time  Patient is more like neuropathy pain  Patient still has edema for the leg  She did not take the diuretic today  Blood pressures been stable  Thyroid functions are improved        Prior to Visit Medications    Medication Sig Taking?  Authorizing Provider   simvastatin (ZOCOR) 20 MG tablet take 1 tablet by mouth at bedtime Yes Temo Owens MD   levothyroxine (SYNTHROID) 88 MCG tablet Take 1 tablet by mouth Daily Yes Temo Owens MD   SYNTHROID 88 MCG tablet Take 1 tablet by mouth Daily Yes Temo Owens MD   lisinopril (PRINIVIL;ZESTRIL) 20 MG tablet Take 1 tablet by mouth daily Yes Temo Owens MD   CALCIUM-MAGNESUIUM-ZINC 392-837-8.4 MG TABS Take 1 tablet by mouth daily Yes Historical Provider, MD   furosemide (LASIX) 40 MG tablet Take 1 tablet by mouth daily Takes when she is at home Yes Temo Owens MD   aspirin 81 MG EC tablet Take 81 mg by mouth daily Yes Historical Provider, MD   clopidogrel (PLAVIX) 75 MG tablet Take 75 mg by mouth daily Yes Historical Provider, MD   meclizine (ANTIVERT) 12.5 MG tablet Take 1 tablet by mouth 3 times daily as needed for Dizziness Yes Temo Owens MD   Omega-3 Fatty Acids (OMEGA-3 PLUS PO) Take 2 capsules by mouth daily Yes Historical Provider, MD   GARLIC PO Take 1 tablet by mouth daily Yes Historical Provider, MD   hydrochlorothiazide (MICROZIDE) 12.5 MG capsule Take 12.5 mg by mouth daily Yes Historical Provider, MD   isosorbide mononitrate (IMDUR) 30 MG extended release tablet Take 30 mg by mouth daily Yes Historical Provider, MD   Pyridoxine HCl (B-6 PO) Take 1 tablet by mouth daily Yes Historical Provider, MD   Cholecalciferol (VITAMIN D-3 PO) Take by mouth daily Yes Historical Provider, MD   vitamin B-12 (CYANOCOBALAMIN) 1000 MCG tablet Take 1,000 mcg by mouth daily as needed  Yes Historical Provider, MD   Multiple Vitamin (ONE-A-DAY 55 PLUS PO) Take by mouth daily Yes Historical Provider, MD      Social History     Socioeconomic History    Marital status:      Spouse name: None    Number of children: None    Years of education: None    Highest education level: None   Occupational History    Occupation: retired   Tobacco Use    Smoking status: Never    Smokeless tobacco: Never   Vaping Use    Vaping Use: Never used   Substance and Sexual Activity    Alcohol use: No    Drug use: No    Sexual activity: Never     Social Determinants of Health     Financial Resource Strain: Low Risk     Difficulty of Paying Living Expenses: Not hard at all   Food Insecurity: No Food Insecurity    Worried About Running Out of Food in the Last Year: Never true    [address] in the Last Year: Never true   Transportation Needs: Unknown    Lack of Transportation (Non-Medical): No   Physical Activity: Inactive    Days of Exercise per Week: 0 days    Minutes of Exercise per Session: 0 min   Housing Stability: Unknown    Unstable Housing in the Last Year: No       I have reviewed Jena's allergies, medications, problem list, medical, social and family history and have updated as needed in the electronic medical record  Review Of Systems:    Review of Systems   Constitutional: Negative. HENT: Negative. Eyes: Negative. Respiratory: Negative. Cardiovascular:  Positive for leg swelling. Gastrointestinal: Negative. Endocrine: Negative. Musculoskeletal: Negative. Skin: Negative. Allergic/Immunologic: Negative. Neurological:  Positive for numbness (Neuropathic pain of the left leg). Hematological: Negative. Psychiatric/Behavioral: Negative.               OBJECTIVE: VS:  Wt Readings from Last 3 Encounters:   02/08/23 165 lb (74.8 kg)   12/12/22 164 lb (74.4 kg)   11/14/22 166 lb (75.3 kg)     Temp Readings from Last 3 Encounters:   02/08/23 97.2 °F (36.2 °C) (Infrared)   12/12/22 97.5 °F (36.4 °C) (Infrared)   11/14/22 97.3 °F (36.3 °C) (Infrared)     BP Readings from Last 3 Encounters:   02/08/23 132/80   12/12/22 136/78   11/14/22 130/86        Physical Exam  Constitutional:       Appearance: She is well-developed. HENT:      Head: Normocephalic and atraumatic. Eyes:      Conjunctiva/sclera: Conjunctivae normal.      Pupils: Pupils are equal, round, and reactive to light. Cardiovascular:      Rate and Rhythm: Normal rate and regular rhythm. Heart sounds: Normal heart sounds. Pulmonary:      Effort: Pulmonary effort is normal.      Breath sounds: Normal breath sounds. Abdominal:      General: Bowel sounds are normal.      Palpations: Abdomen is soft. Musculoskeletal:         General: Swelling (Bilateral leg) present. Normal range of motion. Cervical back: Normal range of motion and neck supple. Skin:     General: Skin is warm and dry. Neurological:      Mental Status: She is alert and oriented to person, place, and time. Psychiatric:         Thought Content:  Thought content normal.          Labs :    Lab Results   Component Value Date    WBC 6.3 11/10/2022    HGB 11.9 11/10/2022    HCT 36.3 11/10/2022     11/10/2022    CHOL 143 11/10/2022    TRIG 74 11/10/2022    HDL 63 11/10/2022    ALT 13 11/10/2022    AST 22 11/10/2022     11/10/2022    K 4.7 11/10/2022     11/10/2022    CREATININE 0.7 11/10/2022    BUN 18 11/10/2022    CO2 30 (H) 11/10/2022    TSH 0.974 01/17/2023    GLUF 85 06/28/2018     Lab Results   Component Value Date/Time    COLORU yellow 07/02/2018 02:23 PM    COLORU Yellow 06/26/2015 12:48 PM    NITRU Negative 06/26/2015 12:48 PM    GLUCOSEU negative 07/02/2018 02:23 PM    GLUCOSEU Negative 06/26/2015 12:48 PM Williams Kenmore trace 07/02/2018 02:23 PM    KETUA Negative 06/26/2015 12:48 PM    UROBILINOGEN 0.2 06/26/2015 12:48 PM    BILIRUBINUR negative 07/02/2018 02:23 PM     No results found for: PSA, CEA, , CP2133,       Controlled Substances Monitoring:                                    ASSESSMENT     Patient Active Problem List    Diagnosis Date Noted    History of aortic valve replacement 04/01/2021    Nonrheumatic aortic (valve) stenosis 03/25/2021    Dependence on supplemental oxygen 03/25/2021    Dyspnea on exertion 03/25/2021    Irritation of vulva 03/25/2021    Pulmonary hypertension ([facility] [address] 03/25/2021    Transient ischemic attack 07/22/2020    Atherosclerosis of coronary artery without angina pectoris 11/26/2018    Hypertension 02/19/2018    Hypothyroidism 02/19/2018    Hyperlipidemia 02/19/2018    Osteoporosis 02/19/2018        Diagnosis:     ICD-10-CM    1. Idiopathic peripheral neuropathy  G60.9 CBC with Auto Differential      2. Acquired hypothyroidism  E03.9       3. Hyperlipidemia, unspecified hyperlipidemia type  E78.5 Comprehensive Metabolic Panel     LIPID PANEL      4. Leg edema, left  R60.0 Comprehensive Metabolic Panel      5.  Hypertension, unspecified type  I10 Comprehensive Metabolic Panel     furosemide (LASIX) 40 MG tablet          PLAN:   Take the medication as prescribed  Low-sodium low-fat low-carb diet  Keep the legs elevated  Lab work before the next visit  Vitamin B6 100 mg daily  Return to clinic earlier if any problems  All the instruction given to the patient      Return to clinic in 1 month

## 2023-02-08 NOTE — PATIENT INSTRUCTIONS
Take the medication as prescribed  Low-sodium low-fat low-carb diet  Regular exercise  Keep the legs elevated  Get the lab work done before the next visit  Take vitamin B6 100 mg daily  Return to clinic earlier if any problems

## 2023-03-01 ENCOUNTER — HOSPITAL ENCOUNTER (OUTPATIENT)
Age: 88
Discharge: HOME | End: 2023-03-01
Payer: MEDICARE

## 2023-03-01 DIAGNOSIS — I10 HYPERTENSION, UNSPECIFIED TYPE: ICD-10-CM

## 2023-03-01 DIAGNOSIS — E78.5 HYPERLIPIDEMIA, UNSPECIFIED HYPERLIPIDEMIA TYPE: ICD-10-CM

## 2023-03-01 DIAGNOSIS — R60.0 LEG EDEMA, LEFT: ICD-10-CM

## 2023-03-01 DIAGNOSIS — G60.9 IDIOPATHIC PERIPHERAL NEUROPATHY: ICD-10-CM

## 2023-03-01 LAB
ALBUMIN SERPL-MCNC: 4 G/DL (ref 3.5–5.2)
ALP BLD-CCNC: 106 U/L (ref 35–104)
ALT SERPL-CCNC: 13 U/L (ref 0–32)
ANION GAP SERPL CALCULATED.3IONS-SCNC: 9 MMOL/L (ref 7–16)
AST SERPL-CCNC: 20 U/L (ref 0–31)
BASOPHILS ABSOLUTE: 0.01 E9/L (ref 0–0.2)
BASOPHILS RELATIVE PERCENT: 0.2 % (ref 0–2)
BILIRUB SERPL-MCNC: 0.4 MG/DL (ref 0–1.2)
BUN BLDV-MCNC: 21 MG/DL (ref 6–23)
CALCIUM SERPL-MCNC: 10 MG/DL (ref 8.6–10.2)
CHLORIDE BLD-SCNC: 102 MMOL/L (ref 98–107)
CHOLESTEROL, TOTAL: 139 MG/DL (ref 0–199)
CO2: 30 MMOL/L (ref 22–29)
CREAT SERPL-MCNC: 0.7 MG/DL (ref 0.5–1)
EOSINOPHILS ABSOLUTE: 0.14 E9/L (ref 0.05–0.5)
EOSINOPHILS RELATIVE PERCENT: 2.3 % (ref 0–6)
GFR SERPL CREATININE-BSD FRML MDRD: >60 ML/MIN/1.73
GLUCOSE BLD-MCNC: 95 MG/DL (ref 74–99)
HCT VFR BLD CALC: 36.5 % (ref 34–48)
HDLC SERPL-MCNC: 61 MG/DL
HEMOGLOBIN: 11.9 G/DL (ref 11.5–15.5)
IMMATURE GRANULOCYTES #: 0.02 E9/L
IMMATURE GRANULOCYTES %: 0.3 % (ref 0–5)
LDL CHOLESTEROL CALCULATED: 65 MG/DL (ref 0–99)
LYMPHOCYTES ABSOLUTE: 1.17 E9/L (ref 1.5–4)
LYMPHOCYTES RELATIVE PERCENT: 18.8 % (ref 20–42)
MCH RBC QN AUTO: 29.1 PG (ref 26–35)
MCHC RBC AUTO-ENTMCNC: 32.6 % (ref 32–34.5)
MCV RBC AUTO: 89.2 FL (ref 80–99.9)
MONOCYTES ABSOLUTE: 0.63 E9/L (ref 0.1–0.95)
MONOCYTES RELATIVE PERCENT: 10.1 % (ref 2–12)
NEUTROPHILS ABSOLUTE: 4.24 E9/L (ref 1.8–7.3)
NEUTROPHILS RELATIVE PERCENT: 68.3 % (ref 43–80)
PDW BLD-RTO: 15.5 FL (ref 11.5–15)
PLATELET # BLD: 269 E9/L (ref 130–450)
PMV BLD AUTO: 9.2 FL (ref 7–12)
POTASSIUM SERPL-SCNC: 4.3 MMOL/L (ref 3.5–5)
RBC # BLD: 4.09 E12/L (ref 3.5–5.5)
SODIUM BLD-SCNC: 141 MMOL/L (ref 132–146)
TOTAL PROTEIN: 6.6 G/DL (ref 6.4–8.3)
TRIGL SERPL-MCNC: 64 MG/DL (ref 0–149)
VLDLC SERPL CALC-MCNC: 13 MG/DL
WBC # BLD: 6.2 E9/L (ref 4.5–11.5)

## 2023-03-01 PROCEDURE — 85025 COMPLETE CBC W/AUTO DIFF WBC: CPT

## 2023-03-01 PROCEDURE — 80061 LIPID PANEL: CPT

## 2023-03-01 PROCEDURE — 36415 COLL VENOUS BLD VENIPUNCTURE: CPT

## 2023-03-01 PROCEDURE — 80053 COMPREHEN METABOLIC PANEL: CPT

## 2023-03-02 ENCOUNTER — TELEPHONE (OUTPATIENT)
Dept: FAMILY MEDICINE CLINIC | Age: 88
End: 2023-03-02

## 2023-03-02 NOTE — TELEPHONE ENCOUNTER
Nothing available sooner. Will add to wait list.      ----- Message from Kristen Peralta sent at 3/2/2023 11:09 AM EST -----  Subject: Message to Provider    QUESTIONS  Information for Provider? Pt had to rescheduled due to provider issue from   3/8/23 to 3/20/23, Pt would like to be seen earlier if an appt becomes   available.   ---------------------------------------------------------------------------  --------------  [id number] [facility]  [phone]; OK to leave message on voicemail  ---------------------------------------------------------------------------  --------------  SCRIPT ANSWERS  Relationship to Patient?  Self

## 2023-03-20 ENCOUNTER — OFFICE VISIT (OUTPATIENT)
Dept: FAMILY MEDICINE CLINIC | Age: 88
End: 2023-03-20
Payer: MEDICARE

## 2023-03-20 VITALS
DIASTOLIC BLOOD PRESSURE: 82 MMHG | BODY MASS INDEX: 28.7 KG/M2 | WEIGHT: 162 LBS | SYSTOLIC BLOOD PRESSURE: 130 MMHG | HEART RATE: 92 BPM | TEMPERATURE: 97.2 F | OXYGEN SATURATION: 95 %

## 2023-03-20 DIAGNOSIS — R60.0 LEG EDEMA, LEFT: ICD-10-CM

## 2023-03-20 DIAGNOSIS — I25.10 CORONARY ARTERY DISEASE INVOLVING NATIVE CORONARY ARTERY OF NATIVE HEART WITHOUT ANGINA PECTORIS: ICD-10-CM

## 2023-03-20 DIAGNOSIS — E78.5 HYPERLIPIDEMIA, UNSPECIFIED HYPERLIPIDEMIA TYPE: ICD-10-CM

## 2023-03-20 DIAGNOSIS — I10 HYPERTENSION, UNSPECIFIED TYPE: ICD-10-CM

## 2023-03-20 DIAGNOSIS — E03.9 ACQUIRED HYPOTHYROIDISM: Primary | ICD-10-CM

## 2023-03-20 PROCEDURE — 1123F ACP DISCUSS/DSCN MKR DOCD: CPT | Performed by: FAMILY MEDICINE

## 2023-03-20 PROCEDURE — 99214 OFFICE O/P EST MOD 30 MIN: CPT | Performed by: FAMILY MEDICINE

## 2023-03-20 ASSESSMENT — ENCOUNTER SYMPTOMS
GASTROINTESTINAL NEGATIVE: 1
RESPIRATORY NEGATIVE: 1
ALLERGIC/IMMUNOLOGIC NEGATIVE: 1
EYES NEGATIVE: 1

## 2023-03-20 NOTE — PATIENT INSTRUCTIONS
Continue present treatment  Low-sodium low-fat  Regular exercises  Lab work before the next visit  Return to clinic earlier if any problem  Follow-up with the consultants Current every day smoker

## 2023-03-20 NOTE — PROGRESS NOTES
earlier if any problem  Follow-up with the consultants    Return in about 3 months (around 6/20/2023) for Medication Check, test results, diabetes check. Caro Jimenez reviewed my findings and recommendations with Ashley Amador.     Electronicallysigned by Kaleb Mckeon MD on 3/20/23 at 1:40 PM EDT

## 2023-04-03 RX ORDER — LEVOTHYROXINE SODIUM 0.1 MG/1
TABLET ORAL
Qty: 90 TABLET | Refills: 1 | OUTPATIENT
Start: 2023-04-03

## 2023-04-05 DIAGNOSIS — I27.20 PULMONARY HYPERTENSION (HCC): ICD-10-CM

## 2023-04-05 RX ORDER — LISINOPRIL 20 MG/1
20 TABLET ORAL DAILY
Qty: 90 TABLET | Refills: 1 | Status: SHIPPED | OUTPATIENT
Start: 2023-04-05

## 2023-04-05 NOTE — TELEPHONE ENCOUNTER
Patient called for refill to her mail order pharmacy    Last seen 3/20/2023  Next appt 6/19/2023  MISBAH Krause

## 2023-06-01 DIAGNOSIS — E03.9 ACQUIRED HYPOTHYROIDISM: ICD-10-CM

## 2023-06-05 RX ORDER — LEVOTHYROXINE SODIUM 88 UG/1
TABLET ORAL
Qty: 90 TABLET | Refills: 1 | Status: SHIPPED | OUTPATIENT
Start: 2023-06-05

## 2023-06-19 ENCOUNTER — OFFICE VISIT (OUTPATIENT)
Dept: FAMILY MEDICINE CLINIC | Age: 88
End: 2023-06-19
Payer: MEDICARE

## 2023-06-19 VITALS
SYSTOLIC BLOOD PRESSURE: 130 MMHG | TEMPERATURE: 97.2 F | WEIGHT: 162 LBS | DIASTOLIC BLOOD PRESSURE: 86 MMHG | HEART RATE: 106 BPM | OXYGEN SATURATION: 95 % | BODY MASS INDEX: 28.7 KG/M2

## 2023-06-19 DIAGNOSIS — I10 HYPERTENSION, UNSPECIFIED TYPE: ICD-10-CM

## 2023-06-19 DIAGNOSIS — I25.10 CORONARY ARTERY DISEASE INVOLVING NATIVE CORONARY ARTERY OF NATIVE HEART WITHOUT ANGINA PECTORIS: ICD-10-CM

## 2023-06-19 DIAGNOSIS — E78.5 HYPERLIPIDEMIA, UNSPECIFIED HYPERLIPIDEMIA TYPE: ICD-10-CM

## 2023-06-19 DIAGNOSIS — R60.0 LEG EDEMA, LEFT: ICD-10-CM

## 2023-06-19 DIAGNOSIS — E03.9 ACQUIRED HYPOTHYROIDISM: Primary | ICD-10-CM

## 2023-06-19 PROCEDURE — 1123F ACP DISCUSS/DSCN MKR DOCD: CPT | Performed by: FAMILY MEDICINE

## 2023-06-19 PROCEDURE — 99214 OFFICE O/P EST MOD 30 MIN: CPT | Performed by: FAMILY MEDICINE

## 2023-06-19 ASSESSMENT — ENCOUNTER SYMPTOMS
GASTROINTESTINAL NEGATIVE: 1
ALLERGIC/IMMUNOLOGIC NEGATIVE: 1
EYES NEGATIVE: 1
RESPIRATORY NEGATIVE: 1

## 2023-06-19 NOTE — PROGRESS NOTES
Hugo Barker trace 07/02/2018 02:23 PM    KETUA Negative 06/26/2015 12:48 PM    UROBILINOGEN 0.2 06/26/2015 12:48 PM    BILIRUBINUR negative 07/02/2018 02:23 PM     No results found for: PSA, CEA, , [id number],       Controlled Substances Monitoring:                                    ASSESSMENT     Patient Active Problem List    Diagnosis Date Noted    History of aortic valve replacement 04/01/2021    Nonrheumatic aortic (valve) stenosis 03/25/2021    Dependence on supplemental oxygen 03/25/2021    Dyspnea on exertion 03/25/2021    Irritation of vulva 03/25/2021    Pulmonary hypertension ([address] 03/25/2021    Transient ischemic attack 07/22/2020    Atherosclerosis of coronary artery without angina pectoris 11/26/2018    Hypertension 02/19/2018    Hypothyroidism 02/19/2018    Hyperlipidemia 02/19/2018    Osteoporosis 02/19/2018        Diagnosis:   1. Acquired hypothyroidism  2. Hyperlipidemia, unspecified hyperlipidemia type  -     CBC with Auto Differential; Future  -     Comprehensive Metabolic Panel; Future  -     Lipid Panel; Future  3. Hypertension, unspecified type  -     Comprehensive Metabolic Panel; Future  4. Coronary artery disease involving native coronary artery of native heart without angina pectoris  5. Leg edema, left  -     CBC with Auto Differential; Future  -     Comprehensive Metabolic Panel; Future       PLAN:   Continue present treatment  Low-sodium low-fat diet  Regular exercises  Lab work before the next visit  Return to clinic earlier if any problems  All instructions given to the patient          Patient Instructions   Continue present treatment  Low-sodium low-fat diet  Regular exercises  Lab work before the next visit  Return to clinic earlier if any problems    Return in about 3 months (around 9/19/2023) for Medication Check, test results. Cassia Young reviewed my findings and recommendations with Rosaland Goldmann.     Electronicallysigned by Robie Alpers, MD on 6/19/23 at 2:17 PM

## 2023-07-26 DIAGNOSIS — E78.5 HYPERLIPIDEMIA, UNSPECIFIED HYPERLIPIDEMIA TYPE: ICD-10-CM

## 2023-07-26 RX ORDER — SIMVASTATIN 20 MG
TABLET ORAL
Qty: 90 TABLET | Refills: 1 | Status: SHIPPED | OUTPATIENT
Start: 2023-07-26

## 2023-08-17 ENCOUNTER — HOSPITAL ENCOUNTER (OUTPATIENT)
Age: 88
Discharge: HOME | End: 2023-08-17
Payer: MEDICARE

## 2023-08-17 DIAGNOSIS — R60.0 LEG EDEMA, LEFT: ICD-10-CM

## 2023-08-17 DIAGNOSIS — I10 HYPERTENSION, UNSPECIFIED TYPE: ICD-10-CM

## 2023-08-17 DIAGNOSIS — E78.5 HYPERLIPIDEMIA, UNSPECIFIED HYPERLIPIDEMIA TYPE: ICD-10-CM

## 2023-08-17 LAB
ALBUMIN SERPL-MCNC: 4 G/DL (ref 3.5–5.2)
ALP SERPL-CCNC: 116 U/L (ref 35–104)
ALT SERPL-CCNC: 12 U/L (ref 0–32)
ANION GAP SERPL CALCULATED.3IONS-SCNC: 8 MMOL/L (ref 7–16)
AST SERPL-CCNC: 21 U/L (ref 0–31)
BASOPHILS # BLD: 0.01 K/UL (ref 0–0.2)
BASOPHILS NFR BLD: 0 % (ref 0–2)
BILIRUB SERPL-MCNC: 0.4 MG/DL (ref 0–1.2)
BUN SERPL-MCNC: 15 MG/DL (ref 6–23)
CALCIUM SERPL-MCNC: 10.3 MG/DL (ref 8.6–10.2)
CHLORIDE SERPL-SCNC: 103 MMOL/L (ref 98–107)
CHOLEST SERPL-MCNC: 153 MG/DL
CO2 SERPL-SCNC: 28 MMOL/L (ref 22–29)
CREAT SERPL-MCNC: 0.8 MG/DL (ref 0.5–1)
EOSINOPHIL # BLD: 0.13 K/UL (ref 0.05–0.5)
EOSINOPHILS RELATIVE PERCENT: 3 % (ref 0–6)
ERYTHROCYTE [DISTWIDTH] IN BLOOD BY AUTOMATED COUNT: 15.1 % (ref 12–16)
GFR SERPL CREATININE-BSD FRML MDRD: >60 ML/MIN/1.73M2
GLUCOSE SERPL-MCNC: 98 MG/DL (ref 74–99)
HCT VFR BLD AUTO: 38.9 % (ref 34–48)
HDLC SERPL-MCNC: 63 MG/DL
HGB BLD-MCNC: 12.6 G/DL (ref 11.5–15.5)
IMM GRANULOCYTES # BLD AUTO: <0.03 K/UL (ref 0–0.58)
IMM GRANULOCYTES NFR BLD: 0 % (ref 0–5)
LDLC SERPL CALC-MCNC: 76 MG/DL
LYMPHOCYTES NFR BLD: 0.92 K/UL (ref 1.5–4)
LYMPHOCYTES RELATIVE PERCENT: 19 % (ref 20–42)
MAGNESIUM SERPL-MCNC: 2.1 MG/DL (ref 1.6–2.6)
MCH RBC QN AUTO: 28.9 PG (ref 26–35)
MCHC RBC AUTO-ENTMCNC: 32.4 G/DL (ref 32–34.5)
MCV RBC AUTO: 89.2 FL (ref 80–99.9)
MONOCYTES NFR BLD: 0.49 K/UL (ref 0.1–0.95)
MONOCYTES NFR BLD: 10 % (ref 2–12)
NEUTROPHILS NFR BLD: 68 % (ref 43–80)
NEUTS SEG NFR BLD: 3.32 K/UL (ref 1.8–7.3)
PLATELET # BLD AUTO: 285 K/UL (ref 130–450)
PMV BLD AUTO: 10.4 FL (ref 7–12)
POTASSIUM SERPL-SCNC: 4.6 MMOL/L (ref 3.5–5)
PROT SERPL-MCNC: 7 G/DL (ref 6.4–8.3)
RBC # BLD AUTO: 4.36 M/UL (ref 3.5–5.5)
SODIUM SERPL-SCNC: 139 MMOL/L (ref 132–146)
TRIGL SERPL-MCNC: 69 MG/DL
VLDLC SERPL CALC-MCNC: 14 MG/DL
WBC OTHER # BLD: 4.9 K/UL (ref 4.5–11.5)

## 2023-08-17 PROCEDURE — 36415 COLL VENOUS BLD VENIPUNCTURE: CPT

## 2023-08-17 PROCEDURE — 80053 COMPREHEN METABOLIC PANEL: CPT

## 2023-08-17 PROCEDURE — 83735 ASSAY OF MAGNESIUM: CPT

## 2023-08-17 PROCEDURE — 80061 LIPID PANEL: CPT

## 2023-08-17 PROCEDURE — 85025 COMPLETE CBC W/AUTO DIFF WBC: CPT

## 2023-08-28 ENCOUNTER — OFFICE VISIT (OUTPATIENT)
Dept: FAMILY MEDICINE CLINIC | Age: 88
End: 2023-08-28
Payer: MEDICARE

## 2023-08-28 VITALS
HEIGHT: 61 IN | BODY MASS INDEX: 30.21 KG/M2 | DIASTOLIC BLOOD PRESSURE: 78 MMHG | HEART RATE: 90 BPM | WEIGHT: 160 LBS | TEMPERATURE: 97.2 F | SYSTOLIC BLOOD PRESSURE: 138 MMHG | OXYGEN SATURATION: 96 %

## 2023-08-28 DIAGNOSIS — Z00.00 MEDICARE ANNUAL WELLNESS VISIT, SUBSEQUENT: Primary | ICD-10-CM

## 2023-08-28 PROCEDURE — 1123F ACP DISCUSS/DSCN MKR DOCD: CPT | Performed by: FAMILY MEDICINE

## 2023-08-28 PROCEDURE — G0439 PPPS, SUBSEQ VISIT: HCPCS | Performed by: FAMILY MEDICINE

## 2023-08-28 ASSESSMENT — PATIENT HEALTH QUESTIONNAIRE - PHQ9
SUM OF ALL RESPONSES TO PHQ QUESTIONS 1-9: 0
SUM OF ALL RESPONSES TO PHQ QUESTIONS 1-9: 0
1. LITTLE INTEREST OR PLEASURE IN DOING THINGS: 0
SUM OF ALL RESPONSES TO PHQ QUESTIONS 1-9: 0
2. FEELING DOWN, DEPRESSED OR HOPELESS: 0
SUM OF ALL RESPONSES TO PHQ QUESTIONS 1-9: 0
SUM OF ALL RESPONSES TO PHQ9 QUESTIONS 1 & 2: 0

## 2023-08-28 ASSESSMENT — LIFESTYLE VARIABLES
HOW OFTEN DO YOU HAVE A DRINK CONTAINING ALCOHOL: NEVER
HOW MANY STANDARD DRINKS CONTAINING ALCOHOL DO YOU HAVE ON A TYPICAL DAY: PATIENT DOES NOT DRINK

## 2023-08-28 NOTE — PATIENT INSTRUCTIONS

## 2023-09-05 DIAGNOSIS — E03.9 ACQUIRED HYPOTHYROIDISM: ICD-10-CM

## 2023-09-05 RX ORDER — LEVOTHYROXINE SODIUM 88 UG/1
88 TABLET ORAL DAILY
Qty: 90 TABLET | Refills: 1 | Status: SHIPPED | OUTPATIENT
Start: 2023-09-05

## 2023-09-05 NOTE — TELEPHONE ENCOUNTER
Patient called for refill. She knows she has a refill at her pharmacy but she isnt going to travel to get it there. She has moved to [facility] can you please send it there.     Last seen 8/28/2023  Next appt 9/25/2023  [facility] [address]

## 2023-09-25 ENCOUNTER — OFFICE VISIT (OUTPATIENT)
Dept: FAMILY MEDICINE CLINIC | Age: 88
End: 2023-09-25
Payer: MEDICARE

## 2023-09-25 VITALS
HEART RATE: 89 BPM | OXYGEN SATURATION: 96 % | DIASTOLIC BLOOD PRESSURE: 80 MMHG | WEIGHT: 159 LBS | SYSTOLIC BLOOD PRESSURE: 138 MMHG | TEMPERATURE: 97 F | BODY MASS INDEX: 30.04 KG/M2

## 2023-09-25 DIAGNOSIS — G60.9 IDIOPATHIC PERIPHERAL NEUROPATHY: ICD-10-CM

## 2023-09-25 DIAGNOSIS — E78.5 HYPERLIPIDEMIA, UNSPECIFIED HYPERLIPIDEMIA TYPE: ICD-10-CM

## 2023-09-25 DIAGNOSIS — I10 HYPERTENSION, UNSPECIFIED TYPE: ICD-10-CM

## 2023-09-25 DIAGNOSIS — E03.9 ACQUIRED HYPOTHYROIDISM: Primary | ICD-10-CM

## 2023-09-25 DIAGNOSIS — I77.9 CAROTID ARTERY DISEASE, UNSPECIFIED LATERALITY, UNSPECIFIED TYPE (HCC): ICD-10-CM

## 2023-09-25 DIAGNOSIS — I25.10 CORONARY ARTERY DISEASE INVOLVING NATIVE CORONARY ARTERY OF NATIVE HEART WITHOUT ANGINA PECTORIS: ICD-10-CM

## 2023-09-25 PROCEDURE — 1123F ACP DISCUSS/DSCN MKR DOCD: CPT | Performed by: FAMILY MEDICINE

## 2023-09-25 PROCEDURE — 90694 VACC AIIV4 NO PRSRV 0.5ML IM: CPT | Performed by: FAMILY MEDICINE

## 2023-09-25 PROCEDURE — 99214 OFFICE O/P EST MOD 30 MIN: CPT | Performed by: FAMILY MEDICINE

## 2023-09-25 PROCEDURE — G0008 ADMIN INFLUENZA VIRUS VAC: HCPCS | Performed by: FAMILY MEDICINE

## 2023-09-25 ASSESSMENT — ENCOUNTER SYMPTOMS
RESPIRATORY NEGATIVE: 1
GASTROINTESTINAL NEGATIVE: 1
ALLERGIC/IMMUNOLOGIC NEGATIVE: 1
EYES NEGATIVE: 1

## 2023-09-25 NOTE — PROGRESS NOTES
OFFICE PROGRESS NOTE      SUBJECTIVE:        Patient ID:   Vel Owens is a 80 y.o. female who presents for   Chief Complaint   Patient presents with    Hypertension     Here for recheck on Hypertension and Hypothyroidism. Here to discuss lab results from August.           HPI:   Patient here for the follow-up  He states that the swelling of the leg   Patient not wearing the support hose  Lab work reviewed  Blood pressure stable and normal  Blood pressure 138/80    Prior to Visit Medications    Medication Sig Taking?  Authorizing Provider   levothyroxine (SYNTHROID) 88 MCG tablet Take 1 tablet by mouth daily Yes Farhat Sandoval MD   simvastatin (ZOCOR) 20 MG tablet take 1 tablet by mouth at bedtime Yes Farhat Sandoval MD   lisinopril (PRINIVIL;ZESTRIL) 20 MG tablet Take 1 tablet by mouth daily Yes Farhat Sandoval MD   furosemide (LASIX) 40 MG tablet Take 1 tablet by mouth daily Takes when she is at home Yes Farhat Sandoval MD   SYNTHROID 88 MCG tablet Take 1 tablet by mouth Daily Yes Farhat Sandoval MD   CALCIUM-MAGNESUIUM-ZINC 333-133-8.3 MG TABS Take 1 tablet by mouth daily Yes Historical Provider, MD   aspirin 81 MG EC tablet Take 1 tablet by mouth daily Yes Historical Provider, MD   clopidogrel (PLAVIX) 75 MG tablet Take 1 tablet by mouth daily Yes Historical Provider, MD   meclizine (ANTIVERT) 12.5 MG tablet Take 1 tablet by mouth 3 times daily as needed for Dizziness Yes Farhat Sandoval MD   Omega-3 Fatty Acids (OMEGA-3 PLUS PO) Take 2 capsules by mouth daily Yes Historical Provider, MD   GARLIC PO Take 1 tablet by mouth daily Yes Historical Provider, MD   hydrochlorothiazide (MICROZIDE) 12.5 MG capsule Take 1 capsule by mouth daily Yes Historical Provider, MD   isosorbide mononitrate (IMDUR) 30 MG extended release tablet Take 1 tablet by mouth daily Yes Historical Provider, MD   Pyridoxine HCl (B-6 PO) Take 1 tablet by mouth daily Yes Historical Provider,

## 2023-09-25 NOTE — PATIENT INSTRUCTIONS
Continue present treatment  Low-sodium low-fat low-carb diet  Regular exercises  Use the support hose  Lab work before the next visit  Return to clinic earlier if any problems Pt was given the results in office visit today

## 2023-10-31 ENCOUNTER — HOSPITAL ENCOUNTER (OUTPATIENT)
Age: 88
Discharge: HOME | End: 2023-10-31
Payer: MEDICARE

## 2023-10-31 DIAGNOSIS — I10 HYPERTENSION, UNSPECIFIED TYPE: ICD-10-CM

## 2023-10-31 DIAGNOSIS — G60.9 IDIOPATHIC PERIPHERAL NEUROPATHY: ICD-10-CM

## 2023-10-31 DIAGNOSIS — E78.5 HYPERLIPIDEMIA, UNSPECIFIED HYPERLIPIDEMIA TYPE: ICD-10-CM

## 2023-10-31 DIAGNOSIS — E03.9 ACQUIRED HYPOTHYROIDISM: ICD-10-CM

## 2023-10-31 LAB
ALBUMIN SERPL-MCNC: 4.2 G/DL (ref 3.5–5.2)
ALP SERPL-CCNC: 115 U/L (ref 35–104)
ALT SERPL-CCNC: 10 U/L (ref 0–32)
ANION GAP SERPL CALCULATED.3IONS-SCNC: 11 MMOL/L (ref 7–16)
ANION GAP SERPL CALCULATED.3IONS-SCNC: 8 MMOL/L (ref 7–16)
AST SERPL-CCNC: 20 U/L (ref 0–31)
BASOPHILS # BLD: 0.01 K/UL (ref 0–0.2)
BASOPHILS NFR BLD: 0 % (ref 0–2)
BILIRUB SERPL-MCNC: 0.5 MG/DL (ref 0–1.2)
BUN SERPL-MCNC: 15 MG/DL (ref 6–23)
BUN SERPL-MCNC: 15 MG/DL (ref 6–23)
CALCIUM SERPL-MCNC: 9.8 MG/DL (ref 8.6–10.2)
CALCIUM SERPL-MCNC: 9.9 MG/DL (ref 8.6–10.2)
CHLORIDE SERPL-SCNC: 102 MMOL/L (ref 98–107)
CHLORIDE SERPL-SCNC: 103 MMOL/L (ref 98–107)
CHOLEST SERPL-MCNC: 139 MG/DL
CO2 SERPL-SCNC: 27 MMOL/L (ref 22–29)
CO2 SERPL-SCNC: 30 MMOL/L (ref 22–29)
CREAT SERPL-MCNC: 0.6 MG/DL (ref 0.5–1)
CREAT SERPL-MCNC: 0.6 MG/DL (ref 0.5–1)
EOSINOPHIL # BLD: 0.11 K/UL (ref 0.05–0.5)
EOSINOPHILS RELATIVE PERCENT: 2 % (ref 0–6)
ERYTHROCYTE [DISTWIDTH] IN BLOOD BY AUTOMATED COUNT: 14.9 % (ref 12–16)
GFR SERPL CREATININE-BSD FRML MDRD: >60 ML/MIN/1.73M2
GFR SERPL CREATININE-BSD FRML MDRD: >60 ML/MIN/1.73M2
GLUCOSE SERPL-MCNC: 98 MG/DL (ref 74–99)
GLUCOSE SERPL-MCNC: 99 MG/DL (ref 74–99)
HCT VFR BLD AUTO: 39.6 % (ref 34–48)
HDLC SERPL-MCNC: 59 MG/DL
HGB BLD-MCNC: 12.9 G/DL (ref 11.5–15.5)
IMM GRANULOCYTES # BLD AUTO: <0.03 K/UL (ref 0–0.58)
IMM GRANULOCYTES NFR BLD: 0 % (ref 0–5)
LDLC SERPL CALC-MCNC: 63 MG/DL
LYMPHOCYTES NFR BLD: 1.11 K/UL (ref 1.5–4)
LYMPHOCYTES RELATIVE PERCENT: 18 % (ref 20–42)
MAGNESIUM SERPL-MCNC: 2.2 MG/DL (ref 1.6–2.6)
MCH RBC QN AUTO: 29.1 PG (ref 26–35)
MCHC RBC AUTO-ENTMCNC: 32.6 G/DL (ref 32–34.5)
MCV RBC AUTO: 89.2 FL (ref 80–99.9)
MONOCYTES NFR BLD: 0.49 K/UL (ref 0.1–0.95)
MONOCYTES NFR BLD: 8 % (ref 2–12)
NEUTROPHILS NFR BLD: 72 % (ref 43–80)
NEUTS SEG NFR BLD: 4.57 K/UL (ref 1.8–7.3)
PLATELET # BLD AUTO: 282 K/UL (ref 130–450)
PMV BLD AUTO: 10.4 FL (ref 7–12)
POTASSIUM SERPL-SCNC: 4.4 MMOL/L (ref 3.5–5)
POTASSIUM SERPL-SCNC: 4.5 MMOL/L (ref 3.5–5)
PROT SERPL-MCNC: 7.1 G/DL (ref 6.4–8.3)
RBC # BLD AUTO: 4.44 M/UL (ref 3.5–5.5)
SODIUM SERPL-SCNC: 140 MMOL/L (ref 132–146)
SODIUM SERPL-SCNC: 141 MMOL/L (ref 132–146)
T4 FREE SERPL-MCNC: 1.8 NG/DL (ref 0.9–1.7)
TRIGL SERPL-MCNC: 87 MG/DL
TSH SERPL DL<=0.05 MIU/L-ACNC: 0.56 UIU/ML (ref 0.27–4.2)
VLDLC SERPL CALC-MCNC: 17 MG/DL
WBC OTHER # BLD: 6.3 K/UL (ref 4.5–11.5)

## 2023-10-31 PROCEDURE — 84443 ASSAY THYROID STIM HORMONE: CPT

## 2023-10-31 PROCEDURE — 83735 ASSAY OF MAGNESIUM: CPT

## 2023-10-31 PROCEDURE — 80053 COMPREHEN METABOLIC PANEL: CPT

## 2023-10-31 PROCEDURE — 84439 ASSAY OF FREE THYROXINE: CPT

## 2023-10-31 PROCEDURE — 85025 COMPLETE CBC W/AUTO DIFF WBC: CPT

## 2023-10-31 PROCEDURE — 80061 LIPID PANEL: CPT

## 2023-10-31 PROCEDURE — 80048 BASIC METABOLIC PNL TOTAL CA: CPT

## 2023-10-31 PROCEDURE — 36415 COLL VENOUS BLD VENIPUNCTURE: CPT

## 2023-11-06 ENCOUNTER — OFFICE VISIT (OUTPATIENT)
Dept: FAMILY MEDICINE CLINIC | Age: 88
End: 2023-11-06
Payer: MEDICARE

## 2023-11-06 VITALS
BODY MASS INDEX: 29.1 KG/M2 | TEMPERATURE: 97.6 F | DIASTOLIC BLOOD PRESSURE: 70 MMHG | SYSTOLIC BLOOD PRESSURE: 120 MMHG | HEART RATE: 73 BPM | OXYGEN SATURATION: 96 % | WEIGHT: 154 LBS

## 2023-11-06 DIAGNOSIS — I10 HYPERTENSION, UNSPECIFIED TYPE: ICD-10-CM

## 2023-11-06 DIAGNOSIS — E03.9 ACQUIRED HYPOTHYROIDISM: Primary | ICD-10-CM

## 2023-11-06 DIAGNOSIS — E78.5 HYPERLIPIDEMIA, UNSPECIFIED HYPERLIPIDEMIA TYPE: ICD-10-CM

## 2023-11-06 DIAGNOSIS — R05.3 CHRONIC COUGH: ICD-10-CM

## 2023-11-06 PROBLEM — E83.52 HYPERCALCEMIA: Status: ACTIVE | Noted: 2023-08-17

## 2023-11-06 PROBLEM — I05.0 MITRAL VALVE STENOSIS: Status: ACTIVE | Noted: 2023-07-25

## 2023-11-06 PROCEDURE — 1123F ACP DISCUSS/DSCN MKR DOCD: CPT | Performed by: FAMILY MEDICINE

## 2023-11-06 PROCEDURE — 99214 OFFICE O/P EST MOD 30 MIN: CPT | Performed by: FAMILY MEDICINE

## 2023-11-06 RX ORDER — MULTIVIT WITH MINERALS/LUTEIN
1000 TABLET ORAL DAILY
COMMUNITY

## 2023-11-06 RX ORDER — CARVEDILOL 6.25 MG/1
TABLET ORAL
COMMUNITY

## 2023-11-06 RX ORDER — AMLODIPINE BESYLATE 2.5 MG/1
TABLET ORAL
COMMUNITY

## 2023-11-06 ASSESSMENT — ENCOUNTER SYMPTOMS
COUGH: 1
ALLERGIC/IMMUNOLOGIC NEGATIVE: 1
EYES NEGATIVE: 1
GASTROINTESTINAL NEGATIVE: 1

## 2023-11-06 NOTE — PATIENT INSTRUCTIONS
Continue present treatment  Low-sodium low-fat low-carb diet  Regular exercises  Pulmonary referral has been made  Lab work before the next visit  Return to clinic earlier if any problems

## 2023-11-06 NOTE — PROGRESS NOTES
10/31/2023     10/31/2023     10/31/2023    K 4.5 10/31/2023    K 4.4 10/31/2023     10/31/2023     10/31/2023    CREATININE 0.6 10/31/2023    CREATININE 0.6 10/31/2023    BUN 15 10/31/2023    BUN 15 10/31/2023    CO2 30 (H) 10/31/2023    CO2 27 10/31/2023    TSH 0.56 10/31/2023    GLUF 85 06/28/2018     Lab Results   Component Value Date/Time    COLORU yellow 07/02/2018 02:23 PM    COLORU Yellow 06/26/2015 12:48 PM    NITRU Negative 06/26/2015 12:48 PM    GLUCOSEU negative 07/02/2018 02:23 PM    GLUCOSEU Negative 06/26/2015 12:48 PM    Lynnetta Martinet trace 07/02/2018 02:23 PM    Lynnetta Martinet Negative 06/26/2015 12:48 PM    UROBILINOGEN 0.2 06/26/2015 12:48 PM    BILIRUBINUR negative 07/02/2018 02:23 PM     No results found for: \"PSA\", \"CEA\", \"\", \"[id number]\", \"\"      Controlled Substances Monitoring:                                    ASSESSMENT     Patient Active Problem List    Diagnosis Date Noted    History of aortic valve replacement 04/01/2021    Hypercalcemia 08/17/2023    Mitral valve stenosis 07/25/2023    Nonrheumatic aortic (valve) stenosis 03/25/2021    Dependence on supplemental oxygen 03/25/2021    Dyspnea on exertion 03/25/2021    Irritation of vulva 03/25/2021    Pulmonary hypertension ([address]) 03/25/2021    Transient ischemic attack 07/22/2020    Atherosclerosis of coronary artery without angina pectoris 11/26/2018    Essential hypertension 02/19/2018    Hypothyroidism 02/19/2018    Hyperlipidemia 02/19/2018    Osteoporosis 02/19/2018        Diagnosis:   1. Acquired hypothyroidism  -     CBC with Auto Differential; Future  -     T4, Free; Future  2. Hyperlipidemia, unspecified hyperlipidemia type  -     CBC with Auto Differential; Future  -     Comprehensive Metabolic Panel; Future  -     Lipid Panel; Future  3. Hypertension, unspecified type  -     CBC with Auto Differential; Future  -     Comprehensive Metabolic Panel; Future  4.  Chronic cough  -     Marylen Mauritanian, MD,

## 2023-11-10 LAB
ANION GAP SERPL CALCULATED.3IONS-SCNC: NORMAL MMOL/L (ref 9–17)
BUN SERPL-MCNC: NORMAL MG/DL (ref 8–23)
CALCIUM SERPL-MCNC: NORMAL MG/DL (ref 8.6–10.4)
CHLORIDE SERPL-SCNC: NORMAL MMOL/L (ref 98–107)
CO2 SERPL-SCNC: NORMAL MMOL/L (ref 20–31)
CREAT SERPL-MCNC: NORMAL MG/DL (ref 0.5–0.9)
GLUCOSE SERPL-MCNC: NORMAL MG/DL (ref 70–99)
POTASSIUM SERPL-SCNC: NORMAL MMOL/L (ref 3.7–5.3)
SODIUM SERPL-SCNC: NORMAL MMOL/L (ref 135–144)

## 2024-02-09 ENCOUNTER — HOSPITAL ENCOUNTER (OUTPATIENT)
Age: 89
Discharge: HOME | End: 2024-02-09
Payer: MEDICARE

## 2024-02-09 DIAGNOSIS — E03.9 ACQUIRED HYPOTHYROIDISM: ICD-10-CM

## 2024-02-09 DIAGNOSIS — R05.3 CHRONIC COUGH: ICD-10-CM

## 2024-02-09 DIAGNOSIS — E78.5 HYPERLIPIDEMIA, UNSPECIFIED HYPERLIPIDEMIA TYPE: ICD-10-CM

## 2024-02-09 DIAGNOSIS — I10 HYPERTENSION, UNSPECIFIED TYPE: ICD-10-CM

## 2024-02-09 LAB
ALBUMIN SERPL-MCNC: 3.9 G/DL (ref 3.5–5.2)
ALP SERPL-CCNC: 117 U/L (ref 35–104)
ALT SERPL-CCNC: 13 U/L (ref 0–32)
ANION GAP SERPL CALCULATED.3IONS-SCNC: 10 MMOL/L (ref 7–16)
AST SERPL-CCNC: 15 U/L (ref 0–31)
BASOPHILS # BLD: 0.01 K/UL (ref 0–0.2)
BASOPHILS NFR BLD: 0 % (ref 0–2)
BILIRUB SERPL-MCNC: 0.4 MG/DL (ref 0–1.2)
BUN SERPL-MCNC: 15 MG/DL (ref 6–23)
CALCIUM SERPL-MCNC: 9.5 MG/DL (ref 8.6–10.2)
CHLORIDE SERPL-SCNC: 102 MMOL/L (ref 98–107)
CHOLEST SERPL-MCNC: 153 MG/DL
CO2 SERPL-SCNC: 31 MMOL/L (ref 22–29)
CREAT SERPL-MCNC: 0.6 MG/DL (ref 0.5–1)
EOSINOPHIL # BLD: 0.11 K/UL (ref 0.05–0.5)
EOSINOPHILS RELATIVE PERCENT: 2 % (ref 0–6)
ERYTHROCYTE [DISTWIDTH] IN BLOOD BY AUTOMATED COUNT: 14.8 % (ref 12–16)
GFR SERPL CREATININE-BSD FRML MDRD: >60 ML/MIN/1.73M2
GLUCOSE SERPL-MCNC: 90 MG/DL (ref 74–99)
HCT VFR BLD AUTO: 37.1 % (ref 34–48)
HDLC SERPL-MCNC: 66 MG/DL
HGB BLD-MCNC: 11.8 G/DL (ref 11.5–15.5)
IMM GRANULOCYTES # BLD AUTO: <0.03 K/UL (ref 0–0.58)
IMM GRANULOCYTES NFR BLD: 0 % (ref 0–5)
LDLC SERPL CALC-MCNC: 73 MG/DL
LYMPHOCYTES NFR BLD: 1.1 K/UL (ref 1.5–4)
LYMPHOCYTES RELATIVE PERCENT: 17 % (ref 20–42)
MCH RBC QN AUTO: 28.5 PG (ref 26–35)
MCHC RBC AUTO-ENTMCNC: 31.8 G/DL (ref 32–34.5)
MCV RBC AUTO: 89.6 FL (ref 80–99.9)
MONOCYTES NFR BLD: 0.64 K/UL (ref 0.1–0.95)
MONOCYTES NFR BLD: 10 % (ref 2–12)
NEUTROPHILS NFR BLD: 71 % (ref 43–80)
NEUTS SEG NFR BLD: 4.58 K/UL (ref 1.8–7.3)
PLATELET # BLD AUTO: 291 K/UL (ref 130–450)
PMV BLD AUTO: 9.8 FL (ref 7–12)
POTASSIUM SERPL-SCNC: 3.8 MMOL/L (ref 3.5–5)
PROT SERPL-MCNC: 6.7 G/DL (ref 6.4–8.3)
RBC # BLD AUTO: 4.14 M/UL (ref 3.5–5.5)
SODIUM SERPL-SCNC: 143 MMOL/L (ref 132–146)
T4 FREE SERPL-MCNC: 1.5 NG/DL (ref 0.9–1.7)
TRIGL SERPL-MCNC: 70 MG/DL
VLDLC SERPL CALC-MCNC: 14 MG/DL
WBC OTHER # BLD: 6.5 K/UL (ref 4.5–11.5)

## 2024-02-09 PROCEDURE — 80053 COMPREHEN METABOLIC PANEL: CPT

## 2024-02-09 PROCEDURE — 85025 COMPLETE CBC W/AUTO DIFF WBC: CPT

## 2024-02-09 PROCEDURE — 80061 LIPID PANEL: CPT

## 2024-02-09 PROCEDURE — 36415 COLL VENOUS BLD VENIPUNCTURE: CPT

## 2024-02-09 PROCEDURE — 84439 ASSAY OF FREE THYROXINE: CPT

## 2024-02-21 DIAGNOSIS — E78.5 HYPERLIPIDEMIA, UNSPECIFIED HYPERLIPIDEMIA TYPE: ICD-10-CM

## 2024-02-21 RX ORDER — SIMVASTATIN 20 MG
TABLET ORAL
Qty: 90 TABLET | Refills: 0 | Status: SHIPPED | OUTPATIENT
Start: 2024-02-21

## 2024-02-21 NOTE — TELEPHONE ENCOUNTER
Last Appointment:  11/6/2023  Future Appointments   Date Time Provider Department Center   2/26/2024 11:45 AM Kaushal Olivares MD [facility]   9/9/2024  1:00 PM Kaushal Olivares MD [facility]

## 2024-02-23 ENCOUNTER — OFFICE VISIT (OUTPATIENT)
Dept: PRIMARY CARE | Facility: CLINIC | Age: 89
End: 2024-02-23
Payer: COMMERCIAL

## 2024-02-23 VITALS
SYSTOLIC BLOOD PRESSURE: 122 MMHG | HEIGHT: 61 IN | TEMPERATURE: 98.5 F | WEIGHT: 182 LBS | DIASTOLIC BLOOD PRESSURE: 74 MMHG | OXYGEN SATURATION: 91 % | HEART RATE: 68 BPM | BODY MASS INDEX: 34.36 KG/M2

## 2024-02-23 DIAGNOSIS — I83.893 VARICOSE VEINS OF BOTH LEGS WITH EDEMA: ICD-10-CM

## 2024-02-23 DIAGNOSIS — Z01.89 ENCOUNTER FOR ROUTINE LABORATORY TESTING: ICD-10-CM

## 2024-02-23 DIAGNOSIS — Z12.31 ENCOUNTER FOR SCREENING MAMMOGRAM FOR BREAST CANCER: ICD-10-CM

## 2024-02-23 DIAGNOSIS — Z95.2 HISTORY OF AORTIC VALVE REPLACEMENT: ICD-10-CM

## 2024-02-23 DIAGNOSIS — M81.0 AGE-RELATED OSTEOPOROSIS WITHOUT CURRENT PATHOLOGICAL FRACTURE: ICD-10-CM

## 2024-02-23 DIAGNOSIS — I10 PRIMARY HYPERTENSION: ICD-10-CM

## 2024-02-23 DIAGNOSIS — I27.20 PULMONARY HYPERTENSION (MULTI): ICD-10-CM

## 2024-02-23 DIAGNOSIS — E78.00 PURE HYPERCHOLESTEROLEMIA: ICD-10-CM

## 2024-02-23 DIAGNOSIS — I25.10 ATHEROSCLEROSIS OF NATIVE CORONARY ARTERY OF NATIVE HEART WITHOUT ANGINA PECTORIS: ICD-10-CM

## 2024-02-23 DIAGNOSIS — R79.9 ABNORMAL FINDING OF BLOOD CHEMISTRY, UNSPECIFIED: ICD-10-CM

## 2024-02-23 DIAGNOSIS — E03.9 ACQUIRED HYPOTHYROIDISM: ICD-10-CM

## 2024-02-23 DIAGNOSIS — E55.9 VITAMIN D DEFICIENCY: ICD-10-CM

## 2024-02-23 DIAGNOSIS — Z76.89 ENCOUNTER TO ESTABLISH CARE WITH NEW DOCTOR: Primary | ICD-10-CM

## 2024-02-23 DIAGNOSIS — Z13.820 ENCOUNTER FOR SCREENING FOR OSTEOPOROSIS: ICD-10-CM

## 2024-02-23 PROBLEM — E78.5 HYPERLIPIDEMIA: Status: ACTIVE | Noted: 2018-02-19

## 2024-02-23 PROBLEM — Z99.81 DEPENDENCE ON SUPPLEMENTAL OXYGEN: Status: ACTIVE | Noted: 2021-03-25

## 2024-02-23 PROBLEM — I05.0 MITRAL VALVE STENOSIS: Status: ACTIVE | Noted: 2023-07-25

## 2024-02-23 PROBLEM — I35.0 NONRHEUMATIC AORTIC (VALVE) STENOSIS: Status: ACTIVE | Noted: 2021-03-25

## 2024-02-23 PROBLEM — G45.9 TRANSIENT ISCHEMIC ATTACK: Status: ACTIVE | Noted: 2020-07-22

## 2024-02-23 PROBLEM — E83.52 HYPERCALCEMIA: Status: ACTIVE | Noted: 2023-08-17

## 2024-02-23 PROBLEM — R06.09 DYSPNEA ON EXERTION: Status: ACTIVE | Noted: 2021-03-25

## 2024-02-23 PROCEDURE — 3074F SYST BP LT 130 MM HG: CPT | Performed by: INTERNAL MEDICINE

## 2024-02-23 PROCEDURE — 99203 OFFICE O/P NEW LOW 30 MIN: CPT | Performed by: INTERNAL MEDICINE

## 2024-02-23 PROCEDURE — 99213 OFFICE O/P EST LOW 20 MIN: CPT | Performed by: INTERNAL MEDICINE

## 2024-02-23 PROCEDURE — 1125F AMNT PAIN NOTED PAIN PRSNT: CPT | Performed by: INTERNAL MEDICINE

## 2024-02-23 PROCEDURE — 3078F DIAST BP <80 MM HG: CPT | Performed by: INTERNAL MEDICINE

## 2024-02-23 PROCEDURE — 1036F TOBACCO NON-USER: CPT | Performed by: INTERNAL MEDICINE

## 2024-02-23 PROCEDURE — 1159F MED LIST DOCD IN RCRD: CPT | Performed by: INTERNAL MEDICINE

## 2024-02-23 RX ORDER — OMEPRAZOLE 20 MG/1
20 CAPSULE, DELAYED RELEASE ORAL
COMMUNITY
End: 2024-02-23 | Stop reason: WASHOUT

## 2024-02-23 RX ORDER — ASPIRIN 81 MG/1
1 TABLET ORAL DAILY
COMMUNITY
Start: 2021-04-09

## 2024-02-23 RX ORDER — ASCORBIC ACID 500 MG/5ML
SYRUP ORAL
COMMUNITY
End: 2024-03-07 | Stop reason: HOSPADM

## 2024-02-23 RX ORDER — CLOPIDOGREL BISULFATE 75 MG/1
1 TABLET ORAL DAILY
COMMUNITY
Start: 2021-04-09

## 2024-02-23 RX ORDER — MULTIVITAMIN
TABLET ORAL
COMMUNITY
Start: 2010-09-24 | End: 2024-02-23 | Stop reason: WASHOUT

## 2024-02-23 RX ORDER — MULTIVIT,IRON,MINERALS/LUTEIN
1 TABLET ORAL DAILY
COMMUNITY
End: 2024-03-07 | Stop reason: HOSPADM

## 2024-02-23 RX ORDER — MULTIVITAMIN/IRON/FOLIC ACID 18MG-0.4MG
TABLET ORAL
COMMUNITY
End: 2024-03-07 | Stop reason: HOSPADM

## 2024-02-23 RX ORDER — LANOLIN ALCOHOL/MO/W.PET/CERES
1 CREAM (GRAM) TOPICAL DAILY PRN
COMMUNITY
End: 2024-03-07 | Stop reason: HOSPADM

## 2024-02-23 RX ORDER — GARLIC 1000 MG
CAPSULE ORAL
COMMUNITY
End: 2024-03-07 | Stop reason: HOSPADM

## 2024-02-23 RX ORDER — IBANDRONATE SODIUM 150 MG/1
TABLET, FILM COATED ORAL
COMMUNITY
End: 2024-02-23 | Stop reason: WASHOUT

## 2024-02-23 RX ORDER — VIT C/E/ZN/COPPR/LUTEIN/ZEAXAN 250MG-90MG
CAPSULE ORAL
COMMUNITY
End: 2024-04-15 | Stop reason: HOSPADM

## 2024-02-23 RX ORDER — PYRIDOXINE HCL (VITAMIN B6) 100 MG
1 TABLET ORAL DAILY
COMMUNITY
End: 2024-03-07 | Stop reason: HOSPADM

## 2024-02-23 RX ORDER — CARVEDILOL 6.25 MG/1
TABLET ORAL
COMMUNITY
End: 2024-04-15 | Stop reason: HOSPADM

## 2024-02-23 RX ORDER — LEVOTHYROXINE SODIUM 100 UG/1
TABLET ORAL EVERY 24 HOURS
COMMUNITY
Start: 2010-09-24 | End: 2024-02-23 | Stop reason: WASHOUT

## 2024-02-23 RX ORDER — ISOSORBIDE MONONITRATE 30 MG/1
1 TABLET, EXTENDED RELEASE ORAL DAILY
COMMUNITY
End: 2024-02-23 | Stop reason: WASHOUT

## 2024-02-23 RX ORDER — ATENOLOL 25 MG/1
TABLET ORAL
COMMUNITY
Start: 2010-09-24 | End: 2024-02-23 | Stop reason: WASHOUT

## 2024-02-23 RX ORDER — LEVOTHYROXINE SODIUM 88 UG/1
1 TABLET ORAL DAILY
COMMUNITY
Start: 2023-09-05

## 2024-02-23 RX ORDER — UBIDECARENONE 30 MG
30 CAPSULE ORAL DAILY
COMMUNITY
End: 2024-03-07 | Stop reason: HOSPADM

## 2024-02-23 RX ORDER — FUROSEMIDE 20 MG/1
20 TABLET ORAL
COMMUNITY
End: 2024-03-07 | Stop reason: HOSPADM

## 2024-02-23 RX ORDER — HYDROCHLOROTHIAZIDE 12.5 MG/1
12.5 CAPSULE ORAL
COMMUNITY
End: 2024-02-23 | Stop reason: WASHOUT

## 2024-02-23 RX ORDER — LISINOPRIL 10 MG/1
TABLET ORAL EVERY 24 HOURS
COMMUNITY
End: 2024-02-23 | Stop reason: WASHOUT

## 2024-02-23 RX ORDER — MECLIZINE HCL 12.5 MG 12.5 MG/1
1 TABLET ORAL 3 TIMES DAILY PRN
COMMUNITY
Start: 2020-08-13 | End: 2024-03-07 | Stop reason: HOSPADM

## 2024-02-23 RX ORDER — FERROUS SULFATE 325(65) MG
1 TABLET ORAL DAILY
COMMUNITY
End: 2024-02-23 | Stop reason: WASHOUT

## 2024-02-23 RX ORDER — LISINOPRIL 20 MG/1
TABLET ORAL
COMMUNITY
Start: 2023-07-02 | End: 2024-02-23 | Stop reason: WASHOUT

## 2024-02-23 RX ORDER — NEOMYCIN/BACITRACIN/POLYMYXINB 3.5-400-5K
1 OINTMENT (GRAM) TOPICAL DAILY
COMMUNITY
End: 2024-03-07 | Stop reason: HOSPADM

## 2024-02-23 RX ORDER — SIMVASTATIN 20 MG/1
1 TABLET, FILM COATED ORAL NIGHTLY
COMMUNITY
Start: 2021-02-12

## 2024-02-23 RX ORDER — AMLODIPINE BESYLATE 2.5 MG/1
1 TABLET ORAL DAILY
COMMUNITY
End: 2024-04-15 | Stop reason: HOSPADM

## 2024-02-23 RX ORDER — FUROSEMIDE 40 MG/1
40 TABLET ORAL
COMMUNITY
Start: 2023-02-08 | End: 2024-02-23 | Stop reason: WASHOUT

## 2024-02-23 ASSESSMENT — ENCOUNTER SYMPTOMS
LOSS OF SENSATION IN FEET: 0
OCCASIONAL FEELINGS OF UNSTEADINESS: 0
DEPRESSION: 0

## 2024-02-23 ASSESSMENT — PATIENT HEALTH QUESTIONNAIRE - PHQ9
2. FEELING DOWN, DEPRESSED OR HOPELESS: SEVERAL DAYS
SUM OF ALL RESPONSES TO PHQ9 QUESTIONS 1 AND 2: 1
1. LITTLE INTEREST OR PLEASURE IN DOING THINGS: NOT AT ALL

## 2024-02-23 ASSESSMENT — PAIN SCALES - GENERAL: PAINLEVEL: 8

## 2024-02-23 NOTE — PROGRESS NOTES
[facility]: [facility]  PROGRESS NOTE      Akua Almanzar is a 89 y.o. female that is presenting today for Establish Care.    Assessment/Plan   Diagnoses and all orders for this visit:  Encounter to establish care with new doctor      - Reviewed patient's available records, discussed PMH, Current active problems Meds and allergies.  Pulmonary hypertension (CMS/HCC)     Monitor with periodic echo  Primary hypertension     Under control with current treatment   Continue the same   -     Comprehensive Metabolic Panel; Future  History of aortic valve replacement      Stable / FU w Cardiology  Atherosclerosis of native coronary artery of native heart without angina pectoris      Stable      Under control with current treatment   Continue the same   Acquired hypothyroidism      Under control with current treatment   Continue the same   -     TSH with reflex to Free T4 if abnormal; Future  Varicose veins of both legs with edema      Low salt elevate and support stockings  Encounter for routine laboratory testing  -     Comprehensive Metabolic Panel; Future  -     CBC and Auto Differential; Future  -     Lipid Panel; Future  -     Hemoglobin A1C; Future  -     Vitamin D 25-Hydroxy,Total (for eval of Vitamin D levels); Future  -     TSH with reflex to Free T4 if abnormal; Future  Vitamin D deficiency  -     Vitamin D 25-Hydroxy,Total (for eval of Vitamin D levels); Future  Encounter for screening mammogram for breast cancer  -     BI mammo bilateral screening tomosynthesis; Future  Encounter for screening for osteoporosis  -     XR DEXA bone density; Future  Pure hypercholesterolemia  -     Lipid Panel; Future  Abnormal finding of blood chemistry, unspecified  -     CBC and Auto Differential; Future  -     Hemoglobin A1C; Future  Age-related osteoporosis without current pathological fracture  -     XR DEXA bone density; Future  Other orders  -     Follow Up In Primary Care; Future  Subjective   -  Patient is here today to establish care, swollen feet, ankles and lower legs    - Patient denies any other symptoms or concerns at this time.    - patient denies any adverse reactions to or concerns with his/her meds.      Edema    Presents with new edema. The current episode started less than one week ago. The onset of the episode was gradual. These episodes happen throughout the day. The problem presents itself daily. The edema is present on the both side(s). Risk factors for edema include no known risk factors.   Associated agents include no associated agents.   Pertinent negative symptoms include no abdominal pain, no abdominal swelling, no chest pain, no cough, no decreased urine volume, no fatigue, no fever, no nausea, no nocturia, no orthopnea, no palpitations, no PND, no presyncope, no syncope, no vomiting and no weight change.     Past medical history is significant for CHF, chronic renal disease and varicose veins. Treatments tried include diuretics. There has been moderate improvement on treatment(s).   Review of Systems   Constitutional:  Negative for fatigue and fever.   Respiratory:  Negative for cough.    Cardiovascular:  Negative for chest pain, palpitations, syncope and PND.   Gastrointestinal:  Negative for abdominal pain, nausea and vomiting.   Genitourinary:  Negative for decreased urine volume and nocturia.      All pertinent POSITIVES as noted per HPI.  All other systems have been reviewed and are NEGATIVE and /or Noncontributory to this patient current visit or complaint.    Objective   Vitals:    02/23/24 0948   BP: 122/74   Pulse: 68   Temp: 36.9 °C (98.5 °F)   SpO2: 91%      Body mass index is 34.39 kg/m².  Physical Exam  Vitals and nursing note reviewed.   Constitutional:       Appearance: Normal appearance. She is obese.   HENT:      Head: Normocephalic and atraumatic.   Neck:      Vascular: No carotid bruit.   Cardiovascular:      Rate and Rhythm: Normal rate and regular rhythm.       "Pulses: Normal pulses.      Heart sounds: Normal heart sounds.   Pulmonary:      Effort: Pulmonary effort is normal.      Breath sounds: Normal breath sounds.   Abdominal:      General: Abdomen is flat. Bowel sounds are normal.      Palpations: Abdomen is soft.   Musculoskeletal:         General: Swelling present. Normal range of motion.      Cervical back: Neck supple.      Right lower leg: Edema present.      Left lower leg: Edema present.   Lymphadenopathy:      Cervical: No cervical adenopathy.   Skin:     General: Skin is warm.   Neurological:      Mental Status: She is alert.   Psychiatric:         Mood and Affect: Mood normal.     Diagnostic Results   Lab Results   Component Value Date    GLUCOSE 120 (H) 2021    CALCIUM 8.6 2021     2021    K 4.2 2021    CO2 33 (H) 2021    CL 99 2021    BUN 21 2021    CREATININE 0.76 2021     Lab Results   Component Value Date    ALT 13 04/10/2021    AST 19 04/10/2021    ALKPHOS 90 04/10/2021    BILITOT 0.3 04/10/2021     Lab Results   Component Value Date    WBC 5.6 04/10/2021    HGB 9.0 (L) 04/10/2021    HCT 28.3 (L) 04/10/2021    MCV 90 04/10/2021     04/10/2021     No results found for: \"CHOL\"  No results found for: \"HDL\"  No results found for: \"LDLCALC\"  No results found for: \"TRIG\"  No components found for: \"CHOLHDL\"  No results found for: \"HGBA1C\"  Other labs not included in the list above were reviewed either before or during this encounter.    History    Past Medical History:   Diagnosis Date    Nonrheumatic aortic (valve) stenosis with insufficiency 2021    Aortic insufficiency with aortic stenosis    Personal history of other diseases of the circulatory system 2021    History of aortic valve stenosis     Past Surgical History:   Procedure Laterality Date    OTHER SURGICAL HISTORY  2021     section    OTHER SURGICAL HISTORY  2021    Tonsillectomy with adenoidectomy    OTHER " SURGICAL HISTORY  02/12/2021    Femur fracture repair    OTHER SURGICAL HISTORY  02/12/2021    Arm surgery    OTHER SURGICAL HISTORY  02/12/2021    Hysterectomy    OTHER SURGICAL HISTORY  02/12/2021    Mass excision     No family history on file.  Social History     Socioeconomic History    Marital status:      Spouse name: Not on file    Number of children: Not on file    Years of education: Not on file    Highest education level: Not on file   Occupational History    Not on file   Tobacco Use    Smoking status: Never    Smokeless tobacco: Never   Substance and Sexual Activity    Alcohol use: Never    Drug use: Never    Sexual activity: Not on file   Other Topics Concern    Not on file   Social History Narrative    Not on file     Social Determinants of Health     Financial Resource Strain: Not on file   Food Insecurity: Not on file   Transportation Needs: Not on file   Physical Activity: Not on file   Stress: Not on file   Social Connections: Not on file   Intimate Partner Violence: Not on file   Housing Stability: Not on file     Allergies   Allergen Reactions    Adhesive Rash     [place] skin    Hydrocodone Other    Lamotrigine Other     Current Outpatient Medications on File Prior to Visit   Medication Sig Dispense Refill    amLODIPine (Norvasc) 2.5 mg tablet Take 1 tablet (2.5 mg) by mouth once daily.      aspirin 81 mg EC tablet Take 1 tablet (81 mg) by mouth once daily.      carvedilol (Coreg) 6.25 mg tablet Take 1 tablet twice a day by oral route.      cholecalciferol (Vitamin D-3) 25 MCG (1000 UT) capsule Take by mouth.      co-enzyme Q-10 30 mg capsule Take 1 capsule (30 mg) by mouth once daily.      cyanocobalamin (Vitamin B-12) 1,000 mcg tablet Take 1 tablet (1,000 mcg) by mouth once daily as needed.      furosemide (Lasix) 20 mg tablet Take 1 tablet (20 mg) by mouth.      garlic 1,000 mg capsule Take by mouth.      levothyroxine (Synthroid, Levoxyl) 88 mcg tablet Take 1 tablet (88 mcg) by mouth  once daily.      melatonin-pyridoxine HCl, B6, 3-1 mg tablet Take 1 tablet by mouth once daily.      multivit-min-iron-FA-vit K-lut (Centrum Silver Women) 8 mg iron-400 mcg-50 mcg tablet Take 1 tablet by mouth once daily.      omega 3-dha-epa-fish oil 1,000 mg (250 mg-750 mg)/5 mL liquid once every 24 hours.      pyridoxine (Vitamin B-6) 100 mg tablet Take 1 tablet (100 mg) by mouth once daily.      simvastatin (Zocor) 20 mg tablet Take 1 tablet (20 mg) by mouth once daily at bedtime.      [DISCONTINUED] atenolol (Tenormin) 25 mg tablet Take by mouth once daily.      [DISCONTINUED] furosemide (Lasix) 40 mg tablet Take 1 tablet (40 mg) by mouth.      [DISCONTINUED] levothyroxine (Synthroid) 100 mcg tablet once every 24 hours.      [DISCONTINUED] lisinopril 20 mg tablet       [DISCONTINUED] multivitamin tablet Take by mouth.      [DISCONTINUED] vitamin-B complex split tablet Take 1 tablet (2 half tablet) by mouth once daily.      ascorbic acid (Vitamin C) 500 mg/5 mL liquid as directed Orally      b complex (B Complex 1, with folic acid,) 0.4 mg tablet as directed Orally      clopidogrel (Plavix) 75 mg tablet Take 1 tablet (75 mg) by mouth once daily.      meclizine (Antivert) 12.5 mg tablet Take 1 tablet (12.5 mg) by mouth 3 times a day as needed.      [DISCONTINUED] ferrous sulfate, 325 mg ferrous sulfate, tablet Take 1 tablet by mouth once daily.      [DISCONTINUED] hydroCHLOROthiazide (Microzide) 12.5 mg capsule Take 1 capsule (12.5 mg) by mouth.      [DISCONTINUED] ibandronate (Boniva) 150 mg tablet 1 tablet Orally for 30 day(s)      [DISCONTINUED] isosorbide mononitrate ER (Imdur) 30 mg 24 hr tablet Take 1 tablet (30 mg) by mouth once daily.      [DISCONTINUED] lisinopril 10 mg tablet once every 24 hours.      [DISCONTINUED] omeprazole (PriLOSEC) 20 mg DR capsule Take 1 capsule (20 mg) by mouth once daily.       No current facility-administered medications on file prior to visit.     Immunization History    Administered Date(s) Administered    Influenza, High Dose Seasonal, Preservative Free 11/26/2018    Influenza, Seasonal, Quadrivalent, Adjuvanted 12/29/2021, 11/14/2022, 09/25/2023    Influenza, injectable, MDCK, quadrivalent 09/19/2019    Pneumococcal conjugate vaccine, 13-valent (PREVNAR 13) 10/10/2016    Pneumococcal polysaccharide vaccine, 23-valent, age 2 years and older (PNEUMOVAX 23) 10/10/2013     Patient's medical history was reviewed and updated either before or during this encounter.       Lakeisha Cueto MD

## 2024-02-26 ENCOUNTER — TELEPHONE (OUTPATIENT)
Dept: PRIMARY CARE | Facility: CLINIC | Age: 89
End: 2024-02-26
Payer: COMMERCIAL

## 2024-02-26 ASSESSMENT — ENCOUNTER SYMPTOMS
WEIGHT CHANGE: 0
ORTHOPNEA: 0
VOMITING: 0
ABDOMINAL SWELLING: 0
FATIGUE: 0
FEVER: 0
SYNCOPE: 0
PND: 0
COUGH: 0
NAUSEA: 0
ABDOMINAL PAIN: 0
PALPITATIONS: 0

## 2024-02-26 NOTE — TELEPHONE ENCOUNTER
Fax received today from [facility].  In your office for review.

## 2024-02-26 NOTE — TELEPHONE ENCOUNTER
Dtr states you were going to call with results of labs that were ordered by Dr. Price.      Looks like there are results from 2/9/24 that Dr. Price ordered.      Please call Anitra at [phone]

## 2024-02-29 ENCOUNTER — APPOINTMENT (OUTPATIENT)
Dept: RADIOLOGY | Facility: HOSPITAL | Age: 89
DRG: 291 | End: 2024-02-29
Payer: COMMERCIAL

## 2024-02-29 ENCOUNTER — HOSPITAL ENCOUNTER (INPATIENT)
Facility: HOSPITAL | Age: 89
LOS: 6 days | Discharge: NURSING, REHAB OR LONG-TERM CARE | DRG: 291 | End: 2024-03-07
Attending: STUDENT IN AN ORGANIZED HEALTH CARE EDUCATION/TRAINING PROGRAM | Admitting: INTERNAL MEDICINE
Payer: COMMERCIAL

## 2024-02-29 DIAGNOSIS — I50.9 CONGESTIVE HEART FAILURE, UNSPECIFIED HF CHRONICITY, UNSPECIFIED HEART FAILURE TYPE (MULTI): ICD-10-CM

## 2024-02-29 DIAGNOSIS — R09.02 HYPOXIA: ICD-10-CM

## 2024-02-29 DIAGNOSIS — I50.33 ACUTE ON CHRONIC DIASTOLIC (CONGESTIVE) HEART FAILURE (MULTI): ICD-10-CM

## 2024-02-29 DIAGNOSIS — I35.0 NONRHEUMATIC AORTIC (VALVE) STENOSIS: ICD-10-CM

## 2024-02-29 DIAGNOSIS — I50.43 CHF (CONGESTIVE HEART FAILURE), NYHA CLASS I, ACUTE ON CHRONIC, COMBINED (MULTI): Primary | ICD-10-CM

## 2024-02-29 DIAGNOSIS — R60.0 LOWER EXTREMITY EDEMA: ICD-10-CM

## 2024-02-29 LAB
BASOPHILS # BLD AUTO: 0.01 X10*3/UL (ref 0–0.1)
BASOPHILS NFR BLD AUTO: 0.1 %
EOSINOPHIL # BLD AUTO: 0.09 X10*3/UL (ref 0–0.4)
EOSINOPHIL NFR BLD AUTO: 1.2 %
ERYTHROCYTE [DISTWIDTH] IN BLOOD BY AUTOMATED COUNT: 15.5 % (ref 11.5–14.5)
HCT VFR BLD AUTO: 39.5 % (ref 36–46)
HGB BLD-MCNC: 12.3 G/DL (ref 12–16)
IMM GRANULOCYTES # BLD AUTO: 0.02 X10*3/UL (ref 0–0.5)
IMM GRANULOCYTES NFR BLD AUTO: 0.3 % (ref 0–0.9)
LYMPHOCYTES # BLD AUTO: 0.93 X10*3/UL (ref 0.8–3)
LYMPHOCYTES NFR BLD AUTO: 12.1 %
MCH RBC QN AUTO: 28.5 PG (ref 26–34)
MCHC RBC AUTO-ENTMCNC: 31.1 G/DL (ref 32–36)
MCV RBC AUTO: 91 FL (ref 80–100)
MONOCYTES # BLD AUTO: 0.76 X10*3/UL (ref 0.05–0.8)
MONOCYTES NFR BLD AUTO: 9.9 %
NEUTROPHILS # BLD AUTO: 5.9 X10*3/UL (ref 1.6–5.5)
NEUTROPHILS NFR BLD AUTO: 76.4 %
NRBC BLD-RTO: 0 /100 WBCS (ref 0–0)
PLATELET # BLD AUTO: 257 X10*3/UL (ref 150–450)
RBC # BLD AUTO: 4.32 X10*6/UL (ref 4–5.2)
WBC # BLD AUTO: 7.7 X10*3/UL (ref 4.4–11.3)

## 2024-02-29 PROCEDURE — 84075 ASSAY ALKALINE PHOSPHATASE: CPT | Performed by: PHYSICIAN ASSISTANT

## 2024-02-29 PROCEDURE — 85025 COMPLETE CBC W/AUTO DIFF WBC: CPT | Performed by: PHYSICIAN ASSISTANT

## 2024-02-29 PROCEDURE — 83880 ASSAY OF NATRIURETIC PEPTIDE: CPT | Performed by: PHYSICIAN ASSISTANT

## 2024-02-29 PROCEDURE — 71046 X-RAY EXAM CHEST 2 VIEWS: CPT | Performed by: SURGERY

## 2024-02-29 PROCEDURE — 71046 X-RAY EXAM CHEST 2 VIEWS: CPT

## 2024-02-29 PROCEDURE — 84443 ASSAY THYROID STIM HORMONE: CPT | Performed by: INTERNAL MEDICINE

## 2024-02-29 PROCEDURE — 36415 COLL VENOUS BLD VENIPUNCTURE: CPT | Performed by: PHYSICIAN ASSISTANT

## 2024-02-29 PROCEDURE — 96374 THER/PROPH/DIAG INJ IV PUSH: CPT

## 2024-02-29 PROCEDURE — 84484 ASSAY OF TROPONIN QUANT: CPT | Performed by: PHYSICIAN ASSISTANT

## 2024-02-29 PROCEDURE — 99285 EMERGENCY DEPT VISIT HI MDM: CPT | Mod: 25

## 2024-02-29 PROCEDURE — 87636 SARSCOV2 & INF A&B AMP PRB: CPT | Performed by: PHYSICIAN ASSISTANT

## 2024-02-29 ASSESSMENT — COLUMBIA-SUICIDE SEVERITY RATING SCALE - C-SSRS
2. HAVE YOU ACTUALLY HAD ANY THOUGHTS OF KILLING YOURSELF?: NO
6. HAVE YOU EVER DONE ANYTHING, STARTED TO DO ANYTHING, OR PREPARED TO DO ANYTHING TO END YOUR LIFE?: NO
1. IN THE PAST MONTH, HAVE YOU WISHED YOU WERE DEAD OR WISHED YOU COULD GO TO SLEEP AND NOT WAKE UP?: NO

## 2024-03-01 ENCOUNTER — APPOINTMENT (OUTPATIENT)
Dept: CARDIOLOGY | Facility: HOSPITAL | Age: 89
DRG: 291 | End: 2024-03-01
Payer: COMMERCIAL

## 2024-03-01 PROBLEM — I50.33 ACUTE ON CHRONIC DIASTOLIC (CONGESTIVE) HEART FAILURE (MULTI): Status: ACTIVE | Noted: 2024-03-01

## 2024-03-01 PROBLEM — I50.43 CHF (CONGESTIVE HEART FAILURE), NYHA CLASS I, ACUTE ON CHRONIC, COMBINED (MULTI): Status: ACTIVE | Noted: 2024-03-01

## 2024-03-01 LAB
ALBUMIN SERPL-MCNC: 4.2 G/DL (ref 3.5–5)
ALP BLD-CCNC: 128 U/L (ref 35–125)
ALT SERPL-CCNC: 21 U/L (ref 5–40)
ANION GAP SERPL CALC-SCNC: 11 MMOL/L
ANION GAP SERPL CALC-SCNC: 8 MMOL/L
AORTIC VALVE MEAN GRADIENT: 8 MMHG
AORTIC VALVE PEAK VELOCITY: 2.13 M/S
AST SERPL-CCNC: 29 U/L (ref 5–40)
AV PEAK GRADIENT: 18.1 MMHG
AVA (PEAK VEL): 1.29 CM2
AVA (VTI): 1.51 CM2
BILIRUB SERPL-MCNC: 0.4 MG/DL (ref 0.1–1.2)
BUN SERPL-MCNC: 14 MG/DL (ref 8–25)
BUN SERPL-MCNC: 17 MG/DL (ref 8–25)
CALCIUM SERPL-MCNC: 8.9 MG/DL (ref 8.5–10.4)
CALCIUM SERPL-MCNC: 9.8 MG/DL (ref 8.5–10.4)
CHLORIDE SERPL-SCNC: 95 MMOL/L (ref 97–107)
CHLORIDE SERPL-SCNC: 96 MMOL/L (ref 97–107)
CO2 SERPL-SCNC: 30 MMOL/L (ref 24–31)
CO2 SERPL-SCNC: 35 MMOL/L (ref 24–31)
CREAT SERPL-MCNC: 0.6 MG/DL (ref 0.4–1.6)
CREAT SERPL-MCNC: 0.6 MG/DL (ref 0.4–1.6)
EGFRCR SERPLBLD CKD-EPI 2021: 83 ML/MIN/1.73M*2
EGFRCR SERPLBLD CKD-EPI 2021: 83 ML/MIN/1.73M*2
EJECTION FRACTION APICAL 4 CHAMBER: 62.2
EJECTION FRACTION: 56 %
FLUAV RNA RESP QL NAA+PROBE: NOT DETECTED
FLUBV RNA RESP QL NAA+PROBE: NOT DETECTED
GLUCOSE SERPL-MCNC: 145 MG/DL (ref 65–99)
GLUCOSE SERPL-MCNC: 93 MG/DL (ref 65–99)
LEFT ATRIUM VOLUME AREA LENGTH INDEX BSA: 46.9 ML/M2
LEFT VENTRICLE INTERNAL DIMENSION DIASTOLE: 3.88 CM (ref 3.5–6)
LEFT VENTRICULAR OUTFLOW TRACT DIAMETER: 1.7 CM
MITRAL VALVE E/A RATIO: 0.76
MITRAL VALVE E/E' RATIO: 35.98
NT-PROBNP SERPL-MCNC: 2589 PG/ML (ref 0–624)
POTASSIUM SERPL-SCNC: 3.6 MMOL/L (ref 3.4–5.1)
POTASSIUM SERPL-SCNC: 4.5 MMOL/L (ref 3.4–5.1)
PROT SERPL-MCNC: 7.7 G/DL (ref 5.9–7.9)
RIGHT VENTRICLE FREE WALL PEAK S': 18.2 CM/S
RIGHT VENTRICLE PEAK SYSTOLIC PRESSURE: 58.7 MMHG
SARS-COV-2 RNA RESP QL NAA+PROBE: NOT DETECTED
SODIUM SERPL-SCNC: 136 MMOL/L (ref 133–145)
SODIUM SERPL-SCNC: 139 MMOL/L (ref 133–145)
TRICUSPID ANNULAR PLANE SYSTOLIC EXCURSION: 2 CM
TROPONIN T SERPL-MCNC: 23 NG/L
TROPONIN T SERPL-MCNC: 23 NG/L
TROPONIN T SERPL-MCNC: 28 NG/L
TROPONIN T SERPL-MCNC: 29 NG/L
TSH SERPL DL<=0.05 MIU/L-ACNC: 1.13 MIU/L (ref 0.27–4.2)

## 2024-03-01 PROCEDURE — 93005 ELECTROCARDIOGRAM TRACING: CPT

## 2024-03-01 PROCEDURE — 84484 ASSAY OF TROPONIN QUANT: CPT | Performed by: PHYSICIAN ASSISTANT

## 2024-03-01 PROCEDURE — 2500000004 HC RX 250 GENERAL PHARMACY W/ HCPCS (ALT 636 FOR OP/ED): Performed by: PHYSICIAN ASSISTANT

## 2024-03-01 PROCEDURE — 97530 THERAPEUTIC ACTIVITIES: CPT | Mod: GP

## 2024-03-01 PROCEDURE — 36415 COLL VENOUS BLD VENIPUNCTURE: CPT | Performed by: PHYSICIAN ASSISTANT

## 2024-03-01 PROCEDURE — 2500000002 HC RX 250 W HCPCS SELF ADMINISTERED DRUGS (ALT 637 FOR MEDICARE OP, ALT 636 FOR OP/ED): Performed by: INTERNAL MEDICINE

## 2024-03-01 PROCEDURE — 82374 ASSAY BLOOD CARBON DIOXIDE: CPT | Performed by: INTERNAL MEDICINE

## 2024-03-01 PROCEDURE — 97162 PT EVAL MOD COMPLEX 30 MIN: CPT | Mod: GP

## 2024-03-01 PROCEDURE — 93306 TTE W/DOPPLER COMPLETE: CPT | Performed by: INTERNAL MEDICINE

## 2024-03-01 PROCEDURE — 2500000001 HC RX 250 WO HCPCS SELF ADMINISTERED DRUGS (ALT 637 FOR MEDICARE OP): Performed by: INTERNAL MEDICINE

## 2024-03-01 PROCEDURE — 93306 TTE W/DOPPLER COMPLETE: CPT

## 2024-03-01 PROCEDURE — 2500000001 HC RX 250 WO HCPCS SELF ADMINISTERED DRUGS (ALT 637 FOR MEDICARE OP): Performed by: NURSE PRACTITIONER

## 2024-03-01 PROCEDURE — 99222 1ST HOSP IP/OBS MODERATE 55: CPT | Performed by: NURSE PRACTITIONER

## 2024-03-01 PROCEDURE — 1200000002 HC GENERAL ROOM WITH TELEMETRY DAILY

## 2024-03-01 PROCEDURE — 2500000004 HC RX 250 GENERAL PHARMACY W/ HCPCS (ALT 636 FOR OP/ED): Performed by: NURSE PRACTITIONER

## 2024-03-01 RX ORDER — CLOPIDOGREL BISULFATE 75 MG/1
75 TABLET ORAL DAILY
Status: DISCONTINUED | OUTPATIENT
Start: 2024-03-01 | End: 2024-03-07 | Stop reason: HOSPADM

## 2024-03-01 RX ORDER — FUROSEMIDE 10 MG/ML
40 INJECTION INTRAMUSCULAR; INTRAVENOUS EVERY 12 HOURS
Status: DISCONTINUED | OUTPATIENT
Start: 2024-03-01 | End: 2024-03-04

## 2024-03-01 RX ORDER — FUROSEMIDE 10 MG/ML
40 INJECTION INTRAMUSCULAR; INTRAVENOUS ONCE
Status: DISCONTINUED | OUTPATIENT
Start: 2024-03-01 | End: 2024-03-01

## 2024-03-01 RX ORDER — CARVEDILOL 6.25 MG/1
6.25 TABLET ORAL
Status: DISCONTINUED | OUTPATIENT
Start: 2024-03-01 | End: 2024-03-07 | Stop reason: HOSPADM

## 2024-03-01 RX ORDER — L. ACIDOPHILUS/L.BULGARICUS 1MM CELL
1 TABLET ORAL 2 TIMES DAILY
Status: DISCONTINUED | OUTPATIENT
Start: 2024-03-01 | End: 2024-03-07 | Stop reason: HOSPADM

## 2024-03-01 RX ORDER — SIMVASTATIN 20 MG/1
20 TABLET, FILM COATED ORAL NIGHTLY
Status: DISCONTINUED | OUTPATIENT
Start: 2024-03-01 | End: 2024-03-07 | Stop reason: HOSPADM

## 2024-03-01 RX ORDER — FUROSEMIDE 10 MG/ML
60 INJECTION INTRAMUSCULAR; INTRAVENOUS ONCE
Status: COMPLETED | OUTPATIENT
Start: 2024-03-01 | End: 2024-03-01

## 2024-03-01 RX ORDER — LEVOTHYROXINE SODIUM 88 UG/1
88 TABLET ORAL DAILY
Status: DISCONTINUED | OUTPATIENT
Start: 2024-03-01 | End: 2024-03-07 | Stop reason: HOSPADM

## 2024-03-01 RX ORDER — CHOLECALCIFEROL (VITAMIN D3) 25 MCG
1000 TABLET ORAL DAILY
Status: DISCONTINUED | OUTPATIENT
Start: 2024-03-01 | End: 2024-03-07 | Stop reason: HOSPADM

## 2024-03-01 RX ORDER — AMLODIPINE BESYLATE 2.5 MG/1
2.5 TABLET ORAL DAILY
Status: DISCONTINUED | OUTPATIENT
Start: 2024-03-01 | End: 2024-03-07 | Stop reason: HOSPADM

## 2024-03-01 RX ORDER — ENOXAPARIN SODIUM 100 MG/ML
40 INJECTION SUBCUTANEOUS DAILY
Status: DISCONTINUED | OUTPATIENT
Start: 2024-03-02 | End: 2024-03-07 | Stop reason: HOSPADM

## 2024-03-01 RX ORDER — IPRATROPIUM BROMIDE AND ALBUTEROL SULFATE 2.5; .5 MG/3ML; MG/3ML
3 SOLUTION RESPIRATORY (INHALATION) EVERY 2 HOUR PRN
Status: DISCONTINUED | OUTPATIENT
Start: 2024-03-01 | End: 2024-03-07 | Stop reason: HOSPADM

## 2024-03-01 RX ORDER — ACETAMINOPHEN 325 MG/1
650 TABLET ORAL EVERY 6 HOURS PRN
Status: DISCONTINUED | OUTPATIENT
Start: 2024-03-01 | End: 2024-03-07 | Stop reason: HOSPADM

## 2024-03-01 RX ORDER — ASCORBIC ACID 500 MG
500 TABLET ORAL DAILY
Status: DISCONTINUED | OUTPATIENT
Start: 2024-03-01 | End: 2024-03-07 | Stop reason: HOSPADM

## 2024-03-01 RX ORDER — BIOTIN 10 MG
TABLET ORAL
COMMUNITY

## 2024-03-01 RX ORDER — ASPIRIN 81 MG/1
81 TABLET ORAL DAILY
Status: DISCONTINUED | OUTPATIENT
Start: 2024-03-01 | End: 2024-03-07 | Stop reason: HOSPADM

## 2024-03-01 RX ORDER — ASCORBIC ACID 250 MG
500 TABLET,CHEWABLE ORAL
COMMUNITY
End: 2024-04-15 | Stop reason: HOSPADM

## 2024-03-01 RX ADMIN — Medication 1 TABLET: at 21:26

## 2024-03-01 RX ADMIN — OXYCODONE HYDROCHLORIDE AND ACETAMINOPHEN 500 MG: 500 TABLET ORAL at 14:08

## 2024-03-01 RX ADMIN — FUROSEMIDE 60 MG: 10 INJECTION, SOLUTION INTRAMUSCULAR; INTRAVENOUS at 00:33

## 2024-03-01 RX ADMIN — SIMVASTATIN 20 MG: 20 TABLET, FILM COATED ORAL at 21:26

## 2024-03-01 RX ADMIN — FUROSEMIDE 40 MG: 10 INJECTION, SOLUTION INTRAMUSCULAR; INTRAVENOUS at 21:26

## 2024-03-01 RX ADMIN — CLOPIDOGREL BISULFATE 75 MG: 75 TABLET ORAL at 11:59

## 2024-03-01 RX ADMIN — CARVEDILOL 6.25 MG: 6.25 TABLET, FILM COATED ORAL at 16:10

## 2024-03-01 RX ADMIN — Medication 1 TABLET: at 14:08

## 2024-03-01 RX ADMIN — FUROSEMIDE 40 MG: 10 INJECTION, SOLUTION INTRAMUSCULAR; INTRAVENOUS at 07:55

## 2024-03-01 RX ADMIN — LEVOTHYROXINE SODIUM 88 MCG: 0.09 TABLET ORAL at 12:00

## 2024-03-01 RX ADMIN — ASPIRIN 81 MG: 81 TABLET, COATED ORAL at 12:00

## 2024-03-01 RX ADMIN — AMLODIPINE BESYLATE 2.5 MG: 2.5 TABLET ORAL at 14:08

## 2024-03-01 RX ADMIN — CHOLECALCIFEROL TAB 25 MCG (1000 UNIT) 1000 UNITS: 25 TAB at 14:08

## 2024-03-01 SDOH — SOCIAL STABILITY: SOCIAL INSECURITY: HAVE YOU HAD THOUGHTS OF HARMING ANYONE ELSE?: NO

## 2024-03-01 SDOH — ECONOMIC STABILITY: INCOME INSECURITY: IN THE PAST 12 MONTHS, HAS THE ELECTRIC, GAS, OIL, OR WATER COMPANY THREATENED TO SHUT OFF SERVICE IN YOUR HOME?: NO

## 2024-03-01 SDOH — HEALTH STABILITY: MENTAL HEALTH: HOW MANY STANDARD DRINKS CONTAINING ALCOHOL DO YOU HAVE ON A TYPICAL DAY?: PATIENT DOES NOT DRINK

## 2024-03-01 SDOH — SOCIAL STABILITY: SOCIAL INSECURITY: WITHIN THE LAST YEAR, HAVE YOU BEEN AFRAID OF YOUR PARTNER OR EX-PARTNER?: NO

## 2024-03-01 SDOH — SOCIAL STABILITY: SOCIAL INSECURITY
WITHIN THE LAST YEAR, HAVE TO BEEN RAPED OR FORCED TO HAVE ANY KIND OF SEXUAL ACTIVITY BY YOUR PARTNER OR EX-PARTNER?: NO

## 2024-03-01 SDOH — ECONOMIC STABILITY: FOOD INSECURITY: WITHIN THE PAST 12 MONTHS, THE FOOD YOU BOUGHT JUST DIDN'T LAST AND YOU DIDN'T HAVE MONEY TO GET MORE.: NEVER TRUE

## 2024-03-01 SDOH — SOCIAL STABILITY: SOCIAL INSECURITY: DO YOU FEEL ANYONE HAS EXPLOITED OR TAKEN ADVANTAGE OF YOU FINANCIALLY OR OF YOUR PERSONAL PROPERTY?: NO

## 2024-03-01 SDOH — SOCIAL STABILITY: SOCIAL NETWORK: HOW OFTEN DO YOU GET TOGETHER WITH FRIENDS OR RELATIVES?: MORE THAN THREE TIMES A WEEK

## 2024-03-01 SDOH — SOCIAL STABILITY: SOCIAL INSECURITY: DOES ANYONE TRY TO KEEP YOU FROM HAVING/CONTACTING OTHER FRIENDS OR DOING THINGS OUTSIDE YOUR HOME?: NO

## 2024-03-01 SDOH — SOCIAL STABILITY: SOCIAL INSECURITY
WITHIN THE LAST YEAR, HAVE YOU BEEN KICKED, HIT, SLAPPED, OR OTHERWISE PHYSICALLY HURT BY YOUR PARTNER OR EX-PARTNER?: NO

## 2024-03-01 SDOH — HEALTH STABILITY: MENTAL HEALTH: HOW OFTEN DO YOU HAVE 6 OR MORE DRINKS ON ONE OCCASION?: NEVER

## 2024-03-01 SDOH — SOCIAL STABILITY: SOCIAL INSECURITY: WERE YOU ABLE TO COMPLETE ALL THE BEHAVIORAL HEALTH SCREENINGS?: YES

## 2024-03-01 SDOH — SOCIAL STABILITY: SOCIAL NETWORK
IN A TYPICAL WEEK, HOW MANY TIMES DO YOU TALK ON THE PHONE WITH FAMILY, FRIENDS, OR NEIGHBORS?: MORE THAN THREE TIMES A WEEK

## 2024-03-01 SDOH — SOCIAL STABILITY: SOCIAL INSECURITY: HAS ANYONE EVER THREATENED TO HURT YOUR FAMILY OR YOUR PETS?: NO

## 2024-03-01 SDOH — SOCIAL STABILITY: SOCIAL NETWORK: HOW OFTEN DO YOU ATTENT MEETINGS OF THE CLUB OR ORGANIZATION YOU BELONG TO?: NEVER

## 2024-03-01 SDOH — SOCIAL STABILITY: SOCIAL INSECURITY: DO YOU FEEL UNSAFE GOING BACK TO THE PLACE WHERE YOU ARE LIVING?: NO

## 2024-03-01 SDOH — SOCIAL STABILITY: SOCIAL NETWORK: ARE YOU MARRIED, WIDOWED, DIVORCED, SEPARATED, NEVER MARRIED, OR LIVING WITH A PARTNER?: WIDOWED

## 2024-03-01 SDOH — SOCIAL STABILITY: SOCIAL NETWORK: HOW OFTEN DO YOU ATTEND CHURCH OR RELIGIOUS SERVICES?: NEVER

## 2024-03-01 SDOH — SOCIAL STABILITY: SOCIAL INSECURITY: WITHIN THE LAST YEAR, HAVE YOU BEEN HUMILIATED OR EMOTIONALLY ABUSED IN OTHER WAYS BY YOUR PARTNER OR EX-PARTNER?: NO

## 2024-03-01 SDOH — SOCIAL STABILITY: SOCIAL INSECURITY: ARE THERE ANY APPARENT SIGNS OF INJURIES/BEHAVIORS THAT COULD BE RELATED TO ABUSE/NEGLECT?: NO

## 2024-03-01 SDOH — ECONOMIC STABILITY: FOOD INSECURITY: WITHIN THE PAST 12 MONTHS, YOU WORRIED THAT YOUR FOOD WOULD RUN OUT BEFORE YOU GOT MONEY TO BUY MORE.: NEVER TRUE

## 2024-03-01 SDOH — SOCIAL STABILITY: SOCIAL NETWORK
DO YOU BELONG TO ANY CLUBS OR ORGANIZATIONS SUCH AS CHURCH GROUPS UNIONS, FRATERNAL OR ATHLETIC GROUPS, OR SCHOOL GROUPS?: NO

## 2024-03-01 SDOH — SOCIAL STABILITY: SOCIAL INSECURITY: ARE YOU OR HAVE YOU BEEN THREATENED OR ABUSED PHYSICALLY, EMOTIONALLY, OR SEXUALLY BY ANYONE?: NO

## 2024-03-01 SDOH — HEALTH STABILITY: MENTAL HEALTH: HOW OFTEN DO YOU HAVE A DRINK CONTAINING ALCOHOL?: NEVER

## 2024-03-01 SDOH — HEALTH STABILITY: MENTAL HEALTH
STRESS IS WHEN SOMEONE FEELS TENSE, NERVOUS, ANXIOUS, OR CAN'T SLEEP AT NIGHT BECAUSE THEIR MIND IS TROUBLED. HOW STRESSED ARE YOU?: NOT AT ALL

## 2024-03-01 ASSESSMENT — ACTIVITIES OF DAILY LIVING (ADL)
HEARING - LEFT EAR: FUNCTIONAL
LACK_OF_TRANSPORTATION: NO
BATHING: INDEPENDENT
WALKS IN HOME: INDEPENDENT
GROOMING: INDEPENDENT
HEARING - RIGHT EAR: FUNCTIONAL
JUDGMENT_ADEQUATE_SAFELY_COMPLETE_DAILY_ACTIVITIES: YES
TOILETING: INDEPENDENT
ADEQUATE_TO_COMPLETE_ADL: YES
DRESSING YOURSELF: INDEPENDENT
PATIENT'S MEMORY ADEQUATE TO SAFELY COMPLETE DAILY ACTIVITIES?: YES
ADL_ASSISTANCE: INDEPENDENT
FEEDING YOURSELF: INDEPENDENT

## 2024-03-01 ASSESSMENT — LIFESTYLE VARIABLES
HAVE PEOPLE ANNOYED YOU BY CRITICIZING YOUR DRINKING: NO
AUDIT-C TOTAL SCORE: 0
HOW OFTEN DO YOU HAVE 6 OR MORE DRINKS ON ONE OCCASION: NEVER
EVER FELT BAD OR GUILTY ABOUT YOUR DRINKING: NO
EVER HAD A DRINK FIRST THING IN THE MORNING TO STEADY YOUR NERVES TO GET RID OF A HANGOVER: NO
HAVE YOU EVER FELT YOU SHOULD CUT DOWN ON YOUR DRINKING: NO
HOW MANY STANDARD DRINKS CONTAINING ALCOHOL DO YOU HAVE ON A TYPICAL DAY: PATIENT DOES NOT DRINK
AUDIT-C TOTAL SCORE: 0
HOW OFTEN DO YOU HAVE A DRINK CONTAINING ALCOHOL: NEVER
SKIP TO QUESTIONS 9-10: 1
AUDIT-C TOTAL SCORE: 0
SKIP TO QUESTIONS 9-10: 1

## 2024-03-01 ASSESSMENT — COGNITIVE AND FUNCTIONAL STATUS - GENERAL
TOILETING: A LOT
WALKING IN HOSPITAL ROOM: A LITTLE
DAILY ACTIVITIY SCORE: 24
STANDING UP FROM CHAIR USING ARMS: A LITTLE
HELP NEEDED FOR BATHING: A LOT
DRESSING REGULAR UPPER BODY CLOTHING: A LITTLE
MOVING FROM LYING ON BACK TO SITTING ON SIDE OF FLAT BED WITH BEDRAILS: A LITTLE
CLIMB 3 TO 5 STEPS WITH RAILING: A LITTLE
DRESSING REGULAR LOWER BODY CLOTHING: A LOT
DAILY ACTIVITIY SCORE: 16
MOBILITY SCORE: 23
PATIENT BASELINE BEDBOUND: NO
CLIMB 3 TO 5 STEPS WITH RAILING: A LOT
MOBILITY SCORE: 17
CLIMB 3 TO 5 STEPS WITH RAILING: A LOT
TURNING FROM BACK TO SIDE WHILE IN FLAT BAD: A LITTLE
MOVING TO AND FROM BED TO CHAIR: A LITTLE
PERSONAL GROOMING: A LITTLE
STANDING UP FROM CHAIR USING ARMS: A LOT
MOBILITY SCORE: 18
WALKING IN HOSPITAL ROOM: A LOT

## 2024-03-01 ASSESSMENT — PAIN SCALES - GENERAL
PAINLEVEL_OUTOF10: 0 - NO PAIN
PAINLEVEL_OUTOF10: 6

## 2024-03-01 ASSESSMENT — PAIN - FUNCTIONAL ASSESSMENT
PAIN_FUNCTIONAL_ASSESSMENT: 0-10

## 2024-03-01 ASSESSMENT — PATIENT HEALTH QUESTIONNAIRE - PHQ9
2. FEELING DOWN, DEPRESSED OR HOPELESS: NOT AT ALL
1. LITTLE INTEREST OR PLEASURE IN DOING THINGS: NOT AT ALL
SUM OF ALL RESPONSES TO PHQ9 QUESTIONS 1 & 2: 0

## 2024-03-01 NOTE — PROGRESS NOTES
"Akua Almanzar is a 89 y.o. female on day 0 of admission presenting with CHF (congestive heart failure), NYHA class I, acute on chronic, combined (CMS/HCC).    Subjective   Patient resting in bed. Denies chest pain, abdominal pain, fevers, chills. States breathing seems better than yesterday.        Objective     Physical Exam  Constitutional:       Appearance: Normal appearance.   HENT:      Head: Normocephalic and atraumatic.      Mouth/Throat:      Mouth: Mucous membranes are moist.      Pharynx: Oropharynx is clear.   Eyes:      Extraocular Movements: Extraocular movements intact.      Conjunctiva/sclera: Conjunctivae normal.      Pupils: Pupils are equal, round, and reactive to light.   Cardiovascular:      Rate and Rhythm: Normal rate and regular rhythm.      Pulses: Normal pulses.      Heart sounds: Normal heart sounds.   Pulmonary:      Effort: Pulmonary effort is normal.      Breath sounds: Decreased breath sounds present.   Abdominal:      General: Bowel sounds are normal.      Palpations: Abdomen is soft.      Tenderness: There is no abdominal tenderness.   Musculoskeletal:         General: Normal range of motion.      Cervical back: Normal range of motion and neck supple.   Skin:     General: Skin is warm and dry.      Capillary Refill: Capillary refill takes less than 2 seconds.   Neurological:      General: No focal deficit present.      Mental Status: She is alert and oriented to person, place, and time.   Psychiatric:         Mood and Affect: Mood normal.         Behavior: Behavior normal.         Last Recorded Vitals  Blood pressure 125/81, pulse 70, temperature 36.6 °C (97.9 °F), temperature source Oral, resp. rate 18, height 1.549 m (5' 1\"), weight 72.6 kg (160 lb), SpO2 96 %.  Intake/Output last 3 Shifts:  I/O last 3 completed shifts:  In: - (0 mL/kg)   Out: 1500 (20.7 mL/kg) [Urine:1500 (0.6 mL/kg/hr)]  Weight: 72.6 kg     Relevant Results  Results for orders placed or performed during the " hospital encounter of 02/29/24 (from the past 24 hour(s))   CBC and Auto Differential   Result Value Ref Range    WBC 7.7 4.4 - 11.3 x10*3/uL    nRBC 0.0 0.0 - 0.0 /100 WBCs    RBC 4.32 4.00 - 5.20 x10*6/uL    Hemoglobin 12.3 12.0 - 16.0 g/dL    Hematocrit 39.5 36.0 - 46.0 %    MCV 91 80 - 100 fL    MCH 28.5 26.0 - 34.0 pg    MCHC 31.1 (L) 32.0 - 36.0 g/dL    RDW 15.5 (H) 11.5 - 14.5 %    Platelets 257 150 - 450 x10*3/uL    Neutrophils % 76.4 40.0 - 80.0 %    Immature Granulocytes %, Automated 0.3 0.0 - 0.9 %    Lymphocytes % 12.1 13.0 - 44.0 %    Monocytes % 9.9 2.0 - 10.0 %    Eosinophils % 1.2 0.0 - 6.0 %    Basophils % 0.1 0.0 - 2.0 %    Neutrophils Absolute 5.90 (H) 1.60 - 5.50 x10*3/uL    Immature Granulocytes Absolute, Automated 0.02 0.00 - 0.50 x10*3/uL    Lymphocytes Absolute 0.93 0.80 - 3.00 x10*3/uL    Monocytes Absolute 0.76 0.05 - 0.80 x10*3/uL    Eosinophils Absolute 0.09 0.00 - 0.40 x10*3/uL    Basophils Absolute 0.01 0.00 - 0.10 x10*3/uL   Comprehensive metabolic panel   Result Value Ref Range    Glucose 145 (H) 65 - 99 mg/dL    Sodium 136 133 - 145 mmol/L    Potassium 4.5 3.4 - 5.1 mmol/L    Chloride 95 (L) 97 - 107 mmol/L    Bicarbonate 30 24 - 31 mmol/L    Urea Nitrogen 17 8 - 25 mg/dL    Creatinine 0.60 0.40 - 1.60 mg/dL    eGFR 83 >60 mL/min/1.73m*2    Calcium 9.8 8.5 - 10.4 mg/dL    Albumin 4.2 3.5 - 5.0 g/dL    Alkaline Phosphatase 128 (H) 35 - 125 U/L    Total Protein 7.7 5.9 - 7.9 g/dL    AST 29 5 - 40 U/L    Bilirubin, Total 0.4 0.1 - 1.2 mg/dL    ALT 21 5 - 40 U/L    Anion Gap 11 <=19 mmol/L   NT Pro-BNP   Result Value Ref Range    PROBNP 2,589 (H) 0 - 624 pg/mL   Sars-CoV-2 and Influenza A/B PCR   Result Value Ref Range    Flu A Result Not Detected Not Detected    Flu B Result Not Detected Not Detected    Coronavirus 2019, PCR Not Detected Not Detected   Serial Troponin, Initial ([facility])   Result Value Ref Range    Troponin T, High Sensitivity 28 (HH) <=14 ng/L   Thyroid Stimulating Hormone    Result Value Ref Range    Thyroid Stimulating Hormone 1.13 0.27 - 4.20 mIU/L   ECG 12 lead   Result Value Ref Range    Ventricular Rate 71 BPM    Atrial Rate 71 BPM    ME Interval 154 ms    QRS Duration 130 ms    QT Interval 408 ms    QTC Calculation(Bazett) 443 ms    P Axis 44 degrees    R Axis -46 degrees    T Axis 95 degrees    QRS Count 12 beats    Q Onset 215 ms    P Onset 138 ms    P Offset 199 ms    T Offset 419 ms    QTC Fredericia 431 ms   Serial Troponin, 2 Hour ([facility])   Result Value Ref Range    Troponin T, High Sensitivity 23 (HH) <=14 ng/L   Serial Troponin, 6 Hour ([facility])   Result Value Ref Range    Troponin T, High Sensitivity 23 (HH) <=14 ng/L   Serial Troponin, 6 Hour ([facility])   Result Value Ref Range    Troponin T, High Sensitivity 29 (HH) <=14 ng/L   Basic Metabolic Panel   Result Value Ref Range    Glucose 93 65 - 99 mg/dL    Sodium 139 133 - 145 mmol/L    Potassium 3.6 3.4 - 5.1 mmol/L    Chloride 96 (L) 97 - 107 mmol/L    Bicarbonate 35 (H) 24 - 31 mmol/L    Urea Nitrogen 14 8 - 25 mg/dL    Creatinine 0.60 0.40 - 1.60 mg/dL    eGFR 83 >60 mL/min/1.73m*2    Calcium 8.9 8.5 - 10.4 mg/dL    Anion Gap 8 <=19 mmol/L     ECG 12 lead    Result Date: 3/1/2024  Normal sinus rhythm with sinus arrhythmia Left axis deviation Left bundle branch block Abnormal ECG When compared with ECG of 26-APR-2021 11:51, No significant change was found    XR chest 2 views    Result Date: 3/1/2024  Interpreted By:  Myles Sotomayor, STUDY: XR CHEST 2 VIEWS;  2/29/2024 11:48 pm   INDICATION: Signs/Symptoms:sob.   COMPARISON: Chest radiography of 04/10/2021.   ACCESSION NUMBER(S): [id number]   ORDERING CLINICIAN: FAIZAN RODRIGUEZ   FINDINGS: AP and lateral views of the chest.   Limited by portable technique and patient soft tissue attenuation factors. Leads overlie the chest, partially obscuring the field-of-view.   CARDIOMEDIASTINAL SILHOUETTE: Cardiac silhouette is enlarged. TAVR changes redemonstrated. Tortuous and  atherosclerotic thoracic aorta.   LUNGS: Pulmonary vascular congestion with prominent, indistinct interstitium and Kerley B-lines suggesting acute pulmonary interstitial edema/CHF. Trace pleural effusions with associated atelectasis. No pneumothorax.   ABDOMEN: No remarkable upper abdominal findings.   BONES: No acute osseous changes.       1.  Pulmonary vascular congestion with prominent, indistinct interstitium and Kerley B-lines suggesting acute pulmonary interstitial edema/CHF. Trace pleural effusions with associated atelectasis. No pneumothorax.       MACRO: None   Signed by: Myles Sotomayor 3/1/2024 12:03 AM Dictation workstation:   [id number]       Assessment/Plan   Principal Problem:    Acute on chronic diastolic (congestive) heart failure (CMS/HCC)   X-ray with vascular congestion    NTpBNP > 2500   IV lasix    Consult cardiology - appreciate recs    Echo   Active Problems:    Atherosclerosis of coronary artery without angina pectoris   Angina free, stable    Continue ASA, beta blocker, statin and Plavix     Hyperlipidemia   Statin     Primary hypertension   Amlodipine, Coreg     Acquired hypothyroidism   Levothyroxine     DVT prophylaxis    Lovenox     Jeny Scott, APRN-CNP

## 2024-03-01 NOTE — H&P
History Of Present Illness  Akua Almanzar is a 89 y.o. female presenting with shortness of breath.  She has known congestive heart failure.  She is supposed to take 20 mg of Lasix a day but she does not seem to take it.  She does not like that it makes her run to the bathroom and when she is out of the house she has to find a toilet.  She now comes in with worsening leg edema and shortness of breath.  Denies chest pain.  Denies nausea vomiting.  Denies significant mucus production.  She does have a chronic cough.     Past Medical History  Past Medical History:   Diagnosis Date    Nonrheumatic aortic (valve) stenosis with insufficiency 2021    Aortic insufficiency with aortic stenosis    Personal history of other diseases of the circulatory system 2021    History of aortic valve stenosis   Congestive heart failure, hypothyroidism, COPD.    Surgical History  Past Surgical History:   Procedure Laterality Date    OTHER SURGICAL HISTORY  2021     section    OTHER SURGICAL HISTORY  2021    Tonsillectomy with adenoidectomy    OTHER SURGICAL HISTORY  2021    Femur fracture repair    OTHER SURGICAL HISTORY  2021    Arm surgery    OTHER SURGICAL HISTORY  2021    Hysterectomy    OTHER SURGICAL HISTORY  2021    Mass excision        Social History  She reports that she has never smoked. She has never used smokeless tobacco. She reports that she does not drink alcohol and does not use drugs.    Family History  Geta for premature heart disease     Allergies  Adhesive, Hydrocodone, and Lamotrigine    Review of Systems-see HPI for pertinent positives and negatives.     Physical Exam head atraumatic normocephalic  Alert oriented undistressed  Mouth normal-appearing tongue and oropharynx  Neck supple without thyromegaly  Lymph nodes no cervical or axillary lymphadenopathy  Heart regular rate and rhythm with loud systolic murmur heard at the right sternal border  Lungs bibasilar  "crackles without wheezing or rhonchi  Abdomen soft nontender nondistended  Extremities 2-3+ pitting bilateral leg edema  Neuro cranial nerves intact with good tone and strength in arms and legs  Skin her left shin is erythematous  Musculoskeletal normal passive range of motion shoulders elbows hips and knees     Last Recorded Vitals  Blood pressure 138/80, pulse 77, temperature 37.2 °C (99 °F), temperature source Temporal, resp. rate (!) 22, height 1.549 m (5' 1\"), weight 72.6 kg (160 lb), SpO2 (!) 93 %.    Relevant Results  Chest x-ray shows CHF     Assessment/Plan   Acute diastolic congestive heart failure.  She has been given 60 mg IV Lasix and is put out a lot of urine.  Will redose Lasix at 10 AM.  Will order her aspirin Plavix her carvedilol and her Zocor but will hold amlodipine for now.  Will order an echocardiogram consult Dr. Brown who she was supposed to see as an outpatient.    Jatinder Snell MD    "

## 2024-03-01 NOTE — PROGRESS NOTES
Physical Therapy    Physical Therapy Evaluation & Treatment    Patient Name: Akua Almanzar  MRN: [id number]  Today's Date: 3/1/2024   Time Calculation  Start Time: 1040  Stop Time: 1115  Time Calculation (min): 35 min    Assessment/Plan   PT Assessment  PT Assessment Results: Decreased strength, Decreased endurance, Impaired balance, Decreased mobility, Pain  Rehab Prognosis: Good  Evaluation/Treatment Tolerance: Patient tolerated treatment well  Medical Staff Made Aware: Yes  End of Session Communication: Bedside nurse  Assessment Comment: pt demonstrates decreased BLE strength, impaired balance, decreased tolerance to activity.  pt requires supervision for transfers at this time.  End of Session Patient Position: On cart   IP OR SWING BED PT PLAN  Inpatient or Swing Bed: Inpatient  PT Plan  Treatment/Interventions: Bed mobility, Transfer training, Gait training, Stair training, Balance training, Strengthening, Endurance training, Therapeutic exercise, Therapeutic activity  PT Plan: Skilled PT  PT Frequency: 5 times per week  PT Discharge Recommendations: Low intensity level of continued care, 24 hr supervision due to cognition  Equipment Recommended upon Discharge: Wheeled walker  PT Recommended Transfer Status: Stand by assist  PT - OK to Discharge: Yes      Subjective     General Visit Information:  General  Reason for Referral: 89 y/o female admit from home with worsening BLE edema and SOB.  Referred By: Jatinder Snell MD  Past Medical History Relevant to Rehab: ASHD, hpl, htn, hypothyroidism, AVR, TIA, vit D deficiency  Prior to Session Communication: Bedside nurse  Patient Position Received: On cart  General Comment: pt cleared for therapy by nursing, supine upon arrival and agreeable to therapy.  Home Living:  Home Living  Type of Home: House  Lives With: Adult children (daughter and ANYA)  Home Adaptive Equipment: Walker rolling or standard  Home Layout: Multi-level, Bed/bath upstairs, Stairs to alternate  level with rails  Alternate Level Stairs-Rails:  (single)  Alternate Level Stairs-Number of Steps: 6-7  Home Access: Stairs to enter without rails  Entrance Stairs-Rails: None  Entrance Stairs-Number of Steps: 2  Bathroom Shower/Tub: Tub/shower unit  Bathroom Toilet: Standard  Bathroom Equipment: Shower chair with back  Home Living Comments: dtr/ANYA are retired  Prior Level of Function:  Prior Function Per Pt/Caregiver Report  Level of [place]: Independent with ADLs and functional transfers, Needs assistance with homemaking  Receives Help From: Family  ADL Assistance: Independent  Homemaking Assistance: Needs assistance  Ambulatory Assistance: Independent (mod ind with RW)  Vocational: Retired  Prior Function Comments: denies falls  Precautions:  Precautions  Hearing/Visual Limitations: mild [place], lost hearing aids  Medical Precautions: Fall precautions, Oxygen therapy device and L/min (6L O2 via NC)  Precautions Comment: +telemetry; purewick was removed by RN  Vital Signs:  Vital Signs  Heart Rate: 71  Heart Rate Source: Monitor  SpO2: 94 %  BP: 113/60  MAP (mmHg): 76  BP Location: Right arm  BP Method: Automatic  Patient Position: Lying    Objective   Pain:  Pain Assessment  Pain Assessment: 0-10  Pain Score: 6  Pain Type: Chronic pain  Pain Location:  (back, L ankle)  Pain Interventions: Repositioned  Cognition:  Cognition  Overall Cognitive Status: Within Functional Limits    General Assessments:  General Observation  General Observation: BLE edema     Activity Tolerance  Endurance: Decreased tolerance for upright activites    Sensation  Light Touch: No apparent deficits  Sensation Comment: denies paresthesias    Strength  Strength Comments: BLEs grossly >4-/5    Coordination  Coordination Comment: decreased rate of movements    Postural Control  Postural Control: Within Functional Limits  Posture Comment: forward head posture and rounded shoulders    Static Sitting Balance  Static Sitting-Balance Support:  Feet supported, No upper extremity supported  Static Sitting-Level of Assistance: Distant supervision  Static Sitting-Comment/Number of Minutes: good  Dynamic Sitting Balance  Dynamic Sitting-Balance Support: Feet supported, No upper extremity supported  Dynamic Sitting-Balance: Forward lean, Lateral lean, Reaching for objects  Dynamic Sitting-Comments: good-    Static Standing Balance  Static Standing-Balance Support: No upper extremity supported  Static Standing-Level of Assistance: Close supervision  Static Standing-Comment/Number of Minutes: good  Dynamic Standing Balance  Dynamic Standing-Balance Support: No upper extremity supported  Dynamic Standing-Balance: Forward lean, Reaching for objects, Reaching across midline  Dynamic Standing-Comments: good-  Functional Assessments:  Bed Mobility  Bed Mobility: Yes  Bed Mobility 1  Bed Mobility 1: Supine to sitting  Level of Assistance 1: Close supervision  Bed Mobility Comments 1: increased time/effort.  seated EOB several minutes with supervision and no LOB.  Bed Mobility 2  Bed Mobility  2: Sitting to supine  Level of Assistance 2: Minimum assistance  Bed Mobility Comments 2: assist for bringing BLEs into bed    Transfers  Transfer: Yes  Transfer 1  Technique 1: Sit to stand, Stand to sit  Transfer Level of Assistance 1: Close supervision  Trials/Comments 1: pt performed twice with supervision; remained standing x 10 minutes.  +incontinence.  brief donned.  clean linens placed and hygiene performed.  good standing balance.  took several steps to HOB with supervision.  vitals monitored and stable throughout. cues for pursed lip breathing, SpO2 98%.  Extremity/Trunk Assessments:  RLE   RLE : Within Functional Limits  LLE   LLE : Within Functional Limits  Treatments:  Bed Mobility  Bed Mobility: Yes  Bed Mobility 1  Bed Mobility 1: Supine to sitting  Level of Assistance 1: Close supervision  Bed Mobility Comments 1: increased time/effort.  seated EOB several minutes  with supervision and no LOB.  Bed Mobility 2  Bed Mobility  2: Sitting to supine  Level of Assistance 2: Minimum assistance  Bed Mobility Comments 2: assist for bringing BLEs into bed    Transfers  Transfer: Yes  Transfer 1  Technique 1: Sit to stand, Stand to sit  Transfer Level of Assistance 1: Close supervision  Trials/Comments 1: pt performed twice with supervision; remained standing x 10 minutes.  +incontinence.  brief donned.  clean linens placed and hygiene performed.  good standing balance.  took several steps to HOB with supervision.  vitals monitored and stable throughout. cues for pursed lip breathing, SpO2 98%.  Outcome Measures:  [facility] Basic Mobility  Turning from your back to your side while in a flat bed without using bedrails: A little  Moving from lying on your back to sitting on the side of a flat bed without using bedrails: A little  Moving to and from bed to chair (including a wheelchair): A little  Standing up from a chair using your arms (e.g. wheelchair or bedside chair): A little  To walk in hospital room: A little  Climbing 3-5 steps with railing: A lot  Basic Mobility - Total Score: 17    Encounter Problems       Encounter Problems (Active)       PT Problem       Patient will transfer supine <> sit independently  (Progressing)       Start:  03/01/24    Expected End:  03/22/24            Patient will transfer sit <> stand modified independently and use of rolling walker  (Progressing)       Start:  03/01/24    Expected End:  03/22/24            Patient will ambulate 150 ft modified independently and use of rolling walker  (Progressing)       Start:  03/01/24    Expected End:  03/22/24            Patient will ascend/descend 7 stairs with single UE support on 1 rail(s) modified independently and use of no assistive device  (Progressing)       Start:  03/01/24    Expected End:  03/22/24                   Education Documentation  Precautions, taught by Macy Hancock, PT at 3/1/2024  1:24  PM.  Learner: Patient  Readiness: Acceptance  Method: Explanation  Response: Needs Reinforcement    Home Exercise Program, taught by Macy Hancock, PT at 3/1/2024  1:24 PM.  Learner: Patient  Readiness: Acceptance  Method: Explanation  Response: Needs Reinforcement    Mobility Training, taught by Macy Hancock, PT at 3/1/2024  1:24 PM.  Learner: Patient  Readiness: Acceptance  Method: Explanation  Response: Needs Reinforcement    Education Comments  No comments found.

## 2024-03-01 NOTE — PROGRESS NOTES
03/01/24 1444   Discharge Planning   Number of Stairs to Enter Residence 2   Do you have animals or pets at home? No   Patient expects to be discharged to: Home     Met with daughter Anitra and son-in-law at bedside. Patient off the floor for an echo.  Patient resides with her daughter and son-in-law in a bilevel house.  She ambulates with a walker. Patient does not require oxygen or cpap. Daughter does all of the cooking , cleaning and shopping.  Daughter also helps patient  with bathing. Patient had been driving up until a few weeks ago. POA is daughter Anitra.  Plan is to discharge home with daughter.  Would benefit from PT/OT.   [facility] will follow for needs and PT recommendations.  Daughter is open to SNF or HHC if recommended.

## 2024-03-01 NOTE — ED PROVIDER NOTES
HPI   Chief Complaint   Patient presents with    Shortness of Breath     Pt presents to ED with shortness of breath x1 day, PMH aortic valve replacement, chf and copd. Pt does not wear O2 at home, pt has dyspnea especially with exertion, denies CP       89-year-old female presented emergency department with a chief complaint of shortness of breath for 1 day.  She has a history of congestive heart failure.  She has not been taking her diuretic as it makes her frequently urinate.  She is coming from home.  She is hypoxic around 86 to 87% on arrival.  Typically does not wear supplemental oxygen.  She denies chest pain or pressure.  She denies fall or injury or trauma.  No other complaint.                          No data recorded                   Patient History   Past Medical History:   Diagnosis Date    Nonrheumatic aortic (valve) stenosis with insufficiency 2021    Aortic insufficiency with aortic stenosis    Personal history of other diseases of the circulatory system 2021    History of aortic valve stenosis     Past Surgical History:   Procedure Laterality Date    OTHER SURGICAL HISTORY  2021     section    OTHER SURGICAL HISTORY  2021    Tonsillectomy with adenoidectomy    OTHER SURGICAL HISTORY  2021    Femur fracture repair    OTHER SURGICAL HISTORY  2021    Arm surgery    OTHER SURGICAL HISTORY  2021    Hysterectomy    OTHER SURGICAL HISTORY  2021    Mass excision     No family history on file.  Social History     Tobacco Use    Smoking status: Never    Smokeless tobacco: Never   Substance Use Topics    Alcohol use: Never    Drug use: Never       Physical Exam   ED Triage Vitals [24 2310]   Temperature Heart Rate Respirations BP   37.2 °C (99 °F) 81 19 151/69      Pulse Ox Temp Source Heart Rate Source Patient Position   (!) 78 % Temporal Monitor --      BP Location FiO2 (%)     -- --       Physical Exam  Vitals and nursing note reviewed.    Constitutional:       Appearance: She is well-developed.   HENT:      Head: Normocephalic.   Eyes:      Pupils: Pupils are equal, round, and reactive to light.   Cardiovascular:      Rate and Rhythm: Normal rate and regular rhythm.      Comments: Bilateral lower extremity edema  Pulmonary:      Effort: Pulmonary effort is normal.      Comments: Crackles in lower lung field bilaterally  Abdominal:      Palpations: Abdomen is soft.   Musculoskeletal:         General: Normal range of motion.      Cervical back: Normal range of motion.   Skin:     General: Skin is warm.   Neurological:      General: No focal deficit present.      Mental Status: She is alert and oriented to person, place, and time.   Psychiatric:         Mood and Affect: Mood normal.         ED Course & MDM   Diagnoses as of 03/01/24 0138   CHF (congestive heart failure), NYHA class I, acute on chronic, combined (CMS/HCC)   Congestive heart failure, unspecified HF chronicity, unspecified heart failure type (CMS/HCC)   Hypoxia       Medical Decision Making  I have seen and evaluated this patient.  The attending physician has also seen and evaluated this patient.  Vital signs, laboratory testing and diagnostic images if applicable have been reviewed.  All laboratory and imaging is interpreted by myself unless otherwise stated.  Radiology studies are also formally interpreted by radiologist.    CBC without significant leukocytosis or anemia, metabolic panel without significant renal impairment or electrolyte abnormality.  Patient with elevated proBNP to 2500.  Clinically the patient has congestive heart failure.  Chest x-ray shows volume overload.  Diuretic given.  With patient's new supplemental oxygen requirement she will be hospitalized.  Admitted for further treatment management.    I have seen and evaluated this patient and independently provided 31 minutes of nonconcurrent critical care time. This does not include separately billable procedures.  Patient with high potential for deterioration, required frequent monitoring and assessment.    Labs Reviewed  CBC WITH AUTO DIFFERENTIAL - Abnormal     WBC                           7.7                    nRBC                          0.0                    RBC                           4.32                   Hemoglobin                    12.3                   Hematocrit                    39.5                   MCV                           91                     MCH                           28.5                   MCHC                          31.1 (*)               RDW                           15.5 (*)               Platelets                     257                    Neutrophils %                 76.4                   Immature Granulocytes %, Automated   0.3                    Lymphocytes %                 12.1                   Monocytes %                   9.9                    Eosinophils %                 1.2                    Basophils %                   0.1                    Neutrophils Absolute          5.90 (*)               Immature Granulocytes Absolute, Au*   0.02                   Lymphocytes Absolute          0.93                   Monocytes Absolute            0.76                   Eosinophils Absolute          0.09                   Basophils Absolute            0.01                COMPREHENSIVE METABOLIC PANEL - Abnormal     Glucose                       145 (*)                Sodium                        136                    Potassium                     4.5                    Chloride                      95 (*)                 Bicarbonate                   30                     Urea Nitrogen                 17                     Creatinine                    0.60                   eGFR                          83                     Calcium                       9.8                    Albumin                       4.2                    Alkaline Phosphatase          128 (*)                 Total Protein                 7.7                    AST                           29                     Bilirubin, Total              0.4                    ALT                           21                     Anion Gap                     11                  N-TERMINAL PROBNP - Abnormal     PROBNP                        2,589 (*)                 Narrative: Reference ranges are based on clinical submission data. These ranges represent the 95th percentile of normal cut-off points. As NT Pro- BNP values approach 1000 pg/ml, clinical symptoms are more likely associated with CHF.  SERIAL TROPONIN, INITIAL ([facility]) - Abnormal     Troponin T, High Sensitivity   28 (*)              SARS-COV-2 AND INFLUENZA A/B PCR - Normal     Flu A Result                                         Flu B Result                                         Coronavirus 2019, PCR                                  Narrative: This assay has received FDA Emergency Use Authorization (EUA) and  is only authorized for the duration of time that circumstances exist to justify the authorization of the emergency use of in vitro diagnostic tests for the detection of SARS-CoV-2 virus and/or diagnosis of COVID-19 infection under section 564(b)(1) of the Act, 21 U.S.C. 360bbb-3(b)(1). Testing for SARS-CoV-2 is only recommended for patients who meet current clinical and/or epidemiological criteria as defined by federal, state, or local public health directives. This assay is an in vitro diagnostic nucleic acid amplification test for the qualitative detection of SARS-CoV-2, Influenza A, and Influenza B from nasopharyngeal specimens and has been validated for use at [facility]. Negative results do not preclude COVID-19 infections or Influenza A/B infections, and should not be used as the sole basis for diagnosis, treatment, or other management decisions. If Influenza A/B and RSV PCR results are negative, testing for Parainfluenza virus,  Adenovirus and Metapneumovirus is routinely performed for [facility] pediatric oncology and intensive care inpatients, and is available on other patients by placing an add-on request.   TROPONIN T SERIES, HIGH SENSITIVITY (0, 2 HR, 6 HR)       Narrative: The following orders were created for panel order Troponin T Series, High Sensitivity (0, 2HR, 6HR).                Procedure                               Abnormality         Status                                   ---------                               -----------         ------                                   Serial Troponin, Initial...[[id number]]  Abnormal            Final result                             Serial Troponin, 2 Hour ...[[id number]]                                                                               Please view results for these tests on the individual orders.  SERIAL TROPONIN,  2 HOUR ([facility])  XR chest 2 views   Final Result    1.  Pulmonary vascular congestion with prominent, indistinct    interstitium and Kerley B-lines suggesting acute pulmonary    interstitial edema/CHF. Trace pleural effusions with associated    atelectasis. No pneumothorax.                      MACRO:    None          Signed by: Myles Sotomayor 3/1/2024 12:03 AM    Dictation workstation:   [id number]     Medications  furosemide (Lasix) injection 60 mg (60 mg intravenous Given 3/1/24 0033)  New Prescriptions  No medications on file            Procedure  Procedures     Chuckie Albert PA-C  03/01/24 0139       Chuckie Albert PA-C  03/01/24 0140       Chuckie Albert PA-C  03/01/24 0144

## 2024-03-01 NOTE — CARE PLAN
Problem: Pain  Goal: My pain/discomfort is manageable  Outcome: Progressing     Problem: Safety  Goal: Patient will be injury free during hospitalization  Outcome: Progressing     Problem: Daily Care  Goal: Daily care needs are met  Outcome: Progressing     Problem: Psychosocial Needs  Goal: Demonstrates ability to cope with hospitalization/illness  Outcome: Progressing     Problem: Discharge Barriers  Goal: My discharge needs are met  Outcome: Progressing       The clinical goals for the shift include decreased oxygen need      Patient alert and oriented. [place]. Admitted from ED today. Currently 3LNC. Echo.

## 2024-03-01 NOTE — CONSULTS
"Inpatient consult to Cardiology  Consult performed by: Vince Maddox, WANG-CNP  Consult ordered by: Jatinder Snell MD  Reason for consult: Acute diastolic congestive heart failure        History Of Present Illness:    Akua Almanzar is a 89 y.o. female presenting with acute dyspnea and peripheral edema.  No current cardiologist.  Patient recently moved to [place], she is set to establish care with Dr. Myles Brown.  Has a past medical history of severe aortic valve stenosis with TAVR in approximately 2020, hypertensive disorder, pulmonary hypertension, coronary artery disease, hyperlipidemia, hypothyroidism.  Patient states progressive peripheral edema as well as shortness of breath prompting hospitalization.  Reportedly she has trouble with incontinence and does not often take her diuretics as this causes worsening of her incontinence.  She reports she is still fairly active and this is an inconvenience to her.  Reports no chest pain or near-syncope or syncope.  EKG in emergency department a sinus rhythm with left bundle branch block.  proBNP 2589.  High-sensitivity opponent 28 and 23.  X-ray with evidence of fluid volume overload.  Patient received IV furosemide in the emergency department and was admitted on telemetry for further testing and treatment.     Last Recorded Vitals:  Vitals:    03/01/24 0300 03/01/24 0330 03/01/24 0400 03/01/24 0430   BP: (!) 149/98 133/80 118/73 138/76   Pulse: 78 69 68 68   Resp: 16      Temp:       TempSrc:       SpO2: (!) 92% 95% (!) 93% (!) 93%   Weight:       Height:           Last Labs:  CBC - 2/29/2024: 11:36 PM  7.7 12.3 257    39.5      CMP - 2/29/2024: 11:36 PM  9.8 7.7 29 --- 0.4   _ 4.2 21 128      PTT - No results in last year.  _   _ _     No results found for: \"TROPHS\", \"BNP\", \"HGBA1C\", \"LDLCALC\", \"VLDL\"   Last I/O:  I/O last 3 completed shifts:  In: - (0 mL/kg)   Out: 1500 (20.7 mL/kg) [Urine:1500 (0.6 mL/kg/hr)]  Weight: 72.6 kg     Past Cardiology Tests " (Last 3 Years):  EKG: ECG 12 lead 03/01/2024 (Preliminary): Sinus rhythm with left bundle branch block    Echo: Ejection fraction 65 to 70% with diastolic dysfunction, moderate left ventricular hypertrophy, increased left atrial dilatation and moderate valve stenosis with evidence of previous TAVR    Past Medical History:  severe aortic valve stenosis with TAVR in approximately 2020  hypertensive disorder  pulmonary hypertension  coronary artery disease  Hyperlipidemia  hypothyroidism    Past Surgical History:  She has a past surgical history that includes Other surgical history (02/12/2021); Other surgical history (02/12/2021); Other surgical history (02/12/2021); Other surgical history (02/12/2021); Other surgical history (02/12/2021); and Other surgical history (02/12/2021).      Social History:  She reports that she has never smoked. She has never used smokeless tobacco. She reports that she does not drink alcohol and does not use drugs.    Family History:  No family history on file.     Allergies:  Adhesive, Hydrocodone, and Lamotrigine    Inpatient Medications:  Scheduled medications   Medication Dose Route Frequency    furosemide  40 mg intravenous Once     PRN medications   Medication     Continuous Medications   Medication Dose Last Rate     Outpatient Medications:  Current Outpatient Medications   Medication Instructions    amLODIPine (Norvasc) 2.5 mg tablet 1 tablet, oral, Daily    ascorbic acid (Vitamin C) 500 mg/5 mL liquid as directed Orally    aspirin 81 mg EC tablet 1 tablet, oral, Daily    b complex (B Complex 1, with folic acid,) 0.4 mg tablet as directed Orally    carvedilol (Coreg) 6.25 mg tablet Take 1 tablet twice a day by oral route.    cholecalciferol (Vitamin D-3) 25 MCG (1000 UT) capsule oral    clopidogrel (Plavix) 75 mg tablet 1 tablet, oral, Daily    co-enzyme Q-10 30 mg, oral, Daily    cyanocobalamin (Vitamin B-12) 1,000 mcg tablet 1 tablet, oral, Daily PRN    furosemide (LASIX) 20 mg,  oral    garlic 1,000 mg capsule oral    levothyroxine (Synthroid, Levoxyl) 88 mcg tablet 1 tablet, oral, Daily    meclizine (Antivert) 12.5 mg tablet 1 tablet, oral, 3 times daily PRN    melatonin-pyridoxine HCl, B6, 3-1 mg tablet 1 tablet, oral, Daily    multivit-min-iron-FA-vit K-lut (Centrum Silver Women) 8 mg iron-400 mcg-50 mcg tablet 1 tablet, oral, Daily    omega 3-dha-epa-fish oil 1,000 mg (250 mg-750 mg)/5 mL liquid Every 24 hours    pyridoxine (Vitamin B-6) 100 mg tablet 1 tablet, oral, Daily    simvastatin (Zocor) 20 mg tablet 1 tablet, oral, Nightly       Physical Exam:  General: alert, oriented x 3, very pleasant  HEENT: normal cephalic, atraumatic  Neck: No JVD, bruit or thrill, masses or tenderness   Heart: S1/S2, Rate 70, Rhythm regular, no s3 or s4, 2 out of 6 aortic systolic murmur, no thrill, or heaves at PMI.   Lungs: Significant diminished in bilateral bases, no significant crackles.  Oxygen via nasal cannula.  Mild conversational dyspnea is appreciated   Abdomen: bowel sounds x 4, soft, non-tender   Genitourinary: deferred   Extremities: +1 to +2 bilateral lower extremity pitting edema       Assessment/Plan     Acute on chronic diastolic heart failure  Aortic valve stenosis with history of TAVR  Hypertensive disorder  Coronary disease  Hyperlipidemia  Hypothyroidism  Pulmonary hypertension    Overall impression:    3/1: Patient has recently transferred to our area and has yet to establish care with a cardiologist.  She will follow-up with Dr. Brown in the outpatient setting.  Does have a history of diastolic heart failure with a preserved ejection fraction most recently in 2021.  Additionally prior to that had a severe arctic valve stenosis and underwent a TAVR in approximately 2020 with Dr. Bedolla at [facility].  Patient states trouble with stress incontinence and reports that she has not taken her Lasix as prescribed.  Present with shortness of breath as well as peripheral edema.   Chest x-ray certainly confirms congestive heart failure.  Will consult cardiac rehab and heart failure coordinator.  Continue with IV diuresis.  Creatinine stable at 0.6.  Will check echocardiogram.  Continue on oxygen at this time.  Low-sodium diet.  Blood pressure is stable.  No significant vents on telemetry.  Chest pain-free.  Will follow with you.      Code Status:  Full Code    I spent 60 minutes in the professional and overall care of this patient.        Vince Maddox, WANG-CNP

## 2024-03-01 NOTE — NURSING NOTE
CHF education/book given to patient/family. Discussed low sodium diet, importance of daily weights, following up with physician appointments, taking medications as prescribed.

## 2024-03-01 NOTE — ED NOTES
Pt desat to 80% moved up to 5L NC still down to 83% Pushed up to 6L and probe moved still at 87% respiratory called.      Xenia Lentz RN  03/01/24 6316

## 2024-03-01 NOTE — PROGRESS NOTES
Pharmacy Medication History Review    Akua Almanzar is a 89 y.o. female admitted for CHF (congestive heart failure), NYHA class I, acute on chronic, combined (CMS/[facility]). Pharmacy reviewed the patient's [id number] medications and allergies for accuracy.    Medications ADDED:  Acidophilus   Vitamin C chewable   Medications CHANGED:  N/A  Medications REMOVED:   Vitamin C liquid     The list below reflects the updated PTA list. Comments regarding how patient may be taking medications differently can be found in the Admit Orders Activity  Prior to Admission Medications   Prescriptions Last Dose Informant   acidophilus-pectin, citrus 100 million cell-10 mg capsule     Sig: Take by mouth.   amLODIPine (Norvasc) 2.5 mg tablet  Child, Family Member   Sig: Take 1 tablet (2.5 mg) by mouth once daily.   ascorbic acid (Vitamin C) 250 MG chewable tablet  Child, Family Member   Sig: Chew 2 tablets (500 mg). Reports chewable 500mg   ascorbic acid (Vitamin C) 500 mg/5 mL liquid  Child, Family Member   Sig: as directed Orally   aspirin 81 mg EC tablet  Child, Family Member   Sig: Take 1 tablet (81 mg) by mouth once daily.   b complex (B Complex 1, with folic acid,) 0.4 mg tablet  Child, Family Member   Sig: as directed Orally   carvedilol (Coreg) 6.25 mg tablet  Child, Family Member   Sig: Take 1 tablet twice a day by oral route.   cholecalciferol (Vitamin D-3) 25 MCG (1000 UT) capsule  Child, Family Member   Sig: Take by mouth.   clopidogrel (Plavix) 75 mg tablet  Child, Family Member   Sig: Take 1 tablet (75 mg) by mouth once daily.   co-enzyme Q-10 30 mg capsule  Child, Family Member   Sig: Take 1 capsule (30 mg) by mouth once daily.   cyanocobalamin (Vitamin B-12) 1,000 mcg tablet  Child, Family Member   Sig: Take 1 tablet (1,000 mcg) by mouth once daily as needed.   furosemide (Lasix) 20 mg tablet  Child, Family Member   Sig: Take 1 tablet (20 mg) by mouth.   garlic 1,000 mg capsule  Child, Family Member   Sig: Take by  mouth.   levothyroxine (Synthroid, Levoxyl) 88 mcg tablet  Child, Family Member   Sig: Take 1 tablet (88 mcg) by mouth once daily.   meclizine (Antivert) 12.5 mg tablet  Child, Family Member   Sig: Take 1 tablet (12.5 mg) by mouth 3 times a day as needed.   melatonin-pyridoxine HCl, B6, 3-1 mg tablet  Child, Family Member   Sig: Take 1 tablet by mouth once daily.   multivit-min-iron-FA-vit K-lut (Centrum Silver Women) 8 mg iron-400 mcg-50 mcg tablet  Child, Family Member   Sig: Take 1 tablet by mouth once daily.   omega 3-dha-epa-fish oil 1,000 mg (250 mg-750 mg)/5 mL liquid  Child, Family Member   Sig: once every 24 hours.   pyridoxine (Vitamin B-6) 100 mg tablet  Child, Family Member   Sig: Take 1 tablet (100 mg) by mouth once daily.   simvastatin (Zocor) 20 mg tablet  Child, Family Member   Sig: Take 1 tablet (20 mg) by mouth once daily at bedtime.      Facility-Administered Medications: None        The list below reflects the updated allergy list. Please review each documented allergy for additional clarification and justification.  Allergies  Reviewed by Edwige Garza RN on 2/29/2024        Severity Reactions Comments    Adhesive Medium Rash Burns skin    Hydrocodone Not Specified Other     Lamotrigine Not Specified Other             Pharmacy has been updated to [facility]. Could not confirm bedside delivery.    Sources used to complete the med history include PTA medication list, dispense history, prior visit summaries, patient interview (poor historian), relative interview. Relative is a fair historian.    Below are additional concerns with the patient's PTA list.  Patient also takes CuraMed for bone health, and Neurocalm. Unavailable to add to medication list.  Patient relative reports patient is not compliant with Furosemide. She does not take when leaving the house.    Ladonna Samano Holden  Please reach out via [employer] Secure Chat for questions

## 2024-03-02 LAB
ANION GAP SERPL CALC-SCNC: 8 MMOL/L
BUN SERPL-MCNC: 17 MG/DL (ref 8–25)
CALCIUM SERPL-MCNC: 9 MG/DL (ref 8.5–10.4)
CHLORIDE SERPL-SCNC: 94 MMOL/L (ref 97–107)
CO2 SERPL-SCNC: 35 MMOL/L (ref 24–31)
CREAT SERPL-MCNC: 0.8 MG/DL (ref 0.4–1.6)
EGFRCR SERPLBLD CKD-EPI 2021: 68 ML/MIN/1.73M*2
ERYTHROCYTE [DISTWIDTH] IN BLOOD BY AUTOMATED COUNT: 14.9 % (ref 11.5–14.5)
GLUCOSE SERPL-MCNC: 86 MG/DL (ref 65–99)
HCT VFR BLD AUTO: 34.9 % (ref 36–46)
HGB BLD-MCNC: 10.9 G/DL (ref 12–16)
MCH RBC QN AUTO: 28.4 PG (ref 26–34)
MCHC RBC AUTO-ENTMCNC: 31.2 G/DL (ref 32–36)
MCV RBC AUTO: 91 FL (ref 80–100)
NRBC BLD-RTO: 0 /100 WBCS (ref 0–0)
PLATELET # BLD AUTO: 255 X10*3/UL (ref 150–450)
POTASSIUM SERPL-SCNC: 3.6 MMOL/L (ref 3.4–5.1)
RBC # BLD AUTO: 3.84 X10*6/UL (ref 4–5.2)
SODIUM SERPL-SCNC: 137 MMOL/L (ref 133–145)
WBC # BLD AUTO: 6.4 X10*3/UL (ref 4.4–11.3)

## 2024-03-02 PROCEDURE — 1200000002 HC GENERAL ROOM WITH TELEMETRY DAILY

## 2024-03-02 PROCEDURE — 36415 COLL VENOUS BLD VENIPUNCTURE: CPT | Performed by: NURSE PRACTITIONER

## 2024-03-02 PROCEDURE — 2500000002 HC RX 250 W HCPCS SELF ADMINISTERED DRUGS (ALT 637 FOR MEDICARE OP, ALT 636 FOR OP/ED): Performed by: INTERNAL MEDICINE

## 2024-03-02 PROCEDURE — 2500000004 HC RX 250 GENERAL PHARMACY W/ HCPCS (ALT 636 FOR OP/ED): Performed by: NURSE PRACTITIONER

## 2024-03-02 PROCEDURE — 82374 ASSAY BLOOD CARBON DIOXIDE: CPT | Performed by: NURSE PRACTITIONER

## 2024-03-02 PROCEDURE — 2500000001 HC RX 250 WO HCPCS SELF ADMINISTERED DRUGS (ALT 637 FOR MEDICARE OP): Performed by: NURSE PRACTITIONER

## 2024-03-02 PROCEDURE — 99232 SBSQ HOSP IP/OBS MODERATE 35: CPT

## 2024-03-02 PROCEDURE — 85027 COMPLETE CBC AUTOMATED: CPT | Performed by: NURSE PRACTITIONER

## 2024-03-02 PROCEDURE — 2500000001 HC RX 250 WO HCPCS SELF ADMINISTERED DRUGS (ALT 637 FOR MEDICARE OP): Performed by: INTERNAL MEDICINE

## 2024-03-02 RX ORDER — POLYETHYLENE GLYCOL 3350 17 G/17G
17 POWDER, FOR SOLUTION ORAL DAILY PRN
Status: DISCONTINUED | OUTPATIENT
Start: 2024-03-02 | End: 2024-03-07 | Stop reason: HOSPADM

## 2024-03-02 RX ORDER — ONDANSETRON HYDROCHLORIDE 2 MG/ML
4 INJECTION, SOLUTION INTRAVENOUS EVERY 6 HOURS PRN
Status: DISCONTINUED | OUTPATIENT
Start: 2024-03-02 | End: 2024-03-07 | Stop reason: HOSPADM

## 2024-03-02 RX ORDER — DOCUSATE SODIUM 100 MG/1
100 CAPSULE, LIQUID FILLED ORAL 2 TIMES DAILY
Status: DISCONTINUED | OUTPATIENT
Start: 2024-03-02 | End: 2024-03-07 | Stop reason: HOSPADM

## 2024-03-02 RX ADMIN — Medication 1 TABLET: at 21:39

## 2024-03-02 RX ADMIN — SIMVASTATIN 20 MG: 20 TABLET, FILM COATED ORAL at 21:39

## 2024-03-02 RX ADMIN — Medication 1 TABLET: at 08:28

## 2024-03-02 RX ADMIN — ENOXAPARIN SODIUM 40 MG: 100 INJECTION SUBCUTANEOUS at 08:28

## 2024-03-02 RX ADMIN — DOCUSATE SODIUM 100 MG: 100 CAPSULE, LIQUID FILLED ORAL at 10:53

## 2024-03-02 RX ADMIN — CARVEDILOL 6.25 MG: 6.25 TABLET, FILM COATED ORAL at 08:28

## 2024-03-02 RX ADMIN — CLOPIDOGREL BISULFATE 75 MG: 75 TABLET ORAL at 08:28

## 2024-03-02 RX ADMIN — OXYCODONE HYDROCHLORIDE AND ACETAMINOPHEN 500 MG: 500 TABLET ORAL at 08:28

## 2024-03-02 RX ADMIN — CARVEDILOL 6.25 MG: 6.25 TABLET, FILM COATED ORAL at 16:53

## 2024-03-02 RX ADMIN — FUROSEMIDE 40 MG: 10 INJECTION, SOLUTION INTRAMUSCULAR; INTRAVENOUS at 08:28

## 2024-03-02 RX ADMIN — ASPIRIN 81 MG: 81 TABLET, COATED ORAL at 08:28

## 2024-03-02 RX ADMIN — FUROSEMIDE 40 MG: 10 INJECTION, SOLUTION INTRAMUSCULAR; INTRAVENOUS at 21:39

## 2024-03-02 RX ADMIN — LEVOTHYROXINE SODIUM 88 MCG: 0.09 TABLET ORAL at 06:30

## 2024-03-02 RX ADMIN — DOCUSATE SODIUM 100 MG: 100 CAPSULE, LIQUID FILLED ORAL at 21:39

## 2024-03-02 RX ADMIN — AMLODIPINE BESYLATE 2.5 MG: 2.5 TABLET ORAL at 08:28

## 2024-03-02 RX ADMIN — CHOLECALCIFEROL TAB 25 MCG (1000 UNIT) 1000 UNITS: 25 TAB at 08:28

## 2024-03-02 ASSESSMENT — PAIN SCALES - GENERAL
PAINLEVEL_OUTOF10: 0 - NO PAIN

## 2024-03-02 ASSESSMENT — COGNITIVE AND FUNCTIONAL STATUS - GENERAL
WALKING IN HOSPITAL ROOM: A LOT
CLIMB 3 TO 5 STEPS WITH RAILING: A LOT
DRESSING REGULAR UPPER BODY CLOTHING: A LITTLE
MOVING TO AND FROM BED TO CHAIR: A LITTLE
PERSONAL GROOMING: A LITTLE
MOBILITY SCORE: 16
DRESSING REGULAR LOWER BODY CLOTHING: A LITTLE
HELP NEEDED FOR BATHING: A LITTLE
MOVING FROM LYING ON BACK TO SITTING ON SIDE OF FLAT BED WITH BEDRAILS: A LITTLE
DAILY ACTIVITIY SCORE: 18
TURNING FROM BACK TO SIDE WHILE IN FLAT BAD: A LITTLE
STANDING UP FROM CHAIR USING ARMS: A LITTLE
EATING MEALS: A LITTLE
TOILETING: A LITTLE

## 2024-03-02 NOTE — CONSULTS
"Nutrition Assessement Note Pt seen per CHF dx. States she tries  follows a low Na diet at home, per MD notes, may not have been taking diuretics     Nutrition Assessment    Reason for Assessment: Dietitian discretion    Reason for Hospital Admission:  Akua Almanzar is a 89 y.o. female who is admitted for acute on chronic congestive heart failure    Nutrition History:        Food Allergies/Intolerances:  None  GI Symptoms: None  Oral Problems: None    Anthropometrics:  Ht: 154.9 cm (5' 1\"), Wt: 72.6 kg (160 lb), BMI: 30.25  IBW/kg (Dietitian Calculated): 48 kg  Percent of IBW: 150 %  Adjusted Body Weight (kg): 54 kg    Weight Change:  Daily Weight  02/29/24 : 72.6 kg (160 lb)  02/23/24 : 82.6 kg (182 lb)  04/26/21 : 71.2 kg (157 lb)  02/15/21 : 72.1 kg (159 lb)                   Nutrition Focused Physical Exam Findings:   Subcutaneous Fat Loss  Orbital Fat Pads: Well nourshed (slightly bulging fat pads)  Buccal Fat Pads: Well nourished (full, rounded cheeks)    Muscle Wasting  Temporalis: Well nourished (well-defined muscle)    Edema  Edema: +2 mild  Edema Location: BLE     Nutrition Significant Labs:  Lab Results   Component Value Date    WBC 6.4 03/02/2024    HGB 10.9 (L) 03/02/2024    HCT 34.9 (L) 03/02/2024     03/02/2024    ALT 21 02/29/2024    AST 29 02/29/2024     03/02/2024    K 3.6 03/02/2024    CL 94 (L) 03/02/2024    CREATININE 0.80 03/02/2024    BUN 17 03/02/2024    CO2 35 (H) 03/02/2024    TSH 1.13 02/29/2024    INR 1.2 (H) 04/10/2021       Current Facility-Administered Medications:     acetaminophen (Tylenol) tablet 650 mg, 650 mg, oral, q6h PRN, DELILAH Deleon    amLODIPine (Norvasc) tablet 2.5 mg, 2.5 mg, oral, Daily, DELILAH Deleon, 2.5 mg at 03/02/24 0828    ascorbic acid (Vitamin C) tablet 500 mg, 500 mg, oral, Daily, WANG Deleon-CNP, 500 mg at 03/02/24 0828    aspirin EC tablet 81 mg, 81 mg, oral, Daily, Jatinder Snell MD, 81 mg at 03/02/24 " 0828    carvedilol (Coreg) tablet 6.25 mg, 6.25 mg, oral, BID with meals, Jatinder Snell MD, 6.25 mg at 03/02/24 0828    cholecalciferol (Vitamin D-3) tablet 1,000 Units, 1,000 Units, oral, Daily, DELILAH Deleon, 1,000 Units at 03/02/24 0828    clopidogrel (Plavix) tablet 75 mg, 75 mg, oral, Daily, Jatinder Snell MD, 75 mg at 03/02/24 0828    docusate sodium (Colace) capsule 100 mg, 100 mg, oral, BID, DELILAH Deleon, 100 mg at 03/02/24 1053    enoxaparin (Lovenox) syringe 40 mg, 40 mg, subcutaneous, Daily, DELILAH Deleon, 40 mg at 03/02/24 0828    furosemide (Lasix) injection 40 mg, 40 mg, intravenous, q12h, DELILAH Alarcon, 40 mg at 03/02/24 0828    ipratropium-albuteroL (Duo-Neb) 0.5-2.5 mg/3 mL nebulizer solution 3 mL, 3 mL, nebulization, q2h PRN, DELILAH Deleon    lactobacillus acidophilus tablet 1 tablet, 1 tablet, oral, BID, DELILAH Deleon, 1 tablet at 03/02/24 0828    levothyroxine (Synthroid, Levoxyl) tablet 88 mcg, 88 mcg, oral, Daily, Jatinder Snell MD, 88 mcg at 03/02/24 0630    ondansetron (Zofran) injection 4 mg, 4 mg, intravenous, q6h PRN, DELILAH Deleon    oxygen (O2) therapy, , inhalation, Continuous PRN - O2/gases, DELILAH Deleon    polyethylene glycol (Glycolax, Miralax) packet 17 g, 17 g, oral, Daily PRN, DELILAH Deleon    simvastatin (Zocor) tablet 20 mg, 20 mg, oral, Nightly, Jatinder Snell MD, 20 mg at 03/01/24 2126    Dietary Orders (From admission, onward)       Start     Ordered    03/01/24 0204  Adult diet Regular, Cardiac; 70 gm fat; 2 - 3 grams Sodium  Diet effective now        Question Answer Comment   Diet type Regular    Diet type Cardiac    Fat restriction: 70 gm fat    Sodium restriction: 2 - 3 grams Sodium        03/01/24 0206                   Estimated Needs:   Estimated Energy Needs  Total Energy Estimated Needs (kCal): 1350 kCal  Total Estimated Energy Need  per Day (kCal/kg): 25 kCal/kg  Method for Estimating Needs: adj wt    Estimated Protein Needs  Total Protein Estimated Needs (g): 65 g  Total Protein Estimated Needs (g/kg): 1.2 g/kg  Method for Estimating Needs: adj wt    Estimated Fluid Needs  Total Fluid Estimated Needs (mL): 1350 mL  Total Fluid Estimated Needs (mL/kg): 1 mL/kg  Method for Estimating Needs: adj wt      Nutrition Diagnosis   Nutrition Diagnosis:  Malnutrition Diagnosis  Patient has Malnutrition Diagnosis: No    Nutrition Diagnosis  Patient has Nutrition Diagnosis: No     Nutrition Interventions/Recommendations   Nutrition Interventions and Recommendations:    Nutrition Prescription:  Individualized Nutrition Prescription Provided for : 1350 calories, 65gm protein low Na    Nutrition Interventions:   Food and/or Nutrient Delivery Interventions  Interventions: Meals and snacks  Meals and Snacks: Mineral-modified diet  Goal: Low Na diet    Education Documentation  No documentation found.       Nutrition Monitoring and Evaluation   Monitoring/Evaluation:   Food/Nutrient Related History Monitoring  Monitoring and Evaluation Plan: Energy intake  Energy Intake: Estimated energy intake  Criteria: Pt to consume >/=75% estimated energy needs                 Time Spent/Follow-up:   Follow Up  Time Spent (min): 25 minutes  Last Date of Nutrition Visit: 03/02/24  Nutrition Follow-Up Needed?: 5-7 days  Follow up Comment: 3/6/24

## 2024-03-02 NOTE — CONSULTS
Consults    Reason For Consult  Heel pain    History Of Present Illness  Akua Almanzar is a 89 y.o. female presenting with congestive heart failure. Podiatry was consulted for heel pain. During encounter, pt denies heel pain but does note that her right foot was itchy as of late. Pt notes that she is seeing her primary doctor for this issue and has been prescribed hydrocortisone cream which has helped. Pt has no other pedal complaints and denies constitutional symptoms.     Past Medical History  She has a past medical history of Nonrheumatic aortic (valve) stenosis with insufficiency (02/11/2021) and Personal history of other diseases of the circulatory system (02/11/2021).    Surgical History  She has a past surgical history that includes Other surgical history (02/12/2021); Other surgical history (02/12/2021); Other surgical history (02/12/2021); Other surgical history (02/12/2021); Other surgical history (02/12/2021); and Other surgical history (02/12/2021).     Social History  She reports that she has never smoked. She has never used smokeless tobacco. She reports that she does not drink alcohol and does not use drugs.    Family History  No family history on file.     Allergies  Adhesive, Hydrocodone, and Lamotrigine    Review of Systems    REVIEW OF SYSTEMS  GENERAL:  Negative for malaise, significant weight loss, fever  HEENT:  No changes in hearing or vision, no nose bleeds or other nasal problems and Negative for frequent or significant headaches  NECK:  Negative for lumps, goiter, pain and significant neck swelling  RESPIRATORY:  Negative for cough, wheezing and shortness of breath  CARDIOVASCULAR:  Negative for chest pain, leg swelling and palpitations  GI:  Negative for abdominal discomfort, blood in stools or black stools and change in bowel habits  :  Negative for dysuria, frequency and incontinence  MUSCULOSKELETAL:  Negative for joint pain or swelling, back pain, and muscle pain.  SKIN:  Mild  "itching to right foot  PSYCH:  Negative for sleep disturbance, mood disorder and recent psychosocial stressors  HEMATOLOGY/LYMPHOLOGY:  Negative for prolonged bleeding, bruising easily, and swollen nodes.  ENDOCRINE:  Negative for cold or heat intolerance, polyuria, polydipsia and goiter  NEURO: Negative, denies any burning, tingling or numbness     Objective:   Vasc: DP and PT pulses are palpable bilateral.  CFT is less than 3 seconds bilateral.  Skin temperature is warm to cool proximal to distal bilateral.  Mild edema noted to bilateral lower extremities. Negative pain upon compression of bilateral calf    Neuro:  Light touch is intact to the foot bilateral.  Protective sensation is intact to the foot when tested with the 5.07 SWM bilateral.  There is no clonus noted.  The hallux is downgoing bilateral.      Derm: Nails 1-5 bilateral are intact.  Skin is supple with normal texture and turgor noted.  Webspaces are clean, dry and intact bilateral.  Mild rash noted to medial dorsum right midfoot. No open lesions. No clinical signs of infection     Ortho: Muscle strength is 4/5 for all pedal groups tested.  Ankle joint, subtalar joint, 1st MPJ and lesser MPJ ROM is full and without pain or crepitus.  The foot type is rectus bilateral off weight bearing.  There are no structural deformities noted. Diffuse pain to bilateral foot and leg.       Physical Exam     Last Recorded Vitals  Blood pressure 118/68, pulse 65, temperature 36.4 °C (97.5 °F), temperature source Oral, resp. rate 18, height 1.549 m (5' 1\"), weight 72.6 kg (160 lb), SpO2 94 %.    Relevant Results       Assessment/Plan     # Dermatitis, unspecified L30.9  # Pain in right foot M79.671  # Pain in left foot M79.672    Plan:  - Patient was seen at bedside and is resting comfortably.  A total of 45 minutes of face-to-face time was spent on this patient for professional services including but not limited to obtaining medially appropriate history, performing " physical examination, collecting and explaining pertinent findings to patient, discussing relevant and viable treatment options, making appropriate level of medical decision, and coordinating care and facilitating treatment.  The end of the encounter was spent educating patient and family on treatment plan. All questions and concerns were answered and addressed to the pt's apparent satisfaction.  - Labs reviewed.  - Imaging reviewed.    - Continue pain management per Medicine/Primary  - Dermatitis noted to right midfoot. Pt is currently being seen by her primary who has prescribed her a hydrocortisone cream. She noted relief with application of the cream.  - No acute surgical intervention per podiatry.  - Podiatry will be signing off  - Pt may follow up with Dr. Keara Davis for any podiatric issues at [facility]  - All questions and concerns were answered and addressed to the patient and patient's family apparent satisfaction  - Treatment and plan was discussed with attending Dr. Keara Davis DPM    Case to be discussed with attending, A&P above reflects a tentative plan. Please await for the final signature from the attending physician on service.     Nuno Valdes DPM PGY-3  Podiatric Medicine & Surgery  Please Haiku message me with any questions or concerns.

## 2024-03-02 NOTE — PROGRESS NOTES
"Akua Almanzar is a 89 y.o. female on day 1 of admission presenting with Acute on chronic diastolic (congestive) heart failure (CMS/HCC).    Subjective   Patient resting in bed. Denies chest pain, abdominal pain, fevers, chills. Feels breathing improved, but hasn't been out of bed.        Objective     Physical Exam  Constitutional:       Appearance: Normal appearance.   HENT:      Head: Normocephalic and atraumatic.      Mouth/Throat:      Mouth: Mucous membranes are moist.      Pharynx: Oropharynx is clear.   Eyes:      Extraocular Movements: Extraocular movements intact.      Conjunctiva/sclera: Conjunctivae normal.      Pupils: Pupils are equal, round, and reactive to light.   Cardiovascular:      Rate and Rhythm: Normal rate and regular rhythm.      Pulses: Normal pulses.      Heart sounds: Normal heart sounds.   Pulmonary:      Effort: Pulmonary effort is normal.      Breath sounds: Rales present.   Abdominal:      General: Bowel sounds are normal.      Palpations: Abdomen is soft.      Tenderness: There is no abdominal tenderness.   Musculoskeletal:      Cervical back: Normal range of motion and neck supple.      Right lower leg: Edema present.      Left lower leg: Edema present.   Skin:     General: Skin is warm and dry.      Capillary Refill: Capillary refill takes less than 2 seconds.   Neurological:      General: No focal deficit present.      Mental Status: She is alert and oriented to person, place, and time.   Psychiatric:         Mood and Affect: Mood normal.         Behavior: Behavior normal.         Last Recorded Vitals  Blood pressure 116/68, pulse 67, temperature 36.6 °C (97.9 °F), temperature source Oral, resp. rate 18, height 1.549 m (5' 1\"), weight 72.6 kg (160 lb), SpO2 95 %.  Intake/Output last 3 Shifts:  I/O last 3 completed shifts:  In: 240 (3.3 mL/kg) [P.O.:240]  Out: 1850 (25.5 mL/kg) [Urine:1850 (0.7 mL/kg/hr)]  Weight: 72.6 kg     Relevant Results  Results for orders placed or " performed during the hospital encounter of 02/29/24 (from the past 24 hour(s))   Serial Troponin, 6 Hour ([facility])   Result Value Ref Range    Troponin T, High Sensitivity 29 (HH) <=14 ng/L   Basic Metabolic Panel   Result Value Ref Range    Glucose 93 65 - 99 mg/dL    Sodium 139 133 - 145 mmol/L    Potassium 3.6 3.4 - 5.1 mmol/L    Chloride 96 (L) 97 - 107 mmol/L    Bicarbonate 35 (H) 24 - 31 mmol/L    Urea Nitrogen 14 8 - 25 mg/dL    Creatinine 0.60 0.40 - 1.60 mg/dL    eGFR 83 >60 mL/min/1.73m*2    Calcium 8.9 8.5 - 10.4 mg/dL    Anion Gap 8 <=19 mmol/L   Transthoracic Echo (TTE) Complete   Result Value Ref Range    AV pk denis 2.13 m/s    LVOT diam 1.70 cm    AV mn grad 8.0 mmHg    MV E/A ratio 0.76     Tricuspid annular plane systolic excursion 2.0 cm    LV biplane EF 56 %    LA vol index A/L 46.9 ml/m2    MV avg E/e' ratio 35.98     RV free wall pk S' 18.20 cm/s    RVSP 58.7 mmHg    LVIDd 3.88 cm    AV pk grad 18.1 mmHg    Aortic Valve Area by Continuity of VTI 1.51 cm2    Aortic Valve Area by Continuity of Peak Velocity 1.29 cm2    LV A4C EF 62.2    Basic Metabolic Panel   Result Value Ref Range    Glucose 86 65 - 99 mg/dL    Sodium 137 133 - 145 mmol/L    Potassium 3.6 3.4 - 5.1 mmol/L    Chloride 94 (L) 97 - 107 mmol/L    Bicarbonate 35 (H) 24 - 31 mmol/L    Urea Nitrogen 17 8 - 25 mg/dL    Creatinine 0.80 0.40 - 1.60 mg/dL    eGFR 68 >60 mL/min/1.73m*2    Calcium 9.0 8.5 - 10.4 mg/dL    Anion Gap 8 <=19 mmol/L   CBC   Result Value Ref Range    WBC 6.4 4.4 - 11.3 x10*3/uL    nRBC 0.0 0.0 - 0.0 /100 WBCs    RBC 3.84 (L) 4.00 - 5.20 x10*6/uL    Hemoglobin 10.9 (L) 12.0 - 16.0 g/dL    Hematocrit 34.9 (L) 36.0 - 46.0 %    MCV 91 80 - 100 fL    MCH 28.4 26.0 - 34.0 pg    MCHC 31.2 (L) 32.0 - 36.0 g/dL    RDW 14.9 (H) 11.5 - 14.5 %    Platelets 255 150 - 450 x10*3/uL     Transthoracic Echo (TTE) Complete    Result Date: 3/1/2024           [place] [id number]             Phone [phone] TRANSTHORACIC ECHOCARDIOGRAM REPORT  Patient Name:     DUNG FOSTER CROSS      Reading Physician:   [id number] Aryan Sara RESTREPO Study Date:       3/1/2024             Ordering Provider:   [id number] JANNA ROSE MRN/PID:          [id number]             Fellow: Accession#:       [id number]         Nurse: Date of           3/27/1929 / 89 years Sonographer:         Germania Tenorio RDCS Birth/Age: Gender:           F                    Additional Staff: Height:           154.00 cm            Admit Date: Weight:           72.00 kg             Admission Status:    Inpatient - Routine BSA / BMI:        1.70 m2 / 30.36      Department Location: [facility]                   kg/m2 Blood Pressure: 144 /82 mmHg Study Type:    TRANSTHORACIC ECHO (TTE) COMPLETE Diagnosis/ICD: Nonrheumatic aortic (valve) stenosis-I35.0 Indication:    chf CPT Codes:     Echo Complete w Full [id number] Patient History: Pertinent History: CHF and HTN. TAVR, Mv stenosis, ashd, pulm ht,tia 2020. Study Detail: The following Echo studies were performed: 2D, M-Mode, Doppler and               color flow. Technically challenging study due to prominent lung               artifact and patient lying in supine position.  PHYSICIAN INTERPRETATION: Left Ventricle: Left ventricular systolic function is normal. There are no regional wall motion abnormalities. The left ventricular cavity size is normal. There is moderate concentric left ventricular hypertrophy. Spectral Doppler shows an impaired relaxation pattern of left ventricular diastolic filling. Left Atrium: The left atrium is moderately dilated. Right Ventricle: The right ventricle is normal in size. There is normal right ventricular global systolic function. Right Atrium: The right atrium is normal in size. Aortic Valve: There is a prosthetic aortic valve present. There is no evidence of aortic valve regurgitation. The peak  instantaneous gradient of the aortic valve is 18.1 mmHg. The mean gradient of the aortic valve is 8.0 mmHg. TAVR function normal. Mitral Valve: The mitral valve is moderately thickened. There is moderate to severe calcification of the anterior and posterior mitral valve leaflets. There is evidence of moderate mitral valve stenosis. The doppler estimated mean and peak diastolic pressure gradients are 8.5 mmHg and 17.9 mmHg respectively. There is moderate mitral annular calcification. There is trace to mild mitral valve regurgitation. Tricuspid Valve: The tricuspid valve is structurally normal. There is mild to moderate tricuspid regurgitation. The Doppler estimated RVSP is moderate to severely elevated at 58.7 mmHg. Pulmonic Valve: The pulmonic valve is structurally normal. There is no indication of pulmonic valve regurgitation. Pericardium: There is no pericardial effusion noted. Aorta: The aortic root is normal.  CONCLUSIONS:  1. Left ventricular systolic function is normal.  2. Spectral Doppler shows an impaired relaxation pattern of left ventricular diastolic filling.  3. There is moderate concentric left ventricular hypertrophy.  4. The left atrium is moderately dilated.  5. The mitral valve is moderately thickened.  6. There is moderate mitral annular calcification.  7. There is moderate to severe calcification of the anterior and posterior mitral valve leaflets.  8. Mild to moderate tricuspid regurgitation.  9. Moderate to severely elevated right ventricular systolic pressure. QUANTITATIVE DATA SUMMARY: 2D MEASUREMENTS:                          Normal Ranges: LAs:           3.40 cm   (2.7-4.0cm) IVSd:          0.96 cm   (0.6-1.1cm) LVPWd:         0.79 cm   (0.6-1.1cm) LVIDd:         3.88 cm   (3.9-5.9cm) LV Mass Index: 59.2 g/m2 LA VOLUME:                               Normal Ranges: LA Vol A4C:        72.5 ml    (22+/-6mL/m2) LA Vol A2C:        88.1 ml LA Vol BP:         80.0 ml LA Vol Index A4C:  42.5ml/m2  LA Vol Index A2C:  51.7 ml/m2 LA Vol Index BP:   46.9 ml/m2 LA Area A4C:       23.6 cm2 LA Area A2C:       26.0 cm2 LA Major Axis A4C: 6.5 cm LA Major Axis A2C: 6.5 cm LA Volume Index:   44.3 ml/m2 LA Vol A4C:        66.1 ml LA Vol A2C:        87.0 ml LV SYSTOLIC FUNCTION BY 2D PLANIMETRY (MOD):                     Normal Ranges: EF-A4C View: 62.2 % (>=55%) EF-A2C View: 48.3 % EF-Biplane:  55.6 % LV DIASTOLIC FUNCTION:                        Normal Ranges: MV Peak E:    1.45 m/s (0.7-1.2 m/s) MV Peak A:    1.92 m/s (0.42-0.7 m/s) E/A Ratio:    0.76     (1.0-2.2) MV e'         0.04 m/s (>8.0) MV lateral e' 0.04 m/s MV medial e'  0.03 m/s E/e' Ratio:   35.98    (<8.0) a'            0.03 m/s MITRAL VALVE:                       Normal Ranges: MV Vmax:    2.12 m/s  (<=1.3m/s) MV peak P.9 mmHg (<5mmHg) MV mean P.5 mmHg  (<48mmHg) MV DT:      482 msec  (150-240msec) AORTIC VALVE:                                    Normal Ranges: AoV Vmax:                2.13 m/s  (<=1.7m/s) AoV Peak P.1 mmHg (<20mmHg) AoV Mean P.0 mmHg  (1.7-11.5mmHg) LVOT Max Meño:            1.21 m/s  (<=1.1m/s) AoV VTI:                 34.80 cm  (18-25cm) LVOT VTI:                23.10 cm LVOT Diameter:           1.70 cm   (1.8-2.4cm) AoV Area, VTI:           1.51 cm2  (2.5-5.5cm2) AoV Area,Vmax:           1.29 cm2  (2.5-4.5cm2) AoV Dimensionless Index: 0.66  RIGHT VENTRICLE: TAPSE: 19.9 mm RV s'  0.18 m/s TRICUSPID VALVE/RVSP:                             Normal Ranges: Peak TR Velocity: 3.56 m/s RV Syst Pressure: 58.7 mmHg (< 30mmHg) IVC Diam:         1.37 cm PULMONIC VALVE:                         Normal Ranges: PV Accel Time: 79 msec  (>120ms) PV Max Meño:    1.2 m/s  (0.6-0.9m/s) PV Max P.3 mmHg  [id number] Aryan Ruiz DO Electronically signed on 3/1/2024 at 2:27:05 PM  ** Final **     ECG 12 lead    Result Date: 3/1/2024  Normal sinus rhythm with sinus arrhythmia Left axis deviation Left bundle branch  block Abnormal ECG When compared with ECG of 26-APR-2021 11:51, No significant change was found    XR chest 2 views    Result Date: 3/1/2024  Interpreted By:  Myles Sotomayor, STUDY: XR CHEST 2 VIEWS;  2/29/2024 11:48 pm   INDICATION: Signs/Symptoms:sob.   COMPARISON: Chest radiography of 04/10/2021.   ACCESSION NUMBER(S): [id number]   ORDERING CLINICIAN: FAIZAN RODRIGUEZ   FINDINGS: AP and lateral views of the chest.   Limited by portable technique and patient soft tissue attenuation factors. Leads overlie the chest, partially obscuring the field-of-view.   CARDIOMEDIASTINAL SILHOUETTE: Cardiac silhouette is enlarged. TAVR changes redemonstrated. Tortuous and atherosclerotic thoracic aorta.   LUNGS: Pulmonary vascular congestion with prominent, indistinct interstitium and Kerley B-lines suggesting acute pulmonary interstitial edema/CHF. Trace pleural effusions with associated atelectasis. No pneumothorax.   ABDOMEN: No remarkable upper abdominal findings.   BONES: No acute osseous changes.       1.  Pulmonary vascular congestion with prominent, indistinct interstitium and Kerley B-lines suggesting acute pulmonary interstitial edema/CHF. Trace pleural effusions with associated atelectasis. No pneumothorax.       MACRO: None   Signed by: Myles Sotomayor 3/1/2024 12:03 AM Dictation workstation:   [id number]         Assessment/Plan   Principal Problem:    Acute on chronic diastolic (congestive) heart failure (CMS/HCC)              X-ray with vascular congestion               NTpBNP > 2500              IV lasix: continue today   - Renal function stable, continue to monitor   - Continue LE edema and bilateral rales today   - plan to transition back to oral lasix when closer to euvolemia               Consult cardiology - appreciate recs               Echo    - moderate to severely elevated RVSP, similar to report from 2021,   - LVEF 55-65%   - Moderate MV stenosis   Active Problems:    Atherosclerosis of coronary artery without  angina pectoris              Angina free, stable               Continue ASA, beta blocker, statin and Plavix     Hyperlipidemia              Statin     Primary hypertension              Amlodipine, Coreg     Acquired hypothyroidism              Levothyroxine   DVT prophylaxis               Lovenox     Plan: Per PT, low intensity needs at discharge. Plan for home with home care   - Out of bed for meals today   - Wean Oxygen as tolerated   Jeny Scott, APRN-CNP

## 2024-03-02 NOTE — CARE PLAN
The patient's goals for the shift include      The clinical goals for the shift include decreased oxygen need      Problem: Pain  Goal: My pain/discomfort is manageable  Outcome: Progressing     Problem: Safety  Goal: Patient will be injury free during hospitalization  Outcome: Progressing  Goal: I will remain free of falls  Outcome: Progressing     Problem: Daily Care  Goal: Daily care needs are met  Outcome: Progressing     Problem: Discharge Barriers  Goal: My discharge needs are met  Outcome: Progressing     Problem: Discharge Barriers  Goal: My discharge needs are met  Outcome: Progressing

## 2024-03-02 NOTE — PROGRESS NOTES
"Akua Almanzar is a 89 y.o. female on day 1 of admission presenting with Acute on chronic diastolic (congestive) heart failure (CMS/HCC).    Subjective   Patient resting comfortably in bed. Reports that her breathing feels improved this morning.        Objective     Physical Exam  Constitutional:       General: She is not in acute distress.  Cardiovascular:      Rate and Rhythm: Normal rate and regular rhythm.      Heart sounds: No murmur heard.     No friction rub. No gallop.   Pulmonary:      Effort: Pulmonary effort is normal.      Comments: Faint crackles in bilateral lung bases. Mild conversational dyspnea noted.  Abdominal:      General: Bowel sounds are normal.   Musculoskeletal:      Right lower leg: Edema present.      Left lower leg: Edema present.      Comments: +1 bilateral lower extremity edema.    Skin:     General: Skin is warm and dry.   Neurological:      Mental Status: She is alert and oriented to person, place, and time.   Psychiatric:         Mood and Affect: Mood normal.         Behavior: Behavior normal.         Last Recorded Vitals  Blood pressure 116/68, pulse 67, temperature 36.6 °C (97.9 °F), temperature source Oral, resp. rate 18, height 1.549 m (5' 1\"), weight 72.6 kg (160 lb), SpO2 95 %.  Intake/Output last 24 Hrs:    Intake/Output Summary (Last 24 hours) at 3/2/2024 0974  Last data filed at 3/1/2024 1851  Gross per 24 hour   Intake 240 ml   Output 350 ml   Net -110 ml        Relevant Results  Results for orders placed or performed during the hospital encounter of 02/29/24 (from the past 24 hour(s))   Serial Troponin, 6 Hour ([facility])   Result Value Ref Range    Troponin T, High Sensitivity 29 (HH) <=14 ng/L   Basic Metabolic Panel   Result Value Ref Range    Glucose 93 65 - 99 mg/dL    Sodium 139 133 - 145 mmol/L    Potassium 3.6 3.4 - 5.1 mmol/L    Chloride 96 (L) 97 - 107 mmol/L    Bicarbonate 35 (H) 24 - 31 mmol/L    Urea Nitrogen 14 8 - 25 mg/dL    Creatinine 0.60 0.40 - 1.60 mg/dL    " eGFR 83 >60 mL/min/1.73m*2    Calcium 8.9 8.5 - 10.4 mg/dL    Anion Gap 8 <=19 mmol/L   Transthoracic Echo (TTE) Complete   Result Value Ref Range    AV pk denis 2.13 m/s    LVOT diam 1.70 cm    AV mn grad 8.0 mmHg    MV E/A ratio 0.76     Tricuspid annular plane systolic excursion 2.0 cm    LV biplane EF 56 %    LA vol index A/L 46.9 ml/m2    MV avg E/e' ratio 35.98     RV free wall pk S' 18.20 cm/s    RVSP 58.7 mmHg    LVIDd 3.88 cm    AV pk grad 18.1 mmHg    Aortic Valve Area by Continuity of VTI 1.51 cm2    Aortic Valve Area by Continuity of Peak Velocity 1.29 cm2    LV A4C EF 62.2    Basic Metabolic Panel   Result Value Ref Range    Glucose 86 65 - 99 mg/dL    Sodium 137 133 - 145 mmol/L    Potassium 3.6 3.4 - 5.1 mmol/L    Chloride 94 (L) 97 - 107 mmol/L    Bicarbonate 35 (H) 24 - 31 mmol/L    Urea Nitrogen 17 8 - 25 mg/dL    Creatinine 0.80 0.40 - 1.60 mg/dL    eGFR 68 >60 mL/min/1.73m*2    Calcium 9.0 8.5 - 10.4 mg/dL    Anion Gap 8 <=19 mmol/L   CBC   Result Value Ref Range    WBC 6.4 4.4 - 11.3 x10*3/uL    nRBC 0.0 0.0 - 0.0 /100 WBCs    RBC 3.84 (L) 4.00 - 5.20 x10*6/uL    Hemoglobin 10.9 (L) 12.0 - 16.0 g/dL    Hematocrit 34.9 (L) 36.0 - 46.0 %    MCV 91 80 - 100 fL    MCH 28.4 26.0 - 34.0 pg    MCHC 31.2 (L) 32.0 - 36.0 g/dL    RDW 14.9 (H) 11.5 - 14.5 %    Platelets 255 150 - 450 x10*3/uL                     Assessment/Plan   Principal Problem:    Acute on chronic diastolic (congestive) heart failure (CMS/HCC)  Active Problems:    Atherosclerosis of coronary artery without angina pectoris    Hyperlipidemia    Primary hypertension    Acquired hypothyroidism    Acute on chronic diastolic heart failure  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  She will follow-up with Dr. Brown in  the outpatient setting.  Does have a history of diastolic heart failure with a preserved ejection fraction most recently in 2021.  Additionally prior to that had a severe arctic valve stenosis and underwent a TAVR in approximately 2020 with Dr. Bedolla at [facility].  Patient states trouble with stress incontinence and reports that she has not taken her Lasix as prescribed.  Present with shortness of breath as well as peripheral edema.  Chest x-ray certainly confirms congestive heart failure.  Will consult cardiac rehab and heart failure coordinator.  Continue with IV diuresis.  Creatinine stable at 0.6.  Will check echocardiogram.  Continue on oxygen at this time.  Low-sodium diet.  Blood pressure is stable.  No significant vents on telemetry.  Chest pain-free.  Will follow with you.    3/2: As above.  Patient denies chest pain, pressure or palpitations overnight.  Reports that her breathing feels improved.  Patient is a -110 cc fluid balance over the last 24 hours; though,  I suspect that intakes and outputs were not accurately recorded, appreciate strict I's & O's.  Labs morning show sodium 137, potassium 3.6, stable creatinine at 0.80 and hemoglobin of 10.9.  Blood pressure is normotensive with her last recorded at 116/68.  Patient remains on 3 L nasal cannula with pulse oximetry of 95%.  Remains in sinus rhythm on telemery with occasional PVCs. Echocardiogram completed yesterday shows normal left ventricular systolic function, impaired relaxation pattern of left ventricular diastolic filling, moderate concentric left ventricular hypertrophy, moderately dilated left atrium, moderate mitral annular calcification, moderate to severely elevated right ventricular systolic pressure. RV systolic pressure is 58.7 mmHG. Would continue patient on 40 mg IV lasix every 12 hours. We will follow with you.           Priya Buckley, APRN-CNP

## 2024-03-03 LAB
ANION GAP SERPL CALC-SCNC: 10 MMOL/L
BUN SERPL-MCNC: 16 MG/DL (ref 8–25)
CALCIUM SERPL-MCNC: 8.9 MG/DL (ref 8.5–10.4)
CHLORIDE SERPL-SCNC: 93 MMOL/L (ref 97–107)
CO2 SERPL-SCNC: 35 MMOL/L (ref 24–31)
CREAT SERPL-MCNC: 0.7 MG/DL (ref 0.4–1.6)
EGFRCR SERPLBLD CKD-EPI 2021: 80 ML/MIN/1.73M*2
ERYTHROCYTE [DISTWIDTH] IN BLOOD BY AUTOMATED COUNT: 14.9 % (ref 11.5–14.5)
GLUCOSE SERPL-MCNC: 82 MG/DL (ref 65–99)
HCT VFR BLD AUTO: 37.5 % (ref 36–46)
HGB BLD-MCNC: 11.9 G/DL (ref 12–16)
MCH RBC QN AUTO: 28.5 PG (ref 26–34)
MCHC RBC AUTO-ENTMCNC: 31.7 G/DL (ref 32–36)
MCV RBC AUTO: 90 FL (ref 80–100)
NRBC BLD-RTO: 0 /100 WBCS (ref 0–0)
PLATELET # BLD AUTO: 267 X10*3/UL (ref 150–450)
POTASSIUM SERPL-SCNC: 3.7 MMOL/L (ref 3.4–5.1)
RBC # BLD AUTO: 4.17 X10*6/UL (ref 4–5.2)
SODIUM SERPL-SCNC: 138 MMOL/L (ref 133–145)
WBC # BLD AUTO: 5.6 X10*3/UL (ref 4.4–11.3)

## 2024-03-03 PROCEDURE — 99232 SBSQ HOSP IP/OBS MODERATE 35: CPT

## 2024-03-03 PROCEDURE — 2500000002 HC RX 250 W HCPCS SELF ADMINISTERED DRUGS (ALT 637 FOR MEDICARE OP, ALT 636 FOR OP/ED): Performed by: NURSE PRACTITIONER

## 2024-03-03 PROCEDURE — 80048 BASIC METABOLIC PNL TOTAL CA: CPT | Performed by: NURSE PRACTITIONER

## 2024-03-03 PROCEDURE — 2500000004 HC RX 250 GENERAL PHARMACY W/ HCPCS (ALT 636 FOR OP/ED): Performed by: NURSE PRACTITIONER

## 2024-03-03 PROCEDURE — 2500000001 HC RX 250 WO HCPCS SELF ADMINISTERED DRUGS (ALT 637 FOR MEDICARE OP): Performed by: INTERNAL MEDICINE

## 2024-03-03 PROCEDURE — 94664 DEMO&/EVAL PT USE INHALER: CPT

## 2024-03-03 PROCEDURE — 36415 COLL VENOUS BLD VENIPUNCTURE: CPT | Performed by: NURSE PRACTITIONER

## 2024-03-03 PROCEDURE — 2500000005 HC RX 250 GENERAL PHARMACY W/O HCPCS: Performed by: NURSE PRACTITIONER

## 2024-03-03 PROCEDURE — 9420000001 HC RT PATIENT EDUCATION 5 MIN

## 2024-03-03 PROCEDURE — 2500000002 HC RX 250 W HCPCS SELF ADMINISTERED DRUGS (ALT 637 FOR MEDICARE OP, ALT 636 FOR OP/ED): Performed by: INTERNAL MEDICINE

## 2024-03-03 PROCEDURE — 94640 AIRWAY INHALATION TREATMENT: CPT

## 2024-03-03 PROCEDURE — 1200000002 HC GENERAL ROOM WITH TELEMETRY DAILY

## 2024-03-03 PROCEDURE — 85027 COMPLETE CBC AUTOMATED: CPT | Performed by: NURSE PRACTITIONER

## 2024-03-03 PROCEDURE — 2500000001 HC RX 250 WO HCPCS SELF ADMINISTERED DRUGS (ALT 637 FOR MEDICARE OP): Performed by: NURSE PRACTITIONER

## 2024-03-03 RX ORDER — HYDROCORTISONE 25 MG/G
CREAM TOPICAL 2 TIMES DAILY
Status: DISCONTINUED | OUTPATIENT
Start: 2024-03-03 | End: 2024-03-07 | Stop reason: HOSPADM

## 2024-03-03 RX ADMIN — OXYCODONE HYDROCHLORIDE AND ACETAMINOPHEN 500 MG: 500 TABLET ORAL at 08:11

## 2024-03-03 RX ADMIN — LEVOTHYROXINE SODIUM 88 MCG: 0.09 TABLET ORAL at 05:43

## 2024-03-03 RX ADMIN — CHOLECALCIFEROL TAB 25 MCG (1000 UNIT) 1000 UNITS: 25 TAB at 08:11

## 2024-03-03 RX ADMIN — HYDROCORTISONE: 25 CREAM TOPICAL at 20:07

## 2024-03-03 RX ADMIN — SIMVASTATIN 20 MG: 20 TABLET, FILM COATED ORAL at 20:06

## 2024-03-03 RX ADMIN — IPRATROPIUM BROMIDE AND ALBUTEROL SULFATE 3 ML: 2.5; .5 SOLUTION RESPIRATORY (INHALATION) at 10:07

## 2024-03-03 RX ADMIN — CARVEDILOL 6.25 MG: 6.25 TABLET, FILM COATED ORAL at 08:11

## 2024-03-03 RX ADMIN — Medication 1 TABLET: at 20:06

## 2024-03-03 RX ADMIN — DOCUSATE SODIUM 100 MG: 100 CAPSULE, LIQUID FILLED ORAL at 08:11

## 2024-03-03 RX ADMIN — CARVEDILOL 6.25 MG: 6.25 TABLET, FILM COATED ORAL at 16:48

## 2024-03-03 RX ADMIN — FUROSEMIDE 40 MG: 10 INJECTION, SOLUTION INTRAMUSCULAR; INTRAVENOUS at 20:11

## 2024-03-03 RX ADMIN — Medication 1 TABLET: at 08:11

## 2024-03-03 RX ADMIN — AMLODIPINE BESYLATE 2.5 MG: 2.5 TABLET ORAL at 08:11

## 2024-03-03 RX ADMIN — DOCUSATE SODIUM 100 MG: 100 CAPSULE, LIQUID FILLED ORAL at 20:06

## 2024-03-03 RX ADMIN — ENOXAPARIN SODIUM 40 MG: 100 INJECTION SUBCUTANEOUS at 08:11

## 2024-03-03 RX ADMIN — ASPIRIN 81 MG: 81 TABLET, COATED ORAL at 08:11

## 2024-03-03 RX ADMIN — FUROSEMIDE 40 MG: 10 INJECTION, SOLUTION INTRAMUSCULAR; INTRAVENOUS at 08:11

## 2024-03-03 RX ADMIN — CLOPIDOGREL BISULFATE 75 MG: 75 TABLET ORAL at 08:11

## 2024-03-03 RX ADMIN — Medication 3 L/MIN: at 10:07

## 2024-03-03 ASSESSMENT — COGNITIVE AND FUNCTIONAL STATUS - GENERAL
MOVING FROM LYING ON BACK TO SITTING ON SIDE OF FLAT BED WITH BEDRAILS: A LITTLE
DRESSING REGULAR LOWER BODY CLOTHING: A LITTLE
TURNING FROM BACK TO SIDE WHILE IN FLAT BAD: A LITTLE
EATING MEALS: A LITTLE
CLIMB 3 TO 5 STEPS WITH RAILING: TOTAL
MOBILITY SCORE: 15
MOVING TO AND FROM BED TO CHAIR: A LITTLE
HELP NEEDED FOR BATHING: A LITTLE
TOILETING: A LITTLE
STANDING UP FROM CHAIR USING ARMS: A LITTLE
DRESSING REGULAR UPPER BODY CLOTHING: A LITTLE
PERSONAL GROOMING: A LITTLE
EATING MEALS: A LITTLE
CLIMB 3 TO 5 STEPS WITH RAILING: A LOT
WALKING IN HOSPITAL ROOM: A LOT
HELP NEEDED FOR BATHING: A LITTLE
MOVING TO AND FROM BED TO CHAIR: A LITTLE
MOVING TO AND FROM BED TO CHAIR: A LITTLE
DRESSING REGULAR UPPER BODY CLOTHING: A LITTLE
EATING MEALS: A LITTLE
MOBILITY SCORE: 15
TURNING FROM BACK TO SIDE WHILE IN FLAT BAD: A LITTLE
PERSONAL GROOMING: A LITTLE
WALKING IN HOSPITAL ROOM: A LOT
DRESSING REGULAR LOWER BODY CLOTHING: A LITTLE
TOILETING: A LITTLE
PERSONAL GROOMING: A LITTLE
TOILETING: A LITTLE
MOVING FROM LYING ON BACK TO SITTING ON SIDE OF FLAT BED WITH BEDRAILS: A LITTLE
MOVING FROM LYING ON BACK TO SITTING ON SIDE OF FLAT BED WITH BEDRAILS: A LITTLE
DRESSING REGULAR UPPER BODY CLOTHING: A LITTLE
CLIMB 3 TO 5 STEPS WITH RAILING: TOTAL
WALKING IN HOSPITAL ROOM: A LOT
TURNING FROM BACK TO SIDE WHILE IN FLAT BAD: A LITTLE
STANDING UP FROM CHAIR USING ARMS: A LITTLE
MOBILITY SCORE: 16
DAILY ACTIVITIY SCORE: 18
STANDING UP FROM CHAIR USING ARMS: A LITTLE
HELP NEEDED FOR BATHING: A LITTLE
DRESSING REGULAR LOWER BODY CLOTHING: A LITTLE
DAILY ACTIVITIY SCORE: 18
DAILY ACTIVITIY SCORE: 18

## 2024-03-03 ASSESSMENT — PAIN SCALES - GENERAL
PAINLEVEL_OUTOF10: 0 - NO PAIN
PAINLEVEL_OUTOF10: 0 - NO PAIN

## 2024-03-03 NOTE — HOSPITAL COURSE
Presented to ER 2/29 with worsening shortness of breath. Evaluation revealed elevated NTpBNP with vascular congestion on chest x-ray and hypoxia. Admitted for acute on chronic diastolic heart faillure. IV diuresis started and cardiology consulted. Updated echo showed preserved LVEF with decreased LV diastolic function and moderate to severely elevated RVSP. Weaning oxygen as tolerated.   Podiatry consulted for heel tenderness and skin irritation.

## 2024-03-03 NOTE — PROGRESS NOTES
"Akua Almanzar is a 89 y.o. female on day 2 of admission presenting with Acute on chronic diastolic (congestive) heart failure (CMS/HCC).    Subjective   Patient resting in bed. States she is feeling well this morning, Denies chest pain, abdominal pain, fevers, chills. Unsure if breathing improved. Feels ankles are edema free this morning.        Objective     Physical Exam  Constitutional:       Appearance: Normal appearance.   HENT:      Head: Normocephalic and atraumatic.      Mouth/Throat:      Mouth: Mucous membranes are moist.      Pharynx: Oropharynx is clear.   Eyes:      Extraocular Movements: Extraocular movements intact.      Conjunctiva/sclera: Conjunctivae normal.      Pupils: Pupils are equal, round, and reactive to light.   Cardiovascular:      Rate and Rhythm: Normal rate and regular rhythm.      Pulses: Normal pulses.      Heart sounds: Normal heart sounds.   Pulmonary:      Effort: Pulmonary effort is normal.      Breath sounds: Wheezing and rales present.   Abdominal:      General: Bowel sounds are normal.      Palpations: Abdomen is soft.      Tenderness: There is no abdominal tenderness.   Musculoskeletal:         General: Normal range of motion.      Cervical back: Normal range of motion and neck supple.   Skin:     General: Skin is warm and dry.      Capillary Refill: Capillary refill takes less than 2 seconds.   Neurological:      General: No focal deficit present.      Mental Status: She is alert and oriented to person, place, and time.   Psychiatric:         Mood and Affect: Mood normal.         Behavior: Behavior normal.         Last Recorded Vitals  Blood pressure 115/73, pulse 84, temperature 36.3 °C (97.3 °F), temperature source Oral, resp. rate 18, height 1.549 m (5' 1\"), weight 72.6 kg (160 lb), SpO2 100 %.  Intake/Output last 3 Shifts:  I/O last 3 completed shifts:  In: - (0 mL/kg)   Out: 2350 (32.4 mL/kg) [Urine:2350 (0.9 mL/kg/hr)]  Weight: 72.6 kg     Relevant Results  Results " for orders placed or performed during the hospital encounter of 02/29/24 (from the past 24 hour(s))   Basic Metabolic Panel   Result Value Ref Range    Glucose 82 65 - 99 mg/dL    Sodium 138 133 - 145 mmol/L    Potassium 3.7 3.4 - 5.1 mmol/L    Chloride 93 (L) 97 - 107 mmol/L    Bicarbonate 35 (H) 24 - 31 mmol/L    Urea Nitrogen 16 8 - 25 mg/dL    Creatinine 0.70 0.40 - 1.60 mg/dL    eGFR 80 >60 mL/min/1.73m*2    Calcium 8.9 8.5 - 10.4 mg/dL    Anion Gap 10 <=19 mmol/L   CBC   Result Value Ref Range    WBC 5.6 4.4 - 11.3 x10*3/uL    nRBC 0.0 0.0 - 0.0 /100 WBCs    RBC 4.17 4.00 - 5.20 x10*6/uL    Hemoglobin 11.9 (L) 12.0 - 16.0 g/dL    Hematocrit 37.5 36.0 - 46.0 %    MCV 90 80 - 100 fL    MCH 28.5 26.0 - 34.0 pg    MCHC 31.7 (L) 32.0 - 36.0 g/dL    RDW 14.9 (H) 11.5 - 14.5 %    Platelets 267 150 - 450 x10*3/uL     Transthoracic Echo (TTE) Complete    Result Date: 3/1/2024           [place] [id number]            Phone [phone] TRANSTHORACIC ECHOCARDIOGRAM REPORT  Patient Name:     DUNG Jean Baptiste Physician:   [id number] Aryan Ruiz DO Study Date:       3/1/2024             Ordering Provider:   [id number] JANNA ROSE MRN/PID:          [id number]             Fellow: Accession#:       [id number]         Nurse: Date of           3/27/1929 / 89 years Sonographer:         Germania Tenorio RDCS Birth/Age: Gender:           F                    Additional Staff: Height:           154.00 cm            Admit Date: Weight:           72.00 kg             Admission Status:    Inpatient - Routine BSA / BMI:        1.70 m2 / 30.36      Department Location: [facility]                   kg/m2 Blood Pressure: 144 /82 mmHg Study Type:    TRANSTHORACIC ECHO (TTE) COMPLETE Diagnosis/ICD: Nonrheumatic aortic (valve) stenosis-I35.0 Indication:    chf CPT Codes:     Echo Complete w Full [id number] Patient History:  Pertinent History: CHF and HTN. TAVR, Mv stenosis, ashd, pulm ht,tia 2020. Study Detail: The following Echo studies were performed: 2D, M-Mode, Doppler and               color flow. Technically challenging study due to prominent lung               artifact and patient lying in supine position.  PHYSICIAN INTERPRETATION: Left Ventricle: Left ventricular systolic function is normal. There are no regional wall motion abnormalities. The left ventricular cavity size is normal. There is moderate concentric left ventricular hypertrophy. Spectral Doppler shows an impaired relaxation pattern of left ventricular diastolic filling. Left Atrium: The left atrium is moderately dilated. Right Ventricle: The right ventricle is normal in size. There is normal right ventricular global systolic function. Right Atrium: The right atrium is normal in size. Aortic Valve: There is a prosthetic aortic valve present. There is no evidence of aortic valve regurgitation. The peak instantaneous gradient of the aortic valve is 18.1 mmHg. The mean gradient of the aortic valve is 8.0 mmHg. TAVR function normal. Mitral Valve: The mitral valve is moderately thickened. There is moderate to severe calcification of the anterior and posterior mitral valve leaflets. There is evidence of moderate mitral valve stenosis. The doppler estimated mean and peak diastolic pressure gradients are 8.5 mmHg and 17.9 mmHg respectively. There is moderate mitral annular calcification. There is trace to mild mitral valve regurgitation. Tricuspid Valve: The tricuspid valve is structurally normal. There is mild to moderate tricuspid regurgitation. The Doppler estimated RVSP is moderate to severely elevated at 58.7 mmHg. Pulmonic Valve: The pulmonic valve is structurally normal. There is no indication of pulmonic valve regurgitation. Pericardium: There is no pericardial effusion noted. Aorta: The aortic root is normal.  CONCLUSIONS:  1. Left ventricular systolic function is  normal.  2. Spectral Doppler shows an impaired relaxation pattern of left ventricular diastolic filling.  3. There is moderate concentric left ventricular hypertrophy.  4. The left atrium is moderately dilated.  5. The mitral valve is moderately thickened.  6. There is moderate mitral annular calcification.  7. There is moderate to severe calcification of the anterior and posterior mitral valve leaflets.  8. Mild to moderate tricuspid regurgitation.  9. Moderate to severely elevated right ventricular systolic pressure. QUANTITATIVE DATA SUMMARY: 2D MEASUREMENTS:                          Normal Ranges: LAs:           3.40 cm   (2.7-4.0cm) IVSd:          0.96 cm   (0.6-1.1cm) LVPWd:         0.79 cm   (0.6-1.1cm) LVIDd:         3.88 cm   (3.9-5.9cm) LV Mass Index: 59.2 g/m2 LA VOLUME:                               Normal Ranges: LA Vol A4C:        72.5 ml    (22+/-6mL/m2) LA Vol A2C:        88.1 ml LA Vol BP:         80.0 ml LA Vol Index A4C:  42.5ml/m2 LA Vol Index A2C:  51.7 ml/m2 LA Vol Index BP:   46.9 ml/m2 LA Area A4C:       23.6 cm2 LA Area A2C:       26.0 cm2 LA Major Axis A4C: 6.5 cm LA Major Axis A2C: 6.5 cm LA Volume Index:   44.3 ml/m2 LA Vol A4C:        66.1 ml LA Vol A2C:        87.0 ml LV SYSTOLIC FUNCTION BY 2D PLANIMETRY (MOD):                     Normal Ranges: EF-A4C View: 62.2 % (>=55%) EF-A2C View: 48.3 % EF-Biplane:  55.6 % LV DIASTOLIC FUNCTION:                        Normal Ranges: MV Peak E:    1.45 m/s (0.7-1.2 m/s) MV Peak A:    1.92 m/s (0.42-0.7 m/s) E/A Ratio:    0.76     (1.0-2.2) MV e'         0.04 m/s (>8.0) MV lateral e' 0.04 m/s MV medial e'  0.03 m/s E/e' Ratio:   35.98    (<8.0) a'            0.03 m/s MITRAL VALVE:                       Normal Ranges: MV Vmax:    2.12 m/s  (<=1.3m/s) MV peak P.9 mmHg (<5mmHg) MV mean P.5 mmHg  (<48mmHg) MV DT:      482 msec  (150-240msec) AORTIC VALVE:                                    Normal Ranges: AoV Vmax:                2.13 m/s   (<=1.7m/s) AoV Peak P.1 mmHg (<20mmHg) AoV Mean P.0 mmHg  (1.7-11.5mmHg) LVOT Max Meño:            1.21 m/s  (<=1.1m/s) AoV VTI:                 34.80 cm  (18-25cm) LVOT VTI:                23.10 cm LVOT Diameter:           1.70 cm   (1.8-2.4cm) AoV Area, VTI:           1.51 cm2  (2.5-5.5cm2) AoV Area,Vmax:           1.29 cm2  (2.5-4.5cm2) AoV Dimensionless Index: 0.66  RIGHT VENTRICLE: TAPSE: 19.9 mm RV s'  0.18 m/s TRICUSPID VALVE/RVSP:                             Normal Ranges: Peak TR Velocity: 3.56 m/s RV Syst Pressure: 58.7 mmHg (< 30mmHg) IVC Diam:         1.37 cm PULMONIC VALVE:                         Normal Ranges: PV Accel Time: 79 msec  (>120ms) PV Max Meño:    1.2 m/s  (0.6-0.9m/s) PV Max P.3 mmHg  [id number] Aryan Sara RESTREPO Electronically signed on 3/1/2024 at 2:27:05 PM  ** Final **        Assessment/Plan   Principal Problem:    Acute on chronic diastolic (congestive) heart failure (CMS/HCC)              X-ray with vascular congestion               NTpBNP > 2500              IV lasix: continue today   - Renal function stable, continue to monitor   - Continued bilateral rales today with wheezing, edema improved  - plan to transition back to oral lasix when closer to euvolemia               Consult cardiology - appreciate recs               Echo               - moderate to severely elevated RVSP, similar to report from ,   - LVEF 55-65%   - Moderate MV stenosis   Active Problems:    Atherosclerosis of coronary artery without angina pectoris              Angina free, stable               Continue ASA, beta blocker, statin and Plavix     Hyperlipidemia              Statin     Primary hypertension              Amlodipine, Coreg     Acquired hypothyroidism              Levothyroxine   DVT prophylaxis               Lovenox      Plan: Per PT, low intensity needs at discharge. Plan for home with home care   - Out of bed for meals today   - Wean Oxygen as tolerated   - PRN  Sammie for wheezing.   - AM labs     WANG Deleon-CNP

## 2024-03-03 NOTE — NURSING NOTE
Attempted to wean pt off of o2. Patient spo2 level is 87-88 percent room air. 3 lo2 placed back on with spo2 93-95%. Dr. Vallecillo made aware via secure chat

## 2024-03-03 NOTE — CARE PLAN
The patient's goals for the shift include      The clinical goals for the shift include safety      Problem: Pain  Goal: My pain/discomfort is manageable  Outcome: Progressing     Problem: Safety  Goal: Patient will be injury free during hospitalization  Outcome: Progressing  Goal: I will remain free of falls  Outcome: Progressing     Problem: Daily Care  Goal: Daily care needs are met  Outcome: Progressing     Problem: Psychosocial Needs  Goal: Demonstrates ability to cope with hospitalization/illness  Outcome: Progressing  Goal: Collaborate with me, my family, and caregiver to identify my specific goals  Outcome: Progressing     Problem: Discharge Barriers  Goal: My discharge needs are met  Outcome: Progressing

## 2024-03-03 NOTE — PROGRESS NOTES
"Akua Almanzar is a 89 y.o. female on day 2 of admission presenting with Acute on chronic diastolic (congestive) heart failure (CMS/HCC).    Subjective   Patient resting comfortably in bed. No complaints of chest pain, pressure or palpitations overnight.        Objective     Physical Exam  Cardiovascular:      Rate and Rhythm: Normal rate and regular rhythm.      Heart sounds: No murmur heard.     No friction rub. No gallop.   Pulmonary:      Effort: Pulmonary effort is normal.      Breath sounds: Normal breath sounds. No wheezing, rhonchi or rales.      Comments: Faint crackles in bilateral lung bases.   Abdominal:      General: Bowel sounds are normal.   Musculoskeletal:      Right lower leg: Edema present.      Left lower leg: Edema present.      Comments: Trace edema to bilateral lower extremities.    Skin:     General: Skin is warm and dry.      Capillary Refill: Capillary refill takes less than 2 seconds.   Neurological:      Mental Status: She is alert and oriented to person, place, and time.   Psychiatric:         Mood and Affect: Mood normal.         Behavior: Behavior normal.         Last Recorded Vitals  Blood pressure 121/74, pulse 70, temperature 36.2 °C (97.2 °F), temperature source Temporal, resp. rate 18, height 1.549 m (5' 1\"), weight 72.6 kg (160 lb), SpO2 90 %.  Intake/Output last 24 Hours:    Intake/Output Summary (Last 24 hours) at 3/3/2024 0832  Last data filed at 3/3/2024 0509  Gross per 24 hour   Intake --   Output 2350 ml   Net -2350 ml        Relevant Results  Results for orders placed or performed during the hospital encounter of 02/29/24 (from the past 24 hour(s))   Basic Metabolic Panel   Result Value Ref Range    Glucose 82 65 - 99 mg/dL    Sodium 138 133 - 145 mmol/L    Potassium 3.7 3.4 - 5.1 mmol/L    Chloride 93 (L) 97 - 107 mmol/L    Bicarbonate 35 (H) 24 - 31 mmol/L    Urea Nitrogen 16 8 - 25 mg/dL    Creatinine 0.70 0.40 - 1.60 mg/dL    eGFR 80 >60 mL/min/1.73m*2    Calcium 8.9 " 8.5 - 10.4 mg/dL    Anion Gap 10 <=19 mmol/L   CBC   Result Value Ref Range    WBC 5.6 4.4 - 11.3 x10*3/uL    nRBC 0.0 0.0 - 0.0 /100 WBCs    RBC 4.17 4.00 - 5.20 x10*6/uL    Hemoglobin 11.9 (L) 12.0 - 16.0 g/dL    Hematocrit 37.5 36.0 - 46.0 %    MCV 90 80 - 100 fL    MCH 28.5 26.0 - 34.0 pg    MCHC 31.7 (L) 32.0 - 36.0 g/dL    RDW 14.9 (H) 11.5 - 14.5 %    Platelets 267 150 - 450 x10*3/uL                        Assessment/Plan   Principal Problem:    Acute on chronic diastolic (congestive) heart failure (CMS/HCC)  Active Problems:    Atherosclerosis of coronary artery without angina pectoris    Hyperlipidemia    Primary hypertension    Acquired hypothyroidism    Acute on chronic diastolic heart failure  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  She will follow-up with Dr. Brown in the outpatient setting.  Does have a history of diastolic heart failure with a preserved ejection fraction most recently in 2021.  Additionally prior to that had a severe arctic valve stenosis and underwent a TAVR in approximately 2020 with Dr. Bedolla at [facility].  Patient states trouble with stress incontinence and reports that she has not taken her Lasix as prescribed.  Present with shortness of breath as well as peripheral edema.  Chest x-ray certainly confirms congestive heart failure.  Will consult cardiac rehab and heart failure coordinator.  Continue with IV diuresis.  Creatinine stable at 0.6.  Will check echocardiogram.  Continue on oxygen at this time.  Low-sodium diet.  Blood pressure is stable.  No significant vents on telemetry.  Chest pain-free.  Will follow with you.     3/2: As above.  Patient denies chest pain, pressure or palpitations overnight.  Reports that her breathing feels  improved.  Patient is a -110 cc fluid balance over the last 24 hours; though,  I suspect that intakes and outputs were not accurately recorded, appreciate strict I's & O's.  Labs morning show sodium 137, potassium 3.6, stable creatinine at 0.80 and hemoglobin of 10.9.  Blood pressure is normotensive with her last recorded at 116/68.  Patient remains on 3 L nasal cannula with pulse oximetry of 95%.  Remains in sinus rhythm on telemery with occasional PVCs. Echocardiogram completed yesterday shows normal left ventricular systolic function, impaired relaxation pattern of left ventricular diastolic filling, moderate concentric left ventricular hypertrophy, moderately dilated left atrium, moderate mitral annular calcification, moderate to severely elevated right ventricular systolic pressure. RV systolic pressure is 58.7 mmHG. Would continue patient on 40 mg IV lasix every 12 hours. We will follow with you.     3/3: Patient continues to improve slowly. She has an approximately 2.3L fluid balance over the last 24 hours. Labs this morning show sodium 138, potassium 3.7, creatinine 0.70 and hemoglobin 11.9. Patient remains on 3L NC with pulse oximetry of 90%. Blood pressures normotensive with last recorded at 121/74. Patient in sinus rhythm on telemetry without significant ectopy. Believe this patient would benefit from continued intravenous diuresis, with tentative plans to transition to oral diuretics tomorrow. We will follow with you.         Priya Buckley, WANG-CNP

## 2024-03-03 NOTE — CARE PLAN
The patient's goals for the shift include      The clinical goals for the shift include patient will work with pt/ot    Problem: Pain  Goal: My pain/discomfort is manageable  Outcome: Progressing     Problem: Safety  Goal: Patient will be injury free during hospitalization  Outcome: Progressing  Goal: I will remain free of falls  Outcome: Progressing     Problem: Daily Care  Goal: Daily care needs are met  Outcome: Progressing     Problem: Psychosocial Needs  Goal: Demonstrates ability to cope with hospitalization/illness  Outcome: Progressing  Goal: Collaborate with me, my family, and caregiver to identify my specific goals  Outcome: Progressing     Problem: Discharge Barriers  Goal: My discharge needs are met  Outcome: Progressing

## 2024-03-04 ENCOUNTER — DOCUMENTATION (OUTPATIENT)
Dept: CASE MANAGEMENT | Facility: HOSPITAL | Age: 89
End: 2024-03-04
Payer: COMMERCIAL

## 2024-03-04 LAB
ANION GAP SERPL CALC-SCNC: 7 MMOL/L
ATRIAL RATE: 71 BPM
BUN SERPL-MCNC: 17 MG/DL (ref 8–25)
CALCIUM SERPL-MCNC: 8.7 MG/DL (ref 8.5–10.4)
CHLORIDE SERPL-SCNC: 93 MMOL/L (ref 97–107)
CO2 SERPL-SCNC: 37 MMOL/L (ref 24–31)
CREAT SERPL-MCNC: 0.7 MG/DL (ref 0.4–1.6)
EGFRCR SERPLBLD CKD-EPI 2021: 80 ML/MIN/1.73M*2
ERYTHROCYTE [DISTWIDTH] IN BLOOD BY AUTOMATED COUNT: 15 % (ref 11.5–14.5)
GLUCOSE SERPL-MCNC: 87 MG/DL (ref 65–99)
HCT VFR BLD AUTO: 35.2 % (ref 36–46)
HGB BLD-MCNC: 11.3 G/DL (ref 12–16)
MCH RBC QN AUTO: 28.8 PG (ref 26–34)
MCHC RBC AUTO-ENTMCNC: 32.1 G/DL (ref 32–36)
MCV RBC AUTO: 90 FL (ref 80–100)
NRBC BLD-RTO: 0 /100 WBCS (ref 0–0)
P AXIS: 44 DEGREES
P OFFSET: 199 MS
P ONSET: 138 MS
PLATELET # BLD AUTO: 261 X10*3/UL (ref 150–450)
POTASSIUM SERPL-SCNC: 3.5 MMOL/L (ref 3.4–5.1)
PR INTERVAL: 154 MS
Q ONSET: 215 MS
QRS COUNT: 12 BEATS
QRS DURATION: 130 MS
QT INTERVAL: 408 MS
QTC CALCULATION(BAZETT): 443 MS
QTC FREDERICIA: 431 MS
R AXIS: -46 DEGREES
RBC # BLD AUTO: 3.93 X10*6/UL (ref 4–5.2)
SODIUM SERPL-SCNC: 137 MMOL/L (ref 133–145)
T AXIS: 95 DEGREES
T OFFSET: 419 MS
VENTRICULAR RATE: 71 BPM
WBC # BLD AUTO: 7.2 X10*3/UL (ref 4.4–11.3)

## 2024-03-04 PROCEDURE — 1200000002 HC GENERAL ROOM WITH TELEMETRY DAILY

## 2024-03-04 PROCEDURE — 97530 THERAPEUTIC ACTIVITIES: CPT | Mod: GP

## 2024-03-04 PROCEDURE — 85027 COMPLETE CBC AUTOMATED: CPT | Performed by: NURSE PRACTITIONER

## 2024-03-04 PROCEDURE — 97110 THERAPEUTIC EXERCISES: CPT | Mod: GP

## 2024-03-04 PROCEDURE — 2500000004 HC RX 250 GENERAL PHARMACY W/ HCPCS (ALT 636 FOR OP/ED): Performed by: NURSE PRACTITIONER

## 2024-03-04 PROCEDURE — 2500000002 HC RX 250 W HCPCS SELF ADMINISTERED DRUGS (ALT 637 FOR MEDICARE OP, ALT 636 FOR OP/ED): Performed by: INTERNAL MEDICINE

## 2024-03-04 PROCEDURE — 99232 SBSQ HOSP IP/OBS MODERATE 35: CPT | Performed by: INTERNAL MEDICINE

## 2024-03-04 PROCEDURE — 2500000001 HC RX 250 WO HCPCS SELF ADMINISTERED DRUGS (ALT 637 FOR MEDICARE OP): Performed by: INTERNAL MEDICINE

## 2024-03-04 PROCEDURE — 2500000001 HC RX 250 WO HCPCS SELF ADMINISTERED DRUGS (ALT 637 FOR MEDICARE OP): Performed by: NURSE PRACTITIONER

## 2024-03-04 PROCEDURE — 36415 COLL VENOUS BLD VENIPUNCTURE: CPT | Performed by: NURSE PRACTITIONER

## 2024-03-04 PROCEDURE — 80048 BASIC METABOLIC PNL TOTAL CA: CPT | Performed by: NURSE PRACTITIONER

## 2024-03-04 RX ORDER — FUROSEMIDE 40 MG/1
40 TABLET ORAL DAILY
Status: DISCONTINUED | OUTPATIENT
Start: 2024-03-05 | End: 2024-03-07 | Stop reason: HOSPADM

## 2024-03-04 RX ADMIN — OXYCODONE HYDROCHLORIDE AND ACETAMINOPHEN 500 MG: 500 TABLET ORAL at 08:15

## 2024-03-04 RX ADMIN — LEVOTHYROXINE SODIUM 88 MCG: 0.09 TABLET ORAL at 05:52

## 2024-03-04 RX ADMIN — HYDROCORTISONE: 25 CREAM TOPICAL at 08:16

## 2024-03-04 RX ADMIN — DOCUSATE SODIUM 100 MG: 100 CAPSULE, LIQUID FILLED ORAL at 21:13

## 2024-03-04 RX ADMIN — Medication 1 TABLET: at 08:15

## 2024-03-04 RX ADMIN — SIMVASTATIN 20 MG: 20 TABLET, FILM COATED ORAL at 21:12

## 2024-03-04 RX ADMIN — AMLODIPINE BESYLATE 2.5 MG: 2.5 TABLET ORAL at 08:15

## 2024-03-04 RX ADMIN — ENOXAPARIN SODIUM 40 MG: 100 INJECTION SUBCUTANEOUS at 08:15

## 2024-03-04 RX ADMIN — ACETAMINOPHEN 650 MG: 325 TABLET ORAL at 08:15

## 2024-03-04 RX ADMIN — ASPIRIN 81 MG: 81 TABLET, COATED ORAL at 08:15

## 2024-03-04 RX ADMIN — DOCUSATE SODIUM 100 MG: 100 CAPSULE, LIQUID FILLED ORAL at 08:15

## 2024-03-04 RX ADMIN — Medication 1 TABLET: at 21:00

## 2024-03-04 RX ADMIN — FUROSEMIDE 40 MG: 10 INJECTION, SOLUTION INTRAMUSCULAR; INTRAVENOUS at 08:16

## 2024-03-04 RX ADMIN — CARVEDILOL 6.25 MG: 6.25 TABLET, FILM COATED ORAL at 08:15

## 2024-03-04 RX ADMIN — ACETAMINOPHEN 650 MG: 325 TABLET ORAL at 21:12

## 2024-03-04 RX ADMIN — CHOLECALCIFEROL TAB 25 MCG (1000 UNIT) 1000 UNITS: 25 TAB at 08:15

## 2024-03-04 RX ADMIN — CLOPIDOGREL BISULFATE 75 MG: 75 TABLET ORAL at 08:15

## 2024-03-04 ASSESSMENT — PAIN - FUNCTIONAL ASSESSMENT
PAIN_FUNCTIONAL_ASSESSMENT: 0-10

## 2024-03-04 ASSESSMENT — COGNITIVE AND FUNCTIONAL STATUS - GENERAL
DAILY ACTIVITIY SCORE: 19
WALKING IN HOSPITAL ROOM: A LOT
MOVING TO AND FROM BED TO CHAIR: A LITTLE
TURNING FROM BACK TO SIDE WHILE IN FLAT BAD: A LITTLE
CLIMB 3 TO 5 STEPS WITH RAILING: TOTAL
HELP NEEDED FOR BATHING: A LITTLE
MOVING FROM LYING ON BACK TO SITTING ON SIDE OF FLAT BED WITH BEDRAILS: A LITTLE
DRESSING REGULAR UPPER BODY CLOTHING: A LITTLE
TURNING FROM BACK TO SIDE WHILE IN FLAT BAD: A LITTLE
WALKING IN HOSPITAL ROOM: A LOT
TOILETING: A LITTLE
MOVING FROM LYING ON BACK TO SITTING ON SIDE OF FLAT BED WITH BEDRAILS: A LITTLE
DRESSING REGULAR LOWER BODY CLOTHING: A LITTLE
MOBILITY SCORE: 14
MOBILITY SCORE: 15
STANDING UP FROM CHAIR USING ARMS: A LITTLE
STANDING UP FROM CHAIR USING ARMS: A LITTLE
PERSONAL GROOMING: A LITTLE
CLIMB 3 TO 5 STEPS WITH RAILING: TOTAL
MOVING TO AND FROM BED TO CHAIR: A LOT

## 2024-03-04 ASSESSMENT — PAIN SCALES - GENERAL
PAINLEVEL_OUTOF10: 0 - NO PAIN
PAINLEVEL_OUTOF10: 3
PAINLEVEL_OUTOF10: 4
PAINLEVEL_OUTOF10: 0 - NO PAIN

## 2024-03-04 NOTE — PROGRESS NOTES
Akua Almanzar is a 89 y.o. female on day 3 of admission presenting with Acute on chronic diastolic (congestive) heart failure (CMS/HCC).    Met with daughter at bedside to discuss PT recommendation for low intensity rehab with 24 hour supervision. Daughter is agreeable to [facility] .  Patient resides with her daughter. PCP is Dr. Lakeisha Cueto. Will need an internal referral for [facility] RN, PT, OT.                         Aida Gauthier RN

## 2024-03-04 NOTE — PROGRESS NOTES
"Akua Almanzar is a 89 y.o. female on day 3 of admission presenting with Acute on chronic diastolic (congestive) heart failure (CMS/HCC).    Subjective   Patient resting comfortably in bed eating breakfast. She notes her breathing and leg swelling has improved. No complaints of chest pain, pressure or palpitations overnight. She notes that she has been feeling depressed today since she has been thinking about the passing of her two friends last month. One of whom she was living with and taking care of.     Objective     Physical Exam  Cardiovascular:      Rate and Rhythm: Normal rate and regular rhythm.      Heart sounds: No murmur heard.     No friction rub. No gallop.   Pulmonary:      Effort: Pulmonary effort is normal.      Breath sounds: Normal breath sounds. No wheezing, rhonchi or rales.   Abdominal:      General: Bowel sounds are normal.   Skin:     General: Skin is warm and dry.      Capillary Refill: Capillary refill takes less than 2 seconds.   Neurological:      Mental Status: She is alert.   Psychiatric:         Mood and Affect: Mood normal.       Last Recorded Vitals  Blood pressure 125/70, pulse 87, temperature 36.1 °C (97 °F), temperature source Temporal, resp. rate 18, height 1.549 m (5' 1\"), weight 72.6 kg (160 lb), SpO2 96 %.  Intake/Output last 24 Hours:    Intake/Output Summary (Last 24 hours) at 3/4/2024 0728  Last data filed at 3/4/2024 0554  Gross per 24 hour   Intake --   Output 900 ml   Net -900 ml        Relevant Results  Results for orders placed or performed during the hospital encounter of 02/29/24 (from the past 24 hour(s))   Basic Metabolic Panel   Result Value Ref Range    Glucose 87 65 - 99 mg/dL    Sodium 137 133 - 145 mmol/L    Potassium 3.5 3.4 - 5.1 mmol/L    Chloride 93 (L) 97 - 107 mmol/L    Bicarbonate 37 (H) 24 - 31 mmol/L    Urea Nitrogen 17 8 - 25 mg/dL    Creatinine 0.70 0.40 - 1.60 mg/dL    eGFR 80 >60 mL/min/1.73m*2    Calcium 8.7 8.5 - 10.4 mg/dL    Anion Gap 7 <=19 " mmol/L   CBC   Result Value Ref Range    WBC 7.2 4.4 - 11.3 x10*3/uL    nRBC 0.0 0.0 - 0.0 /100 WBCs    RBC 3.93 (L) 4.00 - 5.20 x10*6/uL    Hemoglobin 11.3 (L) 12.0 - 16.0 g/dL    Hematocrit 35.2 (L) 36.0 - 46.0 %    MCV 90 80 - 100 fL    MCH 28.8 26.0 - 34.0 pg    MCHC 32.1 32.0 - 36.0 g/dL    RDW 15.0 (H) 11.5 - 14.5 %    Platelets 261 150 - 450 x10*3/uL        Assessment/Plan   Acute on chronic diastolic heart failure  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  She will follow-up with Dr. Brown in the outpatient setting.  Does have a history of diastolic heart failure with a preserved ejection fraction most recently in 2021.  Additionally prior to that had a severe arctic valve stenosis and underwent a TAVR in approximately 2020 with Dr. Bedolla at [facility].  Patient states trouble with stress incontinence and reports that she has not taken her Lasix as prescribed.  Present with shortness of breath as well as peripheral edema.  Chest x-ray certainly confirms congestive heart failure.  Will consult cardiac rehab and heart failure coordinator.  Continue with IV diuresis.  Creatinine stable at 0.6.  Will check echocardiogram.  Continue on oxygen at this time.  Low-sodium diet.  Blood pressure is stable.  No significant vents on telemetry.  Chest pain-free.  Will follow with you.     3/2: As above.  Patient denies chest pain, pressure or palpitations overnight.  Reports that her breathing feels improved.  Patient is a -110 cc fluid balance over the last 24 hours; though,  I suspect that intakes and outputs were not accurately recorded, appreciate strict I's & O's.  Labs morning show sodium 137, potassium 3.6, stable creatinine at 0.80 and hemoglobin of 10.9.  Blood pressure is  normotensive with her last recorded at 116/68.  Patient remains on 3 L nasal cannula with pulse oximetry of 95%.  Remains in sinus rhythm on telemery with occasional PVCs. Echocardiogram completed yesterday shows normal left ventricular systolic function, impaired relaxation pattern of left ventricular diastolic filling, moderate concentric left ventricular hypertrophy, moderately dilated left atrium, moderate mitral annular calcification, moderate to severely elevated right ventricular systolic pressure. RV systolic pressure is 58.7 mmHG. Would continue patient on 40 mg IV lasix every 12 hours. We will follow with you.     3/3: Patient continues to improve slowly. She has an approximately 2.3L fluid balance over the last 24 hours. Labs this morning show sodium 138, potassium 3.7, creatinine 0.70 and hemoglobin 11.9. Patient remains on 3L NC with pulse oximetry of 90%. Blood pressures normotensive with last recorded at 121/74. Patient in sinus rhythm on telemetry without significant ectopy. Believe this patient would benefit from continued intravenous diuresis, with tentative plans to transition to oral diuretics tomorrow. We will follow with you.    3/4: Patient continues to improve slowly. She has an approximately -900 net fluid balance over the last 24 hours. Labs this morning show sodium 137, potassium 3.5, creatinine 0.70 and hemoglobin 11.3. Patient remains on 2L NC with pulse oximetry of 97%. Blood pressures normotensive with last recorded at 113/56. Patient in sinus rhythm on telemetry without significant ectopy. We will transition to PO lasix today. We will follow with you.     Tawny Lara, Medical Student

## 2024-03-04 NOTE — PROGRESS NOTES
Akua Almanzar is a 89 y.o. female on day 3 of admission presenting with Acute on chronic diastolic (congestive) heart failure (CMS/HCC).      Subjective   Patient seen and examined. Awake/alert/oriented. Reports feeling depressed last night, states she didn't sleep well. Recently sold her house and states she also lost two close friends this last month. Declines psych consult. Denies chest pain, shortness of breath, fevers, chills, nausea, or vomiting. No abdominal discomfort.        Objective     Last Recorded Vitals  /52 (BP Location: Right arm, Patient Position: Sitting)   Pulse 75   Temp 36.6 °C (97.9 °F) (Oral)   Resp 18   Wt 72.6 kg (160 lb)   SpO2 92%   Intake/Output last 3 Shifts:    Intake/Output Summary (Last 24 hours) at 3/4/2024 1136  Last data filed at 3/4/2024 0900  Gross per 24 hour   Intake 240 ml   Output 900 ml   Net -660 ml       Admission Weight  Weight: 72.6 kg (160 lb) (02/29/24 2310)    Daily Weight  02/29/24 : 72.6 kg (160 lb)    Image Results  ECG 12 lead  Normal sinus rhythm with sinus arrhythmia  Left axis deviation  Left bundle branch block  Abnormal ECG  When compared with ECG of 26-APR-2021 11:51,  No significant change was found  Confirmed by Sonu Price ([id number]) on 3/4/2024 8:58:55 AM      Physical Exam  Vitals reviewed.   Constitutional:       Appearance: Normal appearance.   HENT:      Head: Normocephalic and atraumatic.   Eyes:      Extraocular Movements: Extraocular movements intact.      Conjunctiva/sclera: Conjunctivae normal.   Cardiovascular:      Rate and Rhythm: Normal rate and regular rhythm.   Pulmonary:      Effort: Pulmonary effort is normal.      Breath sounds: No wheezing, rhonchi or rales.      Comments: Breath sounds clear, diminished bilaterally  Abdominal:      General: Bowel sounds are normal.      Palpations: Abdomen is soft.      Tenderness: There is no abdominal tenderness.   Skin:     General: Skin is warm and dry.   Neurological:      General: No  focal deficit present.      Mental Status: She is alert and oriented to person, place, and time.         Relevant Results  Lab Results   Component Value Date    GLUCOSE 87 03/04/2024    CALCIUM 8.7 03/04/2024     03/04/2024    K 3.5 03/04/2024    CO2 37 (H) 03/04/2024    CL 93 (L) 03/04/2024    BUN 17 03/04/2024    CREATININE 0.70 03/04/2024     Lab Results   Component Value Date    WBC 7.2 03/04/2024    HGB 11.3 (L) 03/04/2024    HCT 35.2 (L) 03/04/2024    MCV 90 03/04/2024     03/04/2024     ECG 12 lead  Result Date: 3/4/2024  Normal sinus rhythm with sinus arrhythmia Left axis deviation Left bundle branch block Abnormal ECG When compared with ECG of 26-APR-2021 11:51, No significant change was found Confirmed by Sonu Price ([id number]) on 3/4/2024 8:58:55 AM    Transthoracic Echo (TTE) Complete  Result Date: 3/1/2024  CONCLUSIONS:  1. Left ventricular systolic function is normal.  2. Spectral Doppler shows an impaired relaxation pattern of left ventricular diastolic filling.  3. There is moderate concentric left ventricular hypertrophy.  4. The left atrium is moderately dilated.  5. The mitral valve is moderately thickened.  6. There is moderate mitral annular calcification.  7. There is moderate to severe calcification of the anterior and posterior mitral valve leaflets.  8. Mild to moderate tricuspid regurgitation.  9. Moderate to severely elevated right ventricular systolic pressure.     XR chest 2 views  Result Date: 3/1/2024  1.  Pulmonary vascular congestion with prominent, indistinct interstitium and Kerley B-lines suggesting acute pulmonary interstitial edema/CHF. Trace pleural effusions with associated atelectasis. No pneumothorax.       MACRO: None   Signed by: Myles Sotomayor 3/1/2024 12:03 AM Dictation workstation:   [id number]       Assessment/Plan      Principal Problem:    Acute on chronic diastolic (congestive) heart failure (CMS/HCC)  Active Problems:    Atherosclerosis of coronary artery  without angina pectoris    Hyperlipidemia    Primary hypertension    Acquired hypothyroidism    Acute on chronic diastolic (congestive) heart failure   Transition to oral lasix today per cardiology   Consult cardiology - appreciate recs   Echo, moderate to severely elevated RVSP, similar to report from 2021, LVEF 55-65%, Moderate MV stenosis     Atherosclerosis of coronary artery without angina pectoris  Angina free, stable   Continue ASA, beta blocker, statin and Plavix     Hyperlipidemia  Statin     Primary hypertension  Amlodipine, Coreg     Acquired hypothyroidism  Levothyroxine     DVT prophylaxis   Lovenox      Plan  Oral lasix  Wean oxygen  PT/OT/out of bed  CBC and BMP in AM  Discharge planning to [facility], PT/OT recommending low intensity rehab  CBC and BMP in AM            Tamia Arceo, APRN-CNP

## 2024-03-04 NOTE — PROGRESS NOTES
CHF Clinical Nurse Navigator Documentation    Congestive Heart Failure disease education was performed by the Clinical Nurse Navigator with a good understanding: Yes.  Mini-Cog test completed: Yes, scored 5, added to Media in chart.    Living With Heart Failure Education booklet provided:  Yes.  CHF signs and symptoms discussed and when to call cardiologist:  Yes.  Compliant with home medications: Not all the time. Sometimes did not take lasix.  Low sodium nutrition education reviewed: Yes.  Fluid Restriction Education reviewed: n/a  Daily Weight Education reviewed: Yes.    Cardiovascular Rehab Referral ordered:  Yes.  Follow-up with Cardiologist after discharge education provided: Yes. Will follow with Dr. Brown   Patient/family to make follow up appts: Family will make appts d/t transportation needs.    Meds to Beds discussed: Yes.   Meds to Beds Opt In charted: No, pt declined.    Comments: Met with pt who was admitted for SOB/HF. Pt lives with family who helps with her care. She already eats a low salt diet. We discussed the importance of taking her meds as prescribed and checking for HF symptoms often. She is agreeable to have me follow her for 30 days after DC and her plan at this time is to DC home.

## 2024-03-04 NOTE — PROGRESS NOTES
"Physical Therapy    Physical Therapy Treatment    Patient Name: Akua Almanzar  MRN: [id number]  Today's Date: 3/4/2024  Time Calculation  Start Time: 1015  Stop Time: 1045  Time Calculation (min): 30 min       Assessment/Plan   PT Assessment  End of Session Communication: Bedside nurse  Assessment Comment: pt demonstrated limited standing tolerance today due to c/o \" dizziness and fatigue.\" vitals checked. Will need to reasses functional abilities 3/5 and make any changes to d/c plans if appropriate.  End of Session Patient Position: Bed, 3 rail up, Alarm on  PT Plan  Inpatient/Swing Bed or Outpatient: Inpatient  PT Plan  Treatment/Interventions: Bed mobility, Transfer training, Gait training, Stair training, Balance training, Strengthening, Endurance training, Therapeutic exercise, Therapeutic activity  PT Plan: Skilled PT  PT Frequency: 5 times per week  PT Discharge Recommendations: Low intensity level of continued care, 24 hr supervision due to cognition  Equipment Recommended upon Discharge: Wheeled walker  PT Recommended Transfer Status: Contact guard  PT - OK to Discharge: Yes      General Visit Information:   PT  Visit  PT Received On: 03/04/24  General  Prior to Session Communication: Bedside nurse  Patient Position Received: Bed, 3 rail up  Preferred Learning Style: verbal, visual  General Comment: cleared by nurse for therapy; pt c/o being \" very tired\" but agreeable to therapy; + purewick; + telemetry    Subjective   Precautions:  Precautions  Hearing/Visual Limitations: mild [place], lost hearing aids  Medical Precautions: Fall precautions, Oxygen therapy device and L/min (2 liters o2 via nc)  Vital Signs:  Vital Signs  Heart Rate: 75  SpO2: 92 %  BP: 108/52  BP Location: Right arm  BP Method: Automatic  Patient Position: Sitting    Objective   Pain:  Pain Assessment  Pain Assessment: 0-10  Pain Score: 0 - No pain  Cognition:  Cognition  Overall Cognitive Status: Within Functional Limits  Orientation Level: " Oriented X4  Safety/Judgement:  (decreased safety insight during functional mobility)  Processing Speed: Delayed  Postural Control:  Postural Control  Posture Comment: forward head posture and rounded shoulders  Extremity/Trunk Assessments:     Activity Tolerance:  Activity Tolerance  Endurance: Decreased tolerance for upright activites  Activity Tolerance Comments: + fatigues easily  Treatments:  Therapeutic Exercise  Therapeutic Exercise Performed: Yes  Therapeutic Exercise Activity 1: supine cindy AP, QS, GS x 20 reps each  Therapeutic Exercise Activity 2: supine cindy heel slides x 15 reps  Therapeutic Exercise Activity 3: supine cindy hip abd/add x 10 reps  Therapeutic Exercise Activity 4: seated cindy LAQ's x 20 reps  Therapeutic Exercise Activity 5: seated deep breathing, 2 sets of 5 reps each    Therapeutic Activity  Therapeutic Activity Performed: Yes (see bed mobility, transfers, standing activites with FWW comments)    Bed Mobility  Bed Mobility: Yes  Bed Mobility 1  Bed Mobility 1: Supine to sitting  Level of Assistance 1: Minimum assistance, Minimal verbal cues  Bed Mobility Comments 1: head of bed elevated 30 degrees; min assist of 1 for trunk up, verbal cues for active use of cindy UE's  Bed Mobility 2  Bed Mobility  2: Sitting to supine  Level of Assistance 2: Minimum assistance  Bed Mobility Comments 2: head of bed elevated 30 degrees; min assist of 1 for cindy LE's onto bed    Ambulation/Gait Training  Ambulation/Gait Training Performed: Yes  Ambulation/Gait Training 1  Surface 1: Level tile  Device 1: Rolling walker  Assistance 1: Minimum assistance  Comments/Distance (ft) 1: pt stood at bedside with FWW, min assist of 1 to steady x 2 trials-----1st trial for 2 min 8 secs---pt c/o increasing dizziness; after seated rest break of 2-3 min, pt again stood with FWW, min assist of 1 to steady x 2 min---/58----pt c/o increasing dizziness and fatigue. PT deferred amb at this time  Transfers  Transfer:  Yes  Transfer 1  Transfer From 1: Sit to  Transfer to 1: Stand  Technique 1: Sit to stand  Transfer Device 1: Walker  Transfer Level of Assistance 1: Contact guard, Minimal verbal cues  Trials/Comments 1: contct guard of 1 for trunk up, verbal cues for proper cindy hand placement  Transfers 2  Transfer From 2: Stand to  Transfer to 2: Sit  Technique 2: Stand to sit  Transfer Device 2: Walker  Transfer Level of Assistance 2: Contact guard, Minimal verbal cues  Trials/Comments 2: contact guard of 1 for trunk down, verbal cues for reaching back for bed with both hands    Outcome Measures:  [facility] Basic Mobility  Turning from your back to your side while in a flat bed without using bedrails: A little  Moving from lying on your back to sitting on the side of a flat bed without using bedrails: A little  Moving to and from bed to chair (including a wheelchair): A lot  Standing up from a chair using your arms (e.g. wheelchair or bedside chair): A little  To walk in hospital room: A lot  Climbing 3-5 steps with railing: Total  Basic Mobility - Total Score: 14    Education Documentation  No documentation found.  Education Comments  No comments found.        OP EDUCATION:       Encounter Problems       Encounter Problems (Active)       PT Problem       Patient will transfer supine <> sit independently  (Progressing)       Start:  03/01/24    Expected End:  03/22/24            Patient will transfer sit <> stand modified independently and use of rolling walker  (Progressing)       Start:  03/01/24    Expected End:  03/22/24            Patient will ambulate 150 ft modified independently and use of rolling walker  (Progressing)       Start:  03/01/24    Expected End:  03/22/24            Patient will ascend/descend 7 stairs with single UE support on 1 rail(s) modified independently and use of no assistive device  (Progressing)       Start:  03/01/24    Expected End:  03/22/24

## 2024-03-04 NOTE — CARE PLAN
Problem: Pain  Goal: My pain/discomfort is manageable  Outcome: Progressing     Problem: Safety  Goal: Patient will be injury free during hospitalization  Outcome: Progressing     Problem: Daily Care  Goal: Daily care needs are met  Outcome: Progressing     Problem: Psychosocial Needs  Goal: Demonstrates ability to cope with hospitalization/illness  Outcome: Progressing     Problem: Discharge Barriers  Goal: My discharge needs are met  Outcome: Progressing       Patient alert and oriented. [place]. Diuresis.     The clinical goals for the shift include decreased edema

## 2024-03-05 ENCOUNTER — APPOINTMENT (OUTPATIENT)
Dept: RADIOLOGY | Facility: HOSPITAL | Age: 89
DRG: 291 | End: 2024-03-05
Payer: COMMERCIAL

## 2024-03-05 LAB
ANION GAP SERPL CALC-SCNC: 8 MMOL/L
BUN SERPL-MCNC: 19 MG/DL (ref 8–25)
CALCIUM SERPL-MCNC: 9.5 MG/DL (ref 8.5–10.4)
CHLORIDE SERPL-SCNC: 96 MMOL/L (ref 97–107)
CO2 SERPL-SCNC: 36 MMOL/L (ref 24–31)
CREAT SERPL-MCNC: 0.7 MG/DL (ref 0.4–1.6)
EGFRCR SERPLBLD CKD-EPI 2021: 80 ML/MIN/1.73M*2
ERYTHROCYTE [DISTWIDTH] IN BLOOD BY AUTOMATED COUNT: 15 % (ref 11.5–14.5)
GLUCOSE SERPL-MCNC: 81 MG/DL (ref 65–99)
HCT VFR BLD AUTO: 35.2 % (ref 36–46)
HGB BLD-MCNC: 11.3 G/DL (ref 12–16)
MAGNESIUM SERPL-MCNC: 2.2 MG/DL (ref 1.6–3.1)
MCH RBC QN AUTO: 28.8 PG (ref 26–34)
MCHC RBC AUTO-ENTMCNC: 32.1 G/DL (ref 32–36)
MCV RBC AUTO: 90 FL (ref 80–100)
NRBC BLD-RTO: 0 /100 WBCS (ref 0–0)
PLATELET # BLD AUTO: 263 X10*3/UL (ref 150–450)
POTASSIUM SERPL-SCNC: 3.5 MMOL/L (ref 3.4–5.1)
RBC # BLD AUTO: 3.92 X10*6/UL (ref 4–5.2)
SODIUM SERPL-SCNC: 140 MMOL/L (ref 133–145)
WBC # BLD AUTO: 5.4 X10*3/UL (ref 4.4–11.3)

## 2024-03-05 PROCEDURE — 80048 BASIC METABOLIC PNL TOTAL CA: CPT | Performed by: NURSE PRACTITIONER

## 2024-03-05 PROCEDURE — 1200000002 HC GENERAL ROOM WITH TELEMETRY DAILY

## 2024-03-05 PROCEDURE — 2500000001 HC RX 250 WO HCPCS SELF ADMINISTERED DRUGS (ALT 637 FOR MEDICARE OP): Performed by: INTERNAL MEDICINE

## 2024-03-05 PROCEDURE — 2500000002 HC RX 250 W HCPCS SELF ADMINISTERED DRUGS (ALT 637 FOR MEDICARE OP, ALT 636 FOR OP/ED): Performed by: INTERNAL MEDICINE

## 2024-03-05 PROCEDURE — 36415 COLL VENOUS BLD VENIPUNCTURE: CPT | Performed by: NURSE PRACTITIONER

## 2024-03-05 PROCEDURE — 93971 EXTREMITY STUDY: CPT

## 2024-03-05 PROCEDURE — 2500000001 HC RX 250 WO HCPCS SELF ADMINISTERED DRUGS (ALT 637 FOR MEDICARE OP): Performed by: NURSE PRACTITIONER

## 2024-03-05 PROCEDURE — 2500000004 HC RX 250 GENERAL PHARMACY W/ HCPCS (ALT 636 FOR OP/ED): Performed by: NURSE PRACTITIONER

## 2024-03-05 PROCEDURE — 83735 ASSAY OF MAGNESIUM: CPT | Performed by: STUDENT IN AN ORGANIZED HEALTH CARE EDUCATION/TRAINING PROGRAM

## 2024-03-05 PROCEDURE — 97530 THERAPEUTIC ACTIVITIES: CPT | Mod: GP | Performed by: PHYSICAL THERAPIST

## 2024-03-05 PROCEDURE — 85027 COMPLETE CBC AUTOMATED: CPT | Performed by: NURSE PRACTITIONER

## 2024-03-05 PROCEDURE — 9420000001 HC RT PATIENT EDUCATION 5 MIN

## 2024-03-05 PROCEDURE — 94761 N-INVAS EAR/PLS OXIMETRY MLT: CPT

## 2024-03-05 PROCEDURE — 97110 THERAPEUTIC EXERCISES: CPT | Mod: GP | Performed by: PHYSICAL THERAPIST

## 2024-03-05 RX ORDER — ACETAMINOPHEN 500 MG
5 TABLET ORAL NIGHTLY
Status: DISCONTINUED | OUTPATIENT
Start: 2024-03-05 | End: 2024-03-07 | Stop reason: HOSPADM

## 2024-03-05 RX ADMIN — DOCUSATE SODIUM 100 MG: 100 CAPSULE, LIQUID FILLED ORAL at 09:20

## 2024-03-05 RX ADMIN — Medication 5 MG: at 21:25

## 2024-03-05 RX ADMIN — ENOXAPARIN SODIUM 40 MG: 100 INJECTION SUBCUTANEOUS at 09:19

## 2024-03-05 RX ADMIN — HYDROCORTISONE: 25 CREAM TOPICAL at 09:20

## 2024-03-05 RX ADMIN — LEVOTHYROXINE SODIUM 88 MCG: 0.09 TABLET ORAL at 06:17

## 2024-03-05 RX ADMIN — Medication 1 TABLET: at 09:20

## 2024-03-05 RX ADMIN — CARVEDILOL 6.25 MG: 6.25 TABLET, FILM COATED ORAL at 09:20

## 2024-03-05 RX ADMIN — OXYCODONE HYDROCHLORIDE AND ACETAMINOPHEN 500 MG: 500 TABLET ORAL at 09:20

## 2024-03-05 RX ADMIN — Medication 1 TABLET: at 21:25

## 2024-03-05 RX ADMIN — SIMVASTATIN 20 MG: 20 TABLET, FILM COATED ORAL at 21:25

## 2024-03-05 RX ADMIN — ACETAMINOPHEN 650 MG: 325 TABLET ORAL at 16:00

## 2024-03-05 RX ADMIN — ASPIRIN 81 MG: 81 TABLET, COATED ORAL at 09:20

## 2024-03-05 RX ADMIN — CARVEDILOL 6.25 MG: 6.25 TABLET, FILM COATED ORAL at 16:00

## 2024-03-05 RX ADMIN — CHOLECALCIFEROL TAB 25 MCG (1000 UNIT) 1000 UNITS: 25 TAB at 09:20

## 2024-03-05 RX ADMIN — DOCUSATE SODIUM 100 MG: 100 CAPSULE, LIQUID FILLED ORAL at 21:25

## 2024-03-05 RX ADMIN — AMLODIPINE BESYLATE 2.5 MG: 2.5 TABLET ORAL at 09:20

## 2024-03-05 RX ADMIN — CLOPIDOGREL BISULFATE 75 MG: 75 TABLET ORAL at 09:20

## 2024-03-05 RX ADMIN — FUROSEMIDE 40 MG: 40 TABLET ORAL at 09:20

## 2024-03-05 ASSESSMENT — PAIN SCALES - GENERAL
PAINLEVEL_OUTOF10: 0 - NO PAIN
PAINLEVEL_OUTOF10: 6

## 2024-03-05 ASSESSMENT — COGNITIVE AND FUNCTIONAL STATUS - GENERAL
CLIMB 3 TO 5 STEPS WITH RAILING: A LITTLE
MOVING FROM LYING ON BACK TO SITTING ON SIDE OF FLAT BED WITH BEDRAILS: A LITTLE
STANDING UP FROM CHAIR USING ARMS: A LITTLE
CLIMB 3 TO 5 STEPS WITH RAILING: TOTAL
TOILETING: A LITTLE
DRESSING REGULAR LOWER BODY CLOTHING: A LITTLE
TURNING FROM BACK TO SIDE WHILE IN FLAT BAD: A LOT
MOVING TO AND FROM BED TO CHAIR: A LITTLE
STANDING UP FROM CHAIR USING ARMS: A LITTLE
MOVING FROM LYING ON BACK TO SITTING ON SIDE OF FLAT BED WITH BEDRAILS: A LOT
DRESSING REGULAR UPPER BODY CLOTHING: A LITTLE
MOBILITY SCORE: 16
PERSONAL GROOMING: A LITTLE
WALKING IN HOSPITAL ROOM: A LITTLE
WALKING IN HOSPITAL ROOM: A LOT
HELP NEEDED FOR BATHING: A LITTLE
TURNING FROM BACK TO SIDE WHILE IN FLAT BAD: A LITTLE
MOBILITY SCORE: 14
MOVING TO AND FROM BED TO CHAIR: A LOT
DAILY ACTIVITIY SCORE: 19

## 2024-03-05 ASSESSMENT — PAIN - FUNCTIONAL ASSESSMENT
PAIN_FUNCTIONAL_ASSESSMENT: FLACC (FACE, LEGS, ACTIVITY, CRY, CONSOLABILITY)
PAIN_FUNCTIONAL_ASSESSMENT: 0-10
PAIN_FUNCTIONAL_ASSESSMENT: 0-10

## 2024-03-05 ASSESSMENT — PAIN DESCRIPTION - LOCATION: LOCATION: BACK

## 2024-03-05 NOTE — PROGRESS NOTES
Physical Therapy    Physical Therapy Treatment    Patient Name: Akua Almanzar  MRN: [id number]  Today's Date: 3/5/2024  Time Calculation  Start Time: 1357  Stop Time: 1446  Time Calculation (min): 49 min       Assessment/Plan   PT Assessment  Assessment Comment: She made adequate progress toward her functional mobility goals. Pt presented with decreased muscular strength/endurance and increased assist required for functional mobility (up to CGA x1 today). She would benefit from continued skilled PT services for maximizing independence and safety with functional mobility prior to and after discharge.  End of Session Patient Position: Alarm off, not on at start of session (seated EOB with call light and needs within reach)  PT Plan  Inpatient/Swing Bed or Outpatient: Inpatient  PT Plan  Treatment/Interventions: Bed mobility, Transfer training, Gait training, Stair training, Balance training, Strengthening, Endurance training, Therapeutic exercise, Therapeutic activity  PT Plan: Skilled PT  PT Frequency: 5 times per week  PT Discharge Recommendations: Low intensity level of continued care, 24 hr supervision due to cognition  Equipment Recommended upon Discharge: Wheeled walker  PT Recommended Transfer Status: Contact guard  PT - OK to Discharge: Yes      General Visit Information:   PT  Visit  PT Received On: 03/05/24  General  Prior to Session Communication: Bedside nurse (RN cleared pt for participation.)  Patient Position Received:  (seated EOB; alarm off)  General Comment: Pt agreed to session and participated fully.    Subjective   Precautions:  Precautions  Medical Precautions: Fall precautions, Oxygen therapy device and L/min (2 L/min O2 via NC)    Objective   Pain:  Pain Assessment  Pain Assessment: 0-10  Pain Score: 0 - No pain    Postural Control:  Static Sitting Balance  Static Sitting-Balance Support: Feet supported, No upper extremity supported  Static Sitting-Level of Assistance: Independent    Dynamic  Sitting Balance  Dynamic Sitting-Balance Support: Feet supported, Left upper extremity supported  Dynamic Sitting-Balance:  (seated at toilet for rear hygiene)  Dynamic Sitting-Comments: close S    Static Standing Balance  Static Standing-Balance Support: Bilateral upper extremity supported  Static Standing-Comment/Number of Minutes: close S    Dynamic Standing Balance  Dynamic Standing-Balance Support: Left upper extremity supported  Dynamic Standing-Balance:  (performing front hygiene)  Dynamic Standing-Comments: close S    Activity Tolerance:  Activity Tolerance  Endurance: Tolerates 30 min exercise with multiple rests    Treatments:  Therapeutic Exercise  Therapeutic Exercise Performed: Yes (seated EOB, BLE x20 each)  Therapeutic Exercise Activity 1: DF/PF  Therapeutic Exercise Activity 2: LAQ  Therapeutic Exercise Activity 3: hip abd  Therapeutic Exercise Activity 4: hip flex    Ambulation/Gait Training  Ambulation/Gait Training Performed: Yes  Ambulation/Gait Training 1  Surface 1: Level tile  Device 1: Rolling walker  Assistance 1: Contact guard  Comments/Distance (ft) 1: 15 ft & 40 ft x2 using step through pattern with slowed velocity and continuous movement. Steady gait.    Transfers  Transfer: Yes  Transfer 1  Technique 1: Sit to stand  Transfer Device 1: Walker  Transfer Level of Assistance 1:  (CGA x1 from EOB, [place] x1 from toilet for increased lifting torque. Cued for walker safety/BUE placement.)  Transfers 2  Technique 2: Stand to sit  Transfer Device 2: Walker  Transfer Level of Assistance 2: Contact guard (cued for walker safety/BUE placement)  Trials/Comments 2: x1 trial at toilet & x1 trial at EOB    Stairs  Stairs: Yes  Stairs  Rails 1: Left (ascending)  Assistance 1: Contact guard  Comment/Number of Steps 1: 6 stairs with BUE on railing throughout. She used step-to-step technique. Slow pacing and steady throughout.    Outcome Measures:  [facility] Basic Mobility  Turning from your back to your side  while in a flat bed without using bedrails: A lot  Moving from lying on your back to sitting on the side of a flat bed without using bedrails: A lot  Moving to and from bed to chair (including a wheelchair): A little  Standing up from a chair using your arms (e.g. wheelchair or bedside chair): A little  To walk in hospital room: A little  Climbing 3-5 steps with railing: A little  Basic Mobility - Total Score: 16    Education Documentation  Mobility Training, taught by Stephy Castro, PT at 3/5/2024  3:02 PM.  Learner: Patient  Readiness: Acceptance  Method: Explanation  Response: Demonstrated Understanding, Needs Reinforcement    Education Comments  No comments found.        OP EDUCATION:       Encounter Problems       Encounter Problems (Active)       PT Problem       Patient will transfer supine <> sit independently  (Progressing)       Start:  03/01/24    Expected End:  03/22/24            Patient will transfer sit <> stand modified independently and use of rolling walker  (Progressing)       Start:  03/01/24    Expected End:  03/22/24            Patient will ambulate 150 ft modified independently and use of rolling walker  (Progressing)       Start:  03/01/24    Expected End:  03/22/24            Patient will ascend/descend 7 stairs with single UE support on 1 rail(s) modified independently and use of no assistive device  (Progressing)       Start:  03/01/24    Expected End:  03/22/24

## 2024-03-05 NOTE — NURSING NOTE
Trialed off O2 and patient was 87% on RA put on 1 L O2 NC and she is 95%. Will let attending know.

## 2024-03-05 NOTE — CARE PLAN
Problem: Pain  Goal: My pain/discomfort is manageable  Outcome: Progressing     Problem: Safety  Goal: Patient will be injury free during hospitalization  Outcome: Progressing  Goal: I will remain free of falls  Outcome: Progressing     Problem: Daily Care  Goal: Daily care needs are met  Outcome: Progressing     Problem: Psychosocial Needs  Goal: Demonstrates ability to cope with hospitalization/illness  Outcome: Progressing  Goal: Collaborate with me, my family, and caregiver to identify my specific goals  Outcome: Progressing     Problem: Discharge Barriers  Goal: My discharge needs are met  Outcome: Progressing     Problem: Skin  Goal: Decreased wound size/increased tissue granulation at next dressing change  Outcome: Progressing  Flowsheets (Taken 3/5/2024 0134)  Decreased wound size/increased tissue granulation at next dressing change: Protective dressings over bony prominences  Goal: Participates in plan/prevention/treatment measures  Outcome: Progressing  Flowsheets (Taken 3/5/2024 0134)  Participates in plan/prevention/treatment measures: Elevate heels  Goal: Prevent/manage excess moisture  Outcome: Progressing  Flowsheets (Taken 3/5/2024 0134)  Prevent/manage excess moisture: Moisturize dry skin  Goal: Prevent/minimize sheer/friction injuries  Outcome: Progressing  Flowsheets (Taken 3/5/2024 0134)  Prevent/minimize sheer/friction injuries:   Use pull sheet   HOB 30 degrees or less  Goal: Promote/optimize nutrition  Outcome: Progressing  Flowsheets (Taken 3/5/2024 0134)  Promote/optimize nutrition: Consume > 50% meals/supplements  Goal: Promote skin healing  Outcome: Progressing  Flowsheets (Taken 3/5/2024 0134)  Promote skin healing: Protective dressings over bony prominences

## 2024-03-05 NOTE — TREATMENT PLAN
Spoke with daughter over the phone to update on discharge planning. Plan was for patient to go home with her daughter and home health care. Daughter states that patient is not safe to return home. She feels that she would benefit from rehab. She is also concerned that she will be unable to go up and down her stairs. States that there is no room for oxygen. I spoke with RT who states that the concentrator is small. Also discussed with PT who will evaluate patient's ability to go up and down stairs. Hold off on discharge for now until we can confirm that she has a safe discharge plan. Discussed with care coordinator.

## 2024-03-05 NOTE — SIGNIFICANT EVENT
Home 02 Certification-  On room air pt spo2 was 86% at rest.   Pt was placed on 2L at rest, Pt sp02 was 94%  Pt was walked on 2L N/C and the pt sp02 was 94%-93%  Pt qualifies for 2L continuous

## 2024-03-05 NOTE — PROGRESS NOTES
Akua Almanzar is a 89 y.o. female on day 4 of admission presenting with Acute on chronic diastolic (congestive) heart failure (CMS/HCC).      Subjective   Patient seen and examined. Awake/alert/oriented. Denies chest pain, shortness of breath, fevers, chills, nausea, or vomiting. No abdominal discomfort. Reports poor appetite and difficulty sleeping. Trialed on room air, Pox in the 80s, placed on 1L and increased to 90s.        Objective     Last Recorded Vitals  /60 (BP Location: Right arm, Patient Position: Lying)   Pulse 66   Temp 36.4 °C (97.5 °F) (Oral)   Resp 17   Wt 72.6 kg (160 lb)   SpO2 95%   Intake/Output last 3 Shifts:    Intake/Output Summary (Last 24 hours) at 3/5/2024 1147  Last data filed at 3/5/2024 0500  Gross per 24 hour   Intake 540 ml   Output 1500 ml   Net -960 ml         Admission Weight  Weight: 72.6 kg (160 lb) (02/29/24 2310)    Daily Weight  02/29/24 : 72.6 kg (160 lb)    Image Results  ECG 12 lead  Normal sinus rhythm with sinus arrhythmia  Left axis deviation  Left bundle branch block  Abnormal ECG  When compared with ECG of 26-APR-2021 11:51,  No significant change was found  Confirmed by Sonu Price ([id number]) on 3/4/2024 8:58:55 AM      Physical Exam  Vitals reviewed.   Constitutional:       Appearance: Normal appearance.   HENT:      Head: Normocephalic and atraumatic.   Eyes:      Extraocular Movements: Extraocular movements intact.      Conjunctiva/sclera: Conjunctivae normal.   Cardiovascular:      Rate and Rhythm: Normal rate and regular rhythm.   Pulmonary:      Effort: Pulmonary effort is normal.      Breath sounds: No wheezing, rhonchi or rales.      Comments: Breath sounds clear, diminished bilaterally  Abdominal:      General: Bowel sounds are normal.      Palpations: Abdomen is soft.      Tenderness: There is no abdominal tenderness.   Skin:     General: Skin is warm and dry.   Neurological:      General: No focal deficit present.      Mental Status: She is alert  and oriented to person, place, and time.         Relevant Results  Lab Results   Component Value Date    GLUCOSE 81 03/05/2024    CALCIUM 9.5 03/05/2024     03/05/2024    K 3.5 03/05/2024    CO2 36 (H) 03/05/2024    CL 96 (L) 03/05/2024    BUN 19 03/05/2024    CREATININE 0.70 03/05/2024     Lab Results   Component Value Date    WBC 5.4 03/05/2024    HGB 11.3 (L) 03/05/2024    HCT 35.2 (L) 03/05/2024    MCV 90 03/05/2024     03/05/2024     ECG 12 lead  Result Date: 3/4/2024  Normal sinus rhythm with sinus arrhythmia Left axis deviation Left bundle branch block Abnormal ECG When compared with ECG of 26-APR-2021 11:51, No significant change was found Confirmed by Sonu Price ([id number]) on 3/4/2024 8:58:55 AM    Transthoracic Echo (TTE) Complete  Result Date: 3/1/2024  CONCLUSIONS:  1. Left ventricular systolic function is normal.  2. Spectral Doppler shows an impaired relaxation pattern of left ventricular diastolic filling.  3. There is moderate concentric left ventricular hypertrophy.  4. The left atrium is moderately dilated.  5. The mitral valve is moderately thickened.  6. There is moderate mitral annular calcification.  7. There is moderate to severe calcification of the anterior and posterior mitral valve leaflets.  8. Mild to moderate tricuspid regurgitation.  9. Moderate to severely elevated right ventricular systolic pressure.     XR chest 2 views  Result Date: 3/1/2024  1.  Pulmonary vascular congestion with prominent, indistinct interstitium and Kerley B-lines suggesting acute pulmonary interstitial edema/CHF. Trace pleural effusions with associated atelectasis. No pneumothorax.       MACRO: None   Signed by: Myles Sotomayor 3/1/2024 12:03 AM Dictation workstation:   [id number]       Assessment/Plan      Principal Problem:    Acute on chronic diastolic (congestive) heart failure (CMS/HCC)  Active Problems:    Atherosclerosis of coronary artery without angina pectoris    Hyperlipidemia    Primary  hypertension    Acquired hypothyroidism    Acute on chronic diastolic (congestive) heart failure   Transition to oral lasix today per cardiology   Consult cardiology - appreciate recs   Echo, moderate to severely elevated RVSP, similar to report from 2021, LVEF 55-65%, Moderate MV stenosis     Hypoxemia  Trial on room air, desatted into 80s, currently on 1L, wean as tolerated  Will likely need home oxygen cert prior to discharge    Atherosclerosis of coronary artery without angina pectoris  Angina free, stable   Continue ASA, beta blocker, statin and Plavix     Hyperlipidemia  Statin     Primary hypertension  Amlodipine, Coreg     Acquired hypothyroidism  Levothyroxine     DVT prophylaxis   Lovenox      Plan  Oral lasix  Wean oxygen  PT/OT/out of bed  Cardiology consulted, recommend oral lasix, BMP in one week, follow up 1-3 weeks with Dr. Brown  Discharge planning to [facility], PT/OT recommending low intensity rehab  Discharge home with home health care today            Tamia Arceo, APRN-CNP

## 2024-03-05 NOTE — NURSING NOTE
Assumed care of patient, patient is in bed sleeping and call light and possessions in reach and bed alarm on.

## 2024-03-05 NOTE — PROGRESS NOTES
Akua Almanzar is a 89 y.o. female on day 4 of admission presenting with Acute on chronic diastolic (congestive) heart failure (CMS/HCC).       Met with patient and her daughter at bedside.  Daughter states she does not feel safe taking patient to her home because it is a bilevel.  Patient would have to navigate several steps to get into the home.  Daughter also feels she will be unable to help her mother get in and out of the shower.  PT recommendation is for low intensity rehab with 24 hour supervision. Daughter would like patient to go to a SNF when discharged to allow her time to arrange for an AL facility. Patient has  also qualified for home 02. Daughter admits to feeling overwhelmed at this point.  Referral sent to [facility] which is FOC.  This TCC explained to daughter that although [facility] has accepted, the insurance may not give authorization.  Patient has [employer] insurance.  [facility] will start the precert. Daughter is aware that if authorization is not obtained for SNF, the patient will have to discharge home with HHC until an AL facility has been arranged.     NEEDS OT TO SEE TO START PRECERT    DC PLAN IS NOT SECURE               Aida Gauthier RN

## 2024-03-06 LAB
ANION GAP SERPL CALC-SCNC: 5 MMOL/L
BUN SERPL-MCNC: 17 MG/DL (ref 8–25)
CALCIUM SERPL-MCNC: 9.7 MG/DL (ref 8.5–10.4)
CHLORIDE SERPL-SCNC: 95 MMOL/L (ref 97–107)
CO2 SERPL-SCNC: 37 MMOL/L (ref 24–31)
CREAT SERPL-MCNC: 0.8 MG/DL (ref 0.4–1.6)
EGFRCR SERPLBLD CKD-EPI 2021: 68 ML/MIN/1.73M*2
ERYTHROCYTE [DISTWIDTH] IN BLOOD BY AUTOMATED COUNT: 14.8 % (ref 11.5–14.5)
GLUCOSE SERPL-MCNC: 87 MG/DL (ref 65–99)
HCT VFR BLD AUTO: 36.7 % (ref 36–46)
HGB BLD-MCNC: 11.4 G/DL (ref 12–16)
MCH RBC QN AUTO: 28.4 PG (ref 26–34)
MCHC RBC AUTO-ENTMCNC: 31.1 G/DL (ref 32–36)
MCV RBC AUTO: 91 FL (ref 80–100)
NRBC BLD-RTO: 0 /100 WBCS (ref 0–0)
PLATELET # BLD AUTO: 266 X10*3/UL (ref 150–450)
POTASSIUM SERPL-SCNC: 3.9 MMOL/L (ref 3.4–5.1)
RBC # BLD AUTO: 4.02 X10*6/UL (ref 4–5.2)
SODIUM SERPL-SCNC: 137 MMOL/L (ref 133–145)
WBC # BLD AUTO: 5.5 X10*3/UL (ref 4.4–11.3)

## 2024-03-06 PROCEDURE — 97165 OT EVAL LOW COMPLEX 30 MIN: CPT | Mod: GO

## 2024-03-06 PROCEDURE — 97110 THERAPEUTIC EXERCISES: CPT | Mod: GP | Performed by: PHYSICAL THERAPIST

## 2024-03-06 PROCEDURE — 80048 BASIC METABOLIC PNL TOTAL CA: CPT | Performed by: NURSE PRACTITIONER

## 2024-03-06 PROCEDURE — 1200000002 HC GENERAL ROOM WITH TELEMETRY DAILY

## 2024-03-06 PROCEDURE — 2500000001 HC RX 250 WO HCPCS SELF ADMINISTERED DRUGS (ALT 637 FOR MEDICARE OP): Performed by: NURSE PRACTITIONER

## 2024-03-06 PROCEDURE — 36415 COLL VENOUS BLD VENIPUNCTURE: CPT | Performed by: NURSE PRACTITIONER

## 2024-03-06 PROCEDURE — 97535 SELF CARE MNGMENT TRAINING: CPT | Mod: GO

## 2024-03-06 PROCEDURE — 2500000002 HC RX 250 W HCPCS SELF ADMINISTERED DRUGS (ALT 637 FOR MEDICARE OP, ALT 636 FOR OP/ED): Performed by: INTERNAL MEDICINE

## 2024-03-06 PROCEDURE — 2500000001 HC RX 250 WO HCPCS SELF ADMINISTERED DRUGS (ALT 637 FOR MEDICARE OP): Performed by: INTERNAL MEDICINE

## 2024-03-06 PROCEDURE — 85027 COMPLETE CBC AUTOMATED: CPT | Performed by: NURSE PRACTITIONER

## 2024-03-06 PROCEDURE — 97530 THERAPEUTIC ACTIVITIES: CPT | Mod: GP | Performed by: PHYSICAL THERAPIST

## 2024-03-06 PROCEDURE — 2500000004 HC RX 250 GENERAL PHARMACY W/ HCPCS (ALT 636 FOR OP/ED): Performed by: NURSE PRACTITIONER

## 2024-03-06 RX ORDER — GABAPENTIN 100 MG/1
100 CAPSULE ORAL DAILY PRN
Status: DISCONTINUED | OUTPATIENT
Start: 2024-03-06 | End: 2024-03-07

## 2024-03-06 RX ADMIN — CARVEDILOL 6.25 MG: 6.25 TABLET, FILM COATED ORAL at 16:49

## 2024-03-06 RX ADMIN — FUROSEMIDE 40 MG: 40 TABLET ORAL at 08:52

## 2024-03-06 RX ADMIN — OXYCODONE HYDROCHLORIDE AND ACETAMINOPHEN 500 MG: 500 TABLET ORAL at 08:52

## 2024-03-06 RX ADMIN — DOCUSATE SODIUM 100 MG: 100 CAPSULE, LIQUID FILLED ORAL at 21:14

## 2024-03-06 RX ADMIN — HYDROCORTISONE: 25 CREAM TOPICAL at 08:51

## 2024-03-06 RX ADMIN — AMLODIPINE BESYLATE 2.5 MG: 2.5 TABLET ORAL at 08:52

## 2024-03-06 RX ADMIN — CLOPIDOGREL BISULFATE 75 MG: 75 TABLET ORAL at 08:52

## 2024-03-06 RX ADMIN — SIMVASTATIN 20 MG: 20 TABLET, FILM COATED ORAL at 21:14

## 2024-03-06 RX ADMIN — GABAPENTIN 100 MG: 100 CAPSULE ORAL at 16:50

## 2024-03-06 RX ADMIN — POLYETHYLENE GLYCOL 3350 17 G: 17 POWDER, FOR SOLUTION ORAL at 18:20

## 2024-03-06 RX ADMIN — CHOLECALCIFEROL TAB 25 MCG (1000 UNIT) 1000 UNITS: 25 TAB at 08:52

## 2024-03-06 RX ADMIN — CARVEDILOL 6.25 MG: 6.25 TABLET, FILM COATED ORAL at 08:52

## 2024-03-06 RX ADMIN — ASPIRIN 81 MG: 81 TABLET, COATED ORAL at 08:52

## 2024-03-06 RX ADMIN — LEVOTHYROXINE SODIUM 88 MCG: 0.09 TABLET ORAL at 05:52

## 2024-03-06 RX ADMIN — Medication 1 TABLET: at 08:52

## 2024-03-06 RX ADMIN — DOCUSATE SODIUM 100 MG: 100 CAPSULE, LIQUID FILLED ORAL at 08:52

## 2024-03-06 RX ADMIN — HYDROCORTISONE: 25 CREAM TOPICAL at 23:52

## 2024-03-06 RX ADMIN — Medication 5 MG: at 21:14

## 2024-03-06 RX ADMIN — Medication 1 TABLET: at 21:14

## 2024-03-06 RX ADMIN — ENOXAPARIN SODIUM 40 MG: 100 INJECTION SUBCUTANEOUS at 08:51

## 2024-03-06 ASSESSMENT — COGNITIVE AND FUNCTIONAL STATUS - GENERAL
DRESSING REGULAR UPPER BODY CLOTHING: A LITTLE
CLIMB 3 TO 5 STEPS WITH RAILING: A LITTLE
MOVING TO AND FROM BED TO CHAIR: A LITTLE
TOILETING: A LITTLE
MOVING FROM LYING ON BACK TO SITTING ON SIDE OF FLAT BED WITH BEDRAILS: A LITTLE
DAILY ACTIVITIY SCORE: 17
STANDING UP FROM CHAIR USING ARMS: A LITTLE
HELP NEEDED FOR BATHING: A LOT
PERSONAL GROOMING: A LITTLE
MOVING FROM LYING ON BACK TO SITTING ON SIDE OF FLAT BED WITH BEDRAILS: A LOT
MOBILITY SCORE: 18
MOBILITY SCORE: 16
WALKING IN HOSPITAL ROOM: A LITTLE
DRESSING REGULAR LOWER BODY CLOTHING: A LITTLE
CLIMB 3 TO 5 STEPS WITH RAILING: A LITTLE
DAILY ACTIVITIY SCORE: 19
MOVING TO AND FROM BED TO CHAIR: A LITTLE
DRESSING REGULAR LOWER BODY CLOTHING: A LOT
STANDING UP FROM CHAIR USING ARMS: A LITTLE
TURNING FROM BACK TO SIDE WHILE IN FLAT BAD: A LOT
TURNING FROM BACK TO SIDE WHILE IN FLAT BAD: A LITTLE
TOILETING: A LITTLE
HELP NEEDED FOR BATHING: A LITTLE
PERSONAL GROOMING: A LITTLE
WALKING IN HOSPITAL ROOM: A LITTLE
DRESSING REGULAR UPPER BODY CLOTHING: A LITTLE

## 2024-03-06 ASSESSMENT — PAIN - FUNCTIONAL ASSESSMENT
PAIN_FUNCTIONAL_ASSESSMENT: FLACC (FACE, LEGS, ACTIVITY, CRY, CONSOLABILITY)
PAIN_FUNCTIONAL_ASSESSMENT: 0-10

## 2024-03-06 ASSESSMENT — PAIN SCALES - GENERAL
PAINLEVEL_OUTOF10: 0 - NO PAIN
PAINLEVEL_OUTOF10: 8
PAINLEVEL_OUTOF10: 0 - NO PAIN
PAINLEVEL_OUTOF10: 7
PAINLEVEL_OUTOF10: 0 - NO PAIN

## 2024-03-06 ASSESSMENT — ACTIVITIES OF DAILY LIVING (ADL)
ADL_ASSISTANCE: INDEPENDENT
HOME_MANAGEMENT_TIME_ENTRY: 17
BATHING_ASSISTANCE: MODERATE

## 2024-03-06 NOTE — PROGRESS NOTES
"Nutrition Follow up Note    Nutrition Assessment      Pt stated that she is drinking Ensure HP once daily, will adjust order to reflect. Pt stated that she drinks Ensure at home as well. Pt stated that her appetite is improving. No other nutritional concerns.     Nutrition History:  Food and Nutrient History: Pt stated that she is eating well. Pt stated that her stress is affecting her appetite.  Energy Intake: Good > 75 %  Food Allergies/Intolerances:  None  GI Symptoms: None  Oral Problems: None    Anthropometrics:  Ht: 154.9 cm (5' 1\"), Wt: 72.6 kg (160 lb), BMI: 30.25  IBW/kg (Dietitian Calculated): 48 kg  Percent of IBW: 150 %  Adjusted Body Weight (kg): 54 kg    Weight Change:  Daily Weight  02/29/24 : 72.6 kg (160 lb)  02/23/24 : 82.6 kg (182 lb)  04/26/21 : 71.2 kg (157 lb)  02/15/21 : 72.1 kg (159 lb)                   Nutrition Focused Physical Exam Findings:   Subcutaneous Fat Loss  Orbital Fat Pads: Well nourshed (slightly bulging fat pads)  Buccal Fat Pads: Well nourished (full, rounded cheeks)    Muscle Wasting  Temporalis: Well nourished (well-defined muscle)    Edema  Edema: +1 trace  Edema Location: BLE         Nutrition Significant Labs:  Lab Results   Component Value Date    WBC 5.5 03/06/2024    HGB 11.4 (L) 03/06/2024    HCT 36.7 03/06/2024     03/06/2024    ALT 21 02/29/2024    AST 29 02/29/2024     03/06/2024    K 3.9 03/06/2024    CL 95 (L) 03/06/2024    CREATININE 0.80 03/06/2024    BUN 17 03/06/2024    CO2 37 (H) 03/06/2024    TSH 1.13 02/29/2024    INR 1.2 (H) 04/10/2021       Current Facility-Administered Medications:     acetaminophen (Tylenol) tablet 650 mg, 650 mg, oral, q6h PRN, DELILAH Deleon, 650 mg at 03/05/24 1600    amLODIPine (Norvasc) tablet 2.5 mg, 2.5 mg, oral, Daily, DELILAH Deleon, 2.5 mg at 03/06/24 0852    ascorbic acid (Vitamin C) tablet 500 mg, 500 mg, oral, Daily, DELILAH Deleon, 500 mg at 03/06/24 0852    aspirin " EC tablet 81 mg, 81 mg, oral, Daily, Jatinder Snell MD, 81 mg at 03/06/24 0852    carvedilol (Coreg) tablet 6.25 mg, 6.25 mg, oral, BID with meals, Jatinder Snell MD, 6.25 mg at 03/06/24 0852    cholecalciferol (Vitamin D-3) tablet 1,000 Units, 1,000 Units, oral, Daily, DELILAH Deleon, 1,000 Units at 03/06/24 0852    clopidogrel (Plavix) tablet 75 mg, 75 mg, oral, Daily, Jatinder Snell MD, 75 mg at 03/06/24 0852    docusate sodium (Colace) capsule 100 mg, 100 mg, oral, BID, DELILAH Deleon, 100 mg at 03/06/24 0852    enoxaparin (Lovenox) syringe 40 mg, 40 mg, subcutaneous, Daily, DELILAH Deleon, 40 mg at 03/06/24 0851    furosemide (Lasix) tablet 40 mg, 40 mg, oral, Daily, Vince Cyr DO, 40 mg at 03/06/24 0852    hydrocortisone 2.5 % cream, , Topical, BID, DELILAH Deleon, Given at 03/06/24 0851    ipratropium-albuteroL (Duo-Neb) 0.5-2.5 mg/3 mL nebulizer solution 3 mL, 3 mL, nebulization, q2h PRN, DELILAH Deleon, 3 mL at 03/03/24 1007    lactobacillus acidophilus tablet 1 tablet, 1 tablet, oral, BID, DELILAH Deleon, 1 tablet at 03/06/24 0852    levothyroxine (Synthroid, Levoxyl) tablet 88 mcg, 88 mcg, oral, Daily, Jatinder Snell MD, 88 mcg at 03/06/24 0552    melatonin tablet 5 mg, 5 mg, oral, Nightly, Tamia Arceo, APRN-CNP, 5 mg at 03/05/24 2125    ondansetron (Zofran) injection 4 mg, 4 mg, intravenous, q6h PRN, DELILAH Deleon    oxygen (O2) therapy, , inhalation, Continuous PRN - O2/gases, DELILAH Deleon, 3 L/min at 03/03/24 1007    polyethylene glycol (Glycolax, Miralax) packet 17 g, 17 g, oral, Daily PRN, DELILAH Deleon    simvastatin (Zocor) tablet 20 mg, 20 mg, oral, Nightly, Jatinder Snell MD, 20 mg at 03/05/24 2125    Dietary Orders (From admission, onward)       Start     Ordered    03/05/24 1148  Oral nutritional supplements  Until discontinued        Question Answer Comment    Deliver with All meals    Select supplement: Ensure High Protein        03/05/24 1148    03/01/24 0204  Adult diet Regular, Cardiac; 70 gm fat; 2 - 3 grams Sodium  Diet effective now        Question Answer Comment   Diet type Regular    Diet type Cardiac    Fat restriction: 70 gm fat    Sodium restriction: 2 - 3 grams Sodium        03/01/24 0206                  Estimated Needs:   Estimated Energy Needs  Total Energy Estimated Needs (kCal): 1350 kCal  Total Estimated Energy Need per Day (kCal/kg): 25 kCal/kg  Method for Estimating Needs: adj wt    Estimated Protein Needs  Total Protein Estimated Needs (g): 65 g  Total Protein Estimated Needs (g/kg): 1.2 g/kg  Method for Estimating Needs: adj wt    Estimated Fluid Needs  Total Fluid Estimated Needs (mL): 1350 mL  Total Fluid Estimated Needs (mL/kg): 1 mL/kg  Method for Estimating Needs: adj wt        Nutrition Diagnosis   Nutrition Diagnosis:  Malnutrition Diagnosis  Patient has Malnutrition Diagnosis: No    Nutrition Diagnosis  Patient has Nutrition Diagnosis: No       Nutrition Interventions/Recommendations   Nutrition Interventions and Recommendations:    Nutrition Prescription:  Individualized Nutrition Prescription Provided for : 1350 calories, 65gm protein low Na    Nutrition Interventions:   Food and/or Nutrient Delivery Interventions  Interventions: Meals and snacks, Medical food supplement  Meals and Snacks: Mineral-modified diet, Fat-modified diet  Goal: provide diet as ordered  Medical Food Supplement: Commercial beverage  Goal: ensure high protein once daily to provide 160 kcals and 16g protein    Education Documentation  No documentation found.           Nutrition Monitoring and Evaluation   Monitoring/Evaluation:   Food/Nutrient Related History Monitoring  Monitoring and Evaluation Plan: Energy intake  Energy Intake: Estimated energy intake  Criteria: Pt to consume >/=75% estimated energy needs         Time Spent/Follow-up:   Follow Up  Time Spent  (min): 20 minutes  Last Date of Nutrition Visit: 03/06/24  Nutrition Follow-Up Needed?: 7-10 days  Follow up Comment: 3/13/24

## 2024-03-06 NOTE — PROGRESS NOTES
Akua Almanzar is a 89 y.o. female on day 5 of admission presenting with Acute on chronic diastolic (congestive) heart failure (CMS/HCC).      Subjective   Patient seen and examined. Awake/alert/oriented. Denies chest pain, shortness of breath, fevers, chills, nausea, or vomiting. No abdominal discomfort.         Objective     Last Recorded Vitals  /64 (BP Location: Right arm, Patient Position: Lying)   Pulse 70   Temp 36.9 °C (98.4 °F) (Oral)   Resp 18   Wt 72.6 kg (160 lb)   SpO2 95%   Intake/Output last 3 Shifts:    Intake/Output Summary (Last 24 hours) at 3/6/2024 1436  Last data filed at 3/6/2024 0639  Gross per 24 hour   Intake --   Output 2800 ml   Net -2800 ml         Admission Weight  Weight: 72.6 kg (160 lb) (02/29/24 2310)    Daily Weight  02/29/24 : 72.6 kg (160 lb)    Image Results  Lower extremity venous duplex left  Narrative: Interpreted By:  Jatinder Appiah,   STUDY:  [facility] LOWER EXTREMITY VENOUS DUPLEX LEFT; 3/5/2024 1:16 pm      INDICATION:  Signs/Symptoms:lower extremity pain and edema.      COMPARISON:  None      ACCESSION NUMBER(S):  [id number]      ORDERING CLINICIAN:  BREANNE YOUNG      TECHNIQUE:  Black and white as well as color-flow duplex images were obtained  along with Doppler spectral analysis of the deep venous system of the  lower extremity from the knee to the groin. Attempts were made to  image the proximal posterior tibial and peroneal veins of the  proximal calf as well. Venous compression was performed.      FINDINGS:  There is normal compressibility and flow within the visualized  vessels of the deep venous system of the lower extremity.  The  contralateral common femoral vein is patent.      Impression: No evidence for deep venous thrombosis within the limits of this  examination.      MACRO:  none      Signed by: Jatinder Appiah 3/5/2024 1:30 PM  Dictation workstation:   [id number]      Physical Exam  Vitals reviewed.   Constitutional:       Appearance: Normal  appearance.   HENT:      Head: Normocephalic and atraumatic.   Eyes:      Extraocular Movements: Extraocular movements intact.      Conjunctiva/sclera: Conjunctivae normal.   Cardiovascular:      Rate and Rhythm: Normal rate and regular rhythm.   Pulmonary:      Effort: Pulmonary effort is normal.      Breath sounds: No wheezing, rhonchi or rales.      Comments: Breath sounds clear, diminished bilaterally  Abdominal:      General: Bowel sounds are normal.      Palpations: Abdomen is soft.      Tenderness: There is no abdominal tenderness.   Skin:     General: Skin is warm and dry.   Neurological:      General: No focal deficit present.      Mental Status: She is alert and oriented to person, place, and time.         Relevant Results  Lab Results   Component Value Date    GLUCOSE 87 03/06/2024    CALCIUM 9.7 03/06/2024     03/06/2024    K 3.9 03/06/2024    CO2 37 (H) 03/06/2024    CL 95 (L) 03/06/2024    BUN 17 03/06/2024    CREATININE 0.80 03/06/2024     Lab Results   Component Value Date    WBC 5.5 03/06/2024    HGB 11.4 (L) 03/06/2024    HCT 36.7 03/06/2024    MCV 91 03/06/2024     03/06/2024     ECG 12 lead  Result Date: 3/4/2024  Normal sinus rhythm with sinus arrhythmia Left axis deviation Left bundle branch block Abnormal ECG When compared with ECG of 26-APR-2021 11:51, No significant change was found Confirmed by Sonu Price ([id number]) on 3/4/2024 8:58:55 AM    Transthoracic Echo (TTE) Complete  Result Date: 3/1/2024  CONCLUSIONS:  1. Left ventricular systolic function is normal.  2. Spectral Doppler shows an impaired relaxation pattern of left ventricular diastolic filling.  3. There is moderate concentric left ventricular hypertrophy.  4. The left atrium is moderately dilated.  5. The mitral valve is moderately thickened.  6. There is moderate mitral annular calcification.  7. There is moderate to severe calcification of the anterior and posterior mitral valve leaflets.  8. Mild to moderate  tricuspid regurgitation.  9. Moderate to severely elevated right ventricular systolic pressure.     XR chest 2 views  Result Date: 3/1/2024  1.  Pulmonary vascular congestion with prominent, indistinct interstitium and Kerley B-lines suggesting acute pulmonary interstitial edema/CHF. Trace pleural effusions with associated atelectasis. No pneumothorax.       MACRO: None   Signed by: Myles Sotomayor 3/1/2024 12:03 AM Dictation workstation:   [id number]       Assessment/Plan      Principal Problem:    Acute on chronic diastolic (congestive) heart failure (CMS/HCC)  Active Problems:    Atherosclerosis of coronary artery without angina pectoris    Hyperlipidemia    Primary hypertension    Acquired hypothyroidism    Acute on chronic diastolic (congestive) heart failure   Transition to oral lasix per cardiology   Consult cardiology - appreciate recs   Echo, moderate to severely elevated RVSP, similar to report from 2021, LVEF 55-65%, Moderate MV stenosis     Hypoxemia  Trial on room air, desatted into 80s, currently on 1L, wean as tolerated  Patient did qualify for home oxygen 2L continuous    Atherosclerosis of coronary artery without angina pectoris  Angina free, stable   Continue ASA, beta blocker, statin and Plavix     Hyperlipidemia  Statin     Primary hypertension  Amlodipine, Coreg     Acquired hypothyroidism  Levothyroxine     DVT prophylaxis   Lovenox      Plan  Oral lasix  Wean oxygen  PT/OT/out of bed  Cardiology consulted, recommend oral lasix, BMP in one week, follow up 1-3 weeks with Dr. Brown  Discharge planning to [facility], PT/OT recommending low intensity rehab  Discharge home with home health care planned however daughter refused yesterday, did not feel patient was safe to return home. Reevaluated by PT, still recommending low intensity rehab. Care coordination is checking to see if she will qualify for SNF.              Tamia Arceo, WANG-CNP

## 2024-03-06 NOTE — PROGRESS NOTES
Occupational Therapy    Evaluation/Treatment    Patient Name: Akua Almanzar  MRN: [id number]  : 3/27/1929  Today's Date: 24  Time Calculation  Start Time:   Stop Time:   Time Calculation (min): 27 min       Assessment:  OT Assessment: OT order received, chart reviewed, evaluation completed. Pt performed functional mobility with CGA this date, required mod assist for toilet transfers and  assist for ADLs this date, would benefit from acute OT services to facilitate return to PLOF  Evaluation/Treatment Tolerance: Patient tolerated treatment well  Medical Staff Made Aware: Yes  End of Session Communication: Bedside nurse  End of Session Patient Position: Up in chair, Alarm off, not on at start of session  OT Assessment Results: Decreased ADL status, Decreased upper extremity strength, Decreased endurance, Decreased functional mobility, Decreased IADLs  Evaluation/Treatment Tolerance: Patient tolerated treatment well  Medical Staff Made Aware: Yes  Plan:  Treatment Interventions: ADL retraining, Functional transfer training, Endurance training, Patient/family training, Equipment evaluation/education  OT Frequency: 3 times per week  OT Discharge Recommendations: Moderate intensity level of continued care  OT - OK to Discharge: Yes  Treatment Interventions: ADL retraining, Functional transfer training, Endurance training, Patient/family training, Equipment evaluation/education    Subjective   Current Problem:    General:   OT Received On: 24  General  Reason for Referral: 89 y/o female admit from home with worsening BLE edema and SOB.  Referred By: Jatinder Snell MD  Past Medical History Relevant to Rehab: ASHD, hpl, htn, hypothyroidism, AVR, TIA, vit D deficiency  Family/Caregiver Present: No  Prior to Session Communication: Bedside nurse  Patient Position Received: Up in chair, Alarm off, not on at start of session  Preferred Learning Style: verbal, visual  General Comment: Pt agreeable to OT  evaluation  Precautions:  Medical Precautions: Fall precautions, Oxygen therapy device and L/min (2L)  Vital Signs:     Pain:  Pain Assessment  Pain Assessment: 0-10  Pain Score: 7  Pain Type: Acute pain  Pain Location: Back  Pain Interventions: Repositioned    Objective   Cognition:  Overall Cognitive Status: Within Functional Limits  Orientation Level: Oriented X4           Home Living:  Type of Home: House  Lives With: Adult children  Home Adaptive Equipment: Walker rolling or standard  Home Layout: Multi-level, Bed/bath upstairs, Stairs to alternate level with rails  Alternate Level Stairs-Number of Steps: 6-7  Home Access: Stairs to enter without rails  Entrance Stairs-Rails: None  Entrance Stairs-Number of Steps: 2  Bathroom Shower/Tub: Tub/shower unit  Bathroom Toilet: Standard  Bathroom Equipment: Shower chair with back  Prior Function:  Level of [place]: Independent with ADLs and functional transfers, Needs assistance with homemaking  Receives Help From: Family  ADL Assistance: Independent  Homemaking Assistance: Needs assistance  Ambulatory Assistance: Independent (with RW)  IADL History:     ADL:  Eating Assistance: Independent  Grooming Deficit: Setup, Wash/dry hands, Steadying (standing sinkside)  Bathing Assistance: Moderate  UE Dressing Assistance: Minimal  LE Dressing Assistance: Maximal  Toileting Assistance with Device: Moderate  Toileting Deficit: Incontinent, Clothing management down, Clothing management up, Perineal hygiene (assist to doff brief, don brief post hygiene, hygiene completed with supervision)    Activity Tolerance:  Endurance: Tolerates 10 - 20 min exercise with multiple rests  Functional Standing Tolerance:     Bed Mobility/Transfers: Bed Mobility  Bed Mobility: No    Transfers  Transfer: Yes  Transfer 1  Transfer From 1: Chair with arms to  Transfer to 1: Stand  Technique 1: Sit to stand  Transfer Device 1: Walker  Transfer Level of Assistance 1: Contact  guard  Trials/Comments 1: Pt stood from chair with cues to scoot forwards and safe hand placement, stood with CGA. Pt performed functional mobility household distance to and from bathroom in room with CGA and cues for RW navigation throughout  Transfers 2  Transfer From 2: Stand to, Sit to  Transfer to 2: Toilet, Stand  Technique 2: Sit to stand, Stand to sit  Transfer Device 2: Walker  Transfer Level of Assistance 2: Moderate assistance  Trials/Comments 2: Pt required mod A to and from toilet for transfer due to inaccessible grab bars  Transfers 3  Transfer From 3: Stand to  Transfer to 3: Chair with arms  Technique 3: Stand to sit  Transfer Device 3: Walker  Transfer Level of Assistance 3: Close supervision  Trials/Comments 3: Pt transferred to bedside chair with close S, neds in reach      Sensation:  Light Touch: No apparent deficits  Strength:  Strength Comments: 4/5 B UEs observed functionally    Coordination:  Movements are Fluid and Coordinated: Yes   Hand Function:  Hand Function  Gross Grasp: Functional  Coordination: Functional  Extremities: RUE   RUE : Within Functional Limits and LUE   LUE: Within Functional Limits    Outcome Measures: [facility] Daily Activity  Putting on and taking off regular lower body clothing: A lot  Bathing (including washing, rinsing, drying): A lot  Putting on and taking off regular upper body clothing: A little  Toileting, which includes using toilet, bedpan or urinal: A little  Taking care of personal grooming such as brushing teeth: A little  Eating Meals: None  Daily Activity - Total Score: 17        Education Documentation  ADL Training, taught by Shell Saleem OT at 3/6/2024  4:31 PM.  Learner: Patient  Readiness: Acceptance  Method: Explanation  Response: Verbalizes Understanding  Comment: Edu on OT POC    Education Comments  No comments found.        OP EDUCATION:       Goals:  Encounter Problems       Encounter Problems (Active)       OT Goals       ADLs (Progressing)        Start:  03/06/24    Expected End:  03/22/24       Pt will complete ADL tasks with Mod I, using AE as needed, in order to complete self-care tasks.           Transfers (Progressing)       Start:  03/06/24    Expected End:  03/22/24       Pt will perform functional transfers at mod ind level with RW           functional mobility (Progressing)       Start:  03/06/24    Expected End:  03/22/24       Pt will perform functional mobility extended household distance mod ind with RW

## 2024-03-06 NOTE — NURSING NOTE
Assumed care of patient, patient is in bed sleeping and call light and possessions within reach and bed alarm on.

## 2024-03-06 NOTE — CARE PLAN
Problem: Pain  Goal: My pain/discomfort is manageable  Outcome: Progressing     Problem: Safety  Goal: Patient will be injury free during hospitalization  Outcome: Progressing  Goal: I will remain free of falls  Outcome: Progressing     Problem: Daily Care  Goal: Daily care needs are met  Outcome: Progressing     Problem: Psychosocial Needs  Goal: Demonstrates ability to cope with hospitalization/illness  Outcome: Progressing  Goal: Collaborate with me, my family, and caregiver to identify my specific goals  Outcome: Progressing     Problem: Discharge Barriers  Goal: My discharge needs are met  Outcome: Progressing     Problem: Skin  Goal: Decreased wound size/increased tissue granulation at next dressing change  Outcome: Progressing  Flowsheets (Taken 3/6/2024 0009)  Decreased wound size/increased tissue granulation at next dressing change:   Promote sleep for wound healing   Protective dressings over bony prominences  Goal: Participates in plan/prevention/treatment measures  Outcome: Progressing  Flowsheets (Taken 3/6/2024 0009)  Participates in plan/prevention/treatment measures: Elevate heels  Goal: Prevent/manage excess moisture  Outcome: Progressing  Flowsheets (Taken 3/6/2024 0009)  Prevent/manage excess moisture: Moisturize dry skin  Goal: Prevent/minimize sheer/friction injuries  Outcome: Progressing  Flowsheets (Taken 3/6/2024 0009)  Prevent/minimize sheer/friction injuries:   HOB 30 degrees or less   Use pull sheet  Goal: Promote/optimize nutrition  Outcome: Progressing  Flowsheets (Taken 3/6/2024 0009)  Promote/optimize nutrition: Consume > 50% meals/supplements  Goal: Promote skin healing  Outcome: Progressing  Flowsheets (Taken 3/6/2024 0009)  Promote skin healing: Protective dressings over bony prominences

## 2024-03-06 NOTE — PROGRESS NOTES
Physical Therapy    Physical Therapy Treatment    Patient Name: Akua Almanzar  MRN: [id number]  Today's Date: 3/6/2024  Time Calculation  Start Time: 1348  Stop Time: 1417  Time Calculation (min): 29 min       Assessment/Plan   PT Assessment  Assessment Comment: She made adequate progress toward her functional mobility goals. Pt presented with decreased muscular strength/endurance and increased assist required for functional mobility (up to CGA x1 today). She would benefit from continued skilled PT services for maximizing independence and safety with functional mobility prior to and after discharge.  End of Session Patient Position: Up in chair, Alarm off, not on at start of session (call light and needs within reach)  PT Plan  Inpatient/Swing Bed or Outpatient: Inpatient  PT Plan  Treatment/Interventions: Bed mobility, Transfer training, Gait training, Stair training, Balance training, Strengthening, Endurance training, Therapeutic exercise, Therapeutic activity  PT Plan: Skilled PT  PT Frequency: 5 times per week  PT Discharge Recommendations: Low intensity level of continued care, 24 hr supervision due to cognition  Equipment Recommended upon Discharge: Wheeled walker  PT Recommended Transfer Status: Contact guard  PT - OK to Discharge: Yes      General Visit Information:   PT  Visit  PT Received On: 03/06/24  Response to Previous Treatment: Patient with no complaints from previous session.  General  Prior to Session Communication: Bedside nurse (RN cleared pt for participation.)  Patient Position Received: Bed, 3 rail up, Alarm off, not on at start of session  General Comment: Pt agreed to tx and paricipated fully.    Subjective   Precautions:  Precautions  Medical Precautions: Fall precautions, Oxygen therapy device and L/min (2 L/min O2 via NC)      Objective   Pain:  Pain Assessment  Pain Assessment: 0-10  Pain Score: 8  Pain Type: Acute pain  Pain Location: Back  Pain Orientation: Upper, Lower  Pain  Interventions: Ambulation/increased activity, Repositioned  Multiple Pain Sites: Two  Pain 2  Pain Score 2:  (FLACC 4/10)  Pain Type 2: Neuropathic pain  Pain Location 2: Foot  Pain Orientation 2: Left, Right  Pain Interventions 2: Ambulation/increased activity, Repositioned     Activity Tolerance:  Activity Tolerance  Endurance: Tolerates 10 - 20 min exercise with multiple rests    Treatments:  Therapeutic Exercise  Therapeutic Exercise Performed: Yes (in supported sitting, BLE x20 each)  Therapeutic Exercise Activity 1: DF/PF  Therapeutic Exercise Activity 2: LAQ  Therapeutic Exercise Activity 3: hip abd  Therapeutic Exercise Activity 4: hip flex    Bed Mobility  Bed Mobility: Yes  Bed Mobility 1  Bed Mobility 1: Supine to sitting  Level of Assistance 1: Close supervision  Bed Mobility Comments 1: HOB elevated, used R lower and L upper bed rails  Bed Mobility 2  Bed Mobility  2: Scooting  Level of Assistance 2: Close supervision  Bed Mobility Comments 2: fwd scoot while seated EOB    Ambulation/Gait Training  Ambulation/Gait Training Performed: Yes  Ambulation/Gait Training 1  Surface 1: Level tile  Device 1: Rolling walker  Assistance 1: Contact guard (cued for pursed lip breathing)  Comments/Distance (ft) 1: 50 ft x4 using minimal step through pattern with slowed velocity and continuous movement. Steady gait.    Transfers  Transfer: Yes  Transfer 1  Technique 1: Sit to stand  Transfer Device 1: Walker  Transfer Level of Assistance 1: Contact guard (cued for walker safety/BUE placement)  Trials/Comments 1: x1 trial from EOB  Transfers 2  Technique 2: Stand to sit  Transfer Device 2: Walker  Transfer Level of Assistance 2: Contact guard (cued for alignment with sitting surface and safe BUE placement)  Trials/Comments 2: x1 trial at bedside chair    Outcome Measures:  [facility] Basic Mobility  Turning from your back to your side while in a flat bed without using bedrails: A lot  Moving from lying on your back to  sitting on the side of a flat bed without using bedrails: A lot  Moving to and from bed to chair (including a wheelchair): A little  Standing up from a chair using your arms (e.g. wheelchair or bedside chair): A little  To walk in hospital room: A little  Climbing 3-5 steps with railing: A little  Basic Mobility - Total Score: 16    Education Documentation  Mobility Training, taught by Stephy Castro, PT at 3/6/2024  2:47 PM.  Learner: Patient  Readiness: Acceptance  Method: Explanation  Response: Demonstrated Understanding, Needs Reinforcement    Education Comments  No comments found.    OP EDUCATION:       Encounter Problems       Encounter Problems (Active)       PT Problem       Patient will transfer supine <> sit independently  (Progressing)       Start:  03/01/24    Expected End:  03/22/24            Patient will transfer sit <> stand modified independently and use of rolling walker  (Progressing)       Start:  03/01/24    Expected End:  03/22/24            Patient will ambulate 150 ft modified independently and use of rolling walker  (Progressing)       Start:  03/01/24    Expected End:  03/22/24            Patient will ascend/descend 7 stairs with single UE support on 1 rail(s) modified independently and use of no assistive device  (Progressing)       Start:  03/01/24    Expected End:  03/22/24

## 2024-03-06 NOTE — PROGRESS NOTES
Akua Almanzar is a 89 y.o. female on day 5 of admission presenting with Acute on chronic diastolic (congestive) heart failure (CMS/HCC).    [facility] requesting OT notes to start the precert. OT was not ordered. Requested OT eval to be placed. Once receive OT notes will send via careport to SNF and the precert can be started.     DC PLAN NOT SECURE.      Viridiana Marks RN

## 2024-03-07 VITALS
BODY MASS INDEX: 30.21 KG/M2 | RESPIRATION RATE: 19 BRPM | DIASTOLIC BLOOD PRESSURE: 55 MMHG | WEIGHT: 160 LBS | HEIGHT: 61 IN | HEART RATE: 68 BPM | SYSTOLIC BLOOD PRESSURE: 119 MMHG | OXYGEN SATURATION: 97 % | TEMPERATURE: 97.9 F

## 2024-03-07 PROCEDURE — 2500000001 HC RX 250 WO HCPCS SELF ADMINISTERED DRUGS (ALT 637 FOR MEDICARE OP): Performed by: INTERNAL MEDICINE

## 2024-03-07 PROCEDURE — 97110 THERAPEUTIC EXERCISES: CPT | Mod: GP | Performed by: PHYSICAL THERAPIST

## 2024-03-07 PROCEDURE — 97530 THERAPEUTIC ACTIVITIES: CPT | Mod: GP | Performed by: PHYSICAL THERAPIST

## 2024-03-07 PROCEDURE — 2500000001 HC RX 250 WO HCPCS SELF ADMINISTERED DRUGS (ALT 637 FOR MEDICARE OP): Performed by: NURSE PRACTITIONER

## 2024-03-07 PROCEDURE — 2500000004 HC RX 250 GENERAL PHARMACY W/ HCPCS (ALT 636 FOR OP/ED): Performed by: NURSE PRACTITIONER

## 2024-03-07 PROCEDURE — 2500000002 HC RX 250 W HCPCS SELF ADMINISTERED DRUGS (ALT 637 FOR MEDICARE OP, ALT 636 FOR OP/ED): Performed by: INTERNAL MEDICINE

## 2024-03-07 RX ORDER — GABAPENTIN 100 MG/1
100 CAPSULE ORAL NIGHTLY
Status: DISCONTINUED | OUTPATIENT
Start: 2024-03-07 | End: 2024-03-07 | Stop reason: HOSPADM

## 2024-03-07 RX ORDER — FUROSEMIDE 40 MG/1
40 TABLET ORAL DAILY
Refills: 0
Start: 2024-03-07

## 2024-03-07 RX ORDER — GABAPENTIN 100 MG/1
100 CAPSULE ORAL NIGHTLY
Refills: 0
Start: 2024-03-07

## 2024-03-07 RX ORDER — HYDROXYZINE HYDROCHLORIDE 25 MG/1
25 TABLET, FILM COATED ORAL EVERY 6 HOURS PRN
Refills: 0
Start: 2024-03-07

## 2024-03-07 RX ORDER — HYDROXYZINE HYDROCHLORIDE 25 MG/1
25 TABLET, FILM COATED ORAL EVERY 6 HOURS PRN
Status: DISCONTINUED | OUTPATIENT
Start: 2024-03-07 | End: 2024-03-07 | Stop reason: HOSPADM

## 2024-03-07 RX ORDER — DOCUSATE SODIUM 100 MG/1
100 CAPSULE, LIQUID FILLED ORAL 2 TIMES DAILY
Refills: 0
Start: 2024-03-07 | End: 2024-04-15 | Stop reason: HOSPADM

## 2024-03-07 RX ADMIN — ENOXAPARIN SODIUM 40 MG: 100 INJECTION SUBCUTANEOUS at 09:00

## 2024-03-07 RX ADMIN — FUROSEMIDE 40 MG: 40 TABLET ORAL at 09:43

## 2024-03-07 RX ADMIN — CHOLECALCIFEROL TAB 25 MCG (1000 UNIT) 1000 UNITS: 25 TAB at 09:44

## 2024-03-07 RX ADMIN — HYDROCORTISONE: 25 CREAM TOPICAL at 09:00

## 2024-03-07 RX ADMIN — LEVOTHYROXINE SODIUM 88 MCG: 0.09 TABLET ORAL at 06:41

## 2024-03-07 RX ADMIN — CLOPIDOGREL BISULFATE 75 MG: 75 TABLET ORAL at 09:44

## 2024-03-07 RX ADMIN — OXYCODONE HYDROCHLORIDE AND ACETAMINOPHEN 500 MG: 500 TABLET ORAL at 09:43

## 2024-03-07 RX ADMIN — Medication 1 TABLET: at 09:44

## 2024-03-07 RX ADMIN — CARVEDILOL 6.25 MG: 6.25 TABLET, FILM COATED ORAL at 16:51

## 2024-03-07 RX ADMIN — AMLODIPINE BESYLATE 2.5 MG: 2.5 TABLET ORAL at 09:44

## 2024-03-07 RX ADMIN — CARVEDILOL 6.25 MG: 6.25 TABLET, FILM COATED ORAL at 09:43

## 2024-03-07 RX ADMIN — DOCUSATE SODIUM 100 MG: 100 CAPSULE, LIQUID FILLED ORAL at 09:44

## 2024-03-07 RX ADMIN — ASPIRIN 81 MG: 81 TABLET, COATED ORAL at 09:44

## 2024-03-07 ASSESSMENT — COGNITIVE AND FUNCTIONAL STATUS - GENERAL
MOBILITY SCORE: 16
MOVING TO AND FROM BED TO CHAIR: A LITTLE
MOVING FROM LYING ON BACK TO SITTING ON SIDE OF FLAT BED WITH BEDRAILS: A LOT
CLIMB 3 TO 5 STEPS WITH RAILING: A LOT
DRESSING REGULAR LOWER BODY CLOTHING: A LITTLE
DRESSING REGULAR UPPER BODY CLOTHING: A LITTLE
STANDING UP FROM CHAIR USING ARMS: A LITTLE
TOILETING: A LITTLE
TURNING FROM BACK TO SIDE WHILE IN FLAT BAD: A LOT
HELP NEEDED FOR BATHING: A LITTLE
MOVING TO AND FROM BED TO CHAIR: A LITTLE
WALKING IN HOSPITAL ROOM: A LITTLE
TURNING FROM BACK TO SIDE WHILE IN FLAT BAD: A LITTLE
DAILY ACTIVITIY SCORE: 19
WALKING IN HOSPITAL ROOM: A LITTLE
PERSONAL GROOMING: A LITTLE
CLIMB 3 TO 5 STEPS WITH RAILING: A LITTLE
STANDING UP FROM CHAIR USING ARMS: A LITTLE
MOBILITY SCORE: 18

## 2024-03-07 ASSESSMENT — PAIN - FUNCTIONAL ASSESSMENT
PAIN_FUNCTIONAL_ASSESSMENT: 0-10
PAIN_FUNCTIONAL_ASSESSMENT: 0-10

## 2024-03-07 ASSESSMENT — PAIN SCALES - GENERAL
PAINLEVEL_OUTOF10: 0 - NO PAIN
PAINLEVEL_OUTOF10: 0 - NO PAIN

## 2024-03-07 NOTE — PROGRESS NOTES
03/07/24 1300   Discharge Planning   Patient expects to be discharged to: [facility], pending precert.   Does the patient need discharge transport arranged? Yes   RoundTrip coordination needed? Yes     SNF referral reviewed, confirmed precert is still pending for [facility].  Updates attached to referral and sent.      PT recommendations are for LOW level therapy recommendations, met with patient at bedside to discuss discharge planning.  Updated patient that it is likely her insurance will deny skilled rehab, patient confirms plans to return home with her daughter and home health care in the even she is unable to go to skilled rehab.  [facility] will continue to follow for changes in discharge planning needs.    UPDATE 1556:  Received update that precert has been approved for skilled rehab.  Provider and bedside nurse updated.       UPDATE 1700:  Final discharge orders received, facility confirmed they are willing and able to accept today.  Call placed to patients daughter and updated on planned discharge today.      7000 completed in HENS.  Referral updated with final discharge orders, AVS, and 7000 attached.  Transportation requested via roundtrip for 1830  time, facility updated.      DC plan secure.

## 2024-03-07 NOTE — PROGRESS NOTES
Physical Therapy    Physical Therapy Treatment    Patient Name: Akua Almanzar  MRN: [id number]  Today's Date: 3/7/2024  Time Calculation  Start Time: 1346  Stop Time: 1424  Time Calculation (min): 38 min       Assessment/Plan   PT Assessment  Assessment Comment: She made adequate progress toward her functional mobility goals. Pt presented with decreased muscular strength/endurance and increased assist required for functional mobility (up to [place] x1 today). She would benefit from continued skilled PT services for maximizing independence and safety with functional mobility prior to and after discharge.  End of Session Patient Position: Up in chair, Alarm off, not on at start of session (call light and needs within reach)  PT Plan  Inpatient/Swing Bed or Outpatient: Inpatient  PT Plan  Treatment/Interventions: Bed mobility, Transfer training, Gait training, Stair training, Balance training, Strengthening, Endurance training, Therapeutic exercise, Therapeutic activity  PT Plan: Skilled PT  PT Frequency: 5 times per week  PT Discharge Recommendations: Low intensity level of continued care, 24 hr supervision due to cognition  Equipment Recommended upon Discharge: Wheeled walker  PT Recommended Transfer Status: Contact guard  PT - OK to Discharge: Yes    General Visit Information:   PT  Visit  PT Received On: 03/07/24  General  Prior to Session Communication: Bedside nurse (RN cleared pt for participation.)  Patient Position Received: Up in chair, Alarm off, not on at start of session  General Comment: Pt agreed to tx and participated fully.    Subjective   Precautions:  Precautions  Medical Precautions: Fall precautions, Oxygen therapy device and L/min (2 L/min O2 via NC)    Objective   Pain:  Pain Assessment  Pain Assessment: 0-10  Pain Score: 0 - No pain    Postural Control:  Dynamic Sitting Balance  Dynamic Sitting-Balance Support: Feet supported, Left upper extremity supported  Dynamic Sitting-Comments: close S while  performing rear hygiene seated on toilet  Static Standing Balance  Static Standing-Balance Support: Bilateral upper extremity supported  Static Standing-Level of Assistance: Close supervision  Dynamic Standing Balance  Dynamic Standing-Balance Support: Left upper extremity supported  Dynamic Standing-Comments: close S while performing front hygiene    Activity Tolerance:  Activity Tolerance  Endurance: Tolerates 30 min exercise with multiple rests    Treatments:  Therapeutic Exercise  Therapeutic Exercise Performed: Yes (in supported sitting, BLE x20 each)  Therapeutic Exercise Activity 1: DF/PF  Therapeutic Exercise Activity 2: LAQ  Therapeutic Exercise Activity 3: hip abd  Therapeutic Exercise Activity 4: isometric hip add  Therapeutic Exercise Activity 5: hip flex     Ambulation/Gait Training  Ambulation/Gait Training Performed: Yes  Ambulation/Gait Training 1  Surface 1: Level tile  Device 1: Rolling walker  Assistance 1: Contact guard (cued for walker management during tight turns)  Comments/Distance (ft) 1: 50 ft x4 using minimal step through pattern with slowed velocity and continuous movement. Steady gait.    Transfers  Transfer: Yes  Transfer 1  Technique 1: Sit to stand  Transfer Device 1: Walker  Transfer Level of Assistance 1:  (CGA x1 from bedside chair using armrests. Karen x1 from toilet; assist for lifting torque.)  Transfers 2  Technique 2: Stand to sit  Transfer Device 2: Walker  Transfer Level of Assistance 2: Minimum assistance (assist for increased eccentric control)  Trials/Comments 2: x2 trials, once at toilet and once at bedside chair    Outcome Measures:  [facility] Basic Mobility  Turning from your back to your side while in a flat bed without using bedrails: A lot  Moving from lying on your back to sitting on the side of a flat bed without using bedrails: A lot  Moving to and from bed to chair (including a wheelchair): A little  Standing up from a chair using your arms (e.g. wheelchair or  bedside chair): A little  To walk in hospital room: A little  Climbing 3-5 steps with railing: A little  Basic Mobility - Total Score: 16    Education Documentation  Mobility Training, taught by Stephy Castro, PT at 3/7/2024  2:41 PM.  Learner: Patient  Readiness: Acceptance  Method: Explanation  Response: Demonstrated Understanding, Needs Reinforcement    Education Comments  No comments found.    OP EDUCATION:       Encounter Problems       Encounter Problems (Active)       PT Problem       Patient will transfer supine <> sit independently  (Progressing)       Start:  03/01/24    Expected End:  03/22/24            Patient will transfer sit <> stand modified independently and use of rolling walker  (Progressing)       Start:  03/01/24    Expected End:  03/22/24            Patient will ambulate 150 ft modified independently and use of rolling walker  (Progressing)       Start:  03/01/24    Expected End:  03/22/24            Patient will ascend/descend 7 stairs with single UE support on 1 rail(s) modified independently and use of no assistive device  (Progressing)       Start:  03/01/24    Expected End:  03/22/24

## 2024-03-07 NOTE — DISCHARGE SUMMARY
Discharge Diagnosis  Acute on chronic diastolic (congestive) heart failure (CMS/[facility])    Issues Requiring Follow-Up  Follow up with podiatry and cardiology outpatient. Repeat BMP in one week.    Test Results Pending At Discharge  Pending Labs       No current pending labs.            Hospital Course  Patient is a 89-year-old female with a past medical history of aortic valve stenosis, congestive heart failure, hypothyroidism, and COPD who presented to the emergency department initially with complaints of shortness of breath.  Does have a congestive heart failure and follows with Dr. Brown outpatient.  There was a question as to whether or not she was taking her Lasix at home.  She reported worsening leg edema and shortness of breath.  Chest x-ray showed pulmonary vascular congestion.  She was admitted to [facility] for further evaluation and treatment.  Cardiology was consulted.  Patient was treated with IV Lasix.  Echocardiogram showed moderate to severely elevated RVSP, similar to report from 2021, LVEF 55 to 65%, moderate MV stenosis.  She was hypoxic, baseline was room air.  She was unable to be weaned from oxygen.  Home O2 certification qualified her for 2 L oxygen via nasal cannula.  Cardiology did transition her to oral Lasix and cleared her for discharge.  Recommended follow-up with Dr. Brown in 1 to 3 weeks, BMP in 1 week.  She was initially to be discharged home with home health care however daughter did not feel that she was safe to return home. Seen by PT who recommend low intensity rehab, OT recommended mod. Patient was accepted for SNF. She has been cleared by cardiology. Stable for discharge to SNF today.     Pertinent Physical Exam At Time of Discharge  Physical Exam  Vitals reviewed.   Constitutional:       Appearance: Normal appearance.   HENT:      Head: Normocephalic and atraumatic.   Eyes:      Extraocular Movements: Extraocular movements intact.      Conjunctiva/sclera: Conjunctivae normal.    Cardiovascular:      Rate and Rhythm: Normal rate and regular rhythm.   Pulmonary:      Effort: Pulmonary effort is normal.      Breath sounds: No wheezing, rhonchi or rales.      Comments: Breath sounds clear, diminished bilaterally  Abdominal:      General: Bowel sounds are normal.      Palpations: Abdomen is soft.      Tenderness: There is no abdominal tenderness.   Skin:     General: Skin is warm and dry.   Neurological:      General: No focal deficit present.      Mental Status: She is alert and oriented to person, place, and time.   Home Medications     Medication List      START taking these medications     docusate sodium 100 mg capsule; Commonly known as: Colace; Take 1   capsule (100 mg) by mouth 2 times a day.   gabapentin 100 mg capsule; Commonly known as: Neurontin; Take 1 capsule   (100 mg) by mouth once daily at bedtime.   hydrOXYzine HCL 25 mg tablet; Commonly known as: Atarax; Take 1 tablet   (25 mg) by mouth every 6 hours if needed for itching.   oxygen gas therapy; Commonly known as: O2; Inhale 1 each continuously.     CHANGE how you take these medications     ascorbic acid 250 MG chewable tablet; Commonly known as: Vitamin C; What   changed: Another medication with the same name was removed. Continue   taking this medication, and follow the directions you see here.   furosemide 40 mg tablet; Commonly known as: Lasix; Take 1 tablet (40 mg)   by mouth once daily.; What changed: medication strength, how much to take,   when to take this     CONTINUE taking these medications     acidophilus-pectin, citrus 100 million cell-10 mg capsule   amLODIPine 2.5 mg tablet; Commonly known as: Norvasc   aspirin 81 mg EC tablet   carvedilol 6.25 mg tablet; Commonly known as: Coreg   cholecalciferol 25 MCG (1000 UT) capsule; Commonly known as: Vitamin D-3   clopidogrel 75 mg tablet; Commonly known as: Plavix   levothyroxine 88 mcg tablet; Commonly known as: Synthroid, Levoxyl   simvastatin 20 mg tablet; Commonly  known as: Zocor     STOP taking these medications     B Complex 1 (with folic acid) 0.4 mg tablet; Generic drug: b complex   Centrum Silver Women 8 mg iron-400 mcg-50 mcg tablet; Generic drug:   multivit-min-iron-FA-vit K-lut   co-enzyme Q-10 30 mg capsule   cyanocobalamin 1,000 mcg tablet; Commonly known as: Vitamin B-12   garlic 1,000 mg capsule   meclizine 12.5 mg tablet; Commonly known as: Antivert   melatonin-pyridoxine HCl (B6) 3-1 mg tablet   omega 3-dha-epa-fish oil 1,000 mg (250 mg-750 mg)/5 mL liquid   pyridoxine 100 mg tablet; Commonly known as: Vitamin B-6       Outpatient Follow-Up  No future appointments.    Time spent on discharge: 35 minutes    WANG Luo-CNP

## 2024-03-07 NOTE — PROGRESS NOTES
Akua Almanzar is a 89 y.o. female on day 6 of admission presenting with Acute on chronic diastolic (congestive) heart failure (CMS/HCC).      Subjective   Patient seen and examined. Awake/alert/oriented. Denies chest pain, shortness of breath, fevers, chills, nausea, or vomiting. No abdominal discomfort.         Objective     Last Recorded Vitals  /53 (BP Location: Right arm, Patient Position: Lying)   Pulse 69   Temp 36.6 °C (97.9 °F) (Oral)   Resp 20   Wt 72.6 kg (160 lb)   SpO2 96%   Intake/Output last 3 Shifts:    Intake/Output Summary (Last 24 hours) at 3/7/2024 1320  Last data filed at 3/7/2024 1100  Gross per 24 hour   Intake 857 ml   Output 900 ml   Net -43 ml         Admission Weight  Weight: 72.6 kg (160 lb) (02/29/24 2310)    Daily Weight  02/29/24 : 72.6 kg (160 lb)    Image Results  Lower extremity venous duplex left  Narrative: Interpreted By:  Jatinder Appiah,   STUDY:  [facility] LOWER EXTREMITY VENOUS DUPLEX LEFT; 3/5/2024 1:16 pm      INDICATION:  Signs/Symptoms:lower extremity pain and edema.      COMPARISON:  None      ACCESSION NUMBER(S):  [id number]      ORDERING CLINICIAN:  BREANNE YOUNG      TECHNIQUE:  Black and white as well as color-flow duplex images were obtained  along with Doppler spectral analysis of the deep venous system of the  lower extremity from the knee to the groin. Attempts were made to  image the proximal posterior tibial and peroneal veins of the  proximal calf as well. Venous compression was performed.      FINDINGS:  There is normal compressibility and flow within the visualized  vessels of the deep venous system of the lower extremity.  The  contralateral common femoral vein is patent.      Impression: No evidence for deep venous thrombosis within the limits of this  examination.      MACRO:  none      Signed by: Jatinder Appiah 3/5/2024 1:30 PM  Dictation workstation:   [id number]      Physical Exam  Vitals reviewed.   Constitutional:       Appearance:  Normal appearance.   HENT:      Head: Normocephalic and atraumatic.   Eyes:      Extraocular Movements: Extraocular movements intact.      Conjunctiva/sclera: Conjunctivae normal.   Cardiovascular:      Rate and Rhythm: Normal rate and regular rhythm.   Pulmonary:      Effort: Pulmonary effort is normal.      Breath sounds: No wheezing, rhonchi or rales.      Comments: Breath sounds clear, diminished bilaterally  Abdominal:      General: Bowel sounds are normal.      Palpations: Abdomen is soft.      Tenderness: There is no abdominal tenderness.   Skin:     General: Skin is warm and dry.   Neurological:      General: No focal deficit present.      Mental Status: She is alert and oriented to person, place, and time.         Relevant Results  Lab Results   Component Value Date    GLUCOSE 87 03/06/2024    CALCIUM 9.7 03/06/2024     03/06/2024    K 3.9 03/06/2024    CO2 37 (H) 03/06/2024    CL 95 (L) 03/06/2024    BUN 17 03/06/2024    CREATININE 0.80 03/06/2024     Lab Results   Component Value Date    WBC 5.5 03/06/2024    HGB 11.4 (L) 03/06/2024    HCT 36.7 03/06/2024    MCV 91 03/06/2024     03/06/2024     ECG 12 lead  Result Date: 3/4/2024  Normal sinus rhythm with sinus arrhythmia Left axis deviation Left bundle branch block Abnormal ECG When compared with ECG of 26-APR-2021 11:51, No significant change was found Confirmed by Sonu Price ([id number]) on 3/4/2024 8:58:55 AM    Transthoracic Echo (TTE) Complete  Result Date: 3/1/2024  CONCLUSIONS:  1. Left ventricular systolic function is normal.  2. Spectral Doppler shows an impaired relaxation pattern of left ventricular diastolic filling.  3. There is moderate concentric left ventricular hypertrophy.  4. The left atrium is moderately dilated.  5. The mitral valve is moderately thickened.  6. There is moderate mitral annular calcification.  7. There is moderate to severe calcification of the anterior and posterior mitral valve leaflets.  8. Mild to  moderate tricuspid regurgitation.  9. Moderate to severely elevated right ventricular systolic pressure.     XR chest 2 views  Result Date: 3/1/2024  1.  Pulmonary vascular congestion with prominent, indistinct interstitium and Kerley B-lines suggesting acute pulmonary interstitial edema/CHF. Trace pleural effusions with associated atelectasis. No pneumothorax.       MACRO: None   Signed by: Myles Sotomayor 3/1/2024 12:03 AM Dictation workstation:   [id number]       Assessment/Plan      Principal Problem:    Acute on chronic diastolic (congestive) heart failure (CMS/HCC)  Active Problems:    Atherosclerosis of coronary artery without angina pectoris    Hyperlipidemia    Primary hypertension    Acquired hypothyroidism    Acute on chronic diastolic (congestive) heart failure   Transition to oral lasix per cardiology   Consult cardiology - appreciate recs   Echo, moderate to severely elevated RVSP, similar to report from 2021, LVEF 55-65%, Moderate MV stenosis     Hypoxemia  Trial on room air, desatted into 80s, currently on 1L, wean as tolerated  Patient did qualify for home oxygen 2L continuous    Atherosclerosis of coronary artery without angina pectoris  Angina free, stable   Continue ASA, beta blocker, statin and Plavix     Hyperlipidemia  Statin     Primary hypertension  Amlodipine, Coreg     Acquired hypothyroidism  Levothyroxine     DVT prophylaxis   Lovenox      Plan  Oral lasix  Wean oxygen  PT/OT/out of bed  Cardiology consulted, recommend oral lasix, BMP in one week, follow up 1-3 weeks with Dr. Brown  Discharge planning to [facility], PT/OT recommending low intensity rehab  Discharge home with home health care planned however daughter refused yesterday, did not feel patient was safe to return home. Reevaluated by PT, still recommending low intensity rehab. OT recommending mod intensity rehab. Care coordination is checking to see if she will qualify for SNF.              Tamia Arceo, APRN-CNP

## 2024-03-08 NOTE — NURSING NOTE
Report called to Sushma. Pt has all belongings. Pt awaiting for transport to arrive to transport her to facility.

## 2024-03-08 NOTE — NURSING NOTE
This R[facility]e [facility] [place] [id number] [facility], no answer at nurses station.

## 2024-03-09 ENCOUNTER — NURSING HOME VISIT (OUTPATIENT)
Dept: POST ACUTE CARE | Facility: EXTERNAL LOCATION | Age: 89
End: 2024-03-09
Payer: COMMERCIAL

## 2024-03-09 DIAGNOSIS — J44.9 CHRONIC OBSTRUCTIVE PULMONARY DISEASE, UNSPECIFIED COPD TYPE (MULTI): ICD-10-CM

## 2024-03-09 DIAGNOSIS — R53.1 WEAKNESS: ICD-10-CM

## 2024-03-09 DIAGNOSIS — Z91.81 AT RISK FOR FALLS: ICD-10-CM

## 2024-03-09 DIAGNOSIS — E03.9 ACQUIRED HYPOTHYROIDISM: ICD-10-CM

## 2024-03-09 DIAGNOSIS — I50.9 CONGESTIVE HEART FAILURE, UNSPECIFIED HF CHRONICITY, UNSPECIFIED HEART FAILURE TYPE (MULTI): Primary | ICD-10-CM

## 2024-03-09 PROCEDURE — 99305 1ST NF CARE MODERATE MDM 35: CPT | Performed by: INTERNAL MEDICINE

## 2024-03-09 NOTE — LETTER
Patient: Akua Almanzar  : 3/27/1929    Encounter Date: 2024    PLACE OF SERVICE:  [facility]    This is new/initial history and physical.    Subjective  Patient ID: Akua Almanzar is a 89 y.o. female who presents for Follow-up.    Ms. Akua Almanzar is a 89-year-old female with history of exacerbation of congestive heart failure.  She also suffers from COPD.  She is unable to care for herself and requires supportive care.    Review of Systems   Constitutional:  Negative for chills and fever.   Cardiovascular:  Negative for chest pain.   All other systems reviewed and are negative.    Objective  /72   Pulse 84   Temp 36.6 °C (97.9 °F)   Resp 18     Physical Exam  Vitals reviewed.   Constitutional:       General: She is not in acute distress.     Comments: This is a well-developed and well-nourished female, sitting in a chair   HENT:      Right Ear: Tympanic membrane, ear canal and external ear normal.      Left Ear: Tympanic membrane, ear canal and external ear normal.   Eyes:      General: No scleral icterus.     Pupils: Pupils are equal, round, and reactive to light.   Neck:      Vascular: No carotid bruit.   Cardiovascular:      Heart sounds: Normal heart sounds, S1 normal and S2 normal. No murmur heard.     No friction rub.   Pulmonary:      Breath sounds: Decreased breath sounds (throughout) present.   Abdominal:      Palpations: There is no hepatomegaly, splenomegaly or mass.   Musculoskeletal:         General: No swelling or deformity. Normal range of motion.      Cervical back: Neck supple.      Right lower leg: Edema present.      Left lower leg: Edema present.   Lymphadenopathy:      Cervical: No cervical adenopathy.      Upper Body:      Right upper body: No axillary adenopathy.      Left upper body: No axillary adenopathy.      Lower Body: No right inguinal adenopathy. No left inguinal adenopathy.   Neurological:      Mental Status: She is oriented to person, place, and time.       Cranial Nerves: Cranial nerves 2-12 are intact. No cranial nerve deficit.      Sensory: No sensory deficit.      Motor: Motor function is intact. No weakness.      Gait: Gait is intact.      Deep Tendon Reflexes: Reflexes normal.   Psychiatric:         Mood and Affect: Mood normal. Mood is not anxious or depressed. Affect is not angry.         Behavior: Behavior is not agitated.         Thought Content: Thought content normal.         Judgment: Judgment normal.     LAB WORK:  Laboratory studies were reviewed.    Assessment/Plan  Problem List Items Addressed This Visit             ICD-10-CM       Endocrine/Metabolic    Acquired hypothyroidism E03.9     Other Visit Diagnoses         Codes    Congestive heart failure, unspecified HF chronicity, unspecified heart failure type (CMS/[facility])    -  Primary I50.9    Chronic obstructive pulmonary disease, unspecified COPD type (CMS/[facility])     J44.9    Weakness     R53.1    At risk for falls     Z91.81        1. Congestive heart failure, on diuretic.  2. COPD, on bronchodilator therapy.  3. Hypothyroidism, on levothyroxine.  4. Weakness, on PT/OT.  5. Fall risk, on fall precaution.    Scribe Attestation  By signing my name below, IPaula Scribe attest that this documentation has been prepared under the direction and in the presence of Jenniffer Gilman MD.     All medical record entries made by the scribe were personally dictated by me I have reviewed the chart and agree the record accurately reflects my personal performance of his history physical examination and management      Electronically Signed By: Jenniffer Gilman MD   3/17/24 11:09 PM

## 2024-03-16 ENCOUNTER — NURSING HOME VISIT (OUTPATIENT)
Dept: POST ACUTE CARE | Facility: EXTERNAL LOCATION | Age: 89
End: 2024-03-16
Payer: COMMERCIAL

## 2024-03-16 VITALS
TEMPERATURE: 97.9 F | SYSTOLIC BLOOD PRESSURE: 120 MMHG | DIASTOLIC BLOOD PRESSURE: 72 MMHG | RESPIRATION RATE: 18 BRPM | HEART RATE: 84 BPM

## 2024-03-16 DIAGNOSIS — R53.1 WEAKNESS: ICD-10-CM

## 2024-03-16 DIAGNOSIS — E03.9 ACQUIRED HYPOTHYROIDISM: ICD-10-CM

## 2024-03-16 DIAGNOSIS — J44.9 CHRONIC OBSTRUCTIVE PULMONARY DISEASE, UNSPECIFIED COPD TYPE (MULTI): ICD-10-CM

## 2024-03-16 DIAGNOSIS — I50.9 CONGESTIVE HEART FAILURE, UNSPECIFIED HF CHRONICITY, UNSPECIFIED HEART FAILURE TYPE (MULTI): Primary | ICD-10-CM

## 2024-03-16 DIAGNOSIS — Z91.81 AT RISK FOR FALLS: ICD-10-CM

## 2024-03-16 PROCEDURE — 99308 SBSQ NF CARE LOW MDM 20: CPT | Performed by: INTERNAL MEDICINE

## 2024-03-16 ASSESSMENT — ENCOUNTER SYMPTOMS
FEVER: 0
CHILLS: 0

## 2024-03-16 NOTE — LETTER
Patient: Akua Almanzar  : 3/27/1929    Encounter Date: 2024    PLACE OF SERVICE:  [facility]    This is a subsequent visit.    Subjective  Patient ID: Akua Almanzar is a 89 y.o. female who presents for Follow-up.    Ms. Akua Almanzar is a 89-year-old female with history of heart failure and COPD.  She is unable to care for herself and requires supportive care.    Review of Systems   Constitutional:  Negative for chills and fever.   Cardiovascular:  Negative for chest pain.   All other systems reviewed and are negative.    Objective  /72   Pulse 76   Temp 36.7 °C (98 °F)   Resp 18     Physical Exam  Vitals reviewed.   Constitutional:       Comments: This is a well-developed and well-nourished female, lying in bed, appearing weak   HENT:      Right Ear: Tympanic membrane, ear canal and external ear normal.      Left Ear: Tympanic membrane, ear canal and external ear normal.   Eyes:      General: No scleral icterus.     Pupils: Pupils are equal, round, and reactive to light.   Neck:      Vascular: No carotid bruit.   Cardiovascular:      Heart sounds: Normal heart sounds, S1 normal and S2 normal. No murmur heard.     No friction rub.   Pulmonary:      Breath sounds: Decreased breath sounds (throughout) present.   Abdominal:      Palpations: There is no hepatomegaly, splenomegaly or mass.   Musculoskeletal:         General: No swelling or deformity. Normal range of motion.      Cervical back: Neck supple.      Right lower leg: Edema present.      Left lower leg: Edema present.   Lymphadenopathy:      Cervical: No cervical adenopathy.      Upper Body:      Right upper body: No axillary adenopathy.      Left upper body: No axillary adenopathy.      Lower Body: No right inguinal adenopathy. No left inguinal adenopathy.   Neurological:      Mental Status: She is oriented to person, place, and time. She is lethargic.      Cranial Nerves: Cranial nerves 2-12 are intact. No cranial nerve  deficit.      Sensory: No sensory deficit.      Motor: Motor function is intact. No weakness.      Gait: Gait is intact.      Deep Tendon Reflexes: Reflexes normal.   Psychiatric:         Mood and Affect: Mood normal. Mood is not anxious or depressed. Affect is not angry.         Behavior: Behavior is not agitated.         Thought Content: Thought content normal.         Judgment: Judgment normal.     LAB WORK:  Laboratory studies were reviewed.    Assessment/Plan  Problem List Items Addressed This Visit             ICD-10-CM       Endocrine/Metabolic    Acquired hypothyroidism E03.9     Other Visit Diagnoses         Codes    Congestive heart failure, unspecified HF chronicity, unspecified heart failure type (CMS/[facility])    -  Primary I50.9    Chronic obstructive pulmonary disease, unspecified COPD type (CMS/[facility])     J44.9    Weakness     R53.1    At risk for falls     Z91.81        1. Heart failure, on diuretic.  2. COPD, on bronchodilator therapy.  3. Hypothyroidism, on levothyroxine.  4. Weakness, on PT/OT.  5. Fall risk, on fall precautions.    Scribe Attestation  By signing my name below, IPaula Scribe attest that this documentation has been prepared under the direction and in the presence of Jenniffer Gilman MD.     All medical record entries made by the scribe were personally dictated by me I have reviewed the chart and agree the record accurately reflects my personal performance of his history physical examination and management      Electronically Signed By: Jenniffer Gilman MD   3/24/24  8:59 PM

## 2024-03-16 NOTE — PROGRESS NOTES
PLACE OF SERVICE:  [facility]    This is new/initial history and physical.    Subjective   Patient ID: Akua Almanzar is a 89 y.o. female who presents for Follow-up.    Ms. Akua Almanzar is a 89-year-old female with history of exacerbation of congestive heart failure.  She also suffers from COPD.  She is unable to care for herself and requires supportive care.    Review of Systems   Constitutional:  Negative for chills and fever.   Cardiovascular:  Negative for chest pain.   All other systems reviewed and are negative.    Objective   /72   Pulse 84   Temp 36.6 °C (97.9 °F)   Resp 18     Physical Exam  Vitals reviewed.   Constitutional:       General: She is not in acute distress.     Comments: This is a well-developed and well-nourished female, sitting in a chair   HENT:      Right Ear: Tympanic membrane, ear canal and external ear normal.      Left Ear: Tympanic membrane, ear canal and external ear normal.   Eyes:      General: No scleral icterus.     Pupils: Pupils are equal, round, and reactive to light.   Neck:      Vascular: No carotid bruit.   Cardiovascular:      Heart sounds: Normal heart sounds, S1 normal and S2 normal. No murmur heard.     No friction rub.   Pulmonary:      Breath sounds: Decreased breath sounds (throughout) present.   Abdominal:      Palpations: There is no hepatomegaly, splenomegaly or mass.   Musculoskeletal:         General: No swelling or deformity. Normal range of motion.      Cervical back: Neck supple.      Right lower leg: Edema present.      Left lower leg: Edema present.   Lymphadenopathy:      Cervical: No cervical adenopathy.      Upper Body:      Right upper body: No axillary adenopathy.      Left upper body: No axillary adenopathy.      Lower Body: No right inguinal adenopathy. No left inguinal adenopathy.   Neurological:      Mental Status: She is oriented to person, place, and time.      Cranial Nerves: Cranial nerves 2-12 are intact. No cranial nerve  deficit.      Sensory: No sensory deficit.      Motor: Motor function is intact. No weakness.      Gait: Gait is intact.      Deep Tendon Reflexes: Reflexes normal.   Psychiatric:         Mood and Affect: Mood normal. Mood is not anxious or depressed. Affect is not angry.         Behavior: Behavior is not agitated.         Thought Content: Thought content normal.         Judgment: Judgment normal.     LAB WORK:  Laboratory studies were reviewed.    Assessment/Plan   Problem List Items Addressed This Visit             ICD-10-CM       Endocrine/Metabolic    Acquired hypothyroidism E03.9     Other Visit Diagnoses         Codes    Congestive heart failure, unspecified HF chronicity, unspecified heart failure type (CMS/[facility])    -  Primary I50.9    Chronic obstructive pulmonary disease, unspecified COPD type (CMS/[facility])     J44.9    Weakness     R53.1    At risk for falls     Z91.81        1. Congestive heart failure, on diuretic.  2. COPD, on bronchodilator therapy.  3. Hypothyroidism, on levothyroxine.  4. Weakness, on PT/OT.  5. Fall risk, on fall precaution.    Scribe Attestation  By signing my name below, IPaula Scribe attest that this documentation has been prepared under the direction and in the presence of Jenniffer Gilman MD.     All medical record entries made by the scribsahil were personally dictated by me I have reviewed the chart and agree the record accurately reflects my personal performance of his history physical examination and management

## 2024-03-18 ENCOUNTER — PATIENT OUTREACH (OUTPATIENT)
Dept: CASE MANAGEMENT | Facility: HOSPITAL | Age: 89
End: 2024-03-18
Payer: COMMERCIAL

## 2024-03-18 NOTE — PROGRESS NOTES
This HF Nurse Navigator called out to Sushma to ensure patient is on HF protocol, low sodium diet and daily wts, and check on progress.   Per Sully PRADHAN, pt is on low Na and daily wts.

## 2024-03-23 ENCOUNTER — NURSING HOME VISIT (OUTPATIENT)
Dept: POST ACUTE CARE | Facility: EXTERNAL LOCATION | Age: 89
End: 2024-03-23
Payer: COMMERCIAL

## 2024-03-23 VITALS
HEART RATE: 76 BPM | TEMPERATURE: 98 F | RESPIRATION RATE: 18 BRPM | SYSTOLIC BLOOD PRESSURE: 120 MMHG | DIASTOLIC BLOOD PRESSURE: 72 MMHG

## 2024-03-23 DIAGNOSIS — J44.9 CHRONIC OBSTRUCTIVE PULMONARY DISEASE, UNSPECIFIED COPD TYPE (MULTI): ICD-10-CM

## 2024-03-23 DIAGNOSIS — R53.1 WEAKNESS: ICD-10-CM

## 2024-03-23 DIAGNOSIS — E03.9 ACQUIRED HYPOTHYROIDISM: ICD-10-CM

## 2024-03-23 DIAGNOSIS — I50.9 CONGESTIVE HEART FAILURE, UNSPECIFIED HF CHRONICITY, UNSPECIFIED HEART FAILURE TYPE (MULTI): Primary | ICD-10-CM

## 2024-03-23 DIAGNOSIS — Z91.81 AT RISK FOR FALLS: ICD-10-CM

## 2024-03-23 PROCEDURE — 99308 SBSQ NF CARE LOW MDM 20: CPT | Performed by: INTERNAL MEDICINE

## 2024-03-23 ASSESSMENT — ENCOUNTER SYMPTOMS
CHILLS: 0
FEVER: 0

## 2024-03-23 NOTE — LETTER
Patient: Akua Almanzar  : 3/27/1929    Encounter Date: 2024    PLACE OF SERVICE:  [facility]    This is a subsequent visit.  Subjective  Patient ID: Akua Almanzar is a 89 y.o. female who presents for Follow-up.    Ms. Akua Almanzar is a 89-year-old female with history of CHF and COPD.  She is unable to care for herself and requires supportive care.    Review of Systems   Constitutional:  Negative for chills and fever.   Cardiovascular:  Negative for chest pain.   All other systems reviewed and are negative.    Objective  /80   Pulse 76   Temp 36.6 °C (97.9 °F)   Resp 16     Physical Exam  Vitals reviewed.   Constitutional:       Appearance: She is well-developed. She is obese.      Comments: Lying in bed, appearing weak   HENT:      Right Ear: Tympanic membrane, ear canal and external ear normal.      Left Ear: Tympanic membrane, ear canal and external ear normal.   Eyes:      General: No scleral icterus.     Pupils: Pupils are equal, round, and reactive to light.   Neck:      Vascular: No carotid bruit.   Cardiovascular:      Heart sounds: Normal heart sounds, S1 normal and S2 normal. No murmur heard.     No friction rub.   Pulmonary:      Breath sounds: Decreased breath sounds (throughout) present.   Abdominal:      Palpations: There is no hepatomegaly, splenomegaly or mass.   Musculoskeletal:         General: No swelling or deformity. Normal range of motion.      Cervical back: Neck supple.      Right lower leg: Edema present.      Left lower leg: Edema present.   Lymphadenopathy:      Cervical: No cervical adenopathy.      Upper Body:      Right upper body: No axillary adenopathy.      Left upper body: No axillary adenopathy.      Lower Body: No right inguinal adenopathy. No left inguinal adenopathy.   Neurological:      Mental Status: She is oriented to person, place, and time. She is lethargic.      Cranial Nerves: Cranial nerves 2-12 are intact. No cranial nerve deficit.       Sensory: No sensory deficit.      Motor: Motor function is intact. No weakness.      Gait: Gait is intact.      Deep Tendon Reflexes: Reflexes normal.   Psychiatric:         Mood and Affect: Mood normal. Mood is not anxious or depressed. Affect is not angry.         Behavior: Behavior is not agitated.         Thought Content: Thought content normal.         Judgment: Judgment normal.     LAB WORK:  Laboratory studies were reviewed.    Assessment/Plan  Problem List Items Addressed This Visit             ICD-10-CM       Endocrine/Metabolic    Acquired hypothyroidism E03.9     Other Visit Diagnoses         Codes    Congestive heart failure, unspecified HF chronicity, unspecified heart failure type (CMS/[facility])    -  Primary I50.9    Chronic obstructive pulmonary disease, unspecified COPD type (CMS/[facility])     J44.9    Weakness     R53.1    At risk for falls     Z91.81        1. Heart failure, on diuretic.  2. COPD, on bronchodilator therapy.  3. Hypothyroidism, on levothyroxine.  4. Weakness, on PT/OT.  5. Fall risk, on fall precautions.    Scribe Attestation  By signing my name below, IPaula Scribe attest that this documentation has been prepared under the direction and in the presence of Jenniffer Gilman MD.     All medical record entries made by the scribe were personally dictated by me I have reviewed the chart and agree the record accurately reflects my personal performance of his history physical examination and management      Electronically Signed By: Jenniffer Gilman MD   3/31/24  2:01 AM

## 2024-03-23 NOTE — PROGRESS NOTES
PLACE OF SERVICE:  [facility]    This is a subsequent visit.    Subjective   Patient ID: Akua Almanzar is a 89 y.o. female who presents for Follow-up.    Ms. Akua Almanzar is a 89-year-old female with history of heart failure and COPD.  She is unable to care for herself and requires supportive care.    Review of Systems   Constitutional:  Negative for chills and fever.   Cardiovascular:  Negative for chest pain.   All other systems reviewed and are negative.    Objective   /72   Pulse 76   Temp 36.7 °C (98 °F)   Resp 18     Physical Exam  Vitals reviewed.   Constitutional:       Comments: This is a well-developed and well-nourished female, lying in bed, appearing weak   HENT:      Right Ear: Tympanic membrane, ear canal and external ear normal.      Left Ear: Tympanic membrane, ear canal and external ear normal.   Eyes:      General: No scleral icterus.     Pupils: Pupils are equal, round, and reactive to light.   Neck:      Vascular: No carotid bruit.   Cardiovascular:      Heart sounds: Normal heart sounds, S1 normal and S2 normal. No murmur heard.     No friction rub.   Pulmonary:      Breath sounds: Decreased breath sounds (throughout) present.   Abdominal:      Palpations: There is no hepatomegaly, splenomegaly or mass.   Musculoskeletal:         General: No swelling or deformity. Normal range of motion.      Cervical back: Neck supple.      Right lower leg: Edema present.      Left lower leg: Edema present.   Lymphadenopathy:      Cervical: No cervical adenopathy.      Upper Body:      Right upper body: No axillary adenopathy.      Left upper body: No axillary adenopathy.      Lower Body: No right inguinal adenopathy. No left inguinal adenopathy.   Neurological:      Mental Status: She is oriented to person, place, and time. She is lethargic.      Cranial Nerves: Cranial nerves 2-12 are intact. No cranial nerve deficit.      Sensory: No sensory deficit.      Motor: Motor function is  intact. No weakness.      Gait: Gait is intact.      Deep Tendon Reflexes: Reflexes normal.   Psychiatric:         Mood and Affect: Mood normal. Mood is not anxious or depressed. Affect is not angry.         Behavior: Behavior is not agitated.         Thought Content: Thought content normal.         Judgment: Judgment normal.     LAB WORK:  Laboratory studies were reviewed.    Assessment/Plan   Problem List Items Addressed This Visit             ICD-10-CM       Endocrine/Metabolic    Acquired hypothyroidism E03.9     Other Visit Diagnoses         Codes    Congestive heart failure, unspecified HF chronicity, unspecified heart failure type (CMS/[facility])    -  Primary I50.9    Chronic obstructive pulmonary disease, unspecified COPD type (CMS/[facility])     J44.9    Weakness     R53.1    At risk for falls     Z91.81        1. Heart failure, on diuretic.  2. COPD, on bronchodilator therapy.  3. Hypothyroidism, on levothyroxine.  4. Weakness, on PT/OT.  5. Fall risk, on fall precautions.    Scribe Attestation  By signing my name below, IPaula Scribe attest that this documentation has been prepared under the direction and in the presence of Jenniffer Gilman MD.     All medical record entries made by the scribe were personally dictated by me I have reviewed the chart and agree the record accurately reflects my personal performance of his history physical examination and management

## 2024-03-26 ENCOUNTER — TELEPHONE (OUTPATIENT)
Dept: PULMONOLOGY | Age: 89
End: 2024-03-26

## 2024-03-26 ENCOUNTER — TELEPHONE (OUTPATIENT)
Dept: PRIMARY CARE | Facility: CLINIC | Age: 89
End: 2024-03-26
Payer: COMMERCIAL

## 2024-03-26 NOTE — TELEPHONE ENCOUNTER
Pt discharged from [facility] 3/25/24.  Home care asking if you will sign and follow for PT/OT.  Please advise.  Ph:  [phone]    Past last seen 2/23/24

## 2024-03-26 NOTE — TELEPHONE ENCOUNTER
Mailed letter to patient to let her know that we received a referral from their PCP and to please call the office to schedule an appointment.

## 2024-03-28 VITALS
TEMPERATURE: 97.9 F | RESPIRATION RATE: 16 BRPM | SYSTOLIC BLOOD PRESSURE: 124 MMHG | DIASTOLIC BLOOD PRESSURE: 80 MMHG | HEART RATE: 76 BPM

## 2024-03-28 ASSESSMENT — ENCOUNTER SYMPTOMS
FEVER: 0
CHILLS: 0

## 2024-03-28 NOTE — PROGRESS NOTES
PLACE OF SERVICE:  [facility]    This is a subsequent visit.  Subjective   Patient ID: Akua Almanzar is a 89 y.o. female who presents for Follow-up.    Ms. Akua Almanzar is a 89-year-old female with history of CHF and COPD.  She is unable to care for herself and requires supportive care.    Review of Systems   Constitutional:  Negative for chills and fever.   Cardiovascular:  Negative for chest pain.   All other systems reviewed and are negative.    Objective   /80   Pulse 76   Temp 36.6 °C (97.9 °F)   Resp 16     Physical Exam  Vitals reviewed.   Constitutional:       Appearance: She is well-developed. She is obese.      Comments: Lying in bed, appearing weak   HENT:      Right Ear: Tympanic membrane, ear canal and external ear normal.      Left Ear: Tympanic membrane, ear canal and external ear normal.   Eyes:      General: No scleral icterus.     Pupils: Pupils are equal, round, and reactive to light.   Neck:      Vascular: No carotid bruit.   Cardiovascular:      Heart sounds: Normal heart sounds, S1 normal and S2 normal. No murmur heard.     No friction rub.   Pulmonary:      Breath sounds: Decreased breath sounds (throughout) present.   Abdominal:      Palpations: There is no hepatomegaly, splenomegaly or mass.   Musculoskeletal:         General: No swelling or deformity. Normal range of motion.      Cervical back: Neck supple.      Right lower leg: Edema present.      Left lower leg: Edema present.   Lymphadenopathy:      Cervical: No cervical adenopathy.      Upper Body:      Right upper body: No axillary adenopathy.      Left upper body: No axillary adenopathy.      Lower Body: No right inguinal adenopathy. No left inguinal adenopathy.   Neurological:      Mental Status: She is oriented to person, place, and time. She is lethargic.      Cranial Nerves: Cranial nerves 2-12 are intact. No cranial nerve deficit.      Sensory: No sensory deficit.      Motor: Motor function is intact. No  weakness.      Gait: Gait is intact.      Deep Tendon Reflexes: Reflexes normal.   Psychiatric:         Mood and Affect: Mood normal. Mood is not anxious or depressed. Affect is not angry.         Behavior: Behavior is not agitated.         Thought Content: Thought content normal.         Judgment: Judgment normal.     LAB WORK:  Laboratory studies were reviewed.    Assessment/Plan   Problem List Items Addressed This Visit             ICD-10-CM       Endocrine/Metabolic    Acquired hypothyroidism E03.9     Other Visit Diagnoses         Codes    Congestive heart failure, unspecified HF chronicity, unspecified heart failure type (CMS/[facility])    -  Primary I50.9    Chronic obstructive pulmonary disease, unspecified COPD type (CMS/[facility])     J44.9    Weakness     R53.1    At risk for falls     Z91.81        1. Heart failure, on diuretic.  2. COPD, on bronchodilator therapy.  3. Hypothyroidism, on levothyroxine.  4. Weakness, on PT/OT.  5. Fall risk, on fall precautions.    Scribe Attestation  By signing my name below, IPaula Scribe attest that this documentation has been prepared under the direction and in the presence of Jenniffer Gilman MD.     All medical record entries made by the scribe were personally dictated by me I have reviewed the chart and agree the record accurately reflects my personal performance of his history physical examination and management

## 2024-03-29 ENCOUNTER — HOSPITAL ENCOUNTER (INPATIENT)
Facility: HOSPITAL | Age: 89
LOS: 17 days | Discharge: HOME | DRG: 177 | End: 2024-04-15
Attending: STUDENT IN AN ORGANIZED HEALTH CARE EDUCATION/TRAINING PROGRAM | Admitting: INTERNAL MEDICINE
Payer: COMMERCIAL

## 2024-03-29 ENCOUNTER — APPOINTMENT (OUTPATIENT)
Dept: CARDIOLOGY | Facility: HOSPITAL | Age: 89
DRG: 177 | End: 2024-03-29
Payer: COMMERCIAL

## 2024-03-29 ENCOUNTER — APPOINTMENT (OUTPATIENT)
Dept: RADIOLOGY | Facility: HOSPITAL | Age: 89
DRG: 177 | End: 2024-03-29
Payer: COMMERCIAL

## 2024-03-29 ENCOUNTER — TELEPHONE (OUTPATIENT)
Dept: PRIMARY CARE | Facility: CLINIC | Age: 89
End: 2024-03-29

## 2024-03-29 DIAGNOSIS — I50.9 ACUTE ON CHRONIC HEART FAILURE, UNSPECIFIED HEART FAILURE TYPE (MULTI): ICD-10-CM

## 2024-03-29 DIAGNOSIS — R06.02 SHORTNESS OF BREATH: ICD-10-CM

## 2024-03-29 DIAGNOSIS — J96.21 ACUTE ON CHRONIC HYPOXIC RESPIRATORY FAILURE (MULTI): ICD-10-CM

## 2024-03-29 DIAGNOSIS — J18.9 PNEUMONIA OF RIGHT LUNG DUE TO INFECTIOUS ORGANISM, UNSPECIFIED PART OF LUNG: Primary | ICD-10-CM

## 2024-03-29 PROBLEM — J96.20 ACUTE ON CHRONIC RESPIRATORY FAILURE (MULTI): Status: ACTIVE | Noted: 2024-03-29

## 2024-03-29 LAB
ALBUMIN SERPL-MCNC: 3.9 G/DL (ref 3.5–5)
ALP BLD-CCNC: 106 U/L (ref 35–125)
ALT SERPL-CCNC: 14 U/L (ref 5–40)
ANION GAP SERPL CALC-SCNC: 9 MMOL/L
AST SERPL-CCNC: 30 U/L (ref 5–40)
BASOPHILS # BLD AUTO: 0.01 X10*3/UL (ref 0–0.1)
BASOPHILS NFR BLD AUTO: 0.2 %
BILIRUB SERPL-MCNC: 0.4 MG/DL (ref 0.1–1.2)
BUN SERPL-MCNC: 12 MG/DL (ref 8–25)
CALCIUM SERPL-MCNC: 9.4 MG/DL (ref 8.5–10.4)
CHLORIDE SERPL-SCNC: 88 MMOL/L (ref 97–107)
CO2 SERPL-SCNC: 34 MMOL/L (ref 24–31)
CREAT SERPL-MCNC: 0.5 MG/DL (ref 0.4–1.6)
EGFRCR SERPLBLD CKD-EPI 2021: 86 ML/MIN/1.73M*2
EOSINOPHIL # BLD AUTO: 0.02 X10*3/UL (ref 0–0.4)
EOSINOPHIL NFR BLD AUTO: 0.3 %
ERYTHROCYTE [DISTWIDTH] IN BLOOD BY AUTOMATED COUNT: 14.7 % (ref 11.5–14.5)
FLUAV RNA RESP QL NAA+PROBE: NOT DETECTED
FLUBV RNA RESP QL NAA+PROBE: NOT DETECTED
GLUCOSE SERPL-MCNC: 117 MG/DL (ref 65–99)
HCT VFR BLD AUTO: 36 % (ref 36–46)
HGB BLD-MCNC: 11.5 G/DL (ref 12–16)
IMM GRANULOCYTES # BLD AUTO: 0.01 X10*3/UL (ref 0–0.5)
IMM GRANULOCYTES NFR BLD AUTO: 0.2 % (ref 0–0.9)
LYMPHOCYTES # BLD AUTO: 0.86 X10*3/UL (ref 0.8–3)
LYMPHOCYTES NFR BLD AUTO: 13.7 %
MCH RBC QN AUTO: 28.5 PG (ref 26–34)
MCHC RBC AUTO-ENTMCNC: 31.9 G/DL (ref 32–36)
MCV RBC AUTO: 89 FL (ref 80–100)
MONOCYTES # BLD AUTO: 0.65 X10*3/UL (ref 0.05–0.8)
MONOCYTES NFR BLD AUTO: 10.3 %
NEUTROPHILS # BLD AUTO: 4.74 X10*3/UL (ref 1.6–5.5)
NEUTROPHILS NFR BLD AUTO: 75.3 %
NRBC BLD-RTO: 0 /100 WBCS (ref 0–0)
NT-PROBNP SERPL-MCNC: 965 PG/ML (ref 0–624)
PLATELET # BLD AUTO: 234 X10*3/UL (ref 150–450)
POTASSIUM SERPL-SCNC: 4.5 MMOL/L (ref 3.4–5.1)
PROT SERPL-MCNC: 7.1 G/DL (ref 5.9–7.9)
RBC # BLD AUTO: 4.04 X10*6/UL (ref 4–5.2)
SARS-COV-2 RNA RESP QL NAA+PROBE: NOT DETECTED
SODIUM SERPL-SCNC: 131 MMOL/L (ref 133–145)
TROPONIN T SERPL-MCNC: 18 NG/L
TROPONIN T SERPL-MCNC: 21 NG/L
TROPONIN T SERPL-MCNC: 24 NG/L
WBC # BLD AUTO: 6.3 X10*3/UL (ref 4.4–11.3)

## 2024-03-29 PROCEDURE — 93010 ELECTROCARDIOGRAM REPORT: CPT | Performed by: INTERNAL MEDICINE

## 2024-03-29 PROCEDURE — 84484 ASSAY OF TROPONIN QUANT: CPT | Performed by: STUDENT IN AN ORGANIZED HEALTH CARE EDUCATION/TRAINING PROGRAM

## 2024-03-29 PROCEDURE — 97161 PT EVAL LOW COMPLEX 20 MIN: CPT | Mod: GP

## 2024-03-29 PROCEDURE — 97165 OT EVAL LOW COMPLEX 30 MIN: CPT | Mod: GO

## 2024-03-29 PROCEDURE — 87636 SARSCOV2 & INF A&B AMP PRB: CPT | Performed by: STUDENT IN AN ORGANIZED HEALTH CARE EDUCATION/TRAINING PROGRAM

## 2024-03-29 PROCEDURE — 99291 CRITICAL CARE FIRST HOUR: CPT | Performed by: STUDENT IN AN ORGANIZED HEALTH CARE EDUCATION/TRAINING PROGRAM

## 2024-03-29 PROCEDURE — 96365 THER/PROPH/DIAG IV INF INIT: CPT

## 2024-03-29 PROCEDURE — 2500000001 HC RX 250 WO HCPCS SELF ADMINISTERED DRUGS (ALT 637 FOR MEDICARE OP): Performed by: INTERNAL MEDICINE

## 2024-03-29 PROCEDURE — 2500000001 HC RX 250 WO HCPCS SELF ADMINISTERED DRUGS (ALT 637 FOR MEDICARE OP): Performed by: STUDENT IN AN ORGANIZED HEALTH CARE EDUCATION/TRAINING PROGRAM

## 2024-03-29 PROCEDURE — 99222 1ST HOSP IP/OBS MODERATE 55: CPT | Performed by: INTERNAL MEDICINE

## 2024-03-29 PROCEDURE — 87040 BLOOD CULTURE FOR BACTERIA: CPT | Mod: TRILAB | Performed by: STUDENT IN AN ORGANIZED HEALTH CARE EDUCATION/TRAINING PROGRAM

## 2024-03-29 PROCEDURE — 84295 ASSAY OF SERUM SODIUM: CPT | Performed by: STUDENT IN AN ORGANIZED HEALTH CARE EDUCATION/TRAINING PROGRAM

## 2024-03-29 PROCEDURE — 96374 THER/PROPH/DIAG INJ IV PUSH: CPT

## 2024-03-29 PROCEDURE — 83880 ASSAY OF NATRIURETIC PEPTIDE: CPT | Performed by: STUDENT IN AN ORGANIZED HEALTH CARE EDUCATION/TRAINING PROGRAM

## 2024-03-29 PROCEDURE — 9420000001 HC RT PATIENT EDUCATION 5 MIN

## 2024-03-29 PROCEDURE — 2500000002 HC RX 250 W HCPCS SELF ADMINISTERED DRUGS (ALT 637 FOR MEDICARE OP, ALT 636 FOR OP/ED): Mod: MUE | Performed by: INTERNAL MEDICINE

## 2024-03-29 PROCEDURE — 36415 COLL VENOUS BLD VENIPUNCTURE: CPT | Performed by: STUDENT IN AN ORGANIZED HEALTH CARE EDUCATION/TRAINING PROGRAM

## 2024-03-29 PROCEDURE — 2500000004 HC RX 250 GENERAL PHARMACY W/ HCPCS (ALT 636 FOR OP/ED): Performed by: STUDENT IN AN ORGANIZED HEALTH CARE EDUCATION/TRAINING PROGRAM

## 2024-03-29 PROCEDURE — 71046 X-RAY EXAM CHEST 2 VIEWS: CPT

## 2024-03-29 PROCEDURE — 94640 AIRWAY INHALATION TREATMENT: CPT

## 2024-03-29 PROCEDURE — 93005 ELECTROCARDIOGRAM TRACING: CPT

## 2024-03-29 PROCEDURE — 2060000001 HC INTERMEDIATE ICU ROOM DAILY

## 2024-03-29 PROCEDURE — 85025 COMPLETE CBC W/AUTO DIFF WBC: CPT | Performed by: STUDENT IN AN ORGANIZED HEALTH CARE EDUCATION/TRAINING PROGRAM

## 2024-03-29 PROCEDURE — 2500000004 HC RX 250 GENERAL PHARMACY W/ HCPCS (ALT 636 FOR OP/ED): Performed by: INTERNAL MEDICINE

## 2024-03-29 PROCEDURE — 2500000002 HC RX 250 W HCPCS SELF ADMINISTERED DRUGS (ALT 637 FOR MEDICARE OP, ALT 636 FOR OP/ED): Performed by: INTERNAL MEDICINE

## 2024-03-29 PROCEDURE — 71046 X-RAY EXAM CHEST 2 VIEWS: CPT | Performed by: RADIOLOGY

## 2024-03-29 PROCEDURE — 94664 DEMO&/EVAL PT USE INHALER: CPT

## 2024-03-29 RX ORDER — AZITHROMYCIN 500 MG/1
500 TABLET, FILM COATED ORAL ONCE
Status: COMPLETED | OUTPATIENT
Start: 2024-03-29 | End: 2024-03-29

## 2024-03-29 RX ORDER — ALBUTEROL SULFATE 0.83 MG/ML
2.5 SOLUTION RESPIRATORY (INHALATION) EVERY 2 HOUR PRN
Status: DISCONTINUED | OUTPATIENT
Start: 2024-03-29 | End: 2024-04-07

## 2024-03-29 RX ORDER — GABAPENTIN 100 MG/1
100 CAPSULE ORAL NIGHTLY
Status: DISCONTINUED | OUTPATIENT
Start: 2024-03-29 | End: 2024-03-30

## 2024-03-29 RX ORDER — FUROSEMIDE 40 MG/1
40 TABLET ORAL DAILY
Status: DISCONTINUED | OUTPATIENT
Start: 2024-03-29 | End: 2024-03-30

## 2024-03-29 RX ORDER — CHOLECALCIFEROL (VITAMIN D3) 50 MCG
2000 TABLET ORAL DAILY
Status: DISCONTINUED | OUTPATIENT
Start: 2024-03-29 | End: 2024-03-30

## 2024-03-29 RX ORDER — ACETAMINOPHEN 325 MG/1
650 TABLET ORAL EVERY 4 HOURS PRN
Status: DISCONTINUED | OUTPATIENT
Start: 2024-03-29 | End: 2024-03-30

## 2024-03-29 RX ORDER — IPRATROPIUM BROMIDE AND ALBUTEROL SULFATE 2.5; .5 MG/3ML; MG/3ML
3 SOLUTION RESPIRATORY (INHALATION) EVERY 6 HOURS
Status: DISCONTINUED | OUTPATIENT
Start: 2024-03-29 | End: 2024-03-29

## 2024-03-29 RX ORDER — ASPIRIN 81 MG/1
81 TABLET ORAL DAILY
Status: DISCONTINUED | OUTPATIENT
Start: 2024-03-29 | End: 2024-03-30

## 2024-03-29 RX ORDER — CEFTRIAXONE 1 G/50ML
1 INJECTION, SOLUTION INTRAVENOUS EVERY 12 HOURS
Status: DISCONTINUED | OUTPATIENT
Start: 2024-03-29 | End: 2024-03-30

## 2024-03-29 RX ORDER — LEVOTHYROXINE SODIUM 88 UG/1
88 TABLET ORAL DAILY
Status: DISCONTINUED | OUTPATIENT
Start: 2024-03-29 | End: 2024-03-30

## 2024-03-29 RX ORDER — DOCUSATE SODIUM 100 MG/1
100 CAPSULE, LIQUID FILLED ORAL 2 TIMES DAILY
Status: DISCONTINUED | OUTPATIENT
Start: 2024-03-29 | End: 2024-03-29 | Stop reason: SDUPTHER

## 2024-03-29 RX ORDER — ASCORBIC ACID 250 MG
250 TABLET ORAL DAILY
Status: DISCONTINUED | OUTPATIENT
Start: 2024-03-29 | End: 2024-03-30

## 2024-03-29 RX ORDER — GUAIFENESIN/DEXTROMETHORPHAN 100-10MG/5
5 SYRUP ORAL EVERY 4 HOURS PRN
Status: DISCONTINUED | OUTPATIENT
Start: 2024-03-29 | End: 2024-03-30

## 2024-03-29 RX ORDER — GUAIFENESIN 600 MG/1
600 TABLET, EXTENDED RELEASE ORAL EVERY 12 HOURS PRN
Status: DISCONTINUED | OUTPATIENT
Start: 2024-03-29 | End: 2024-03-30

## 2024-03-29 RX ORDER — L. ACIDOPHILUS/L.BULGARICUS 1MM CELL
1 TABLET ORAL 2 TIMES DAILY
Status: DISCONTINUED | OUTPATIENT
Start: 2024-03-29 | End: 2024-03-30

## 2024-03-29 RX ORDER — DEXTROSE MONOHYDRATE, SODIUM CHLORIDE, AND POTASSIUM CHLORIDE 50; 1.49; 4.5 G/1000ML; G/1000ML; G/1000ML
100 INJECTION, SOLUTION INTRAVENOUS CONTINUOUS
Status: DISCONTINUED | OUTPATIENT
Start: 2024-03-29 | End: 2024-03-30

## 2024-03-29 RX ORDER — ONDANSETRON 4 MG/1
4 TABLET, ORALLY DISINTEGRATING ORAL EVERY 6 HOURS PRN
Status: DISCONTINUED | OUTPATIENT
Start: 2024-03-29 | End: 2024-03-30

## 2024-03-29 RX ORDER — CLOPIDOGREL BISULFATE 75 MG/1
75 TABLET ORAL DAILY
Status: DISCONTINUED | OUTPATIENT
Start: 2024-03-29 | End: 2024-03-30

## 2024-03-29 RX ORDER — IPRATROPIUM BROMIDE AND ALBUTEROL SULFATE 2.5; .5 MG/3ML; MG/3ML
3 SOLUTION RESPIRATORY (INHALATION)
Status: DISCONTINUED | OUTPATIENT
Start: 2024-03-30 | End: 2024-04-14

## 2024-03-29 RX ORDER — TALC
3 POWDER (GRAM) TOPICAL NIGHTLY
Status: DISCONTINUED | OUTPATIENT
Start: 2024-03-29 | End: 2024-03-30

## 2024-03-29 RX ORDER — GUAIFENESIN 100 MG/5ML
10 SOLUTION ORAL EVERY 4 HOURS PRN
Status: DISCONTINUED | OUTPATIENT
Start: 2024-03-29 | End: 2024-03-30

## 2024-03-29 RX ORDER — FUROSEMIDE 10 MG/ML
40 INJECTION INTRAMUSCULAR; INTRAVENOUS ONCE
Status: COMPLETED | OUTPATIENT
Start: 2024-03-29 | End: 2024-03-29

## 2024-03-29 RX ORDER — ACETAMINOPHEN 160 MG/5ML
650 SOLUTION ORAL EVERY 4 HOURS PRN
Status: DISCONTINUED | OUTPATIENT
Start: 2024-03-29 | End: 2024-03-30

## 2024-03-29 RX ORDER — DOCUSATE SODIUM 100 MG/1
100 CAPSULE, LIQUID FILLED ORAL 3 TIMES DAILY PRN
Status: DISCONTINUED | OUTPATIENT
Start: 2024-03-29 | End: 2024-03-30

## 2024-03-29 RX ORDER — AMLODIPINE BESYLATE 2.5 MG/1
2.5 TABLET ORAL DAILY
Status: DISCONTINUED | OUTPATIENT
Start: 2024-03-29 | End: 2024-03-30

## 2024-03-29 RX ORDER — ENOXAPARIN SODIUM 100 MG/ML
40 INJECTION SUBCUTANEOUS DAILY
Status: DISCONTINUED | OUTPATIENT
Start: 2024-03-29 | End: 2024-04-15 | Stop reason: HOSPADM

## 2024-03-29 RX ORDER — DOCUSATE SODIUM 100 MG/1
100 CAPSULE, LIQUID FILLED ORAL 2 TIMES DAILY
Status: DISCONTINUED | OUTPATIENT
Start: 2024-03-29 | End: 2024-03-30

## 2024-03-29 RX ORDER — POLYETHYLENE GLYCOL 3350 17 G/17G
17 POWDER, FOR SOLUTION ORAL DAILY
Status: DISCONTINUED | OUTPATIENT
Start: 2024-03-29 | End: 2024-03-30

## 2024-03-29 RX ORDER — ACETAMINOPHEN 650 MG/1
650 SUPPOSITORY RECTAL EVERY 4 HOURS PRN
Status: DISCONTINUED | OUTPATIENT
Start: 2024-03-29 | End: 2024-03-30

## 2024-03-29 RX ORDER — CEFTRIAXONE 1 G/50ML
1 INJECTION, SOLUTION INTRAVENOUS ONCE
Status: COMPLETED | OUTPATIENT
Start: 2024-03-29 | End: 2024-03-29

## 2024-03-29 RX ORDER — HYDROXYZINE HYDROCHLORIDE 25 MG/1
25 TABLET, FILM COATED ORAL EVERY 6 HOURS PRN
Status: DISCONTINUED | OUTPATIENT
Start: 2024-03-29 | End: 2024-03-30

## 2024-03-29 RX ORDER — ALUMINUM HYDROXIDE, MAGNESIUM HYDROXIDE, AND SIMETHICONE 1200; 120; 1200 MG/30ML; MG/30ML; MG/30ML
30 SUSPENSION ORAL EVERY 4 HOURS PRN
Status: DISCONTINUED | OUTPATIENT
Start: 2024-03-29 | End: 2024-03-30

## 2024-03-29 RX ORDER — CARVEDILOL 3.12 MG/1
3.12 TABLET ORAL
Status: DISCONTINUED | OUTPATIENT
Start: 2024-03-29 | End: 2024-03-30

## 2024-03-29 RX ADMIN — Medication 250 MG: at 09:40

## 2024-03-29 RX ADMIN — FUROSEMIDE 40 MG: 40 TABLET ORAL at 08:49

## 2024-03-29 RX ADMIN — DEXTROSE MONOHYDRATE, SODIUM CHLORIDE, AND POTASSIUM CHLORIDE 100 ML/HR: 50; 4.5; 1.49 INJECTION, SOLUTION INTRAVENOUS at 21:48

## 2024-03-29 RX ADMIN — GUAIFENESIN 10 ML: 200 SOLUTION ORAL at 08:49

## 2024-03-29 RX ADMIN — Medication 1 TABLET: at 20:59

## 2024-03-29 RX ADMIN — Medication 1 SPRAY: at 17:13

## 2024-03-29 RX ADMIN — IPRATROPIUM BROMIDE AND ALBUTEROL SULFATE 3 ML: 2.5; .5 SOLUTION RESPIRATORY (INHALATION) at 20:20

## 2024-03-29 RX ADMIN — LEVOTHYROXINE SODIUM 88 MCG: 0.09 TABLET ORAL at 09:40

## 2024-03-29 RX ADMIN — CLOPIDOGREL BISULFATE 75 MG: 75 TABLET ORAL at 08:50

## 2024-03-29 RX ADMIN — Medication 1 SPRAY: at 09:40

## 2024-03-29 RX ADMIN — Medication 1 SPRAY: at 12:24

## 2024-03-29 RX ADMIN — Medication 3 MG: at 20:59

## 2024-03-29 RX ADMIN — DEXTROSE MONOHYDRATE, SODIUM CHLORIDE, AND POTASSIUM CHLORIDE 100 ML/HR: 50; 4.5; 1.49 INJECTION, SOLUTION INTRAVENOUS at 09:40

## 2024-03-29 RX ADMIN — FUROSEMIDE 40 MG: 10 INJECTION, SOLUTION INTRAMUSCULAR; INTRAVENOUS at 03:53

## 2024-03-29 RX ADMIN — AZITHROMYCIN DIHYDRATE 500 MG: 500 TABLET ORAL at 04:11

## 2024-03-29 RX ADMIN — Medication 1 TABLET: at 08:50

## 2024-03-29 RX ADMIN — CARVEDILOL 3.12 MG: 3.12 TABLET, FILM COATED ORAL at 17:12

## 2024-03-29 RX ADMIN — CEFTRIAXONE SODIUM 1 G: 1 INJECTION, SOLUTION INTRAVENOUS at 04:11

## 2024-03-29 RX ADMIN — CEFTRIAXONE SODIUM 1 G: 1 INJECTION, SOLUTION INTRAVENOUS at 17:11

## 2024-03-29 RX ADMIN — Medication 2000 UNITS: at 08:50

## 2024-03-29 RX ADMIN — Medication 1 SPRAY: at 21:01

## 2024-03-29 RX ADMIN — ENOXAPARIN SODIUM 40 MG: 40 INJECTION SUBCUTANEOUS at 09:40

## 2024-03-29 RX ADMIN — POLYETHYLENE GLYCOL 3350 17 G: 17 POWDER, FOR SOLUTION ORAL at 08:49

## 2024-03-29 RX ADMIN — ACETAMINOPHEN 650 MG: 160 SOLUTION ORAL at 08:49

## 2024-03-29 RX ADMIN — ASPIRIN 81 MG: 81 TABLET, COATED ORAL at 08:49

## 2024-03-29 RX ADMIN — GABAPENTIN 100 MG: 100 CAPSULE ORAL at 20:59

## 2024-03-29 SDOH — HEALTH STABILITY: MENTAL HEALTH: HOW OFTEN DO YOU HAVE 6 OR MORE DRINKS ON ONE OCCASION?: NEVER

## 2024-03-29 SDOH — SOCIAL STABILITY: SOCIAL INSECURITY: WITHIN THE LAST YEAR, HAVE YOU BEEN HUMILIATED OR EMOTIONALLY ABUSED IN OTHER WAYS BY YOUR PARTNER OR EX-PARTNER?: NO

## 2024-03-29 SDOH — ECONOMIC STABILITY: HOUSING INSECURITY: IN THE LAST 12 MONTHS, HOW MANY PLACES HAVE YOU LIVED?: 2

## 2024-03-29 SDOH — ECONOMIC STABILITY: FOOD INSECURITY: WITHIN THE PAST 12 MONTHS, YOU WORRIED THAT YOUR FOOD WOULD RUN OUT BEFORE YOU GOT MONEY TO BUY MORE.: NEVER TRUE

## 2024-03-29 SDOH — HEALTH STABILITY: MENTAL HEALTH: HOW OFTEN DO YOU HAVE A DRINK CONTAINING ALCOHOL?: NEVER

## 2024-03-29 SDOH — SOCIAL STABILITY: SOCIAL NETWORK: ARE YOU MARRIED, WIDOWED, DIVORCED, SEPARATED, NEVER MARRIED, OR LIVING WITH A PARTNER?: WIDOWED

## 2024-03-29 SDOH — SOCIAL STABILITY: SOCIAL INSECURITY: DOES ANYONE TRY TO KEEP YOU FROM HAVING/CONTACTING OTHER FRIENDS OR DOING THINGS OUTSIDE YOUR HOME?: NO

## 2024-03-29 SDOH — SOCIAL STABILITY: SOCIAL INSECURITY
WITHIN THE LAST YEAR, HAVE YOU BEEN RAPED OR FORCED TO HAVE ANY KIND OF SEXUAL ACTIVITY BY YOUR PARTNER OR EX-PARTNER?: NO

## 2024-03-29 SDOH — ECONOMIC STABILITY: TRANSPORTATION INSECURITY
IN THE PAST 12 MONTHS, HAS LACK OF TRANSPORTATION KEPT YOU FROM MEETINGS, WORK, OR FROM GETTING THINGS NEEDED FOR DAILY LIVING?: NO

## 2024-03-29 SDOH — HEALTH STABILITY: PHYSICAL HEALTH: ON AVERAGE, HOW MANY DAYS PER WEEK DO YOU ENGAGE IN MODERATE TO STRENUOUS EXERCISE (LIKE A BRISK WALK)?: 0 DAYS

## 2024-03-29 SDOH — SOCIAL STABILITY: SOCIAL INSECURITY: ARE YOU MARRIED, WIDOWED, DIVORCED, SEPARATED, NEVER MARRIED, OR LIVING WITH A PARTNER?: WIDOWED

## 2024-03-29 SDOH — ECONOMIC STABILITY: INCOME INSECURITY: IN THE LAST 12 MONTHS, WAS THERE A TIME WHEN YOU WERE NOT ABLE TO PAY THE MORTGAGE OR RENT ON TIME?: NO

## 2024-03-29 SDOH — ECONOMIC STABILITY: FOOD INSECURITY: WITHIN THE PAST 12 MONTHS, THE FOOD YOU BOUGHT JUST DIDN'T LAST AND YOU DIDN'T HAVE MONEY TO GET MORE.: NEVER TRUE

## 2024-03-29 SDOH — ECONOMIC STABILITY: FOOD INSECURITY: WITHIN THE PAST 12 MONTHS, YOU WORRIED THAT YOUR FOOD WOULD RUN OUT BEFORE YOU GOT THE MONEY TO BUY MORE.: NEVER TRUE

## 2024-03-29 SDOH — SOCIAL STABILITY: SOCIAL NETWORK: HOW OFTEN DO YOU ATTENT MEETINGS OF THE CLUB OR ORGANIZATION YOU BELONG TO?: NEVER

## 2024-03-29 SDOH — SOCIAL STABILITY: SOCIAL INSECURITY: HAVE YOU HAD THOUGHTS OF HARMING ANYONE ELSE?: NO

## 2024-03-29 SDOH — ECONOMIC STABILITY: INCOME INSECURITY: IN THE PAST 12 MONTHS, HAS THE ELECTRIC, GAS, OIL, OR WATER COMPANY THREATENED TO SHUT OFF SERVICE IN YOUR HOME?: NO

## 2024-03-29 SDOH — ECONOMIC STABILITY: INCOME INSECURITY: IN THE PAST 12 MONTHS HAS THE ELECTRIC, GAS, OIL, OR WATER COMPANY THREATENED TO SHUT OFF SERVICES IN YOUR HOME?: NO

## 2024-03-29 SDOH — ECONOMIC STABILITY: FOOD INSECURITY: HOW HARD IS IT FOR YOU TO PAY FOR THE VERY BASICS LIKE FOOD, HOUSING, MEDICAL CARE, AND HEATING?: NOT VERY HARD

## 2024-03-29 SDOH — HEALTH STABILITY: MENTAL HEALTH
DO YOU FEEL STRESS - TENSE, RESTLESS, NERVOUS, OR ANXIOUS, OR UNABLE TO SLEEP AT NIGHT BECAUSE YOUR MIND IS TROUBLED ALL THE TIME - THESE DAYS?: NOT AT ALL

## 2024-03-29 SDOH — SOCIAL STABILITY: SOCIAL NETWORK: HOW OFTEN DO YOU ATTEND MEETINGS OF THE CLUBS OR ORGANIZATIONS YOU BELONG TO?: NEVER

## 2024-03-29 SDOH — ECONOMIC STABILITY: TRANSPORTATION INSECURITY: IN THE PAST 12 MONTHS, HAS LACK OF TRANSPORTATION KEPT YOU FROM MEDICAL APPOINTMENTS OR FROM GETTING MEDICATIONS?: NO

## 2024-03-29 SDOH — HEALTH STABILITY: MENTAL HEALTH: HOW OFTEN DO YOU HAVE SIX OR MORE DRINKS ON ONE OCCASION?: NEVER

## 2024-03-29 SDOH — SOCIAL STABILITY: SOCIAL NETWORK
DO YOU BELONG TO ANY CLUBS OR ORGANIZATIONS SUCH AS CHURCH GROUPS, UNIONS, FRATERNAL OR ATHLETIC GROUPS, OR SCHOOL GROUPS?: NO

## 2024-03-29 SDOH — ECONOMIC STABILITY: INCOME INSECURITY: HOW HARD IS IT FOR YOU TO PAY FOR THE VERY BASICS LIKE FOOD, HOUSING, MEDICAL CARE, AND HEATING?: NOT VERY HARD

## 2024-03-29 SDOH — ECONOMIC STABILITY: HOUSING INSECURITY: IN THE LAST 12 MONTHS, WAS THERE A TIME WHEN YOU WERE NOT ABLE TO PAY THE MORTGAGE OR RENT ON TIME?: NO

## 2024-03-29 SDOH — ECONOMIC STABILITY: TRANSPORTATION INSECURITY
IN THE PAST 12 MONTHS, HAS THE LACK OF TRANSPORTATION KEPT YOU FROM MEDICAL APPOINTMENTS OR FROM GETTING MEDICATIONS?: NO

## 2024-03-29 SDOH — SOCIAL STABILITY: SOCIAL NETWORK: HOW OFTEN DO YOU GET TOGETHER WITH FRIENDS OR RELATIVES?: MORE THAN THREE TIMES A WEEK

## 2024-03-29 SDOH — SOCIAL STABILITY: SOCIAL INSECURITY: HAS ANYONE EVER THREATENED TO HURT YOUR FAMILY OR YOUR PETS?: NO

## 2024-03-29 SDOH — HEALTH STABILITY: PHYSICAL HEALTH: ON AVERAGE, HOW MANY MINUTES DO YOU ENGAGE IN EXERCISE AT THIS LEVEL?: 0 MIN

## 2024-03-29 SDOH — SOCIAL STABILITY: SOCIAL INSECURITY: ARE YOU OR HAVE YOU BEEN THREATENED OR ABUSED PHYSICALLY, EMOTIONALLY, OR SEXUALLY BY ANYONE?: NO

## 2024-03-29 SDOH — SOCIAL STABILITY: SOCIAL NETWORK: HOW OFTEN DO YOU ATTEND CHURCH OR RELIGIOUS SERVICES?: NEVER

## 2024-03-29 SDOH — HEALTH STABILITY: MENTAL HEALTH: HOW MANY STANDARD DRINKS CONTAINING ALCOHOL DO YOU HAVE ON A TYPICAL DAY?: PATIENT DOES NOT DRINK

## 2024-03-29 SDOH — SOCIAL STABILITY: SOCIAL INSECURITY: DO YOU FEEL ANYONE HAS EXPLOITED OR TAKEN ADVANTAGE OF YOU FINANCIALLY OR OF YOUR PERSONAL PROPERTY?: NO

## 2024-03-29 SDOH — SOCIAL STABILITY: SOCIAL INSECURITY: DO YOU FEEL UNSAFE GOING BACK TO THE PLACE WHERE YOU ARE LIVING?: NO

## 2024-03-29 SDOH — SOCIAL STABILITY: SOCIAL INSECURITY: WERE YOU ABLE TO COMPLETE ALL THE BEHAVIORAL HEALTH SCREENINGS?: YES

## 2024-03-29 SDOH — SOCIAL STABILITY: SOCIAL INSECURITY: ABUSE: ADULT

## 2024-03-29 SDOH — HEALTH STABILITY: MENTAL HEALTH: HOW MANY DRINKS CONTAINING ALCOHOL DO YOU HAVE ON A TYPICAL DAY WHEN YOU ARE DRINKING?: PATIENT DOES NOT DRINK

## 2024-03-29 SDOH — SOCIAL STABILITY: SOCIAL INSECURITY: ARE THERE ANY APPARENT SIGNS OF INJURIES/BEHAVIORS THAT COULD BE RELATED TO ABUSE/NEGLECT?: NO

## 2024-03-29 ASSESSMENT — ENCOUNTER SYMPTOMS
HEMATURIA: 0
DIZZINESS: 0
WEAKNESS: 0
DYSPHORIC MOOD: 0
HALLUCINATIONS: 0
SPEECH DIFFICULTY: 0
POLYDIPSIA: 0
NUMBNESS: 0
VOMITING: 0
BRUISES/BLEEDS EASILY: 0
FREQUENCY: 0
SINUS PAIN: 0
SINUS PRESSURE: 0
BACK PAIN: 0
HYPERACTIVE: 0
RHINORRHEA: 0
CHEST TIGHTNESS: 0
HEADACHES: 0
FLANK PAIN: 0
MYALGIAS: 0
CHILLS: 0
POLYPHAGIA: 0
ABDOMINAL DISTENTION: 0
EYE ITCHING: 0
DIARRHEA: 0
SHORTNESS OF BREATH: 1
DYSURIA: 0
DECREASED CONCENTRATION: 0
ADENOPATHY: 0
PALPITATIONS: 0
STRIDOR: 0
EYE PAIN: 0
SEIZURES: 0
CONSTIPATION: 0
LIGHT-HEADEDNESS: 0
TREMORS: 0
NECK STIFFNESS: 0
VOICE CHANGE: 0
ACTIVITY CHANGE: 0
APNEA: 0
TROUBLE SWALLOWING: 0
FEVER: 0
DIFFICULTY URINATING: 0
PHOTOPHOBIA: 0
COLOR CHANGE: 0
COUGH: 1
ARTHRALGIAS: 0
NAUSEA: 0
ANAL BLEEDING: 0
BLOOD IN STOOL: 0
NERVOUS/ANXIOUS: 0
FATIGUE: 0
DIAPHORESIS: 0
WHEEZING: 0
SORE THROAT: 0
UNEXPECTED WEIGHT CHANGE: 0
FACIAL SWELLING: 0
EYE DISCHARGE: 0
EYE REDNESS: 0
RECTAL PAIN: 0
APPETITE CHANGE: 0
CONFUSION: 0
NECK PAIN: 0
SLEEP DISTURBANCE: 0
ABDOMINAL PAIN: 0
FACIAL ASYMMETRY: 0
CHOKING: 0
JOINT SWELLING: 0
AGITATION: 0
WOUND: 0

## 2024-03-29 ASSESSMENT — COGNITIVE AND FUNCTIONAL STATUS - GENERAL
MOBILITY SCORE: 17
DAILY ACTIVITIY SCORE: 16
PATIENT BASELINE BEDBOUND: NO
EATING MEALS: A LITTLE
WALKING IN HOSPITAL ROOM: A LOT
MOVING TO AND FROM BED TO CHAIR: A LOT
TOILETING: A LOT
MOVING FROM LYING ON BACK TO SITTING ON SIDE OF FLAT BED WITH BEDRAILS: A LOT
CLIMB 3 TO 5 STEPS WITH RAILING: A LOT
STANDING UP FROM CHAIR USING ARMS: A LITTLE
DRESSING REGULAR UPPER BODY CLOTHING: A LITTLE
MOVING TO AND FROM BED TO CHAIR: A LITTLE
MOBILITY SCORE: 12
MOVING FROM LYING ON BACK TO SITTING ON SIDE OF FLAT BED WITH BEDRAILS: A LITTLE
DRESSING REGULAR LOWER BODY CLOTHING: A LITTLE
PERSONAL GROOMING: A LITTLE
CLIMB 3 TO 5 STEPS WITH RAILING: A LOT
DRESSING REGULAR LOWER BODY CLOTHING: A LITTLE
DAILY ACTIVITIY SCORE: 19
DRESSING REGULAR UPPER BODY CLOTHING: A LITTLE
CLIMB 3 TO 5 STEPS WITH RAILING: A LOT
EATING MEALS: A LITTLE
TOILETING: A LITTLE
DAILY ACTIVITIY SCORE: 18
TOILETING: A LITTLE
TURNING FROM BACK TO SIDE WHILE IN FLAT BAD: A LOT
PERSONAL GROOMING: A LITTLE
MOVING FROM LYING ON BACK TO SITTING ON SIDE OF FLAT BED WITH BEDRAILS: A LOT
HELP NEEDED FOR BATHING: A LITTLE
PERSONAL GROOMING: A LITTLE
DRESSING REGULAR UPPER BODY CLOTHING: A LITTLE
STANDING UP FROM CHAIR USING ARMS: A LOT
HELP NEEDED FOR BATHING: A LITTLE
CLIMB 3 TO 5 STEPS WITH RAILING: A LOT
DAILY ACTIVITIY SCORE: 19
WALKING IN HOSPITAL ROOM: A LITTLE
TURNING FROM BACK TO SIDE WHILE IN FLAT BAD: A LOT
STANDING UP FROM CHAIR USING ARMS: A LOT
MOVING TO AND FROM BED TO CHAIR: A LOT
MOBILITY SCORE: 12
HELP NEEDED FOR BATHING: A LITTLE
DRESSING REGULAR LOWER BODY CLOTHING: A LOT
TOILETING: A LITTLE
WALKING IN HOSPITAL ROOM: A LOT
MOVING FROM LYING ON BACK TO SITTING ON SIDE OF FLAT BED WITH BEDRAILS: A LITTLE
WALKING IN HOSPITAL ROOM: A LITTLE
TURNING FROM BACK TO SIDE WHILE IN FLAT BAD: A LITTLE
STANDING UP FROM CHAIR USING ARMS: A LITTLE
DRESSING REGULAR LOWER BODY CLOTHING: A LITTLE
HELP NEEDED FOR BATHING: A LITTLE
DRESSING REGULAR UPPER BODY CLOTHING: A LITTLE
PERSONAL GROOMING: A LITTLE
MOBILITY SCORE: 17
MOVING TO AND FROM BED TO CHAIR: A LITTLE
TURNING FROM BACK TO SIDE WHILE IN FLAT BAD: A LITTLE

## 2024-03-29 ASSESSMENT — ACTIVITIES OF DAILY LIVING (ADL)
HEARING - RIGHT EAR: HEARING AID
ADL_ASSISTANCE: INDEPENDENT
JUDGMENT_ADEQUATE_SAFELY_COMPLETE_DAILY_ACTIVITIES: YES
ADL_ASSISTANCE: INDEPENDENT
BATHING: NEEDS ASSISTANCE
ADEQUATE_TO_COMPLETE_ADL: YES
HEARING - LEFT EAR: HEARING AID
GROOMING: INDEPENDENT
PATIENT'S MEMORY ADEQUATE TO SAFELY COMPLETE DAILY ACTIVITIES?: YES
LACK_OF_TRANSPORTATION: NO
FEEDING YOURSELF: INDEPENDENT
WALKS IN HOME: INDEPENDENT
TOILETING: INDEPENDENT
BATHING_ASSISTANCE: MINIMAL
DRESSING YOURSELF: INDEPENDENT
LACK_OF_TRANSPORTATION: NO

## 2024-03-29 ASSESSMENT — PATIENT HEALTH QUESTIONNAIRE - PHQ9
1. LITTLE INTEREST OR PLEASURE IN DOING THINGS: NOT AT ALL
SUM OF ALL RESPONSES TO PHQ9 QUESTIONS 1 & 2: 0
2. FEELING DOWN, DEPRESSED OR HOPELESS: NOT AT ALL

## 2024-03-29 ASSESSMENT — PAIN SCALES - GENERAL
PAINLEVEL_OUTOF10: 7
PAINLEVEL_OUTOF10: 1
PAINLEVEL_OUTOF10: 7
PAINLEVEL_OUTOF10: 3
PAINLEVEL_OUTOF10: 0 - NO PAIN
PAINLEVEL_OUTOF10: 7

## 2024-03-29 ASSESSMENT — LIFESTYLE VARIABLES
AUDIT-C TOTAL SCORE: 0
HOW OFTEN DO YOU HAVE 6 OR MORE DRINKS ON ONE OCCASION: NEVER
HOW OFTEN DO YOU HAVE A DRINK CONTAINING ALCOHOL: NEVER
SKIP TO QUESTIONS 9-10: 1
SKIP TO QUESTIONS 9-10: 1
AUDIT-C TOTAL SCORE: 0
AUDIT-C TOTAL SCORE: 0
HOW MANY STANDARD DRINKS CONTAINING ALCOHOL DO YOU HAVE ON A TYPICAL DAY: PATIENT DOES NOT DRINK

## 2024-03-29 ASSESSMENT — PAIN - FUNCTIONAL ASSESSMENT
PAIN_FUNCTIONAL_ASSESSMENT: 0-10

## 2024-03-29 ASSESSMENT — COLUMBIA-SUICIDE SEVERITY RATING SCALE - C-SSRS
1. IN THE PAST MONTH, HAVE YOU WISHED YOU WERE DEAD OR WISHED YOU COULD GO TO SLEEP AND NOT WAKE UP?: NO
6. HAVE YOU EVER DONE ANYTHING, STARTED TO DO ANYTHING, OR PREPARED TO DO ANYTHING TO END YOUR LIFE?: NO
2. HAVE YOU ACTUALLY HAD ANY THOUGHTS OF KILLING YOURSELF?: NO

## 2024-03-29 ASSESSMENT — PAIN DESCRIPTION - PAIN TYPE: TYPE: ACUTE PAIN

## 2024-03-29 ASSESSMENT — PAIN DESCRIPTION - LOCATION: LOCATION: BACK

## 2024-03-29 NOTE — CONSULTS
"Nutrition Assessement Note    Nutrition Assessment    Reason for Assessment: Dietitian discretion (CHF)    Reason for Hospital Admission:  Akua Almanzar is a 89 y.o. female who is admitted for pneumonia, CHF. No H&P available at this time.     Nutrition History:  Food and Nutrient History: reports a variable appetite - eating breakfast at this time. follows a low Na+ diet normally. per ED reprot, pt was recently admitted to rehab where she was eating a regular diet.  Energy Intake: Good > 75 %  GI Symptoms: Constipation    Anthropometrics:  Ht: 154.9 cm (5' 1\"), Wt: 72.7 kg (160 lb 4.4 oz), BMI: 30.3  IBW/kg (Dietitian Calculated): 47.73 kg  Percent of IBW: 152 %  Adjusted Body Weight (kg): 54.09 kg    Weight Change:  Daily Weight  03/29/24 : 72.7 kg (160 lb 4.4 oz)  02/29/24 : 72.6 kg (160 lb)  02/23/24 : 82.6 kg (182 lb)  04/26/21 : 71.2 kg (157 lb)  02/15/21 : 72.1 kg (159 lb)     Weight History / % Weight Change: denies wt changes    Nutrition Focused Physical Exam Findings: mild deficits possible age related  Subcutaneous Fat Loss  Orbital Fat Pads: Mild-Moderate (slight dark circles and slight hollowing)  Buccal Fat Pads: Well nourished (full, rounded cheeks)    Muscle Wasting  Temporalis: Well nourished (well-defined muscle)    Nutrition Significant Labs:  Lab Results   Component Value Date    WBC 6.3 03/29/2024    HGB 11.5 (L) 03/29/2024    HCT 36.0 03/29/2024     03/29/2024    ALT 14 03/29/2024    AST 30 03/29/2024     (L) 03/29/2024    K 4.5 03/29/2024    CL 88 (L) 03/29/2024    CREATININE 0.50 03/29/2024    BUN 12 03/29/2024    CO2 34 (H) 03/29/2024    TSH 1.13 02/29/2024    INR 1.2 (H) 04/10/2021       Current Facility-Administered Medications:     acetaminophen (Tylenol) tablet 650 mg, 650 mg, oral, q4h PRN **OR** acetaminophen (Tylenol) oral liquid 650 mg, 650 mg, oral, q4h PRN, 650 mg at 03/29/24 0849 **OR** acetaminophen (Tylenol) suppository 650 mg, 650 mg, rectal, q4h PRN, Ivone S " MD Mukul    alum-mag hydroxide-simeth (Mylanta) 200-200-20 mg/5 mL oral suspension 30 mL, 30 mL, oral, q4h PRN, Ivone Gilman MD    amLODIPine (Norvasc) tablet 2.5 mg, 2.5 mg, oral, Daily, Ivone Gilman MD    ascorbic acid (Vitamin C) tablet 250 mg, 250 mg, oral, Daily, Ivone Gilman MD, 250 mg at 03/29/24 0940    aspirin EC tablet 81 mg, 81 mg, oral, Daily, Ivone Gilman MD, 81 mg at 03/29/24 0849    [START ON 3/30/2024] azithromycin (Zithromax) 500 mg in dextrose 5% 250 mL IV, 500 mg, intravenous, q24h, Ivone Gilman MD    benzocaine-menthol (Cepastat Sore Throat) 15-3.6 mg lozenge 1 lozenge, 1 lozenge, Mouth/Throat, q2h PRN, Ivone Gilman MD    benzocaine-menthol (Cepastat Sore Throat) 15-3.6 mg lozenge 1 lozenge, 1 lozenge, Mouth/Throat, q4h PRN, Ivone Gilman MD    carvedilol (Coreg) tablet 3.125 mg, 3.125 mg, oral, BID with meals, Ivone Gilman MD    cefTRIAXone (Rocephin) IVPB 1 g, 1 g, intravenous, q12h, Ivone Gilman MD    cholecalciferol (Vitamin D-3) tablet 2,000 Units, 2,000 Units, oral, Daily, Ivone Gilman MD, 2,000 Units at 03/29/24 0850    clopidogrel (Plavix) tablet 75 mg, 75 mg, oral, Daily, Ivone Gilman MD, 75 mg at 03/29/24 0850    dextromethorphan-guaifenesin (Robitussin DM)  mg/5 mL oral liquid 5 mL, 5 mL, oral, q4h PRN, Ivone Gilman MD    dextrose 5 % and sodium chloride 0.45 % with KCl 20 mEq/L infusion, 100 mL/hr, intravenous, Continuous, Ivone Gilman MD, Last Rate: 100 mL/hr at 03/29/24 0940, 100 mL/hr at 03/29/24 0940    [Held by provider] docusate sodium (Colace) capsule 100 mg, 100 mg, oral, BID, Ivone Gilman MD    docusate sodium (Colace) capsule 100 mg, 100 mg, oral, TID PRN, Ivone Gilman MD    enoxaparin (Lovenox) syringe 40 mg, 40 mg, subcutaneous, Daily, Ivone Gilman MD, 40 mg at 03/29/24 0940    furosemide (Lasix) tablet 40 mg, 40 mg, oral, Daily, Ivone Gilman MD, 40 mg at 03/29/24 0849    gabapentin (Neurontin) capsule 100 mg, 100 mg, oral, Nightly, Ivone Gilman MD    guaiFENesin  (Mucinex) 12 hr tablet 600 mg, 600 mg, oral, q12h PRN, Ivone Gilman MD    guaiFENesin (Robitussin) 100 mg/5 mL syrup 10 mL, 10 mL, oral, q4h PRN, Ivone Gilman MD, 10 mL at 03/29/24 0849    hydrOXYzine HCL (Atarax) tablet 25 mg, 25 mg, oral, q6h PRN, Ivone Gilman MD    ipratropium-albuteroL (Duo-Neb) 0.5-2.5 mg/3 mL nebulizer solution 3 mL, 3 mL, nebulization, q6h, Ivone Gilman MD    lactobacillus acidophilus tablet 1 tablet, 1 tablet, oral, BID, Ivone Gilman MD, 1 tablet at 03/29/24 0850    levothyroxine (Synthroid, Levoxyl) tablet 88 mcg, 88 mcg, oral, Daily, Ivone Gilman MD, 88 mcg at 03/29/24 0940    melatonin tablet 3 mg, 3 mg, oral, Nightly, Ivone Gilman MD    ondansetron ODT (Zofran-ODT) disintegrating tablet 4 mg, 4 mg, oral, q6h PRN, Ivone Gilman MD    oxygen (O2) therapy, , inhalation, Continuous, Ivone Gilman MD    polyethylene glycol (Glycolax, Miralax) packet 17 g, 17 g, oral, Daily, Ivone Gilman MD, 17 g at 03/29/24 0849    sodium chloride (Ocean) 0.65 % nasal spray 1 spray, 1 spray, Each Nostril, 4x daily, Ivone Gilman MD, 1 spray at 03/29/24 0940    Dietary Orders (From admission, onward)       Start     Ordered    03/29/24 0900  Adult diet 2-3 grams sodium  Diet effective now        Question:  Diet type  Answer:  2-3 grams sodium    03/29/24 0859    03/29/24 0700  May Not Participate in Room Service  Once        Question:  .  Answer:  Yes    03/29/24 0659                  Estimated Needs:   Estimated Energy Needs  Total Energy Estimated Needs (kCal): 1349 kCal  Total Estimated Energy Need per Day (kCal/kg): 25 kCal/kg  Method for Estimating Needs: ABW    Estimated Protein Needs  Total Protein Estimated Needs (g): 65 g  Total Protein Estimated Needs (g/kg): 1.2 g/kg  Method for Estimating Needs: ABW    Estimated Fluid Needs  Method for Estimating Needs: 1 ml/kcal        Nutrition Diagnosis   Nutrition Diagnosis:  Malnutrition Diagnosis  Patient has Malnutrition Diagnosis: No    Nutrition  Diagnosis  Patient has Nutrition Diagnosis: Yes  Diagnosis Status (1): New  Nutrition Diagnosis 1: Increased nutrient needs  Related to (1): increased demand for nutrients  As Evidenced by (1): conditions associated with diagnosis       Nutrition Interventions/Recommendations   Nutrition Interventions and Recommendations:    Nutrition Prescription:  Individualized Nutrition Prescription Provided for : 1349 kcals and 65g protein to be provided via diet    Nutrition Interventions:   Food and/or Nutrient Delivery Interventions  Interventions: Meals and snacks  Meals and Snacks: Mineral-modified diet  Goal: will order low Na+ diet at this time    Education Documentation  No documentation found.           Nutrition Monitoring and Evaluation   Monitoring/Evaluation:   Food/Nutrient Related History Monitoring  Monitoring and Evaluation Plan: Energy intake  Energy Intake: Estimated energy intake  Criteria: pt to consume >/= 75% estimated needs       Time Spent/Follow-up:   Follow Up  Time Spent (min): 30 minutes  Last Date of Nutrition Visit: 03/29/24  Nutrition Follow-Up Needed?: 7-10 days  Follow up Comment: 4/5/24

## 2024-03-29 NOTE — CARE PLAN
Problem: Balance  Goal: Dynamic Balance  Description: Pt to improve dynamic balance to fair (+) with UE assist on 2ww to improve stability and safety to promote safety within home environment.  Outcome: Progressing     Problem: Mobility  Goal: Bed mobility  Description: Pt to be independent with bed mobility to aid in home going environment.   Outcome: Progressing  Goal: ambulation  Description: Pt will ambulate x > 100 feet  at distant sup with UE assist on 2ww to allow for safety within home environment.  Outcome: Progressing  Goal: Stairs  Description: Will ascend and descend x 12 steps with one handrail at [facility] to facilitate improved dependence and allow for safety with home environment.  Outcome: Progressing     Problem: PT Transfers  Goal: Transfers  Description: Pt will complete all transfers at Mod I with UE assist as needed and improved safety awareness to improve functional mobility and promote increased safety awareness.   Outcome: Progressing

## 2024-03-29 NOTE — CARE PLAN
Problem: Pain  Goal: Takes deep breaths with improved pain control throughout the shift  Outcome: Progressing  Goal: Turns in bed with improved pain control throughout the shift  Outcome: Progressing  Goal: Walks with improved pain control throughout the shift  Outcome: Progressing  Goal: Performs ADL's with improved pain control throughout shift  Outcome: Progressing  Goal: Participates in PT with improved pain control throughout the shift  Outcome: Progressing  Goal: Free from opioid side effects throughout the shift  Outcome: Progressing  Goal: Free from acute confusion related to pain meds throughout the shift  Outcome: Progressing     Problem: Pain - Adult  Goal: Verbalizes/displays adequate comfort level or baseline comfort level  Outcome: Progressing     Problem: Safety - Adult  Goal: Free from fall injury  Outcome: Progressing     Problem: Discharge Planning  Goal: Discharge to home or other facility with appropriate resources  Outcome: Progressing     Problem: Chronic Conditions and Co-morbidities  Goal: Patient's chronic conditions and co-morbidity symptoms are monitored and maintained or improved  Outcome: Progressing       The clinical goals for the shift include patient to remain free from new or worsening shortness of breath

## 2024-03-29 NOTE — CONSULTS
"Inpatient consult to Pulmonology  Consult performed by: Joey Mantilla MD  Consult ordered by: Ivone Gilman MD        Pulmonary Consult    Reason For Consult  Shortness of breath  History Of Present Illness  Akua Almanzar is a 89 y.o. female presenting with shortness of breath. Recent admit for CHF. Transferred from rehab for shortness of breath and cough     Past Medical History  She has a past medical history of Aortic valve stenosis, At risk for falls, CHF (congestive heart failure) (CMS/[facility]), COPD (chronic obstructive pulmonary disease) (CMS/[facility]), Hypothyroidism, Nonrheumatic aortic (valve) stenosis with insufficiency (02/11/2021), Personal history of other diseases of the circulatory system (02/11/2021), and Weakness.    Surgical History  She has a past surgical history that includes Other surgical history (02/12/2021); Other surgical history (02/12/2021); Other surgical history (02/12/2021); Other surgical history (02/12/2021); Other surgical history (02/12/2021); and Other surgical history (02/12/2021).     Social History  She reports that she has never smoked. She has never used smokeless tobacco. She reports that she does not drink alcohol and does not use drugs.      Family History  Family History   Problem Relation Name Age of Onset    COPD Other      Heart disease Other          Allergies  Adhesive, Hydrocodone, and Lamotrigine    Review of Systems   All other systems reviewed and are negative.      Last Recorded Vitals  Blood pressure 112/53, pulse 85, temperature 35.5 °C (95.9 °F), temperature source Temporal, resp. rate 16, height 1.549 m (5' 1\"), weight 72.7 kg (160 lb 4.4 oz), SpO2 92 %.    Intake/Output    Intake/Output Summary (Last 24 hours) at 3/29/2024 1620  Last data filed at 3/29/2024 1600  Gross per 24 hour   Intake 756.67 ml   Output 400 ml   Net 356.67 ml       Physical Exam  Vitals reviewed.   Constitutional:       Appearance: Normal appearance.   HENT:      Head: Normocephalic and " atraumatic.      Mouth/Throat:      Mouth: Mucous membranes are moist.   Eyes:      Extraocular Movements: Extraocular movements intact.      Pupils: Pupils are equal, round, and reactive to light.   Cardiovascular:      Rate and Rhythm: Normal rate and regular rhythm.   Pulmonary:      Effort: Pulmonary effort is normal.      Breath sounds: Normal breath sounds and air entry.   Abdominal:      General: Abdomen is flat. Bowel sounds are normal.      Palpations: Abdomen is soft.   Musculoskeletal:         General: Normal range of motion.      Cervical back: Normal range of motion and neck supple.   Skin:     General: Skin is warm and dry.   Neurological:      General: No focal deficit present.      Mental Status: She is alert and oriented to person, place, and time. Mental status is at baseline.      ...    Oxygen Therapy  SpO2: 92 %  Medical Gas Therapy: Supplemental oxygen (6 L)  O2 Delivery Method: Nasal cannula       Lines and Tubes:         Scheduled medications  amLODIPine, 2.5 mg, oral, Daily  ascorbic acid, 250 mg, oral, Daily  aspirin, 81 mg, oral, Daily  [START ON 3/30/2024] azithromycin, 500 mg, intravenous, q24h  carvedilol, 3.125 mg, oral, BID with meals  cefTRIAXone, 1 g, intravenous, q12h  cholecalciferol, 2,000 Units, oral, Daily  clopidogrel, 75 mg, oral, Daily  [Held by provider] docusate sodium, 100 mg, oral, BID  enoxaparin, 40 mg, subcutaneous, Daily  furosemide, 40 mg, oral, Daily  gabapentin, 100 mg, oral, Nightly  ipratropium-albuteroL, 3 mL, nebulization, q6h  lactobacillus acidophilus, 1 tablet, oral, BID  levothyroxine, 88 mcg, oral, Daily  melatonin, 3 mg, oral, Nightly  polyethylene glycol, 17 g, oral, Daily  sodium chloride, 1 spray, Each Nostril, 4x daily      Continuous medications  potassium dcgilzx-D6-6.45%NaCl, 100 mL/hr, Last Rate: 100 mL/hr (03/29/24 1150)  oxygen,       PRN medications  PRN medications: acetaminophen **OR** acetaminophen **OR** acetaminophen, alum-mag  hydroxide-simeth, benzocaine-menthol, benzocaine-menthol, dextromethorphan-guaifenesin, docusate sodium, guaiFENesin, guaiFENesin, hydrOXYzine HCL, ondansetron ODT    Relevant Results  Results from last 7 days   Lab Units 03/29/24  0316   WBC AUTO x10*3/uL 6.3   HEMOGLOBIN g/dL 11.5*   HEMATOCRIT % 36.0   PLATELETS AUTO x10*3/uL 234      Results from last 7 days   Lab Units 03/29/24  0413   SODIUM mmol/L 131*   POTASSIUM mmol/L 4.5   CHLORIDE mmol/L 88*   CO2 mmol/L 34*   BUN mg/dL 12   CREATININE mg/dL 0.50   GLUCOSE mg/dL 117*   CALCIUM mg/dL 9.4          XR chest 2 views 03/29/2024    Narrative  Interpreted By:  Carlo Chan,  STUDY:  XR CHEST 2 VIEWS;  3/29/2024 3:37 am    INDICATION:  Signs/Symptoms:sob.    COMPARISON:  02/29/2024    ACCESSION NUMBER(S):  [id number]    ORDERING CLINICIAN:  ANALI AGUILAR    FINDINGS:      Increasing patchy right basilar airspace opacity. Similar severe  cardiomegaly. No pneumothorax. Similar endovascular aortic valve  prosthesis. Similar severe diffuse interstitial opacity. Upper  abdomen is unremarkable. Diffuse osteopenia. No acute osseous  abnormality.    Impression  1. Increasing patchy right basilar airspace opacity. Consider  pneumonia.  2. Similar severe diffuse interstitial opacity, likely pulmonary  edema.  3. Stable cardiomegaly.    Signed by: Carlo Chan 3/29/2024 3:53 AM  Dictation workstation:   [id number]       Assessment/Plan   Atherosclerosis of coronary artery without angina pectoris  Pneumonia right base opacity    Hyperlipidemia    Primary hypertension    Acquired hypothyroidism    Acute on chronic diastolic (congestive) heart failure (CMS/HCC)    Acute on chronic respiratory failure (CMS/HCC)        Start on broad-spectrum antibiotic  acute congestive heart failure with volume overload  Continue diuresis  Consult cardiology  Continue oxygen support  Resume home medication      Joey Mantilla MD  [facility]

## 2024-03-29 NOTE — PROGRESS NOTES
Occupational Therapy    Evaluation    Patient Name: Akua Almanzar  MRN: [id number]  Today's Date: 3/29/2024  Time Calculation  Start Time: 0744  Stop Time: 0802  Time Calculation (min): 18 min    Assessment  IP OT Assessment  OT Assessment: Patient is a 89 year old female admitted with PNA. Patient is presenting with a decline in strength, balance, activity tolerance, and function, resulting in an increased need for ADL/IADL tasks. Skilled OT to address the above deficits in order to increase patient's safety and independence with daily tasks.  Prognosis: Good  Barriers to Discharge: None  Evaluation/Treatment Tolerance: Patient limited by fatigue  End of Session Communication: Bedside nurse  End of Session Patient Position: Bed, 3 rail up, Alarm on  Plan:  Treatment Interventions: ADL retraining, Functional transfer training, UE strengthening/ROM, Endurance training, Patient/family training, Compensatory technique education  OT Frequency: 2 times per week  OT Discharge Recommendations: Low intensity level of continued care, 24 hr supervision due to cognition  Equipment Recommended upon Discharge: Wheeled walker  OT Recommended Transfer Status: Minimal assist, Assist of 1  OT - OK to Discharge: Yes    Subjective   Current Problem:  1. Pneumonia of right lung due to infectious organism, unspecified part of lung        2. Shortness of breath        3. Acute on chronic hypoxic respiratory failure (CMS/HCC)        4. Acute on chronic heart failure, unspecified heart failure type (CMS/HCC)          General:  General  Reason for Referral: decline in ADLs  Referred By: Dr. JACQUELIN Gilman  Past Medical History Relevant to Rehab: dependence of supplemental O2, HLD, HTN, TIA  Family/Caregiver Present: No  Prior to Session Communication: Bedside nurse  Patient Position Received: Bed, 3 rail up, Alarm on  Preferred Learning Style: verbal, visual  General Comment: Patient is a 89 year old female who presented to the ED with c/o SOB.  Patient is admitted with PNA of right lung due to infectious organism. Patient is cleared by nursing for therapy. She is in bed upon arrival and agreeable to participate  Precautions:  Medical Precautions: Fall precautions, Oxygen therapy device and L/min  Pain:  Pain Assessment  Pain Assessment: 0-10  Pain Score: 7  Pain Type: Acute pain  Pain Location: Back  Pain Interventions: Repositioned, Ambulation/increased activity    Objective   Cognition:  Overall Cognitive Status:  (lethargic, able to follow commands with increased repeition and time)  Orientation Level: Oriented X4  Processing Speed: Delayed     Home Living:  Type of Home: House  Lives With: Adult children (Daughter and son in law)  Home Adaptive Equipment: Walker rolling or standard (2WW)  Home Layout: Multi-level, Stairs to alternate level with rails  Alternate Level Stairs-Rails: Right  Alternate Level Stairs-Number of Steps: 6 to basement - bedroom. 8 upstairs - bathroom  Home Access: Stairs to enter without rails  Entrance Stairs-Rails: None  Entrance Stairs-Number of Steps: 4  Bathroom Shower/Tub: Tub/shower unit  Bathroom Toilet: Standard  Bathroom Equipment: Shower chair without back  Home Living Comments: patient denies fall hx   Prior Function:  Level of Page: Independent with ADLs and functional transfers, Needs assistance with homemaking  Receives Help From: Family  ADL Assistance: Independent (assist in and out of the shower only)  Homemaking Assistance: Needs assistance  Homemaking Assistance Comments: family completes  Ambulatory Assistance: Independent (with 2WW)  IADL History:  Homemaking Responsibilities: No  IADL Comments: family completes  ADL:  Eating Assistance: Stand by  Eating Deficit: Setup (per clinical judgement)  Grooming Assistance: Stand by  Grooming Deficit: Setup, Wash/dry hands, Steadying (per clinical judgement)  Bathing Assistance: Minimal  Bathing Deficit: Setup, Buttocks, Right lower leg including foot, Left  lower leg including foot, Steadying, Increased time to complete , Supervision/safety (per clinical judgement)  UE Dressing Assistance: Minimal  UE Dressing Deficit: Pull around back, Pull down in back (per clinical judgement)  LE Dressing Assistance: Minimal  LE Dressing Deficit: Steadying, Requires assistive device for steadying, Increased time to complete, Supervision/safety (per clinical judgement)  Toileting Assistance with Device: Moderate  Toileting Deficit: Incontinent, Clothing management down, Clothing management up, Perineal hygiene (per clinical judgement, tatum)  Activity Tolerance:  Endurance: Decreased tolerance for upright activites  Bed Mobility/Transfers: Bed Mobility  Bed Mobility: Yes  Bed Mobility 1  Bed Mobility 1: Supine to sitting  Level of Assistance 1: Minimum assistance  Bed Mobility Comments 1: head of bed elevated, use of bed rails  Bed Mobility 2  Bed Mobility  2: Sitting to supine  Level of Assistance 2: Close supervision    Transfers  Transfer: Yes  Transfer 1  Transfer From 1: Bed to  Transfer to 1: Stand  Technique 1: Sit to stand  Transfer Device 1: Walker  Transfer Level of Assistance 1: Contact guard  Trials/Comments 1: cues for safety      Functional Mobility:  Functional Mobility  Functional Mobility Performed: Yes  Functional Mobility 1  Surface 1: Level tile  Device 1: Rolling walker  Assistance 1: Contact guard  Comments 1: cues for safety    IADL's:   Homemaking Responsibilities: No  IADL Comments: family completes    Sensation:  Sensation Comment: patient reports numbness/tingling in feet and sometimes in hands  Strength:  Strength Comments: B UE at least >/= 3/5 grossly. observed functionally  Extremities: RUE   RUE : Within Functional Limits (observed functionally) and LUE   LUE: Within Functional Limits (observed functionally)    Outcome Measures: [facility] Daily Activity  Putting on and taking off regular lower body clothing: A lot  Bathing (including washing, rinsing,  drying): A little  Putting on and taking off regular upper body clothing: A little  Toileting, which includes using toilet, bedpan or urinal: A lot  Taking care of personal grooming such as brushing teeth: A little  Eating Meals: A little  Daily Activity - Total Score: 16    Education Documentation  Body Mechanics, taught by Emily Lacey OT at 3/29/2024 10:38 AM.  Learner: Patient  Readiness: Acceptance  Method: Explanation, Demonstration  Response: Needs Reinforcement    Precautions, taught by mEily Lacey OT at 3/29/2024 10:38 AM.  Learner: Patient  Readiness: Acceptance  Method: Explanation, Demonstration  Response: Needs Reinforcement    Education Comments  No comments found.      Goals:   Encounter Problems       Encounter Problems (Active)       OT Goals       ADLs (Progressing)       Start:  03/29/24    Expected End:  04/19/24       Patient will complete ADL tasks with Mod I, using AE as needed, in order to increase patient's safety and independence with daily tasks.         Functional Transfers (Progressing)       Start:  03/29/24    Expected End:  04/19/24       Patient will complete functional transfers with Mod I in order to increase patient's safety and independence with daily tasks.         B UE Strengthening (Progressing)       Start:  03/29/24    Expected End:  04/19/24       Patient will increase B UE strength to 4+/5 for functional transfers.         Functional Mobility (Progressing)       Start:  03/29/24    Expected End:  04/19/24       Patient will demonstrate the ability to complete item retrieval and functional mobility with Mod I in order to increase patient's safety and independence with daily tasks.

## 2024-03-29 NOTE — PROGRESS NOTES
Physical Therapy    Physical Therapy Evaluation    Patient Name: Akua Almanzar  MRN: [id number]  Today's Date: 3/29/2024   Time Calculation  Start Time: 1313  Stop Time: 1331  Time Calculation (min): 18 min    Assessment/Plan   PT Assessment  PT Assessment Results: Decreased strength, Decreased endurance, Decreased mobility, Impaired balance, Impaired judgement, Decreased safety awareness  Rehab Prognosis: Good  Medical Staff Made Aware: Yes  End of Session Communication: Bedside nurse  End of Session Patient Position: Up in chair, Alarm on    Assessment Comment: 89 y/o female presents following PNA and SOB demonstrating reduced mobility and endurance. Pt had no LOB with functional mobility of transfers and ambulation. Anticiiapte pt will continue to progress in mobility with resolution of medical condition and continued physical therapy interventions to aid in improved strengthening and mobility.      IP OR SWING BED PT PLAN  Inpatient or Swing Bed: Inpatient  PT Plan  Treatment/Interventions: Bed mobility, Transfer training, Gait training, Balance training, Neuromuscular re-education, Strengthening, Stair training, Endurance training, Therapeutic exercise, Therapeutic activity  PT Plan: Skilled PT  PT Frequency: 3 times per week  PT Discharge Recommendations: Low intensity level of continued care, 24 hr supervision due to cognition  Equipment Recommended upon Discharge: Wheeled walker  PT Recommended Transfer Status: Assist x1, Assistive device  PT - OK to Discharge: Yes      Subjective   General Visit Information:  General  Reason for Referral: Impaired mobility  Past Medical History Relevant to Rehab: CHF, PNA, HTN, HLD  Missed Visit: No  Family/Caregiver Present: No  Prior to Session Communication: Bedside nurse  Patient Position Received: Bed, 3 rail up, Alarm on  Preferred Learning Style: verbal  General Comment: Pt is a 89 y/o female who presented with SOB. Recent admit, dc to SNF, [place] home. Pt presents  supine in hosp bed with HOB elevated and x 3 HR up on nasal canula x 6L of O2, PIV (potassium chloride), and exterenal catheter in place. Pt pleasant and agreeable to participate in therapy.  Home Living:  Home Living  Type of Home: House  Lives With: Adult children  Home Adaptive Equipment: Walker rolling or standard  Home Layout: Multi-level, Stairs to alternate level with rails  Alternate Level Stairs-Rails: Right  Alternate Level Stairs-Number of Steps: 6 to basement - bedroom. 8 upstairs - bathroom  Home Access: Stairs to enter without rails  Entrance Stairs-Rails: None  Entrance Stairs-Number of Steps: 4  Bathroom Shower/Tub: Tub/shower unit  Bathroom Toilet: Standard  Bathroom Equipment: Shower chair without back  Prior Level of Function:  Prior Function Per Pt/Caregiver Report  Level of [place]: Independent with ADLs and functional transfers, Needs assistance with homemaking  Receives Help From: Family  ADL Assistance: Independent  Homemaking Assistance: Needs assistance  Ambulatory Assistance: Independent  Prior Function Comments: Amb with front wheeled walker  Precautions:  Precautions  Medical Precautions: Fall precautions, Oxygen therapy device and L/min  Vital Signs:  Vital Signs  Heart Rate: 85    Objective   Pain:  Pain Assessment  Pain Assessment: 0-10  Pain Score: 3  Pain Location: Generalized  Cognition:  Cognition  Overall Cognitive Status: Within Functional Limits  Orientation Level: Oriented X4  Attention:  (able to follow commands appropriately)    General Assessments:  General Observation  General Observation: Pt required assistance with mobility for safety and to manage O2 tank (6L) and PIV. Pt had good response to mobility training to aid in functional strengthening and mobility with productive cough when transfering to upright position.     Activity Tolerance  Endurance: Decreased tolerance for upright activites  Activity Tolerance Comments: instructed in pursed-lip  breathing    Sensation  Light Touch: Partial deficits in the LLE (intact sensation to plantar surface of B/L feet)    Strength  Strength Comments: BL LE 3/5 or > observed.  Strength  Strength Comments: BL LE 3/5 or > observed.    Perception  Inattention/Neglect: Appears intact      Coordination  Movements are Fluid and Coordinated: Yes (observed through functional mobility.)    Postural Control  Posture Comment: forward head and rounded shoulders.    Static Sitting Balance  Static Sitting-Balance Support: Bilateral upper extremity supported (intermittent use of UE)  Static Sitting-Level of Assistance: Distant supervision  Static Sitting-Comment/Number of Minutes: sitting EOB prior to participation in mobility and transfers.  Dynamic Sitting Balance  Dynamic Sitting-Balance Support: Bilateral upper extremity supported (intermittent use of UE)  Dynamic Sitting-Comments: sitting EOB prior to participation in mobility and transfers.    Static Standing Balance  Static Standing-Balance Support: Bilateral upper extremity supported (on 2ww)  Static Standing-Level of Assistance: Contact guard  Dynamic Standing Balance  Dynamic Standing-Balance Support: Bilateral upper extremity supported  Dynamic Standing-Comments: UE assist of 2ww for functional mobility  Functional Assessments:  Bed Mobility  Bed Mobility: Yes  Bed Mobility 1  Bed Mobility 1: Supine to sitting  Level of Assistance 1: Close supervision  Bed Mobility Comments 1: with HOB elevated    Transfers  Transfer: Yes  Transfer 1  Transfer From 1: Sit to  Transfer to 1: Stand  Transfer Device 1: Walker  Transfer Level of Assistance 1: Close supervision, Contact guard  Trials/Comments 1: from elevated EOB  Transfers 2  Transfer From 2: Stand to  Transfer to 2: Sit  Transfer Device 2: Walker  Transfer Level of Assistance 2: Close supervision, Contact guard  Trials/Comments 2: onto chair with arms    Ambulation/Gait Training  Ambulation/Gait Training Performed:  Yes  Ambulation/Gait Training 1  Surface 1: Level tile  Device 1: Rolling walker  Assistance 1: Contact guard  Quality of Gait 1: Diminished heel strike, Decreased step length, Forward flexed posture (and decreased calvin)  Comments/Distance (ft) 1: approx 60 feet w/ 2ww at CGA and follow of O2 tank and PIV  Extremity/Trunk Assessments:  RLE   RLE : Within Functional Limits  Strength RLE  R Knee Flexion: 4+/5  R Knee Extension: 4+/5  R Ankle Dorsiflexion: 4+/5  LLE   LLE : Within Functional Limits  Strength LLE  L Knee Flexion:  (not tested on LLE due to reports of pain with knee flexion when tested on RLE and pain with set up when SPT attempted to perform MMT on LLE.)  L Knee Extension: 4+/5  L Ankle Dorsiflexion: 4+/5  Outcome Measures:  [facility] Basic Mobility  Turning from your back to your side while in a flat bed without using bedrails: A little  Moving from lying on your back to sitting on the side of a flat bed without using bedrails: A little  Moving to and from bed to chair (including a wheelchair): A little  Standing up from a chair using your arms (e.g. wheelchair or bedside chair): A little  To walk in hospital room: A little  Climbing 3-5 steps with railing: A lot  Basic Mobility - Total Score: 17    Encounter Problems       Encounter Problems (Active)       Balance       Dynamic Balance (Progressing)       Start:  03/29/24    Expected End:  04/12/24       Pt to improve dynamic balance to fair (+) with UE assist on 2ww to improve stability and safety to promote safety within home environment.            Mobility       Bed mobility (Progressing)       Start:  03/29/24    Expected End:  04/12/24       Pt to be independent with bed mobility to aid in home going environment.          ambulation (Progressing)       Start:  03/29/24    Expected End:  04/12/24       Pt will ambulate x > 100 feet  at distant sup with UE assist on 2ww to allow for safety within home environment.         Stairs (Progressing)        Start:  03/29/24    Expected End:  04/12/24       Will ascend and descend x 12 steps with one handrail at CGA to facilitate improved dependence and allow for safety with home environment.            PT Transfers       Transfers (Progressing)       Start:  03/29/24    Expected End:  04/12/24       Pt will complete all transfers at Mod I with UE assist as needed and improved safety awareness to improve functional mobility and promote increased safety awareness.             Pain - Adult              Education Documentation  Body Mechanics, taught by Yi Cabrera PT at 3/29/2024  2:46 PM.  Learner: Patient  Readiness: Acceptance  Method: Explanation  Response: Needs Reinforcement    Precautions, taught by Yi Cabrera PT at 3/29/2024  2:46 PM.  Learner: Patient  Readiness: Acceptance  Method: Explanation  Response: Needs Reinforcement    Mobility Training, taught by Yi Cabrera PT at 3/29/2024  2:46 PM.  Learner: Patient  Readiness: Acceptance  Method: Explanation  Response: Needs Reinforcement    Education Comments  No comments found.

## 2024-03-29 NOTE — ED PROVIDER NOTES
HPI   Chief Complaint   Patient presents with    Shortness of Breath     Patient was recently impatient at [facility] for SOB and had fluid taken off and was Dced to rehab on 2L NC, patient was at home and has been decling since she arrived at home, patient wears 2L NC at home and was found 86-88% and that. Was concerned with breathing and feeling worse and called EMS.        Patient presents with shortness of breath.  Patient was recently released from the hospital after having spent 8 days in the hospital having aggressive diuresis.  She reportedly went to rehab where a special diet was not followed.  Patient also recently had a birthday where she had spaghetti sauce.  Patient has been taking her Lasix.  Since leaving the hospital, her legs have progressively swelled up.  She has been coughing a lot but nothing comes up.                          No data recorded                   Patient History   Past Medical History:   Diagnosis Date    Aortic valve stenosis     At risk for falls     CHF (congestive heart failure) (CMS/Formerly [facility])     COPD (chronic obstructive pulmonary disease) (CMS/Formerly [facility])     Hypothyroidism     Nonrheumatic aortic (valve) stenosis with insufficiency 2021    Aortic insufficiency with aortic stenosis    Personal history of other diseases of the circulatory system 2021    History of aortic valve stenosis    Weakness      Past Surgical History:   Procedure Laterality Date    OTHER SURGICAL HISTORY  2021     section    OTHER SURGICAL HISTORY  2021    Tonsillectomy with adenoidectomy    OTHER SURGICAL HISTORY  2021    Femur fracture repair    OTHER SURGICAL HISTORY  2021    Arm surgery    OTHER SURGICAL HISTORY  2021    Hysterectomy    OTHER SURGICAL HISTORY  2021    Mass excision     Family History   Problem Relation Name Age of Onset    COPD Other      Heart disease Other       Social History     Tobacco Use    Smoking status: Never    Smokeless  tobacco: Never   Substance Use Topics    Alcohol use: Never    Drug use: Never       Physical Exam   ED Triage Vitals [03/29/24 0313]   Temperature Heart Rate Respirations BP   37.2 °C (98.9 °F) 87 20 148/88      Pulse Ox Temp Source Heart Rate Source Patient Position   100 % Temporal Monitor Lying      BP Location FiO2 (%)     Left arm --       Physical Exam  HENT:      Head: Normocephalic.   Cardiovascular:      Rate and Rhythm: Normal rate.   Pulmonary:      Comments: Expiratory wheezes heard in all lung fields, crackles heard in bilateral lower lung fields  Musculoskeletal:      Comments: Pretibial edema of bilateral lower extremities   Neurological:      Mental Status: She is alert.      Comments: Patient awake, alert, answers questions appropriately.         ED Course & MDM   ED Course as of 03/29/24 0520   Fri Mar 29, 2024   0328 EKG interpretation: Normal sinus rhythm, rate 87.  Normal axis.  Left bundle branch block.  Sgarbossa criteria not met. [ML]      ED Course User Index  [ML] Virgil Bullock MD         Diagnoses as of 03/29/24 0520   Shortness of breath   Pneumonia of right lung due to infectious organism, unspecified part of lung   Acute on chronic hypoxic respiratory failure (CMS/HCC)   Acute on chronic heart failure, unspecified heart failure type (CMS/HCC)       Medical Decision Making  Chest x-ray reveals edema as well as likely right-sided pneumonia.  Patient treated with Lasix for heart failure and pulmonary edema as well as antibiotics for pneumonia.  Patient's normal 2 L/min by nasal cannula oxygen supplementation had to be increased to 6 L/min to maintain normal oxygen saturation levels.  Given her pulmonary edema and pneumonia seen on x-ray, PE felt to be less likely.  Acute coronary syndrome felt to be unlikely as well.  Patient accepted to primary care physician service for further management.  Parts of this chart were completed with dictation software, please excuse any errors in  transcription.        Procedure  Critical Care    Performed by: Virgil Bullock MD  Authorized by: Virgil Bullock MD    Critical care provider statement:     Critical care time (minutes):  34    Critical care time was exclusive of:  Separately billable procedures and treating other patients    Critical care was necessary to treat or prevent imminent or life-threatening deterioration of the following conditions:  Respiratory failure    Critical care was time spent personally by me on the following activities:  Development of treatment plan with patient or surrogate, discussions with primary provider, evaluation of patient's response to treatment, examination of patient, obtaining history from patient or surrogate, ordering and performing treatments and interventions, ordering and review of laboratory studies, ordering and review of radiographic studies, pulse oximetry and re-evaluation of patient's condition    Care discussed with: admitting provider         Virgil Bullock MD  03/29/24 05

## 2024-03-30 ENCOUNTER — APPOINTMENT (OUTPATIENT)
Dept: RADIOLOGY | Facility: HOSPITAL | Age: 89
DRG: 177 | End: 2024-03-30
Payer: COMMERCIAL

## 2024-03-30 ENCOUNTER — APPOINTMENT (OUTPATIENT)
Dept: CARDIOLOGY | Facility: HOSPITAL | Age: 89
DRG: 177 | End: 2024-03-30
Payer: COMMERCIAL

## 2024-03-30 LAB
ALBUMIN SERPL-MCNC: 3.9 G/DL (ref 3.5–5)
ALP BLD-CCNC: 103 U/L (ref 35–125)
ALT SERPL-CCNC: 21 U/L (ref 5–40)
ANION GAP BLDA CALCULATED.4IONS-SCNC: -1 MMO/L (ref 10–25)
ANION GAP BLDA CALCULATED.4IONS-SCNC: -1 MMO/L (ref 10–25)
ANION GAP SERPL CALC-SCNC: 6 MMOL/L
APPARATUS: ABNORMAL
ARTERIAL PATENCY WRIST A: POSITIVE
ARTERIAL PATENCY WRIST A: POSITIVE
AST SERPL-CCNC: 29 U/L (ref 5–40)
BASE EXCESS BLDA CALC-SCNC: 10.9 MMOL/L (ref -2–3)
BASE EXCESS BLDA CALC-SCNC: 13.6 MMOL/L (ref -2–3)
BILIRUB SERPL-MCNC: 0.2 MG/DL (ref 0.1–1.2)
BODY TEMPERATURE: 37 DEGREES CELSIUS
BODY TEMPERATURE: 37 DEGREES CELSIUS
BUN SERPL-MCNC: 15 MG/DL (ref 8–25)
CA-I BLDA-SCNC: 1.21 MMOL/L (ref 1.1–1.33)
CA-I BLDA-SCNC: 1.22 MMOL/L (ref 1.1–1.33)
CALCIUM SERPL-MCNC: 9.3 MG/DL (ref 8.5–10.4)
CHLORIDE BLDA-SCNC: 91 MMOL/L (ref 98–107)
CHLORIDE BLDA-SCNC: 92 MMOL/L (ref 98–107)
CHLORIDE SERPL-SCNC: 90 MMOL/L (ref 97–107)
CO2 SERPL-SCNC: 34 MMOL/L (ref 24–31)
CREAT SERPL-MCNC: 0.6 MG/DL (ref 0.4–1.6)
CRITICAL CALL TIME: 1112
CRITICAL CALL TIME: 543
CRITICAL CALLED BY: ABNORMAL
CRITICAL CALLED BY: ABNORMAL
CRITICAL CALLED TO: ABNORMAL
CRITICAL CALLED TO: ABNORMAL
CRITICAL NOTE: ABNORMAL
CRITICAL READ BACK: ABNORMAL
CRITICAL READ BACK: ABNORMAL
EGFRCR SERPLBLD CKD-EPI 2021: 83 ML/MIN/1.73M*2
EPAP CMH2O: 8 CM H2O
ERYTHROCYTE [DISTWIDTH] IN BLOOD BY AUTOMATED COUNT: 14.5 % (ref 11.5–14.5)
FLOW: 40 LPM
GLUCOSE BLDA-MCNC: 100 MG/DL (ref 74–99)
GLUCOSE BLDA-MCNC: 206 MG/DL (ref 74–99)
GLUCOSE SERPL-MCNC: 163 MG/DL (ref 65–99)
HCO3 BLDA-SCNC: 40.9 MMOL/L (ref 22–26)
HCO3 BLDA-SCNC: 42 MMOL/L (ref 22–26)
HCT VFR BLD AUTO: 38.2 % (ref 36–46)
HCT VFR BLD EST: 33 % (ref 36–46)
HCT VFR BLD EST: 36 % (ref 36–46)
HGB BLD-MCNC: 11.9 G/DL (ref 12–16)
HGB BLDA-MCNC: 11 G/DL (ref 12–16)
HGB BLDA-MCNC: 12 G/DL (ref 12–16)
INHALED O2 CONCENTRATION: 100 %
INHALED O2 CONCENTRATION: 100 %
IPAP CMH2O: 16 CM H2O
LACTATE BLDA-SCNC: 0.5 MMOL/L (ref 0.4–2)
LACTATE BLDA-SCNC: 0.8 MMOL/L (ref 0.4–2)
MCH RBC QN AUTO: 28.3 PG (ref 26–34)
MCHC RBC AUTO-ENTMCNC: 31.2 G/DL (ref 32–36)
MCV RBC AUTO: 91 FL (ref 80–100)
MRSA DNA SPEC QL NAA+PROBE: NOT DETECTED
NRBC BLD-RTO: 0 /100 WBCS (ref 0–0)
NT-PROBNP SERPL-MCNC: 1489 PG/ML (ref 0–624)
OXYHGB MFR BLDA: 96.4 % (ref 94–98)
OXYHGB MFR BLDA: 98 % (ref 94–98)
PCO2 BLDA: 76 MM HG (ref 38–42)
PCO2 BLDA: 87 MM HG (ref 38–42)
PH BLDA: 7.28 PH (ref 7.38–7.42)
PH BLDA: 7.35 PH (ref 7.38–7.42)
PLATELET # BLD AUTO: 245 X10*3/UL (ref 150–450)
PO2 BLDA: 125 MM HG (ref 85–95)
PO2 BLDA: 87 MM HG (ref 85–95)
POTASSIUM BLDA-SCNC: 4.3 MMOL/L (ref 3.5–5.3)
POTASSIUM BLDA-SCNC: 5.1 MMOL/L (ref 3.5–5.3)
POTASSIUM SERPL-SCNC: 5 MMOL/L (ref 3.4–5.1)
PROT SERPL-MCNC: 6.8 G/DL (ref 5.9–7.9)
RBC # BLD AUTO: 4.21 X10*6/UL (ref 4–5.2)
SAO2 % BLDA: 100 % (ref 94–100)
SAO2 % BLDA: 99 % (ref 94–100)
SODIUM BLDA-SCNC: 126 MMOL/L (ref 136–145)
SODIUM BLDA-SCNC: 129 MMOL/L (ref 136–145)
SODIUM SERPL-SCNC: 130 MMOL/L (ref 133–145)
SPECIMEN DRAWN FROM PATIENT: ABNORMAL
SPECIMEN DRAWN FROM PATIENT: ABNORMAL
TROPONIN T SERPL-MCNC: 17 NG/L
WBC # BLD AUTO: 9.9 X10*3/UL (ref 4.4–11.3)

## 2024-03-30 PROCEDURE — 2500000002 HC RX 250 W HCPCS SELF ADMINISTERED DRUGS (ALT 637 FOR MEDICARE OP, ALT 636 FOR OP/ED): Performed by: STUDENT IN AN ORGANIZED HEALTH CARE EDUCATION/TRAINING PROGRAM

## 2024-03-30 PROCEDURE — 94640 AIRWAY INHALATION TREATMENT: CPT

## 2024-03-30 PROCEDURE — 36415 COLL VENOUS BLD VENIPUNCTURE: CPT | Performed by: INTERNAL MEDICINE

## 2024-03-30 PROCEDURE — 2500000002 HC RX 250 W HCPCS SELF ADMINISTERED DRUGS (ALT 637 FOR MEDICARE OP, ALT 636 FOR OP/ED): Performed by: INTERNAL MEDICINE

## 2024-03-30 PROCEDURE — 94660 CPAP INITIATION&MGMT: CPT

## 2024-03-30 PROCEDURE — 2500000004 HC RX 250 GENERAL PHARMACY W/ HCPCS (ALT 636 FOR OP/ED)

## 2024-03-30 PROCEDURE — 2500000004 HC RX 250 GENERAL PHARMACY W/ HCPCS (ALT 636 FOR OP/ED): Performed by: INTERNAL MEDICINE

## 2024-03-30 PROCEDURE — 87899 AGENT NOS ASSAY W/OPTIC: CPT | Mod: TRILAB,WESLAB | Performed by: STUDENT IN AN ORGANIZED HEALTH CARE EDUCATION/TRAINING PROGRAM

## 2024-03-30 PROCEDURE — 2500000005 HC RX 250 GENERAL PHARMACY W/O HCPCS: Performed by: INTERNAL MEDICINE

## 2024-03-30 PROCEDURE — 84484 ASSAY OF TROPONIN QUANT: CPT | Performed by: INTERNAL MEDICINE

## 2024-03-30 PROCEDURE — 84075 ASSAY ALKALINE PHOSPHATASE: CPT | Performed by: INTERNAL MEDICINE

## 2024-03-30 PROCEDURE — 83605 ASSAY OF LACTIC ACID: CPT | Performed by: INTERNAL MEDICINE

## 2024-03-30 PROCEDURE — 70450 CT HEAD/BRAIN W/O DYE: CPT | Performed by: RADIOLOGY

## 2024-03-30 PROCEDURE — 2500000002 HC RX 250 W HCPCS SELF ADMINISTERED DRUGS (ALT 637 FOR MEDICARE OP, ALT 636 FOR OP/ED): Mod: MUE | Performed by: INTERNAL MEDICINE

## 2024-03-30 PROCEDURE — 94762 N-INVAS EAR/PLS OXIMTRY CONT: CPT

## 2024-03-30 PROCEDURE — 5A09457 ASSISTANCE WITH RESPIRATORY VENTILATION, 24-96 CONSECUTIVE HOURS, CONTINUOUS POSITIVE AIRWAY PRESSURE: ICD-10-PCS | Performed by: INTERNAL MEDICINE

## 2024-03-30 PROCEDURE — 87641 MR-STAPH DNA AMP PROBE: CPT | Performed by: STUDENT IN AN ORGANIZED HEALTH CARE EDUCATION/TRAINING PROGRAM

## 2024-03-30 PROCEDURE — 71045 X-RAY EXAM CHEST 1 VIEW: CPT

## 2024-03-30 PROCEDURE — 71045 X-RAY EXAM CHEST 1 VIEW: CPT | Performed by: RADIOLOGY

## 2024-03-30 PROCEDURE — 70450 CT HEAD/BRAIN W/O DYE: CPT

## 2024-03-30 PROCEDURE — 83880 ASSAY OF NATRIURETIC PEPTIDE: CPT | Performed by: INTERNAL MEDICINE

## 2024-03-30 PROCEDURE — 83605 ASSAY OF LACTIC ACID: CPT | Performed by: STUDENT IN AN ORGANIZED HEALTH CARE EDUCATION/TRAINING PROGRAM

## 2024-03-30 PROCEDURE — 94799 UNLISTED PULMONARY SVC/PX: CPT

## 2024-03-30 PROCEDURE — 2020000001 HC ICU ROOM DAILY

## 2024-03-30 PROCEDURE — 84132 ASSAY OF SERUM POTASSIUM: CPT | Performed by: INTERNAL MEDICINE

## 2024-03-30 PROCEDURE — 2500000002 HC RX 250 W HCPCS SELF ADMINISTERED DRUGS (ALT 637 FOR MEDICARE OP, ALT 636 FOR OP/ED): Mod: MUE | Performed by: STUDENT IN AN ORGANIZED HEALTH CARE EDUCATION/TRAINING PROGRAM

## 2024-03-30 PROCEDURE — 2500000001 HC RX 250 WO HCPCS SELF ADMINISTERED DRUGS (ALT 637 FOR MEDICARE OP): Performed by: STUDENT IN AN ORGANIZED HEALTH CARE EDUCATION/TRAINING PROGRAM

## 2024-03-30 PROCEDURE — 87640 STAPH A DNA AMP PROBE: CPT | Performed by: STUDENT IN AN ORGANIZED HEALTH CARE EDUCATION/TRAINING PROGRAM

## 2024-03-30 PROCEDURE — 9420000001 HC RT PATIENT EDUCATION 5 MIN

## 2024-03-30 PROCEDURE — 84132 ASSAY OF SERUM POTASSIUM: CPT | Performed by: STUDENT IN AN ORGANIZED HEALTH CARE EDUCATION/TRAINING PROGRAM

## 2024-03-30 PROCEDURE — 99222 1ST HOSP IP/OBS MODERATE 55: CPT | Performed by: INTERNAL MEDICINE

## 2024-03-30 PROCEDURE — 99233 SBSQ HOSP IP/OBS HIGH 50: CPT | Performed by: STUDENT IN AN ORGANIZED HEALTH CARE EDUCATION/TRAINING PROGRAM

## 2024-03-30 PROCEDURE — 5A0945A ASSISTANCE WITH RESPIRATORY VENTILATION, 24-96 CONSECUTIVE HOURS, HIGH NASAL FLOW/VELOCITY: ICD-10-PCS | Performed by: INTERNAL MEDICINE

## 2024-03-30 PROCEDURE — 84145 PROCALCITONIN (PCT): CPT | Mod: TRILAB,WESLAB | Performed by: STUDENT IN AN ORGANIZED HEALTH CARE EDUCATION/TRAINING PROGRAM

## 2024-03-30 PROCEDURE — 87449 NOS EACH ORGANISM AG IA: CPT | Mod: TRILAB,WESLAB | Performed by: STUDENT IN AN ORGANIZED HEALTH CARE EDUCATION/TRAINING PROGRAM

## 2024-03-30 PROCEDURE — 36415 COLL VENOUS BLD VENIPUNCTURE: CPT | Performed by: STUDENT IN AN ORGANIZED HEALTH CARE EDUCATION/TRAINING PROGRAM

## 2024-03-30 PROCEDURE — 99222 1ST HOSP IP/OBS MODERATE 55: CPT

## 2024-03-30 PROCEDURE — 93005 ELECTROCARDIOGRAM TRACING: CPT

## 2024-03-30 PROCEDURE — 85027 COMPLETE CBC AUTOMATED: CPT | Performed by: INTERNAL MEDICINE

## 2024-03-30 PROCEDURE — 2500000004 HC RX 250 GENERAL PHARMACY W/ HCPCS (ALT 636 FOR OP/ED): Performed by: STUDENT IN AN ORGANIZED HEALTH CARE EDUCATION/TRAINING PROGRAM

## 2024-03-30 RX ORDER — ACETAMINOPHEN 10 MG/ML
1000 INJECTION, SOLUTION INTRAVENOUS EVERY 8 HOURS
Status: DISCONTINUED | OUTPATIENT
Start: 2024-03-30 | End: 2024-03-30

## 2024-03-30 RX ORDER — ACETAMINOPHEN 325 MG/1
650 TABLET ORAL EVERY 6 HOURS PRN
Status: DISCONTINUED | OUTPATIENT
Start: 2024-03-30 | End: 2024-03-31

## 2024-03-30 RX ORDER — CLOPIDOGREL BISULFATE 75 MG/1
75 TABLET ORAL DAILY
Status: DISCONTINUED | OUTPATIENT
Start: 2024-03-30 | End: 2024-04-15 | Stop reason: HOSPADM

## 2024-03-30 RX ORDER — SIMVASTATIN 20 MG/1
20 TABLET, FILM COATED ORAL NIGHTLY
Status: DISCONTINUED | OUTPATIENT
Start: 2024-03-30 | End: 2024-04-15 | Stop reason: HOSPADM

## 2024-03-30 RX ORDER — ASPIRIN 81 MG/1
81 TABLET ORAL DAILY
Status: DISCONTINUED | OUTPATIENT
Start: 2024-03-30 | End: 2024-04-15 | Stop reason: HOSPADM

## 2024-03-30 RX ORDER — FUROSEMIDE 10 MG/ML
40 INJECTION INTRAMUSCULAR; INTRAVENOUS 2 TIMES DAILY
Status: DISCONTINUED | OUTPATIENT
Start: 2024-03-30 | End: 2024-04-01

## 2024-03-30 RX ORDER — VANCOMYCIN HYDROCHLORIDE 1 G/20ML
INJECTION, POWDER, LYOPHILIZED, FOR SOLUTION INTRAVENOUS DAILY PRN
Status: DISCONTINUED | OUTPATIENT
Start: 2024-03-30 | End: 2024-03-30

## 2024-03-30 RX ORDER — FUROSEMIDE 10 MG/ML
40 INJECTION INTRAMUSCULAR; INTRAVENOUS ONCE
Status: COMPLETED | OUTPATIENT
Start: 2024-03-30 | End: 2024-03-30

## 2024-03-30 RX ORDER — GABAPENTIN 100 MG/1
100 CAPSULE ORAL NIGHTLY
Status: DISCONTINUED | OUTPATIENT
Start: 2024-03-30 | End: 2024-04-15 | Stop reason: HOSPADM

## 2024-03-30 RX ORDER — LEVOTHYROXINE SODIUM 88 UG/1
88 TABLET ORAL DAILY
Status: DISCONTINUED | OUTPATIENT
Start: 2024-03-30 | End: 2024-04-15 | Stop reason: HOSPADM

## 2024-03-30 RX ADMIN — PIPERACILLIN SODIUM AND TAZOBACTAM SODIUM 3.38 G: 3; .375 INJECTION, SOLUTION INTRAVENOUS at 12:00

## 2024-03-30 RX ADMIN — PIPERACILLIN SODIUM AND TAZOBACTAM SODIUM 3.38 G: 3; .375 INJECTION, SOLUTION INTRAVENOUS at 17:50

## 2024-03-30 RX ADMIN — ACETAMINOPHEN 1000 MG: 10 INJECTION INTRAVENOUS at 13:36

## 2024-03-30 RX ADMIN — ENOXAPARIN SODIUM 40 MG: 40 INJECTION SUBCUTANEOUS at 06:13

## 2024-03-30 RX ADMIN — ACETAMINOPHEN 650 MG: 325 TABLET ORAL at 20:18

## 2024-03-30 RX ADMIN — IPRATROPIUM BROMIDE AND ALBUTEROL SULFATE 3 ML: 2.5; .5 SOLUTION RESPIRATORY (INHALATION) at 11:03

## 2024-03-30 RX ADMIN — ALBUTEROL SULFATE 2.5 MG: 2.5 SOLUTION RESPIRATORY (INHALATION) at 02:52

## 2024-03-30 RX ADMIN — METHYLPREDNISOLONE SODIUM SUCCINATE 40 MG: 40 INJECTION, POWDER, FOR SOLUTION INTRAMUSCULAR; INTRAVENOUS at 17:50

## 2024-03-30 RX ADMIN — METHYLPREDNISOLONE SODIUM SUCCINATE 40 MG: 40 INJECTION, POWDER, FOR SOLUTION INTRAMUSCULAR; INTRAVENOUS at 10:26

## 2024-03-30 RX ADMIN — CEFTRIAXONE SODIUM 1 G: 1 INJECTION, SOLUTION INTRAVENOUS at 03:31

## 2024-03-30 RX ADMIN — LEVOTHYROXINE SODIUM 88 MCG: 0.09 TABLET ORAL at 11:57

## 2024-03-30 RX ADMIN — SIMVASTATIN 20 MG: 20 TABLET, FILM COATED ORAL at 21:00

## 2024-03-30 RX ADMIN — IPRATROPIUM BROMIDE AND ALBUTEROL SULFATE 3 ML: 2.5; .5 SOLUTION RESPIRATORY (INHALATION) at 18:58

## 2024-03-30 RX ADMIN — ACETAMINOPHEN 650 MG: 325 TABLET ORAL at 02:01

## 2024-03-30 RX ADMIN — FUROSEMIDE 40 MG: 10 INJECTION, SOLUTION INTRAMUSCULAR; INTRAVENOUS at 10:26

## 2024-03-30 RX ADMIN — IPRATROPIUM BROMIDE AND ALBUTEROL SULFATE 3 ML: 2.5; .5 SOLUTION RESPIRATORY (INHALATION) at 14:05

## 2024-03-30 RX ADMIN — ASPIRIN 81 MG: 81 TABLET, COATED ORAL at 11:58

## 2024-03-30 RX ADMIN — FUROSEMIDE 40 MG: 10 INJECTION, SOLUTION INTRAMUSCULAR; INTRAVENOUS at 21:00

## 2024-03-30 RX ADMIN — Medication: at 20:00

## 2024-03-30 RX ADMIN — Medication 1 SPRAY: at 21:01

## 2024-03-30 RX ADMIN — CLOPIDOGREL BISULFATE 75 MG: 75 TABLET ORAL at 11:57

## 2024-03-30 RX ADMIN — GABAPENTIN 100 MG: 100 CAPSULE ORAL at 21:00

## 2024-03-30 RX ADMIN — FUROSEMIDE 40 MG: 10 INJECTION, SOLUTION INTRAMUSCULAR; INTRAVENOUS at 06:13

## 2024-03-30 RX ADMIN — AZITHROMYCIN MONOHYDRATE 500 MG: 500 INJECTION, POWDER, LYOPHILIZED, FOR SOLUTION INTRAVENOUS at 05:24

## 2024-03-30 ASSESSMENT — PAIN DESCRIPTION - LOCATION
LOCATION: OTHER (COMMENT)
LOCATION: HEAD

## 2024-03-30 ASSESSMENT — PAIN SCALES - GENERAL
PAINLEVEL_OUTOF10: 0 - NO PAIN
PAINLEVEL_OUTOF10: 0 - NO PAIN
PAINLEVEL_OUTOF10: 2
PAINLEVEL_OUTOF10: 5 - MODERATE PAIN
PAINLEVEL_OUTOF10: 0 - NO PAIN
PAINLEVEL_OUTOF10: 4

## 2024-03-30 ASSESSMENT — PAIN - FUNCTIONAL ASSESSMENT
PAIN_FUNCTIONAL_ASSESSMENT: 0-10

## 2024-03-30 NOTE — PROGRESS NOTES
Pulmonary Progress Note    Akua Almanzar is a 89 y.o. female on day 1 of admission presenting with Pneumonia of right lung due to infectious organism, unspecified part of lung.    Subjective   Desaturated overnight and was on bipap this morning  Objective   Vital Signs      3/30/2024     8:00 AM 3/30/2024     8:19 AM 3/30/2024    11:00 AM 3/30/2024    11:03 AM 3/30/2024     1:00 PM 3/30/2024     2:46 PM 3/30/2024     3:00 PM   Vitals   Systolic  111  115 112  118   Diastolic  57  61 63  64   Heart Rate    75   85   Resp 27  22 22   16   Weight (lb)      158.3    BMI      29.91 kg/m2    BSA (m2)      1.76 m2        Oxygen Therapy  SpO2: 99 %  Medical Gas Therapy: Supplemental oxygen  O2 Delivery Method: High flow nasal cannula    FiO2 (%):  [100 %] 100 %    Intake/Output previous 24 hours:    Intake/Output Summary (Last 24 hours) at 3/30/2024 1638  Last data filed at 3/30/2024 1634  Gross per 24 hour   Intake 2035 ml   Output 850 ml   Net 1185 ml       Physical Exam  Vitals reviewed.   Constitutional:       Appearance: Normal appearance.   HENT:      Head: Normocephalic and atraumatic.      Mouth/Throat:      Mouth: Mucous membranes are moist.   Eyes:      Extraocular Movements: Extraocular movements intact.      Pupils: Pupils are equal, round, and reactive to light.   Cardiovascular:      Rate and Rhythm: Normal rate and regular rhythm.   Pulmonary:      Effort: Pulmonary effort is normal.      Breath sounds: Normal breath sounds and air entry.   Abdominal:      General: Abdomen is flat. Bowel sounds are normal.      Palpations: Abdomen is soft.   Musculoskeletal:         General: Normal range of motion.      Cervical back: Normal range of motion and neck supple.   Skin:     General: Skin is warm and dry.   Neurological:      General: No focal deficit present.      Mental Status: She is alert and oriented to person, place, and time. Mental status is at baseline.       ...  Lines and Tubes:  Peripheral IV  03/29/24 20 G Right;Anterior Forearm (Active)   Placement Date/Time: 03/29/24 1150   Size (Gauge): 20 G  Orientation: Right;Anterior  Location: Forearm  Local Anesthetic: None  Placed by: IV Nurse   Number of days: 1         Scheduled medications  acetaminophen, 1,000 mg, intravenous, q8h  aspirin, 81 mg, oral, Daily  azithromycin, 500 mg, intravenous, q24h  clopidogrel, 75 mg, oral, Daily  enoxaparin, 40 mg, subcutaneous, Daily  furosemide, 40 mg, intravenous, BID  gabapentin, 100 mg, oral, Nightly  ipratropium-albuteroL, 3 mL, nebulization, 4x daily  levothyroxine, 88 mcg, oral, Daily  methylPREDNISolone sodium succinate (PF), 40 mg, intravenous, q8h  oxygen, , inhalation, Continuous - Inhalation  piperacillin-tazobactam, 3.375 g, intravenous, q6h  simvastatin, 20 mg, oral, Nightly  sodium chloride, 1 spray, Each Nostril, 4x daily      Continuous medications  oxygen,       PRN medications  PRN medications: albuterol    Relevant Results  Results from last 7 days   Lab Units 03/30/24  0521 03/29/24  0316   WBC AUTO x10*3/uL 9.9 6.3   HEMOGLOBIN g/dL 11.9* 11.5*   HEMATOCRIT % 38.2 36.0   PLATELETS AUTO x10*3/uL 245 234      Results from last 7 days   Lab Units 03/30/24  0521 03/29/24  0413   SODIUM mmol/L 130* 131*   POTASSIUM mmol/L 5.0 4.5   CHLORIDE mmol/L 90* 88*   CO2 mmol/L 34* 34*   BUN mg/dL 15 12   CREATININE mg/dL 0.60 0.50   GLUCOSE mg/dL 163* 117*   CALCIUM mg/dL 9.3 9.4      Results from last 7 days   Lab Units 03/30/24  1052   POCT PH, ARTERIAL pH 7.35*   POCT PCO2, ARTERIAL mm Hg 76*   POCT PO2, ARTERIAL mm Hg 125*   POCT HCO3 CALCULATED, ARTERIAL mmol/L 42.0*   POCT BASE EXCESS, ARTERIAL mmol/L 13.6*     XR chest 1 view 03/30/2024    Narrative  Interpreted By:  Spencer Hoskins,  STUDY:  XR CHEST 1 VIEW    INDICATION:  Signs/Symptoms:Hypoxia/SOB.    COMPARISON:  March 29, 2024    ACCESSION NUMBER(S):  [id number]    ORDERING CLINICIAN:  DORETHA MADDEN    FINDINGS:  Significant worsening of the perihilar and  basilar edema when  compared to prior study.    More conglomerate consolidation in the mid lungs both right and left.    Small bilateral effusions.    Impression  Significant worsening of the perihilar and basilar edema when  compared to prior study.    More conglomerate consolidation in the mid lungs both right and left.    Small bilateral effusions.    Signed by: Spencer Hoskins 3/30/2024 8:11 AM  Dictation workstation:   [id number]      Patient Active Problem List   Diagnosis    Atherosclerosis of coronary artery without angina pectoris    Dependence on supplemental oxygen    History of aortic valve replacement    Hypercalcemia    Dyspnea on exertion    Hyperlipidemia    Primary hypertension    Acquired hypothyroidism    Menopausal osteoporosis    Mitral valve stenosis    Nonrheumatic aortic (valve) stenosis    Pulmonary hypertension (CMS/HCC)    Transient ischemic attack    Vitamin D deficiency    Acute on chronic diastolic (congestive) heart failure (CMS/HCC)    Pneumonia of right lung due to infectious organism, unspecified part of lung    Acute on chronic respiratory failure (CMS/HCC)     Assessment/Plan   Atherosclerosis of coronary artery without angina pectoris  Pneumonia right base opacity    Hyperlipidemia    Primary hypertension    Acquired hypothyroidism    Acute on chronic diastolic (congestive) heart failure (CMS/HCC)    Acute on chronic respiratory failure (CMS/HCC)   History of aortic stenosis as well as tricuspid regurg and pulm hypertension.  Acute on chronic respiratory failure.  Worsening respiratory status overnight.  Chest x-ray shows worsening edema.  acute congestive heart failure with volume overload    On BiPAP.  ABG this morning pH 7.28 pCO2 87 pO2 of 87.    Repeat ABG 11:00 pH 745 pCO2 76 and pO2 of 125    Continue bronchodilators  IV steroids    Continue broad-spectrum antibiotic  Continue diuresis  Cardiology following    Guarded prognosis  DNR CCA DNI     MD Андрей Roberts  [facility]

## 2024-03-30 NOTE — CONSULTS
Vancomycin Dosing by Pharmacy- Cessation of Therapy    Consult to pharmacy for vancomycin dosing has been discontinued by the prescriber, pharmacy will sign off at this time.    Please call pharmacy if there are further questions or re-enter a consult if vancomycin is resumed.     ZION SPENCE, PerryD

## 2024-03-30 NOTE — CONSULTS
Consults    Reason For Consult  Reason for Consult: communication / medical decision making     History Of Present Illness  Akua Almanzar is a 89 y.o. female with past medical history of COPD dependent 2L NC on home O2 is presenting with shortness of breath from a rehab facility. She is originally from home but had a recent admission for in early March for pulmonary congestion and discharged to a SNF. She has a past medical history of COPD (O2 dependent), pulmonary hypertension, aortic valve stenosis s/p TAVR 4/2021, anemia, HTN, and HLD.      Upon admission the Chest XR was concerning for pneumonia and pulmonary edema. WBC is WNL, Blood cultures shows NGTD. ProBNP was 965. She is being given, ATB, steroids, Lasix and bronchodilators.     Overnight her her respiratory status worsened. ABGs showed hypercapnia, the patient is on continuous bipap. Palliative care consulted for goals of care.      Symptoms (0 - 10, Best to Worst)  [place] Symptom Assessment System  Pain Score: 0 - No pain    BM in last 48 hours? yes    Emotional/Psychological/Spiritual Needs  Over the past two weeks, how often has the patient been bothered by having little interest or pleasure in doing things?  SILVIA    Over the past two weeks, how often has the patient been bothered by feeling down, depressed, or hopeless?  SILVIA    Screening for spiritual needs?  SILVIA  Yarsani and importance of Hindu: SILVIA  Source for spiritual support/meaning: SILVIA    Spiritual Hx:  Are you spiritual or Orthodoxy?   What’s your antonia background?  During difficult times in your life, what have you relied on for strength?    Music Hx:   Do you enjoy music? What type of music do you enjoy?     Serious Illness Conversation  What is your understanding now of where you are with your illness:  SILVIA  How much information about what is likely to be ahead with your illness  would you like from me: SILVIA  What are your most important goals if your health situation worsens:   SILVIA  What are your biggest fears and worries about the future with your health:  SILVIA  What gives you strength as you think about the future with your illness:  SILVIA  What abilities are so critical to your life that you can’t imagine living without them:  SILVIA  If you become sicker, how much are you willing to go through for the possibility of gaining more time:  SILVIA  How much does your family know about your priorities and wishes:  SILVIA    Personal/Social History   She reports that she has never smoked. She has never used smokeless tobacco. She reports that she does not drink alcohol and does not use drugs.    Functional Status    Caregiving/Caregiver Support  Does the patient require assistance in some or all components of his care, including coordination of medical care? Yes  If Yes, which person serves that role?  son   Caregiver emotional or practical needs:      Past Medical History  She has a past medical history of Aortic valve stenosis, At risk for falls, CHF (congestive heart failure) (CMS/[facility]), COPD (chronic obstructive pulmonary disease) (CMS/[facility]), Hypothyroidism, Nonrheumatic aortic (valve) stenosis with insufficiency (02/11/2021), Personal history of other diseases of the circulatory system (02/11/2021), and Weakness.    Surgical History  She has a past surgical history that includes Other surgical history (02/12/2021); Other surgical history (02/12/2021); Other surgical history (02/12/2021); Other surgical history (02/12/2021); Other surgical history (02/12/2021); and Other surgical history (02/12/2021).     Family History  Family History   Problem Relation Name Age of Onset    COPD Other      Heart disease Other       Allergies  Adhesive, Hydrocodone, and Lamotrigine    Review of Systems   Reason unable to perform ROS: ROS limited by patient being on a bipap and only being able to comment on most pressing symptoms.        Physical Exam  Vitals and nursing note reviewed.   Constitutional:        "Appearance: She is ill-appearing.   HENT:      Head: Normocephalic and atraumatic.      Mouth/Throat:      Mouth: Mucous membranes are moist.   Eyes:      General: No scleral icterus.     Extraocular Movements: Extraocular movements intact.      Pupils: Pupils are equal, round, and reactive to light.   Neck:      Vascular: No carotid bruit.   Cardiovascular:      Rate and Rhythm: Normal rate and regular rhythm.      Pulses: Normal pulses.      Heart sounds: Murmur heard.      No friction rub. No gallop.   Pulmonary:      Breath sounds: No wheezing, rhonchi or rales.   Abdominal:      General: Bowel sounds are normal.      Tenderness: There is no abdominal tenderness. There is no guarding or rebound.   Genitourinary:     Comments: [place] catheter draining yellow urine.  Musculoskeletal:         General: Tenderness present.      Cervical back: Neck supple. No rigidity or tenderness.      Right lower leg: Edema present.      Left lower leg: Edema present.      Comments: MAEx4   Lymphadenopathy:      Cervical: No cervical adenopathy.   Skin:     General: Skin is warm and dry.   Neurological:      Mental Status: She is oriented to person, place, and time.       Last Recorded Vitals  Blood pressure 115/61, pulse 75, temperature 36.4 °C (97.5 °F), temperature source Temporal, resp. rate 22, height 1.549 m (5' 1\"), weight 72.7 kg (160 lb 4.4 oz), SpO2 100 %.    Relevant Results  Results for orders placed or performed during the hospital encounter of 03/29/24 (from the past 24 hour(s))   CBC   Result Value Ref Range    WBC 9.9 4.4 - 11.3 x10*3/uL    nRBC 0.0 0.0 - 0.0 /100 WBCs    RBC 4.21 4.00 - 5.20 x10*6/uL    Hemoglobin 11.9 (L) 12.0 - 16.0 g/dL    Hematocrit 38.2 36.0 - 46.0 %    MCV 91 80 - 100 fL    MCH 28.3 26.0 - 34.0 pg    MCHC 31.2 (L) 32.0 - 36.0 g/dL    RDW 14.5 11.5 - 14.5 %    Platelets 245 150 - 450 x10*3/uL   Comprehensive metabolic panel   Result Value Ref Range    Glucose 163 (H) 65 - 99 mg/dL    Sodium 130 " (L) 133 - 145 mmol/L    Potassium 5.0 3.4 - 5.1 mmol/L    Chloride 90 (L) 97 - 107 mmol/L    Bicarbonate 34 (H) 24 - 31 mmol/L    Urea Nitrogen 15 8 - 25 mg/dL    Creatinine 0.60 0.40 - 1.60 mg/dL    eGFR 83 >60 mL/min/1.73m*2    Calcium 9.3 8.5 - 10.4 mg/dL    Albumin 3.9 3.5 - 5.0 g/dL    Alkaline Phosphatase 103 35 - 125 U/L    Total Protein 6.8 5.9 - 7.9 g/dL    AST 29 5 - 40 U/L    Bilirubin, Total 0.2 0.1 - 1.2 mg/dL    ALT 21 5 - 40 U/L    Anion Gap 6 <=19 mmol/L   NT-PROBNP   Result Value Ref Range    PROBNP 1,489 (H) 0 - 624 pg/mL   Serial Troponin, Initial ([facility])   Result Value Ref Range    Troponin T, High Sensitivity 17 (HH) <=14 ng/L   Blood Gas Arterial Full Panel   Result Value Ref Range    POCT pH, Arterial 7.28 (L) 7.38 - 7.42 pH    POCT pCO2, Arterial 87 (HH) 38 - 42 mm Hg    POCT pO2, Arterial 87 85 - 95 mm Hg    POCT SO2, Arterial 99 94 - 100 %    POCT Oxy Hemoglobin, Arterial 96.4 94.0 - 98.0 %    POCT Hematocrit Calculated, Arterial 36.0 36.0 - 46.0 %    POCT Sodium, Arterial 126 (L) 136 - 145 mmol/L    POCT Potassium, Arterial 5.1 3.5 - 5.3 mmol/L    POCT Chloride, Arterial 91 (L) 98 - 107 mmol/L    POCT Ionized Calcium, Arterial 1.21 1.10 - 1.33 mmol/L    POCT Glucose, Arterial 206 (H) 74 - 99 mg/dL    POCT Lactate, Arterial 0.8 0.4 - 2.0 mmol/L    POCT Base Excess, Arterial 10.9 (H) -2.0 - 3.0 mmol/L    POCT HCO3 Calculated, Arterial 40.9 (H) 22.0 - 26.0 mmol/L    POCT Hemoglobin, Arterial 12.0 12.0 - 16.0 g/dL    POCT Anion Gap, Arterial -1 (L) 10 - 25 mmo/L    Patient Temperature 37.0 degrees Celsius    FiO2 100 %    Apparatus HIGH FLOW CANNULA     Flow 40.0 LPM    Critical Called By DIVINA     Critical Called To JAYESH     Critical Call Time 543     Critical Read Back Y     Critical Note CO2     Site of Arterial Puncture Radial Left     Willard's Test Positive    MRSA Surveillance for Vancomycin De-escalation, PCR    Specimen: Anterior Nares; Swab   Result Value Ref Range    MRSA PCR Not  Detected Not Detected   Blood Gas Arterial Full Panel   Result Value Ref Range    POCT pH, Arterial 7.35 (L) 7.38 - 7.42 pH    POCT pCO2, Arterial 76 (HH) 38 - 42 mm Hg    POCT pO2, Arterial 125 (H) 85 - 95 mm Hg    POCT SO2, Arterial 100 94 - 100 %    POCT Oxy Hemoglobin, Arterial 98.0 94.0 - 98.0 %    POCT Hematocrit Calculated, Arterial 33.0 (L) 36.0 - 46.0 %    POCT Sodium, Arterial 129 (L) 136 - 145 mmol/L    POCT Potassium, Arterial 4.3 3.5 - 5.3 mmol/L    POCT Chloride, Arterial 92 (L) 98 - 107 mmol/L    POCT Ionized Calcium, Arterial 1.22 1.10 - 1.33 mmol/L    POCT Glucose, Arterial 100 (H) 74 - 99 mg/dL    POCT Lactate, Arterial 0.5 0.4 - 2.0 mmol/L    POCT Base Excess, Arterial 13.6 (H) -2.0 - 3.0 mmol/L    POCT HCO3 Calculated, Arterial 42.0 (H) 22.0 - 26.0 mmol/L    POCT Hemoglobin, Arterial 11.0 (L) 12.0 - 16.0 g/dL    POCT Anion Gap, Arterial -1 (L) 10 - 25 mmo/L    Patient Temperature 37.0 degrees Celsius    FiO2 100 %    Ipap CMH2O 16.0 cm H2O    Epap CMH2O 8.0 cm H2O    Critical Called By MICHAEL     Critical Called To JERRY VARGAS     Critical Call Time 1112     Critical Read Back Y     Site of Arterial Puncture Radial Right     Willard's Test Positive       XR chest 1 view  Result Date: 3/30/2024  Significant worsening of the perihilar and basilar edema when compared to prior study.   More conglomerate consolidation in the mid lungs both right and left.   Small bilateral effusions.   Signed by: Spencer Hoskins 3/30/2024 8:11 AM Dictation workstation:   [id number]    ECG 12 Lead  Result Date: 3/29/2024  Poor data quality, interpretation may be adversely affected Normal sinus rhythm Possible Left atrial enlargement Left bundle branch block Abnormal ECG When compared with ECG of 01-MAR-2024 00:06, No significant change was found    XR chest 2 views  Result Date: 3/29/2024  1. Increasing patchy right basilar airspace opacity. Consider pneumonia. 2. Similar severe diffuse interstitial opacity, likely  pulmonary edema. 3. Stable cardiomegaly.   Signed by: Carlo Chan 3/29/2024 3:53 AM Dictation workstation:   [id number]    I/O last 3 completed shifts:  In: 1006.7 (13.8 mL/kg) [P.O.:340; IV Piggyback:666.7]  Out: 400 (5.5 mL/kg) [Urine:400 (0.2 mL/kg/hr)]  Weight: 72.7 kg   I/O this shift:  In: 1375 [IV Piggyback:1375]  Out: -      Scheduled medications  aspirin, 81 mg, oral, Daily  azithromycin, 500 mg, intravenous, q24h  clopidogrel, 75 mg, oral, Daily  enoxaparin, 40 mg, subcutaneous, Daily  furosemide, 40 mg, intravenous, BID  gabapentin, 100 mg, oral, Nightly  ipratropium-albuteroL, 3 mL, nebulization, 4x daily  levothyroxine, 88 mcg, oral, Daily  methylPREDNISolone sodium succinate (PF), 40 mg, intravenous, q8h  oxygen, , inhalation, Continuous - Inhalation  piperacillin-tazobactam, 3.375 g, intravenous, q6h  simvastatin, 20 mg, oral, Nightly  sodium chloride, 1 spray, Each Nostril, 4x daily      Continuous medications  oxygen,       PRN medications  PRN medications: albuterol, vancomycin      Assessment/Plan   IMP:    Acute on chronic hypoxic and hypercapnic respiratory failure  -ABGs showing hypercapnia     2.   Pneumonia  - WBC WNL  - MRSA PCR negative   - Blood cultures NGTD  - Currently on Azithromycin and Zosyn  - Per Chest XR     3.   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 Left ventricular systolic function is normal.   2. Spectral Doppler shows an impaired relaxation pattern of left ventricular diastolic filling.   3. There is moderate concentric left ventricular hypertrophy.   4. The left atrium is moderately dilated.   5. The mitral valve is moderately thickened.   6. There is moderate mitral annular calcification.   7. There is moderate to severe calcification of the anterior and posterior mitral valve leaflets.   8. Mild to moderate tricuspid regurgitation.   9. Moderate to severely elevated right ventricular systolic  pressure.    4.   TME  - suspect r/t respiratory status    5.   Electrolyte imbalance  - hyponatremia     6.   Anemia  - of chronic disease  - H/H stable 11.9/38.2     7.   Palliative care   DNR CCA DNI  -The patient is a capable decision maker   - DPOA - HC filled out where Derek is primary and Anitra is secondary. This is in hard chart.   -The patient has 4 children.   Derek Almanzar - Westley Ayoub Mayer - [facility] - lives out of [facility]  Ginger Almanzar - lives out of town  Derek and Anitra have been primary contacts.     3/30/2024  Family meeting held today. The patient does not want any aggressive measures. Code status reflects this. We did discuss the patient's wishes in the event of her respiratory status deteriorating she would just want to be kept comfortable.  The family is agreeable with this.  DPOA HC documents filled out and placed in hard chart.     The patient experiencing lower back and leg cramping. Tylenol routinely scheduled IV, will monitor the effectiveness of this.     Symptom Management  Pain: mild  Medications recommended for pain?  Yes  Tiredness: mild  Nausea: none  Depression: SILVIA  Anxiety: none  Drowsiness: mild  Appetite: SILVIA  Wellbeing: SILVIA  Dyspnea: moderate  Intervention recommended for dyspnea?  yes  Other: N/A   Intervention recommended for constipation?  NA      Patient/proxy preference for information  Prefers full information    Goals of Care  Conservative disease management and treatment     Other Palliative Support  Goals of care and pain support.     I spent 60 minutes in the professional and overall care of this patient.      Judith Bone, WANG-CNP

## 2024-03-30 NOTE — H&P
History Of Present Illness  Akua Almanzar is a 89 y.o. female presenting with shortness of breath.  Patient was recently discharged from [facility], [place], operated by [facility] where she was treated for congestive heart failure and treated aggressively with diuretics.  Patient was sent to rehab.  In the rehab she was there for 2 weeks but slowly her leg started getting more swelling and she started having more shortness of breath.  She started coughing a lot     Past Medical History  Past Medical History:   Diagnosis Date    Aortic valve stenosis     At risk for falls     CHF (congestive heart failure) (CMS/[facility])     COPD (chronic obstructive pulmonary disease) (CMS/[facility])     Hypothyroidism     Nonrheumatic aortic (valve) stenosis with insufficiency 2021    Aortic insufficiency with aortic stenosis    Personal history of other diseases of the circulatory system 2021    History of aortic valve stenosis    Weakness        Surgical History  Past Surgical History:   Procedure Laterality Date    OTHER SURGICAL HISTORY  2021     section    OTHER SURGICAL HISTORY  2021    Tonsillectomy with adenoidectomy    OTHER SURGICAL HISTORY  2021    Femur fracture repair    OTHER SURGICAL HISTORY  2021    Arm surgery    OTHER SURGICAL HISTORY  2021    Hysterectomy    OTHER SURGICAL HISTORY  2021    Mass excision        Social History  She reports that she has never smoked. She has never used smokeless tobacco. She reports that she does not drink alcohol and does not use drugs.    Family History  Family History   Problem Relation Name Age of Onset    COPD Other      Heart disease Other          Allergies  Adhesive, Hydrocodone, and Lamotrigine    Review of Systems   Constitutional:  Negative for activity change, appetite change, chills, diaphoresis, fatigue, fever and unexpected weight change.   HENT:  Negative for congestion, dental problem, drooling, ear discharge, ear pain, facial swelling,  hearing loss, mouth sores, nosebleeds, postnasal drip, rhinorrhea, sinus pressure, sinus pain, sneezing, sore throat, tinnitus, trouble swallowing and voice change.    Eyes:  Negative for photophobia, pain, discharge, redness, itching and visual disturbance.   Respiratory:  Positive for cough and shortness of breath. Negative for apnea, choking, chest tightness, wheezing and stridor.    Cardiovascular:  Negative for chest pain, palpitations and leg swelling.   Gastrointestinal:  Negative for abdominal distention, abdominal pain, anal bleeding, blood in stool, constipation, diarrhea, nausea, rectal pain and vomiting.   Endocrine: Negative for cold intolerance, heat intolerance, polydipsia, polyphagia and polyuria.   Genitourinary:  Negative for decreased urine volume, difficulty urinating, dysuria, enuresis, flank pain, frequency, genital sores, hematuria and urgency.   Musculoskeletal:  Negative for arthralgias, back pain, gait problem, joint swelling, myalgias, neck pain and neck stiffness.   Skin:  Negative for color change, pallor, rash and wound.   Allergic/Immunologic: Negative for environmental allergies, food allergies and immunocompromised state.   Neurological:  Negative for dizziness, tremors, seizures, syncope, facial asymmetry, speech difficulty, weakness, light-headedness, numbness and headaches.   Hematological:  Negative for adenopathy. Does not bruise/bleed easily.   Psychiatric/Behavioral:  Negative for agitation, behavioral problems, confusion, decreased concentration, dysphoric mood, hallucinations, self-injury, sleep disturbance and suicidal ideas. The patient is not nervous/anxious and is not hyperactive.         Physical Exam  Vitals reviewed.   Constitutional:       Appearance: Normal appearance.   HENT:      Head: Normocephalic and atraumatic.      Right Ear: Tympanic membrane, ear canal and external ear normal.      Left Ear: Tympanic membrane, ear canal and external ear normal.      Nose:  "Nose normal.      Mouth/Throat:      Pharynx: Oropharynx is clear.   Eyes:      Extraocular Movements: Extraocular movements intact.      Conjunctiva/sclera: Conjunctivae normal.      Pupils: Pupils are equal, round, and reactive to light.   Cardiovascular:      Rate and Rhythm: Normal rate and regular rhythm.      Pulses: Normal pulses.      Heart sounds: Normal heart sounds.   Pulmonary:      Effort: Pulmonary effort is normal.      Breath sounds: Wheezing, rhonchi and rales present.   Abdominal:      General: Abdomen is flat. Bowel sounds are normal.      Palpations: Abdomen is soft.   Musculoskeletal:      Cervical back: Normal range of motion and neck supple.      Right lower leg: Edema present.      Left lower leg: Edema present.   Skin:     General: Skin is warm and dry.   Neurological:      General: No focal deficit present.      Mental Status: She is alert and oriented to person, place, and time.   Psychiatric:         Mood and Affect: Mood normal.          Last Recorded Vitals  Blood pressure 124/60, pulse 86, temperature 35.8 °C (96.4 °F), temperature source Temporal, resp. rate 17, height 1.549 m (5' 1\"), weight 72.7 kg (160 lb 4.4 oz), SpO2 96 %.    Relevant Results        Scheduled medications  amLODIPine, 2.5 mg, oral, Daily  ascorbic acid, 250 mg, oral, Daily  aspirin, 81 mg, oral, Daily  [START ON 3/30/2024] azithromycin, 500 mg, intravenous, q24h  carvedilol, 3.125 mg, oral, BID with meals  cefTRIAXone, 1 g, intravenous, q12h  cholecalciferol, 2,000 Units, oral, Daily  clopidogrel, 75 mg, oral, Daily  [Held by provider] docusate sodium, 100 mg, oral, BID  enoxaparin, 40 mg, subcutaneous, Daily  furosemide, 40 mg, oral, Daily  gabapentin, 100 mg, oral, Nightly  ipratropium-albuteroL, 3 mL, nebulization, q6h  lactobacillus acidophilus, 1 tablet, oral, BID  levothyroxine, 88 mcg, oral, Daily  melatonin, 3 mg, oral, Nightly  polyethylene glycol, 17 g, oral, Daily  sodium chloride, 1 spray, Each " Nostril, 4x daily      Continuous medications  potassium xutknpm-T9-1.45%NaCl, 100 mL/hr, Last Rate: 100 mL/hr (03/29/24 1150)  oxygen,       PRN medications  PRN medications: acetaminophen **OR** acetaminophen **OR** acetaminophen, alum-mag hydroxide-simeth, benzocaine-menthol, benzocaine-menthol, dextromethorphan-guaifenesin, docusate sodium, guaiFENesin, guaiFENesin, hydrOXYzine HCL, ondansetron ODT  Results for orders placed or performed during the hospital encounter of 03/29/24 (from the past 24 hour(s))   CBC and Auto Differential   Result Value Ref Range    WBC 6.3 4.4 - 11.3 x10*3/uL    nRBC 0.0 0.0 - 0.0 /100 WBCs    RBC 4.04 4.00 - 5.20 x10*6/uL    Hemoglobin 11.5 (L) 12.0 - 16.0 g/dL    Hematocrit 36.0 36.0 - 46.0 %    MCV 89 80 - 100 fL    MCH 28.5 26.0 - 34.0 pg    MCHC 31.9 (L) 32.0 - 36.0 g/dL    RDW 14.7 (H) 11.5 - 14.5 %    Platelets 234 150 - 450 x10*3/uL    Neutrophils % 75.3 40.0 - 80.0 %    Immature Granulocytes %, Automated 0.2 0.0 - 0.9 %    Lymphocytes % 13.7 13.0 - 44.0 %    Monocytes % 10.3 2.0 - 10.0 %    Eosinophils % 0.3 0.0 - 6.0 %    Basophils % 0.2 0.0 - 2.0 %    Neutrophils Absolute 4.74 1.60 - 5.50 x10*3/uL    Immature Granulocytes Absolute, Automated 0.01 0.00 - 0.50 x10*3/uL    Lymphocytes Absolute 0.86 0.80 - 3.00 x10*3/uL    Monocytes Absolute 0.65 0.05 - 0.80 x10*3/uL    Eosinophils Absolute 0.02 0.00 - 0.40 x10*3/uL    Basophils Absolute 0.01 0.00 - 0.10 x10*3/uL   NT-PROBNP   Result Value Ref Range    PROBNP 965 (H) 0 - 624 pg/mL   Sars-CoV-2 and Influenza A/B PCR   Result Value Ref Range    Flu A Result Not Detected Not Detected    Flu B Result Not Detected Not Detected    Coronavirus 2019, PCR Not Detected Not Detected   ECG 12 Lead   Result Value Ref Range    Ventricular Rate 87 BPM    Atrial Rate 87 BPM    DE Interval 170 ms    QRS Duration 138 ms    QT Interval 396 ms    QTC Calculation(Bazett) 476 ms    P Axis 43 degrees    R Axis -26 degrees    T Axis 98 degrees    QRS  Count 15 beats    Q Onset 209 ms    P Onset 124 ms    P Offset 187 ms    T Offset 407 ms    QTC Fredericia 448 ms   Comprehensive Metabolic Panel   Result Value Ref Range    Glucose 117 (H) 65 - 99 mg/dL    Sodium 131 (L) 133 - 145 mmol/L    Potassium 4.5 3.4 - 5.1 mmol/L    Chloride 88 (L) 97 - 107 mmol/L    Bicarbonate 34 (H) 24 - 31 mmol/L    Urea Nitrogen 12 8 - 25 mg/dL    Creatinine 0.50 0.40 - 1.60 mg/dL    eGFR 86 >60 mL/min/1.73m*2    Calcium 9.4 8.5 - 10.4 mg/dL    Albumin 3.9 3.5 - 5.0 g/dL    Alkaline Phosphatase 106 35 - 125 U/L    Total Protein 7.1 5.9 - 7.9 g/dL    AST 30 5 - 40 U/L    Bilirubin, Total 0.4 0.1 - 1.2 mg/dL    ALT 14 5 - 40 U/L    Anion Gap 9 <=19 mmol/L   Serial Troponin, Initial ([facility])   Result Value Ref Range    Troponin T, High Sensitivity 24 (HH) <=14 ng/L   Blood Culture    Specimen: Peripheral Venipuncture; Blood culture   Result Value Ref Range    Blood Culture Loaded on Instrument - Culture in progress    Blood Culture    Specimen: Peripheral Venipuncture; Blood culture   Result Value Ref Range    Blood Culture Loaded on Instrument - Culture in progress    Serial Troponin, 2 Hour ([facility])   Result Value Ref Range    Troponin T, High Sensitivity 21 (HH) <=14 ng/L   Serial Troponin, 6 Hour ([facility])   Result Value Ref Range    Troponin T, High Sensitivity 18 (HH) <=14 ng/L     ECG 12 Lead    Result Date: 3/29/2024   Poor data quality, interpretation may be adversely affected Normal sinus rhythm Possible Left atrial enlargement Left bundle branch block Abnormal ECG When compared with ECG of 01-MAR-2024 00:06, No significant change was found    XR chest 2 views    Result Date: 3/29/2024  Interpreted By:  Carlo Chan, STUDY: XR CHEST 2 VIEWS;  3/29/2024 3:37 am   INDICATION: Signs/Symptoms:sob.   COMPARISON: 02/29/2024   ACCESSION NUMBER(S): [id number]   ORDERING CLINICIAN: ANALI AGUILAR   FINDINGS:     Increasing patchy right basilar airspace opacity. Similar severe  cardiomegaly. No pneumothorax. Similar endovascular aortic valve prosthesis. Similar severe diffuse interstitial opacity. Upper abdomen is unremarkable. Diffuse osteopenia. No acute osseous abnormality.       1. Increasing patchy right basilar airspace opacity. Consider pneumonia. 2. Similar severe diffuse interstitial opacity, likely pulmonary edema. 3. Stable cardiomegaly.   Signed by: Carlo Chan 3/29/2024 3:53 AM Dictation workstation:   [id number]        Assessment/Plan   Principal Problem:    Pneumonia of right lung due to infectious organism, unspecified part of lung  Active Problems:    Atherosclerosis of coronary artery without angina pectoris    Hyperlipidemia    Primary hypertension    Acquired hypothyroidism    Acute on chronic diastolic (congestive) heart failure (CMS/HCC)    Acute on chronic respiratory failure (CMS/HCC)      Start on broad-spectrum antibiotic  BNP is elevated but not that high  Patient recently had acute congestive heart failure with volume overload  X-ray still shows volume overload  Continue diuresis  Consult cardiology  Continue pulmonary supportive care  Continue oxygen support  Resume home medication  See physician's orders for details       I spent  minutes in the professional and overall care of this patient.      Ivone Gilman MD

## 2024-03-30 NOTE — PROGRESS NOTES
"Akua Almanzar is a 89 y.o. female on day 1 of admission presenting with Pneumonia of right lung due to infectious organism, unspecified part of lung.    Subjective   Pt desaturated and is now on bipap. She continues to have SOB this AM.        Objective     Physical Exam  Vitals and nursing note reviewed.   Constitutional:       Appearance: She is ill-appearing. She is not toxic-appearing.   HENT:      Head: Normocephalic and atraumatic.      Nose: Nose normal.      Mouth/Throat:      Mouth: Mucous membranes are moist.   Eyes:      Extraocular Movements: Extraocular movements intact.      Conjunctiva/sclera: Conjunctivae normal.      Pupils: Pupils are equal, round, and reactive to light.   Cardiovascular:      Rate and Rhythm: Normal rate and regular rhythm.      Heart sounds: Murmur heard.      No gallop.   Pulmonary:      Breath sounds: Wheezing and rhonchi present. No rales.      Comments: Poor aeration b/l, + conversational dyspnea  Abdominal:      General: Abdomen is flat. Bowel sounds are normal. There is no distension.      Palpations: Abdomen is soft. There is no mass.      Tenderness: There is no abdominal tenderness.   Musculoskeletal:         General: No tenderness. Normal range of motion.      Cervical back: Normal range of motion and neck supple.      Right lower leg: Edema present.      Left lower leg: Edema present.   Skin:     General: Skin is warm and dry.   Neurological:      Mental Status: She is alert.      Motor: Weakness present.      Comments: Aox1-2, very lethargic   Psychiatric:         Mood and Affect: Mood normal.         Behavior: Behavior normal.         Thought Content: Thought content normal.         Judgment: Judgment normal.         Last Recorded Vitals:  /57   Pulse 78   Temp 36.4 °C (97.5 °F) (Temporal)   Resp (!) 27   Ht 1.549 m (5' 1\")   Wt 72.7 kg (160 lb 4.4 oz)   SpO2 97%   BMI 30.28 kg/m²      Scheduled medications:  azithromycin, 500 mg, intravenous, " q24h  cefTRIAXone, 1 g, intravenous, q12h  enoxaparin, 40 mg, subcutaneous, Daily  ipratropium-albuteroL, 3 mL, nebulization, 4x daily  oxygen, , inhalation, Continuous - Inhalation  sodium chloride, 1 spray, Each Nostril, 4x daily      Continuous medications:  oxygen,       PRN medications:  PRN medications: albuterol     Relevant Results:  Results for orders placed or performed during the hospital encounter of 03/29/24 (from the past 24 hour(s))   Serial Troponin, 6 Hour ([facility])   Result Value Ref Range    Troponin T, High Sensitivity 18 (HH) <=14 ng/L   CBC   Result Value Ref Range    WBC 9.9 4.4 - 11.3 x10*3/uL    nRBC 0.0 0.0 - 0.0 /100 WBCs    RBC 4.21 4.00 - 5.20 x10*6/uL    Hemoglobin 11.9 (L) 12.0 - 16.0 g/dL    Hematocrit 38.2 36.0 - 46.0 %    MCV 91 80 - 100 fL    MCH 28.3 26.0 - 34.0 pg    MCHC 31.2 (L) 32.0 - 36.0 g/dL    RDW 14.5 11.5 - 14.5 %    Platelets 245 150 - 450 x10*3/uL   Comprehensive metabolic panel   Result Value Ref Range    Glucose 163 (H) 65 - 99 mg/dL    Sodium 130 (L) 133 - 145 mmol/L    Potassium 5.0 3.4 - 5.1 mmol/L    Chloride 90 (L) 97 - 107 mmol/L    Bicarbonate 34 (H) 24 - 31 mmol/L    Urea Nitrogen 15 8 - 25 mg/dL    Creatinine 0.60 0.40 - 1.60 mg/dL    eGFR 83 >60 mL/min/1.73m*2    Calcium 9.3 8.5 - 10.4 mg/dL    Albumin 3.9 3.5 - 5.0 g/dL    Alkaline Phosphatase 103 35 - 125 U/L    Total Protein 6.8 5.9 - 7.9 g/dL    AST 29 5 - 40 U/L    Bilirubin, Total 0.2 0.1 - 1.2 mg/dL    ALT 21 5 - 40 U/L    Anion Gap 6 <=19 mmol/L   NT-PROBNP   Result Value Ref Range    PROBNP 1,489 (H) 0 - 624 pg/mL   Serial Troponin, Initial ([facility])   Result Value Ref Range    Troponin T, High Sensitivity 17 (HH) <=14 ng/L   Blood Gas Arterial Full Panel   Result Value Ref Range    POCT pH, Arterial 7.28 (L) 7.38 - 7.42 pH    POCT pCO2, Arterial 87 (HH) 38 - 42 mm Hg    POCT pO2, Arterial 87 85 - 95 mm Hg    POCT SO2, Arterial 99 94 - 100 %    POCT Oxy Hemoglobin, Arterial 96.4 94.0 - 98.0 %    POCT  Hematocrit Calculated, Arterial 36.0 36.0 - 46.0 %    POCT Sodium, Arterial 126 (L) 136 - 145 mmol/L    POCT Potassium, Arterial 5.1 3.5 - 5.3 mmol/L    POCT Chloride, Arterial 91 (L) 98 - 107 mmol/L    POCT Ionized Calcium, Arterial 1.21 1.10 - 1.33 mmol/L    POCT Glucose, Arterial 206 (H) 74 - 99 mg/dL    POCT Lactate, Arterial 0.8 0.4 - 2.0 mmol/L    POCT Base Excess, Arterial 10.9 (H) -2.0 - 3.0 mmol/L    POCT HCO3 Calculated, Arterial 40.9 (H) 22.0 - 26.0 mmol/L    POCT Hemoglobin, Arterial 12.0 12.0 - 16.0 g/dL    POCT Anion Gap, Arterial -1 (L) 10 - 25 mmo/L    Patient Temperature 37.0 degrees Celsius    FiO2 100 %    Apparatus HIGH FLOW CANNULA     Flow 40.0 LPM    Critical Called By DIVINA     Critical Called To JAYESH     Critical Call Time 543     Critical Read Back Y     Critical Note CO2     Site of Arterial Puncture Radial Left     Willard's Test Positive        XR chest 1 view    Result Date: 3/30/2024  Interpreted By:  Spencer Hoskins, STUDY: XR CHEST 1 VIEW   INDICATION: Signs/Symptoms:Hypoxia/SOB.   COMPARISON: March 29, 2024   ACCESSION NUMBER(S): [id number]   ORDERING CLINICIAN: DORETHA MADDEN   FINDINGS: Significant worsening of the perihilar and basilar edema when compared to prior study.   More conglomerate consolidation in the mid lungs both right and left.   Small bilateral effusions.       Significant worsening of the perihilar and basilar edema when compared to prior study.   More conglomerate consolidation in the mid lungs both right and left.   Small bilateral effusions.   Signed by: Spencer Hoskins 3/30/2024 8:11 AM Dictation workstation:   [id number]    ECG 12 Lead    Result Date: 3/29/2024   Poor data quality, interpretation may be adversely affected Normal sinus rhythm Possible Left atrial enlargement Left bundle branch block Abnormal ECG When compared with ECG of 01-MAR-2024 00:06, No significant change was found    XR chest 2 views    Result Date: 3/29/2024  Interpreted By:  Rocio  Carlo, STUDY: XR CHEST 2 VIEWS;  3/29/2024 3:37 am   INDICATION: Signs/Symptoms:sob.   COMPARISON: 02/29/2024   ACCESSION NUMBER(S): [id number]   ORDERING CLINICIAN: ANALI AGUILAR   FINDINGS:     Increasing patchy right basilar airspace opacity. Similar severe cardiomegaly. No pneumothorax. Similar endovascular aortic valve prosthesis. Similar severe diffuse interstitial opacity. Upper abdomen is unremarkable. Diffuse osteopenia. No acute osseous abnormality.       1. Increasing patchy right basilar airspace opacity. Consider pneumonia. 2. Similar severe diffuse interstitial opacity, likely pulmonary edema. 3. Stable cardiomegaly.   Signed by: Carlo Chan 3/29/2024 3:53 AM Dictation workstation:   [id number]            Assessment/Plan   Principal Problem:    Pneumonia of right lung due to infectious organism, unspecified part of lung  Active Problems:    Atherosclerosis of coronary artery without angina pectoris    Hyperlipidemia    Primary hypertension    Acquired hypothyroidism    Acute on chronic diastolic (congestive) heart failure (CMS/HCC)    Acute on chronic respiratory failure (CMS/HCC)    Acute on chronic hypoxic respiratory failure 2/2 gram +/gram- pneumonia, HFpEF exacerbation  - on bipap  - vanc/zosyn/AZT  - cardio consult  - duoneb  - IV solumedrol  - repeat ABG  - IV lasix  - procal  - check Uag, MRSA  - pulm consult    Acute metabolic encephalopathy 2/2 infectious  - treat as above    HypoNa  - monitor    Normocytic anemia  - monitor    HTN  BP low  - hold norvasc, coreg    HLD  - statin  - ASA  - plavix    Neuropathy  - gabapentin    Hypothyroid  - synthroid    Diet: cardiac  Code status: DNR CCA/DNI, palliative consult  Dispo: monitor clinically          Jody Lucero MD  Hospitalist

## 2024-03-30 NOTE — CONSULTS
Consults  History Of Present Illness:    Akua Almanzar is a 89 y.o. female presenting with .    The patient is a 89-year-old white female whose past medical history includes COPD with home oxygen dependency, pulmonary hypertension, hypertension, hyperlipidemia, pulmonary hypertension, aortic valvular stenosis.  On 4/9/2021 the patient underwent a TAVR that was successful with postprocedural EKG showing sinus rhythm with a left bundle branch block conduction delay.  Her echocardiogram at that time showed a stable LV ejection fraction with only mild perivalvular leak and a mean gradient of 20 mmHg.  The patient did have an outpatient Holter monitor for possible bradycardia.  Her past medical history also includes chronic anemia, status post left femur fracture and right upper extremity open reduction internal fixation for fracture.    The patient was admitted to [facility] 3/1/2024 - 3/7/2024 with increasing shortness of breath worsening lower extremity edema.  Chest x-ray showed pulmonary vascular congestion.  The patient had an echocardiogram on 3/1/2024 showing a preserved LV ejection fraction with a normally functioning TAVR are peak systolic pressure gradient of 18 mmHg.  There was moderate concentric LV hypertrophy with moderate to severe calcification of the anterior and posterior mitral leaflets with suspected moderate mitral valvular stenosis.  There was moderate left atrial enlargement mild to moderate tricuspid valve regurgitation and moderately severe pulmonary hypertension with an estimated PA systolic pressure 59 mmHg.  The patient initially was placed on IV Lasix and transition back to oral Lasix.  She was again certified for home oxygen at 2 L/min.  Ultimately the patient was sent to a skilled nursing facility with medications including in part Lasix 40 mg daily aspirin 81 mg daily amlodipine 2.5 mg daily carvedilol 6.25 mg twice daily clopidogrel 75 mg daily simvastatin 20 mg daily  "levothyroxine 88 mcg daily.    The patient as noted went to a rehab facility but evidently was not compliant with a low-sodium diet.  She redeveloped increasing shortness of breath and cough along with perceived worsening of peripheral edema.  Her initial evaluation in the emergency room included a PA lateral chest x-ray 3/29 showing increasing patchy right basilar airspace opacity consider pneumonia along with similar severe diffuse interstitial opacities thought to be due to pulmonary edema.  Repeat chest x-ray from my today shows significant worsening of the perihilar and basilar edema and Marcon glomera to consolidation in the mid lungs both right and left with small pleural effusions.  Of note the patient's proBNP on admission was only 965 decreased from 2589 1 month ago. CBC on admission x-ray was unremarkable.  Nasal swab was negative.  Comprehensive metabolic panel with creatinine is 0.5 sodium 131.  2 sets of blood cultures are negative thus far.  High-sensitivity troponins nondiagnostic at 24, 21, and 18.  Repeat proBNP today 1489.  Repeat CBC is also unremarkable from today and renal parameters unchanged creatinine is 0.6     Last Recorded Vitals:  Vitals:    03/30/24 0600 03/30/24 0744 03/30/24 0800 03/30/24 0819   BP:  124/76  111/57   BP Location:  Right arm     Patient Position:  Lying     Pulse:  78     Resp: (!) 37 21 (!) 27    Temp:  36.4 °C (97.5 °F)     TempSrc:  Temporal     SpO2: 96% 97%     Weight:       Height:           Last Labs:  CBC - 3/30/2024:  5:21 AM  9.9 11.9 245    38.2      CMP - 3/30/2024:  5:21 AM  9.3 6.8 29 --- 0.2   _ 3.9 21 103      PTT - No results in last year.  _   _ _     No results found for: \"TROPHS\", \"BNP\", \"HGBA1C\", \"LDLCALC\", \"VLDL\"   Last I/O:  I/O last 3 completed shifts:  In: 1006.7 (13.8 mL/kg) [P.O.:340; IV Piggyback:666.7]  Out: 400 (5.5 mL/kg) [Urine:400 (0.2 mL/kg/hr)]  Weight: 72.7 kg     Past Cardiology Tests (Last 3 Years):  EKG:  ECG 12 Lead 03/29/2024 " (Preliminary)      ECG 12 lead 03/01/2024    Echo:  Transthoracic Echo (TTE) Complete 03/01/2024    Ejection Fractions:  EF   Date/Time Value Ref Range Status   03/01/2024 02:09 PM 56 %      Cath:  No results found for this or any previous visit from the past 1095 days.    Stress Test:  No results found for this or any previous visit from the past 1095 days.    Cardiac Imaging:  No results found for this or any previous visit from the past 1095 days.      Past Medical History:  She has a past medical history of Aortic valve stenosis, At risk for falls, CHF (congestive heart failure) (CMS/[facility]), COPD (chronic obstructive pulmonary disease) (CMS/[facility]), Hypothyroidism, Nonrheumatic aortic (valve) stenosis with insufficiency (02/11/2021), Personal history of other diseases of the circulatory system (02/11/2021), and Weakness.    Past Surgical History:  She has a past surgical history that includes Other surgical history (02/12/2021); Other surgical history (02/12/2021); Other surgical history (02/12/2021); Other surgical history (02/12/2021); Other surgical history (02/12/2021); and Other surgical history (02/12/2021).      Social History:  She reports that she has never smoked. She has never used smokeless tobacco. She reports that she does not drink alcohol and does not use drugs.    Family History:  Family History   Problem Relation Name Age of Onset    COPD Other      Heart disease Other          Allergies:  Adhesive, Hydrocodone, and Lamotrigine    Inpatient Medications:  Scheduled medications   Medication Dose Route Frequency    aspirin  81 mg oral Daily    azithromycin  500 mg intravenous q24h    clopidogrel  75 mg oral Daily    enoxaparin  40 mg subcutaneous Daily    furosemide  40 mg intravenous BID    gabapentin  100 mg oral Nightly    ipratropium-albuteroL  3 mL nebulization 4x daily    levothyroxine  88 mcg oral Daily    methylPREDNISolone sodium succinate (PF)  40 mg intravenous q8h    oxygen   inhalation  Continuous - Inhalation    piperacillin-tazobactam  3.375 g intravenous q6h    simvastatin  20 mg oral Nightly    sodium chloride  1 spray Each Nostril 4x daily     PRN medications   Medication    albuterol    vancomycin     Continuous Medications   Medication Dose Last Rate    oxygen         Outpatient Medications:  Current Outpatient Medications   Medication Instructions    acidophilus-pectin, citrus 100 million cell-10 mg capsule oral    amLODIPine (Norvasc) 2.5 mg tablet 1 tablet, oral, Daily    ascorbic acid (VITAMIN C) 500 mg, oral, Reports chewable 500mg    aspirin 81 mg EC tablet 1 tablet, oral, Daily    carvedilol (Coreg) 6.25 mg tablet Take 1 tablet twice a day by oral route.    cholecalciferol (Vitamin D-3) 25 MCG (1000 UT) capsule oral    clopidogrel (Plavix) 75 mg tablet 1 tablet, oral, Daily    docusate sodium (COLACE) 100 mg, oral, 2 times daily    furosemide (LASIX) 40 mg, oral, Daily    gabapentin (NEURONTIN) 100 mg, oral, Nightly    hydrOXYzine HCL (ATARAX) 25 mg, oral, Every 6 hours PRN    levothyroxine (Synthroid, Levoxyl) 88 mcg tablet 1 tablet, oral, Daily    oxygen (O2) gas therapy 1 each, inhalation, Continuous    simvastatin (Zocor) 20 mg tablet 1 tablet, oral, Nightly       Physical Exam:  The patient is an upper elderly white female lying in bed with a BiPAP mask on.  She is awake and alert with family members visiting  JVP not elevated carotid impulses 2+  Chest with shallow air movement breath sounds are diminished  Cardiac rhythm is regular without any premature beats S1-S2 normal minimal systolic murmur  Abdomen is soft and benign  Modest lower extremity edema     Assessment/Plan     3/30: This patient is a 89-year-old white female with a history of COPD requiring nasal oxygen with pulmonary hypertension as well.  She did undergo a transcatheter aortic valve replacement 4/9/2021 for severe aortic valvular stenosis.  She was recently admitted to [facility] from 3/1/2024 -  3/7/2024 with increased shortness of breath thought to be related to CHF diastolic variety.  Her echocardiogram at that time showed a preserved LV ejection fraction and normally functioning TAVR are but moderate mitral valve stenosis and moderately severe pulmonary hypertension estimated PA systolic pressure 59 mmHg.  She is currently admitted with recurrent shortness of breath and required transfer to ICU due to worsening respiratory distress and CO2 retention.  She has been placed on continuous BiPAP with clinical improvement.  Her hemodynamic parameters remain stable.  Telemetry monitor shows sinus rhythm with left bundle branch block conduction delay.  Her proBNP surprisingly is only modestly elevated despite the significant abnormalities on chest x-ray.  For now she will be treated empirically with Lasix 40 mg IV twice daily.  Empiric antibiotic therapy has also been instituted including vancomycin, Zosyn and azithromycin.  Patient being seen by pulmonology.  Patient receiving DuoNebs and IV Solu-Medrol as well.  The patient and family aware of guarded prognosis.    Peripheral IV 03/29/24 20 G Right;Anterior Forearm (Active)   Site Assessment Clean;Dry;Intact 03/30/24 0815   Dressing Status Clean;Dry 03/30/24 0815   Number of days: 1       Code Status:  DNR and No Intubation    I spent 30 minutes in the professional and overall care of this patient.        Jeffrey Holguin MD

## 2024-03-31 ENCOUNTER — APPOINTMENT (OUTPATIENT)
Dept: RADIOLOGY | Facility: HOSPITAL | Age: 89
DRG: 177 | End: 2024-03-31
Payer: COMMERCIAL

## 2024-03-31 LAB
ALBUMIN SERPL-MCNC: 3.3 G/DL (ref 3.5–5)
ALP BLD-CCNC: 80 U/L (ref 35–125)
ALT SERPL-CCNC: 23 U/L (ref 5–40)
ANION GAP SERPL CALC-SCNC: 6 MMOL/L
AST SERPL-CCNC: 24 U/L (ref 5–40)
ATRIAL RATE: 87 BPM
BASOPHILS # BLD AUTO: 0 X10*3/UL (ref 0–0.1)
BASOPHILS NFR BLD AUTO: 0 %
BILIRUB SERPL-MCNC: 0.2 MG/DL (ref 0.1–1.2)
BUN SERPL-MCNC: 15 MG/DL (ref 8–25)
CALCIUM SERPL-MCNC: 8.9 MG/DL (ref 8.5–10.4)
CHLORIDE SERPL-SCNC: 91 MMOL/L (ref 97–107)
CO2 SERPL-SCNC: 39 MMOL/L (ref 24–31)
CREAT SERPL-MCNC: 0.6 MG/DL (ref 0.4–1.6)
EGFRCR SERPLBLD CKD-EPI 2021: 83 ML/MIN/1.73M*2
EOSINOPHIL # BLD AUTO: 0 X10*3/UL (ref 0–0.4)
EOSINOPHIL NFR BLD AUTO: 0 %
ERYTHROCYTE [DISTWIDTH] IN BLOOD BY AUTOMATED COUNT: 14.4 % (ref 11.5–14.5)
GLUCOSE SERPL-MCNC: 197 MG/DL (ref 65–99)
HCT VFR BLD AUTO: 33.4 % (ref 36–46)
HGB BLD-MCNC: 10.4 G/DL (ref 12–16)
IMM GRANULOCYTES # BLD AUTO: 0.02 X10*3/UL (ref 0–0.5)
IMM GRANULOCYTES NFR BLD AUTO: 0.6 % (ref 0–0.9)
LEGIONELLA AG UR QL: NEGATIVE
LYMPHOCYTES # BLD AUTO: 0.32 X10*3/UL (ref 0.8–3)
LYMPHOCYTES NFR BLD AUTO: 8.9 %
MCH RBC QN AUTO: 28.3 PG (ref 26–34)
MCHC RBC AUTO-ENTMCNC: 31.1 G/DL (ref 32–36)
MCV RBC AUTO: 91 FL (ref 80–100)
MONOCYTES # BLD AUTO: 0.18 X10*3/UL (ref 0.05–0.8)
MONOCYTES NFR BLD AUTO: 5 %
NEUTROPHILS # BLD AUTO: 3.06 X10*3/UL (ref 1.6–5.5)
NEUTROPHILS NFR BLD AUTO: 85.5 %
NRBC BLD-RTO: 0 /100 WBCS (ref 0–0)
P AXIS: 43 DEGREES
P OFFSET: 187 MS
P ONSET: 124 MS
PLATELET # BLD AUTO: 175 X10*3/UL (ref 150–450)
POTASSIUM SERPL-SCNC: 4.2 MMOL/L (ref 3.4–5.1)
PR INTERVAL: 170 MS
PROCALCITONIN SERPL-MCNC: 0.15 NG/ML
PROT SERPL-MCNC: 5.8 G/DL (ref 5.9–7.9)
Q ONSET: 209 MS
QRS COUNT: 15 BEATS
QRS DURATION: 138 MS
QT INTERVAL: 396 MS
QTC CALCULATION(BAZETT): 476 MS
QTC FREDERICIA: 448 MS
R AXIS: -26 DEGREES
RBC # BLD AUTO: 3.67 X10*6/UL (ref 4–5.2)
S PNEUM AG UR QL: NEGATIVE
SODIUM SERPL-SCNC: 136 MMOL/L (ref 133–145)
T AXIS: 98 DEGREES
T OFFSET: 407 MS
VENTRICULAR RATE: 87 BPM
WBC # BLD AUTO: 3.6 X10*3/UL (ref 4.4–11.3)

## 2024-03-31 PROCEDURE — 2500000001 HC RX 250 WO HCPCS SELF ADMINISTERED DRUGS (ALT 637 FOR MEDICARE OP): Performed by: STUDENT IN AN ORGANIZED HEALTH CARE EDUCATION/TRAINING PROGRAM

## 2024-03-31 PROCEDURE — 94762 N-INVAS EAR/PLS OXIMTRY CONT: CPT

## 2024-03-31 PROCEDURE — 71045 X-RAY EXAM CHEST 1 VIEW: CPT | Performed by: RADIOLOGY

## 2024-03-31 PROCEDURE — 94668 MNPJ CHEST WALL SBSQ: CPT

## 2024-03-31 PROCEDURE — 2500000002 HC RX 250 W HCPCS SELF ADMINISTERED DRUGS (ALT 637 FOR MEDICARE OP, ALT 636 FOR OP/ED): Mod: MUE | Performed by: STUDENT IN AN ORGANIZED HEALTH CARE EDUCATION/TRAINING PROGRAM

## 2024-03-31 PROCEDURE — 99232 SBSQ HOSP IP/OBS MODERATE 35: CPT

## 2024-03-31 PROCEDURE — 71045 X-RAY EXAM CHEST 1 VIEW: CPT

## 2024-03-31 PROCEDURE — 94640 AIRWAY INHALATION TREATMENT: CPT

## 2024-03-31 PROCEDURE — 2500000005 HC RX 250 GENERAL PHARMACY W/O HCPCS: Performed by: STUDENT IN AN ORGANIZED HEALTH CARE EDUCATION/TRAINING PROGRAM

## 2024-03-31 PROCEDURE — 2500000002 HC RX 250 W HCPCS SELF ADMINISTERED DRUGS (ALT 637 FOR MEDICARE OP, ALT 636 FOR OP/ED): Performed by: STUDENT IN AN ORGANIZED HEALTH CARE EDUCATION/TRAINING PROGRAM

## 2024-03-31 PROCEDURE — 9420000001 HC RT PATIENT EDUCATION 5 MIN

## 2024-03-31 PROCEDURE — 2500000002 HC RX 250 W HCPCS SELF ADMINISTERED DRUGS (ALT 637 FOR MEDICARE OP, ALT 636 FOR OP/ED): Performed by: INTERNAL MEDICINE

## 2024-03-31 PROCEDURE — 99232 SBSQ HOSP IP/OBS MODERATE 35: CPT | Performed by: INTERNAL MEDICINE

## 2024-03-31 PROCEDURE — 2500000002 HC RX 250 W HCPCS SELF ADMINISTERED DRUGS (ALT 637 FOR MEDICARE OP, ALT 636 FOR OP/ED): Mod: MUE | Performed by: INTERNAL MEDICINE

## 2024-03-31 PROCEDURE — 85025 COMPLETE CBC W/AUTO DIFF WBC: CPT | Performed by: STUDENT IN AN ORGANIZED HEALTH CARE EDUCATION/TRAINING PROGRAM

## 2024-03-31 PROCEDURE — 94660 CPAP INITIATION&MGMT: CPT

## 2024-03-31 PROCEDURE — 2500000004 HC RX 250 GENERAL PHARMACY W/ HCPCS (ALT 636 FOR OP/ED): Performed by: STUDENT IN AN ORGANIZED HEALTH CARE EDUCATION/TRAINING PROGRAM

## 2024-03-31 PROCEDURE — 2020000001 HC ICU ROOM DAILY

## 2024-03-31 PROCEDURE — 36415 COLL VENOUS BLD VENIPUNCTURE: CPT | Performed by: STUDENT IN AN ORGANIZED HEALTH CARE EDUCATION/TRAINING PROGRAM

## 2024-03-31 PROCEDURE — 84075 ASSAY ALKALINE PHOSPHATASE: CPT | Performed by: STUDENT IN AN ORGANIZED HEALTH CARE EDUCATION/TRAINING PROGRAM

## 2024-03-31 PROCEDURE — 2500000004 HC RX 250 GENERAL PHARMACY W/ HCPCS (ALT 636 FOR OP/ED): Performed by: INTERNAL MEDICINE

## 2024-03-31 PROCEDURE — 2500000005 HC RX 250 GENERAL PHARMACY W/O HCPCS: Performed by: INTERNAL MEDICINE

## 2024-03-31 RX ORDER — ACETAMINOPHEN 325 MG/1
650 TABLET ORAL EVERY 6 HOURS
Status: DISCONTINUED | OUTPATIENT
Start: 2024-03-31 | End: 2024-04-15 | Stop reason: HOSPADM

## 2024-03-31 RX ORDER — BISACODYL 10 MG/1
10 SUPPOSITORY RECTAL DAILY PRN
Status: DISCONTINUED | OUTPATIENT
Start: 2024-03-31 | End: 2024-04-15 | Stop reason: HOSPADM

## 2024-03-31 RX ADMIN — LEVOTHYROXINE SODIUM 88 MCG: 0.09 TABLET ORAL at 06:20

## 2024-03-31 RX ADMIN — FUROSEMIDE 40 MG: 10 INJECTION, SOLUTION INTRAMUSCULAR; INTRAVENOUS at 20:49

## 2024-03-31 RX ADMIN — CLOPIDOGREL BISULFATE 75 MG: 75 TABLET ORAL at 09:44

## 2024-03-31 RX ADMIN — IPRATROPIUM BROMIDE AND ALBUTEROL SULFATE 3 ML: 2.5; .5 SOLUTION RESPIRATORY (INHALATION) at 12:00

## 2024-03-31 RX ADMIN — Medication: at 19:00

## 2024-03-31 RX ADMIN — ACETAMINOPHEN 650 MG: 325 TABLET ORAL at 12:11

## 2024-03-31 RX ADMIN — FUROSEMIDE 40 MG: 10 INJECTION, SOLUTION INTRAMUSCULAR; INTRAVENOUS at 09:33

## 2024-03-31 RX ADMIN — IPRATROPIUM BROMIDE AND ALBUTEROL SULFATE 3 ML: 2.5; .5 SOLUTION RESPIRATORY (INHALATION) at 14:59

## 2024-03-31 RX ADMIN — METHYLPREDNISOLONE SODIUM SUCCINATE 40 MG: 40 INJECTION, POWDER, FOR SOLUTION INTRAMUSCULAR; INTRAVENOUS at 02:31

## 2024-03-31 RX ADMIN — ENOXAPARIN SODIUM 40 MG: 40 INJECTION SUBCUTANEOUS at 11:01

## 2024-03-31 RX ADMIN — Medication 30 L/MIN: at 08:00

## 2024-03-31 RX ADMIN — ASPIRIN 81 MG: 81 TABLET, COATED ORAL at 09:44

## 2024-03-31 RX ADMIN — SIMVASTATIN 20 MG: 20 TABLET, FILM COATED ORAL at 20:48

## 2024-03-31 RX ADMIN — METHYLPREDNISOLONE SODIUM SUCCINATE 40 MG: 40 INJECTION, POWDER, FOR SOLUTION INTRAMUSCULAR; INTRAVENOUS at 09:39

## 2024-03-31 RX ADMIN — IPRATROPIUM BROMIDE AND ALBUTEROL SULFATE 3 ML: 2.5; .5 SOLUTION RESPIRATORY (INHALATION) at 07:15

## 2024-03-31 RX ADMIN — Medication 30 L/MIN: at 23:00

## 2024-03-31 RX ADMIN — METHYLPREDNISOLONE SODIUM SUCCINATE 40 MG: 40 INJECTION, POWDER, FOR SOLUTION INTRAMUSCULAR; INTRAVENOUS at 17:33

## 2024-03-31 RX ADMIN — PIPERACILLIN SODIUM AND TAZOBACTAM SODIUM 3.38 G: 3; .375 INJECTION, SOLUTION INTRAVENOUS at 00:01

## 2024-03-31 RX ADMIN — IPRATROPIUM BROMIDE AND ALBUTEROL SULFATE 3 ML: 2.5; .5 SOLUTION RESPIRATORY (INHALATION) at 18:49

## 2024-03-31 RX ADMIN — Medication 30 L/MIN: at 15:00

## 2024-03-31 RX ADMIN — GABAPENTIN 100 MG: 100 CAPSULE ORAL at 20:48

## 2024-03-31 RX ADMIN — PIPERACILLIN SODIUM AND TAZOBACTAM SODIUM 3.38 G: 3; .375 INJECTION, SOLUTION INTRAVENOUS at 12:11

## 2024-03-31 RX ADMIN — PIPERACILLIN SODIUM AND TAZOBACTAM SODIUM 3.38 G: 3; .375 INJECTION, SOLUTION INTRAVENOUS at 05:45

## 2024-03-31 RX ADMIN — PIPERACILLIN SODIUM AND TAZOBACTAM SODIUM 3.38 G: 3; .375 INJECTION, SOLUTION INTRAVENOUS at 17:21

## 2024-03-31 RX ADMIN — Medication 30 L/MIN: at 12:11

## 2024-03-31 RX ADMIN — AZITHROMYCIN MONOHYDRATE 500 MG: 500 INJECTION, POWDER, LYOPHILIZED, FOR SOLUTION INTRAVENOUS at 03:33

## 2024-03-31 RX ADMIN — ACETAMINOPHEN 650 MG: 325 TABLET ORAL at 17:21

## 2024-03-31 RX ADMIN — Medication 1 SPRAY: at 07:00

## 2024-03-31 ASSESSMENT — PAIN - FUNCTIONAL ASSESSMENT
PAIN_FUNCTIONAL_ASSESSMENT: 0-10

## 2024-03-31 ASSESSMENT — COGNITIVE AND FUNCTIONAL STATUS - GENERAL
WALKING IN HOSPITAL ROOM: A LITTLE
CLIMB 3 TO 5 STEPS WITH RAILING: A LITTLE
DAILY ACTIVITIY SCORE: 19
STANDING UP FROM CHAIR USING ARMS: A LITTLE
DRESSING REGULAR UPPER BODY CLOTHING: A LITTLE
MOBILITY SCORE: 20
MOVING TO AND FROM BED TO CHAIR: A LITTLE
TURNING FROM BACK TO SIDE WHILE IN FLAT BAD: A LITTLE
TOILETING: A LITTLE
PERSONAL GROOMING: A LITTLE
MOVING TO AND FROM BED TO CHAIR: A LITTLE
DRESSING REGULAR LOWER BODY CLOTHING: A LITTLE
HELP NEEDED FOR BATHING: A LITTLE

## 2024-03-31 ASSESSMENT — PAIN SCALES - GENERAL
PAINLEVEL_OUTOF10: 0 - NO PAIN

## 2024-03-31 NOTE — PROGRESS NOTES
"Akua Almanzar is a 89 y.o. female on day 2 of admission presenting with Pneumonia of right lung due to infectious organism, unspecified part of lung.    Subjective   The patient was seen and examined. On HFNC, down to 80% FiO2 from 100% yesterday. Complaints of severe headache overnight, Brain CT did not show any abnormalities.     Symptoms (0 - 10, Best to Worst)  [place] Symptom Assessment System  Pain Score: 0 - No pain  4    Objective     Physical Exam  Vitals and nursing note reviewed.   Constitutional:       Appearance: She is ill-appearing.   HENT:      Head: Normocephalic and atraumatic.      Mouth/Throat:      Mouth: Mucous membranes are moist.   Eyes:      General: No scleral icterus.     Extraocular Movements: Extraocular movements intact.      Pupils: Pupils are equal, round, and reactive to light.   Neck:      Vascular: No carotid bruit.   Cardiovascular:      Rate and Rhythm: Normal rate and regular rhythm.      Pulses: Normal pulses.      Heart sounds: Murmur heard.      No friction rub. No gallop.   Pulmonary:      Breath sounds: No wheezing, rhonchi or rales.   Abdominal:      General: Bowel sounds are normal.      Tenderness: There is no abdominal tenderness. There is no guarding or rebound.   Genitourinary:     Comments: Sevilla catheter draining yellow urine.  Musculoskeletal:         General: Tenderness present.      Cervical back: Neck supple. No rigidity or tenderness.      Right lower leg: Edema present.      Left lower leg: Edema present.      Comments: MAEx4   Lymphadenopathy:      Cervical: No cervical adenopathy.   Skin:     General: Skin is warm and dry.   Neurological:      Mental Status: She is oriented to person, place, and time.     Last Recorded Vitals  Blood pressure 123/68, pulse 81, temperature 36.6 °C (97.9 °F), temperature source Temporal, resp. rate 15, height 1.549 m (5' 1\"), weight 71.2 kg (156 lb 15.5 oz), SpO2 99 %.  Intake/Output last 3 Shifts:  I/O last 3 completed " shifts:  In: 1835 (25.6 mL/kg) [P.O.:360; IV Piggyback:1475]  Out: 2750 (38.3 mL/kg) [Urine:2750 (1.1 mL/kg/hr)]  Weight: 71.8 kg     Relevant Results  Results for orders placed or performed during the hospital encounter of 03/29/24 (from the past 24 hour(s))   Legionella Antigen, Urine    Specimen: Urine   Result Value Ref Range    L. pneumophila Urine Ag Negative Negative   Streptococcus pneumoniae Antigen, Urine    Specimen: Urine   Result Value Ref Range    Streptococcus pneumoniae Ag, Urine Negative Negative   MRSA Surveillance for Vancomycin De-escalation, PCR    Specimen: Anterior Nares; Swab   Result Value Ref Range    MRSA PCR Not Detected Not Detected   Blood Gas Arterial Full Panel   Result Value Ref Range    POCT pH, Arterial 7.35 (L) 7.38 - 7.42 pH    POCT pCO2, Arterial 76 (HH) 38 - 42 mm Hg    POCT pO2, Arterial 125 (H) 85 - 95 mm Hg    POCT SO2, Arterial 100 94 - 100 %    POCT Oxy Hemoglobin, Arterial 98.0 94.0 - 98.0 %    POCT Hematocrit Calculated, Arterial 33.0 (L) 36.0 - 46.0 %    POCT Sodium, Arterial 129 (L) 136 - 145 mmol/L    POCT Potassium, Arterial 4.3 3.5 - 5.3 mmol/L    POCT Chloride, Arterial 92 (L) 98 - 107 mmol/L    POCT Ionized Calcium, Arterial 1.22 1.10 - 1.33 mmol/L    POCT Glucose, Arterial 100 (H) 74 - 99 mg/dL    POCT Lactate, Arterial 0.5 0.4 - 2.0 mmol/L    POCT Base Excess, Arterial 13.6 (H) -2.0 - 3.0 mmol/L    POCT HCO3 Calculated, Arterial 42.0 (H) 22.0 - 26.0 mmol/L    POCT Hemoglobin, Arterial 11.0 (L) 12.0 - 16.0 g/dL    POCT Anion Gap, Arterial -1 (L) 10 - 25 mmo/L    Patient Temperature 37.0 degrees Celsius    FiO2 100 %    Ipap CMH2O 16.0 cm H2O    Epap CMH2O 8.0 cm H2O    Critical Called By MICHAEL     Critical Called To JERRY VARGAS     Critical Call Time 1112     Critical Read Back Y     Site of Arterial Puncture Radial Right     Willard's Test Positive    Comprehensive Metabolic Panel   Result Value Ref Range    Glucose 197 (H) 65 - 99 mg/dL    Sodium 136 133 - 145  mmol/L    Potassium 4.2 3.4 - 5.1 mmol/L    Chloride 91 (L) 97 - 107 mmol/L    Bicarbonate 39 (H) 24 - 31 mmol/L    Urea Nitrogen 15 8 - 25 mg/dL    Creatinine 0.60 0.40 - 1.60 mg/dL    eGFR 83 >60 mL/min/1.73m*2    Calcium 8.9 8.5 - 10.4 mg/dL    Albumin 3.3 (L) 3.5 - 5.0 g/dL    Alkaline Phosphatase 80 35 - 125 U/L    Total Protein 5.8 (L) 5.9 - 7.9 g/dL    AST 24 5 - 40 U/L    Bilirubin, Total 0.2 0.1 - 1.2 mg/dL    ALT 23 5 - 40 U/L    Anion Gap 6 <=19 mmol/L   CBC and Auto Differential   Result Value Ref Range    WBC 3.6 (L) 4.4 - 11.3 x10*3/uL    nRBC 0.0 0.0 - 0.0 /100 WBCs    RBC 3.67 (L) 4.00 - 5.20 x10*6/uL    Hemoglobin 10.4 (L) 12.0 - 16.0 g/dL    Hematocrit 33.4 (L) 36.0 - 46.0 %    MCV 91 80 - 100 fL    MCH 28.3 26.0 - 34.0 pg    MCHC 31.1 (L) 32.0 - 36.0 g/dL    RDW 14.4 11.5 - 14.5 %    Platelets 175 150 - 450 x10*3/uL    Neutrophils % 85.5 40.0 - 80.0 %    Immature Granulocytes %, Automated 0.6 0.0 - 0.9 %    Lymphocytes % 8.9 13.0 - 44.0 %    Monocytes % 5.0 2.0 - 10.0 %    Eosinophils % 0.0 0.0 - 6.0 %    Basophils % 0.0 0.0 - 2.0 %    Neutrophils Absolute 3.06 1.60 - 5.50 x10*3/uL    Immature Granulocytes Absolute, Automated 0.02 0.00 - 0.50 x10*3/uL    Lymphocytes Absolute 0.32 (L) 0.80 - 3.00 x10*3/uL    Monocytes Absolute 0.18 0.05 - 0.80 x10*3/uL    Eosinophils Absolute 0.00 0.00 - 0.40 x10*3/uL    Basophils Absolute 0.00 0.00 - 0.10 x10*3/uL      CT head wo IV contrast  Result Date: 3/30/2024  Cerebral atrophy and bilateral chronic white matter change.   No evidence of acute intracranial hemorrhage.   Peripheral mucosal thickening of partially imaged right maxillary sinus.   MACRO: None   Signed by: Bautista Ruelas 3/30/2024 8:36 PM Dictation workstation:   [id number]    XR chest 1 view  Result Date: 3/30/2024  Significant worsening of the perihilar and basilar edema when compared to prior study.   More conglomerate consolidation in the mid lungs both right and left.   Small bilateral  effusions.   Signed by: Spencer Hoskins 3/30/2024 8:11 AM Dictation workstation:   [id number]    Scheduled medications  aspirin, 81 mg, oral, Daily  clopidogrel, 75 mg, oral, Daily  enoxaparin, 40 mg, subcutaneous, Daily  furosemide, 40 mg, intravenous, BID  gabapentin, 100 mg, oral, Nightly  ipratropium-albuteroL, 3 mL, nebulization, 4x daily  levothyroxine, 88 mcg, oral, Daily  methylPREDNISolone sodium succinate (PF), 40 mg, intravenous, q8h  oxygen, , inhalation, Continuous - Inhalation  piperacillin-tazobactam, 3.375 g, intravenous, q6h  simvastatin, 20 mg, oral, Nightly  sodium chloride, 1 spray, Each Nostril, 4x daily      Continuous medications  oxygen,       PRN medications  PRN medications: acetaminophen, albuterol     Assessment/Plan   IMP:    Acute on chronic hypoxic and hypercapnic respiratory failure  -ABGs showing hypercapnia    - consider chest CT, d-dimer, US BLE  - HFNC at 80% FiO2.     2.   Pneumonia  - WBC WNL  - MRSA PCR, strep pneumoniae urine, L pneumophila urine, MAPCR all negative  - procalcitonin 0.15   - Blood cultures NGTD  - Currently on Zosyn  - Per Chest XR      3.   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 Left ventricular systolic function is normal.   2. Spectral Doppler shows an impaired relaxation pattern of left ventricular diastolic filling.   3. There is moderate concentric left ventricular hypertrophy.   4. The left atrium is moderately dilated.   5. The mitral valve is moderately thickened.   6. There is moderate mitral annular calcification.   7. There is moderate to severe calcification of the anterior and posterior mitral valve leaflets.   8. Mild to moderate tricuspid regurgitation.   9. Moderate to severely elevated right ventricular systolic pressure.   - ProBNP 1,489    4.   TME  - suspect r/t respiratory status     5.   Electrolyte imbalance  - hyponatremia   - resolved     6.   Anemia  - of chronic disease  -  H/H stable 10.4/33.4    7.   Palliative care   DNR CCA DNI  -The patient is a capable decision maker   - DPOA - HC filled out where Derek is primary and Anitra is secondary. This is in hard chart.   -The patient has 4 children.   Derek Almanzar - Westley Mayer - [facility] [place] - lives out of town  Ginger Almanzar - lives out of town  Derek and Anitra have been primary contacts.      3/31/2024  The patient respiratory status is slowly uptrending. Is now on HFNC, will switch Tylenol to PO. Palliative care will continue to follow.     3/30/2024  Family meeting held today. The patient does not want any aggressive measures. Code status reflects this. We did discuss the patient's wishes in the event of her respiratory status deteriorating she would just want to be kept comfortable.  The family is agreeable with this.  DPOA HC documents filled out and placed in hard chart.      The patient experiencing lower back and leg cramping. Tylenol routinely scheduled IV, will monitor the effectiveness of this.      Symptom Management  Pain: mild  Medications recommended for pain?  Yes  Tiredness: mild  Nausea: none  Depression: SILVIA  Anxiety: none  Drowsiness: mild  Appetite: SILVIA  Wellbeing: SILVIA  Dyspnea: moderate  Intervention recommended for dyspnea?  yes  Other: N/A   Intervention recommended for constipation?  NA        Patient/proxy preference for information  Prefers full information     Goals of Care  Conservative disease management and treatment      Other Palliative Support  Goals of care and pain support.     Symptom Management  Pain: mild - moderate  Medications recommended for pain?  Yes  Tiredness: mild  Nausea: no  Depression: no   Anxiety: mild  Drowsiness: mild  Appetite: fair  Wellbeing: SILVIA   Dyspnea: moderate - severe  Intervention recommended for dyspnea?  yes  Other: N/A  Intervention recommended for constipation?  Yes    Patient/proxy preference for information  Prefers full information    Goals of  Care  Conservative treatment model of care.    I spent 30 minutes in the professional and overall care of this patient.      Judith Bone, APRN-CNP

## 2024-03-31 NOTE — PROGRESS NOTES
"Subjective Data:      Overnight Events:         Objective Data:  Last Recorded Vitals:  Vitals:    03/31/24 0600 03/31/24 0715 03/31/24 0716 03/31/24 0822   BP: 123/68      Pulse: 81      Resp: 15      Temp:       TempSrc:       SpO2: 99% 99% 99%    Weight:    71.2 kg (156 lb 15.5 oz)   Height:           Last Labs:  CBC - 3/31/2024:  6:10 AM  3.6 10.4 175    33.4      CMP - 3/31/2024:  6:10 AM  8.9 5.8 24 --- 0.2   _ 3.3 23 80      PTT - No results in last year.  _   _ _     No results found for: \"TROPHS\", \"BNP\", \"HGBA1C\", \"LDLCALC\", \"VLDL\"   Last I/O:  I/O last 3 completed shifts:  In: 1835 (25.6 mL/kg) [P.O.:360; IV Piggyback:1475]  Out: 2750 (38.3 mL/kg) [Urine:2750 (1.1 mL/kg/hr)]  Weight: 71.8 kg     Past Cardiology Tests (Last 3 Years):  EKG:  ECG 12 Lead 03/29/2024 (Preliminary)      ECG 12 lead 03/01/2024    Echo:  Transthoracic Echo (TTE) Complete 03/01/2024    Ejection Fractions:  EF   Date/Time Value Ref Range Status   03/01/2024 02:09 PM 56 %      Cath:  No results found for this or any previous visit from the past 1095 days.    Stress Test:  No results found for this or any previous visit from the past 1095 days.    Cardiac Imaging:  No results found for this or any previous visit from the past 1095 days.      Inpatient Medications:  Scheduled medications   Medication Dose Route Frequency    [Held by provider] aspirin  81 mg oral Daily    azithromycin  500 mg intravenous q24h    [Held by provider] clopidogrel  75 mg oral Daily    [Held by provider] enoxaparin  40 mg subcutaneous Daily    furosemide  40 mg intravenous BID    gabapentin  100 mg oral Nightly    ipratropium-albuteroL  3 mL nebulization 4x daily    levothyroxine  88 mcg oral Daily    methylPREDNISolone sodium succinate (PF)  40 mg intravenous q8h    oxygen   inhalation Continuous - Inhalation    piperacillin-tazobactam  3.375 g intravenous q6h    simvastatin  20 mg oral Nightly    sodium chloride  1 spray Each Nostril 4x daily     PRN " medications   Medication    acetaminophen    albuterol     Continuous Medications   Medication Dose Last Rate    oxygen           Physical Exam:  The patient is an upper elderly white female lying in bed with a BiPAP mask on.  She is awake and alert with family members visiting  JVP not elevated carotid impulses 2+  Chest with shallow air movement breath sounds are diminished  Cardiac rhythm is regular without any premature beats S1-S2 normal minimal systolic murmur  Abdomen is soft and benign  Modest lower extremity edema     Assessment/Plan     3/30: This patient is a 89-year-old white female with a history of COPD requiring nasal oxygen with pulmonary hypertension as well. She did undergo a transcatheter aortic valve replacement 4/9/2021 for severe aortic valvular stenosis. She was recently admitted to [facility] from 3/1/2024 - 3/7/2024 with increased shortness of breath thought to be related to CHF diastolic variety. Her echocardiogram at that time showed a preserved LV ejection fraction and normally functioning TAVR are but moderate mitral valve stenosis and moderately severe pulmonary hypertension estimated PA systolic pressure 59 mmHg. She is currently admitted with recurrent shortness of breath and required transfer to ICU due to worsening respiratory distress and CO2 retention. She has been placed on continuous BiPAP with clinical improvement. Her hemodynamic parameters remain stable. Telemetry monitor shows sinus rhythm with left bundle branch block conduction delay. Her proBNP surprisingly is only modestly elevated despite the significant abnormalities on chest x-ray. For now she will be treated empirically with Lasix 40 mg IV twice daily. Empiric antibiotic therapy has also been instituted including vancomycin, Zosyn and azithromycin. Patient being seen by pulmonology. Patient receiving DuoNebs and IV Solu-Medrol as well. The patient and family aware of guarded prognosis.     3/31: The patient  is awake alert and conversant.  She has been transition to a high flow O2 nasal cannula 30 L/min 80% FiO2.  No respiratory distress with satisfactory O2 saturations.  Telemetry monitor shows a borderline sinus tachycardia at 100/min.  The comprehensive metabolic panel includes a creatinine of 0.6 potassium of 4.2.  Will check repeat chest x-ray tomorrow.  Head CT from yesterday showed only cerebral atrophy and bilateral chronic white matter change.  Input output negative by 915 cc yesterday.  Will continue the empiric IV Lasix.  Question the functional significance of the patient's mitral valve stenosis by echo.  The proBNP was only modestly elevated and patient remains currently on IV Rocephin for suspected pneumonia.        Peripheral IV 03/29/24 20 G Right;Anterior Forearm (Active)   Site Assessment Clean;Dry;Intact 03/31/24 0400   Dressing Status Clean;Dry;Occlusive 03/31/24 0400   Number of days: 2       Urethral Catheter (Active)   Site Assessment Clean;Skin intact 03/31/24 0800   Collection Container Standard drainage bag 03/31/24 0800   Securement Method Securing device (Describe) 03/31/24 0800   Reason for Continuing Urinary Catheterization accurate hourly measurement of urine volume in a critically ill patient that cannot be assessed by other volumes and urine collection strategies 03/31/24 0800   Output (mL) 150 mL 03/31/24 0800   Number of days: 1       Code Status:  DNR and No Intubation    I spent  minutes in the professional and overall care of this patient.        Jeffrey Holguin MD

## 2024-03-31 NOTE — PROGRESS NOTES
"Akua Almanzar is a 89 y.o. female on day 2 of admission presenting with Pneumonia of right lung due to infectious organism, unspecified part of lung.    Subjective   Pt is currently requiring airvo. Denies pain.        Objective     Physical Exam  Vitals and nursing note reviewed.   Constitutional:       Appearance: She is ill-appearing. She is not toxic-appearing.   HENT:      Head: Normocephalic and atraumatic.      Nose: Nose normal.      Mouth/Throat:      Mouth: Mucous membranes are moist.   Eyes:      Extraocular Movements: Extraocular movements intact.      Conjunctiva/sclera: Conjunctivae normal.      Pupils: Pupils are equal, round, and reactive to light.   Cardiovascular:      Rate and Rhythm: Normal rate and regular rhythm.      Heart sounds: Murmur heard.      No gallop.   Pulmonary:      Breath sounds: Wheezing and rhonchi present. No rales.      Comments: Poor aeration b/l, + conversational dyspnea  Abdominal:      General: Abdomen is flat. Bowel sounds are normal. There is no distension.      Palpations: Abdomen is soft. There is no mass.      Tenderness: There is no abdominal tenderness.   Musculoskeletal:         General: No tenderness. Normal range of motion.      Cervical back: Normal range of motion and neck supple.      Right lower leg: Edema present.      Left lower leg: Edema present.   Skin:     General: Skin is warm and dry.   Neurological:      Mental Status: She is alert.      Motor: Weakness present.      Comments: Aox2, more alert, but still confused   Psychiatric:         Mood and Affect: Mood normal.         Behavior: Behavior normal.         Thought Content: Thought content normal.         Judgment: Judgment normal.         Last Recorded Vitals:  /68   Pulse 81   Temp 36.6 °C (97.9 °F) (Temporal)   Resp 15   Ht 1.549 m (5' 1\")   Wt 71.2 kg (156 lb 15.5 oz)   SpO2 99%   BMI 29.66 kg/m²      Scheduled medications:  [Held by provider] aspirin, 81 mg, oral, " Daily  azithromycin, 500 mg, intravenous, q24h  [Held by provider] clopidogrel, 75 mg, oral, Daily  [Held by provider] enoxaparin, 40 mg, subcutaneous, Daily  furosemide, 40 mg, intravenous, BID  gabapentin, 100 mg, oral, Nightly  ipratropium-albuteroL, 3 mL, nebulization, 4x daily  levothyroxine, 88 mcg, oral, Daily  methylPREDNISolone sodium succinate (PF), 40 mg, intravenous, q8h  oxygen, , inhalation, Continuous - Inhalation  piperacillin-tazobactam, 3.375 g, intravenous, q6h  simvastatin, 20 mg, oral, Nightly  sodium chloride, 1 spray, Each Nostril, 4x daily      Continuous medications:  oxygen,       PRN medications:  PRN medications: acetaminophen, albuterol     Relevant Results:  Results for orders placed or performed during the hospital encounter of 03/29/24 (from the past 24 hour(s))   MRSA Surveillance for Vancomycin De-escalation, PCR    Specimen: Anterior Nares; Swab   Result Value Ref Range    MRSA PCR Not Detected Not Detected   Blood Gas Arterial Full Panel   Result Value Ref Range    POCT pH, Arterial 7.35 (L) 7.38 - 7.42 pH    POCT pCO2, Arterial 76 (HH) 38 - 42 mm Hg    POCT pO2, Arterial 125 (H) 85 - 95 mm Hg    POCT SO2, Arterial 100 94 - 100 %    POCT Oxy Hemoglobin, Arterial 98.0 94.0 - 98.0 %    POCT Hematocrit Calculated, Arterial 33.0 (L) 36.0 - 46.0 %    POCT Sodium, Arterial 129 (L) 136 - 145 mmol/L    POCT Potassium, Arterial 4.3 3.5 - 5.3 mmol/L    POCT Chloride, Arterial 92 (L) 98 - 107 mmol/L    POCT Ionized Calcium, Arterial 1.22 1.10 - 1.33 mmol/L    POCT Glucose, Arterial 100 (H) 74 - 99 mg/dL    POCT Lactate, Arterial 0.5 0.4 - 2.0 mmol/L    POCT Base Excess, Arterial 13.6 (H) -2.0 - 3.0 mmol/L    POCT HCO3 Calculated, Arterial 42.0 (H) 22.0 - 26.0 mmol/L    POCT Hemoglobin, Arterial 11.0 (L) 12.0 - 16.0 g/dL    POCT Anion Gap, Arterial -1 (L) 10 - 25 mmo/L    Patient Temperature 37.0 degrees Celsius    FiO2 100 %    Ipap CMH2O 16.0 cm H2O    Epap CMH2O 8.0 cm H2O    Critical  Called By MICHAEL     Critical Called To JERRY VARGAS     Critical Call Time 1112     Critical Read Back Y     Site of Arterial Puncture Radial Right     Willard's Test Positive    Comprehensive Metabolic Panel   Result Value Ref Range    Glucose 197 (H) 65 - 99 mg/dL    Sodium 136 133 - 145 mmol/L    Potassium 4.2 3.4 - 5.1 mmol/L    Chloride 91 (L) 97 - 107 mmol/L    Bicarbonate 39 (H) 24 - 31 mmol/L    Urea Nitrogen 15 8 - 25 mg/dL    Creatinine 0.60 0.40 - 1.60 mg/dL    eGFR 83 >60 mL/min/1.73m*2    Calcium 8.9 8.5 - 10.4 mg/dL    Albumin 3.3 (L) 3.5 - 5.0 g/dL    Alkaline Phosphatase 80 35 - 125 U/L    Total Protein 5.8 (L) 5.9 - 7.9 g/dL    AST 24 5 - 40 U/L    Bilirubin, Total 0.2 0.1 - 1.2 mg/dL    ALT 23 5 - 40 U/L    Anion Gap 6 <=19 mmol/L   CBC and Auto Differential   Result Value Ref Range    WBC 3.6 (L) 4.4 - 11.3 x10*3/uL    nRBC 0.0 0.0 - 0.0 /100 WBCs    RBC 3.67 (L) 4.00 - 5.20 x10*6/uL    Hemoglobin 10.4 (L) 12.0 - 16.0 g/dL    Hematocrit 33.4 (L) 36.0 - 46.0 %    MCV 91 80 - 100 fL    MCH 28.3 26.0 - 34.0 pg    MCHC 31.1 (L) 32.0 - 36.0 g/dL    RDW 14.4 11.5 - 14.5 %    Platelets 175 150 - 450 x10*3/uL    Neutrophils % 85.5 40.0 - 80.0 %    Immature Granulocytes %, Automated 0.6 0.0 - 0.9 %    Lymphocytes % 8.9 13.0 - 44.0 %    Monocytes % 5.0 2.0 - 10.0 %    Eosinophils % 0.0 0.0 - 6.0 %    Basophils % 0.0 0.0 - 2.0 %    Neutrophils Absolute 3.06 1.60 - 5.50 x10*3/uL    Immature Granulocytes Absolute, Automated 0.02 0.00 - 0.50 x10*3/uL    Lymphocytes Absolute 0.32 (L) 0.80 - 3.00 x10*3/uL    Monocytes Absolute 0.18 0.05 - 0.80 x10*3/uL    Eosinophils Absolute 0.00 0.00 - 0.40 x10*3/uL    Basophils Absolute 0.00 0.00 - 0.10 x10*3/uL       XR chest 1 view    Result Date: 3/30/2024  Interpreted By:  Spencer Hoskins, STUDY: XR CHEST 1 VIEW   INDICATION: Signs/Symptoms:Hypoxia/SOB.   COMPARISON: March 29, 2024   ACCESSION NUMBER(S): [id number]   ORDERING CLINICIAN: DORETHA MADDEN   FINDINGS: Significant  Number Of Patches (Maximum Allowable Per Dos By Cms Is 90): 1 worsening of the perihilar and basilar edema when compared to prior study.   More conglomerate consolidation in the mid lungs both right and left.   Small bilateral effusions.       Significant worsening of the perihilar and basilar edema when compared to prior study.   More conglomerate consolidation in the mid lungs both right and left.   Small bilateral effusions.   Signed by: Spencer Hoskins 3/30/2024 8:11 AM Dictation workstation:   [id number]    ECG 12 Lead    Result Date: 3/29/2024   Poor data quality, interpretation may be adversely affected Normal sinus rhythm Possible Left atrial enlargement Left bundle branch block Abnormal ECG When compared with ECG of 01-MAR-2024 00:06, No significant change was found    XR chest 2 views    Result Date: 3/29/2024  Interpreted By:  Carlo Chan, STUDY: XR CHEST 2 VIEWS;  3/29/2024 3:37 am   INDICATION: Signs/Symptoms:sob.   COMPARISON: 02/29/2024   ACCESSION NUMBER(S): [id number]   ORDERING CLINICIAN: ANALI AGUILAR   FINDINGS:     Increasing patchy right basilar airspace opacity. Similar severe cardiomegaly. No pneumothorax. Similar endovascular aortic valve prosthesis. Similar severe diffuse interstitial opacity. Upper abdomen is unremarkable. Diffuse osteopenia. No acute osseous abnormality.       1. Increasing patchy right basilar airspace opacity. Consider pneumonia. 2. Similar severe diffuse interstitial opacity, likely pulmonary edema. 3. Stable cardiomegaly.   Signed by: Carlo Chan 3/29/2024 3:53 AM Dictation workstation:   [id number]            Assessment/Plan   Principal Problem:    Pneumonia of right lung due to infectious organism, unspecified part of lung  Active Problems:    Atherosclerosis of coronary artery without angina pectoris    Hyperlipidemia    Primary hypertension    Acquired hypothyroidism    Acute on chronic diastolic (congestive) heart failure (CMS/HCC)    Acute on chronic respiratory failure (CMS/HCC)    Acute on chronic  Detail Level: Zone hypoxic respiratory failure 2/2 gram- pneumonia, HFpEF exacerbation  - on airvo 30L 80%, wean as tolerated  - zosyn  - dc Vanc as MRSA swab is neg  - dc AZT as legionella is neg  - cardio following  - duoneb  - IV solumedrol  - IV lasix  - procal pending  - pulm following    Acute metabolic encephalopathy 2/2 infectious  Improved but confusion still appreciated  - treat as above    HypoNa  resolved    Normocytic anemia  - monitor    HTN  BP improved off meds  - hold norvasc, coreg    HLD  - statin  - ASA  - plavix    Neuropathy  - gabapentin    Hypothyroid  - synthroid    Diet: cardiac  Code status: DNR CCA/DNI, palliative following  Dispo: monitor clinically, wean O2         Jody Lucero MD  Hospitalist     Consent: Written consent obtained, risks reviewed including but not limited to rash, itching, allergic reaction, systemic rash, remote possiblity of anaphylaxis to allergen. Post-Care Instructions: I reviewed with the patient in detail post-care instructions. Patient should not sweat, pick at, or get the patches wet for 48 hours.

## 2024-04-01 LAB
ALBUMIN SERPL-MCNC: 3.2 G/DL (ref 3.5–5)
ALP BLD-CCNC: 77 U/L (ref 35–125)
ALT SERPL-CCNC: 21 U/L (ref 5–40)
ANION GAP SERPL CALC-SCNC: 7 MMOL/L
AST SERPL-CCNC: 20 U/L (ref 5–40)
BASOPHILS # BLD AUTO: 0 X10*3/UL (ref 0–0.1)
BASOPHILS NFR BLD AUTO: 0 %
BILIRUB SERPL-MCNC: 0.3 MG/DL (ref 0.1–1.2)
BUN SERPL-MCNC: 15 MG/DL (ref 8–25)
CALCIUM SERPL-MCNC: 9.3 MG/DL (ref 8.5–10.4)
CHLORIDE SERPL-SCNC: 92 MMOL/L (ref 97–107)
CO2 SERPL-SCNC: 41 MMOL/L (ref 24–31)
CREAT SERPL-MCNC: 0.7 MG/DL (ref 0.4–1.6)
EGFRCR SERPLBLD CKD-EPI 2021: 80 ML/MIN/1.73M*2
EOSINOPHIL # BLD AUTO: 0 X10*3/UL (ref 0–0.4)
EOSINOPHIL NFR BLD AUTO: 0 %
ERYTHROCYTE [DISTWIDTH] IN BLOOD BY AUTOMATED COUNT: 14.5 % (ref 11.5–14.5)
GLUCOSE SERPL-MCNC: 139 MG/DL (ref 65–99)
HCT VFR BLD AUTO: 35.9 % (ref 36–46)
HGB BLD-MCNC: 11.1 G/DL (ref 12–16)
IMM GRANULOCYTES # BLD AUTO: 0.02 X10*3/UL (ref 0–0.5)
IMM GRANULOCYTES NFR BLD AUTO: 0.5 % (ref 0–0.9)
LYMPHOCYTES # BLD AUTO: 0.43 X10*3/UL (ref 0.8–3)
LYMPHOCYTES NFR BLD AUTO: 11.8 %
MCH RBC QN AUTO: 28.2 PG (ref 26–34)
MCHC RBC AUTO-ENTMCNC: 30.9 G/DL (ref 32–36)
MCV RBC AUTO: 91 FL (ref 80–100)
MONOCYTES # BLD AUTO: 0.23 X10*3/UL (ref 0.05–0.8)
MONOCYTES NFR BLD AUTO: 6.3 %
NEUTROPHILS # BLD AUTO: 2.96 X10*3/UL (ref 1.6–5.5)
NEUTROPHILS NFR BLD AUTO: 81.4 %
NRBC BLD-RTO: 0 /100 WBCS (ref 0–0)
PLATELET # BLD AUTO: 197 X10*3/UL (ref 150–450)
POTASSIUM SERPL-SCNC: 4.3 MMOL/L (ref 3.4–5.1)
PROT SERPL-MCNC: 6 G/DL (ref 5.9–7.9)
RBC # BLD AUTO: 3.94 X10*6/UL (ref 4–5.2)
SODIUM SERPL-SCNC: 140 MMOL/L (ref 133–145)
WBC # BLD AUTO: 3.6 X10*3/UL (ref 4.4–11.3)

## 2024-04-01 PROCEDURE — 99232 SBSQ HOSP IP/OBS MODERATE 35: CPT | Performed by: NURSE PRACTITIONER

## 2024-04-01 PROCEDURE — 2020000001 HC ICU ROOM DAILY

## 2024-04-01 PROCEDURE — 94660 CPAP INITIATION&MGMT: CPT

## 2024-04-01 PROCEDURE — 2500000001 HC RX 250 WO HCPCS SELF ADMINISTERED DRUGS (ALT 637 FOR MEDICARE OP): Performed by: INTERNAL MEDICINE

## 2024-04-01 PROCEDURE — 2500000002 HC RX 250 W HCPCS SELF ADMINISTERED DRUGS (ALT 637 FOR MEDICARE OP, ALT 636 FOR OP/ED): Mod: MUE | Performed by: STUDENT IN AN ORGANIZED HEALTH CARE EDUCATION/TRAINING PROGRAM

## 2024-04-01 PROCEDURE — 9420000001 HC RT PATIENT EDUCATION 5 MIN

## 2024-04-01 PROCEDURE — 80053 COMPREHEN METABOLIC PANEL: CPT | Performed by: STUDENT IN AN ORGANIZED HEALTH CARE EDUCATION/TRAINING PROGRAM

## 2024-04-01 PROCEDURE — 2500000002 HC RX 250 W HCPCS SELF ADMINISTERED DRUGS (ALT 637 FOR MEDICARE OP, ALT 636 FOR OP/ED): Mod: MUE | Performed by: INTERNAL MEDICINE

## 2024-04-01 PROCEDURE — 2500000002 HC RX 250 W HCPCS SELF ADMINISTERED DRUGS (ALT 637 FOR MEDICARE OP, ALT 636 FOR OP/ED): Performed by: INTERNAL MEDICINE

## 2024-04-01 PROCEDURE — 99232 SBSQ HOSP IP/OBS MODERATE 35: CPT | Performed by: INTERNAL MEDICINE

## 2024-04-01 PROCEDURE — 85025 COMPLETE CBC W/AUTO DIFF WBC: CPT | Performed by: STUDENT IN AN ORGANIZED HEALTH CARE EDUCATION/TRAINING PROGRAM

## 2024-04-01 PROCEDURE — 51702 INSERT TEMP BLADDER CATH: CPT

## 2024-04-01 PROCEDURE — 2500000002 HC RX 250 W HCPCS SELF ADMINISTERED DRUGS (ALT 637 FOR MEDICARE OP, ALT 636 FOR OP/ED): Performed by: STUDENT IN AN ORGANIZED HEALTH CARE EDUCATION/TRAINING PROGRAM

## 2024-04-01 PROCEDURE — 2500000005 HC RX 250 GENERAL PHARMACY W/O HCPCS: Performed by: STUDENT IN AN ORGANIZED HEALTH CARE EDUCATION/TRAINING PROGRAM

## 2024-04-01 PROCEDURE — 97535 SELF CARE MNGMENT TRAINING: CPT | Mod: GO,CO

## 2024-04-01 PROCEDURE — 2500000001 HC RX 250 WO HCPCS SELF ADMINISTERED DRUGS (ALT 637 FOR MEDICARE OP): Performed by: STUDENT IN AN ORGANIZED HEALTH CARE EDUCATION/TRAINING PROGRAM

## 2024-04-01 PROCEDURE — 2500000004 HC RX 250 GENERAL PHARMACY W/ HCPCS (ALT 636 FOR OP/ED): Performed by: HOSPITALIST

## 2024-04-01 PROCEDURE — 94640 AIRWAY INHALATION TREATMENT: CPT

## 2024-04-01 PROCEDURE — 36415 COLL VENOUS BLD VENIPUNCTURE: CPT | Performed by: STUDENT IN AN ORGANIZED HEALTH CARE EDUCATION/TRAINING PROGRAM

## 2024-04-01 PROCEDURE — 97530 THERAPEUTIC ACTIVITIES: CPT | Mod: GO,CO

## 2024-04-01 PROCEDURE — 2500000004 HC RX 250 GENERAL PHARMACY W/ HCPCS (ALT 636 FOR OP/ED): Performed by: INTERNAL MEDICINE

## 2024-04-01 PROCEDURE — 2500000004 HC RX 250 GENERAL PHARMACY W/ HCPCS (ALT 636 FOR OP/ED): Performed by: STUDENT IN AN ORGANIZED HEALTH CARE EDUCATION/TRAINING PROGRAM

## 2024-04-01 RX ORDER — FUROSEMIDE 40 MG/1
40 TABLET ORAL
Status: DISCONTINUED | OUTPATIENT
Start: 2024-04-01 | End: 2024-04-03

## 2024-04-01 RX ADMIN — PIPERACILLIN SODIUM AND TAZOBACTAM SODIUM 3.38 G: 3; .375 INJECTION, SOLUTION INTRAVENOUS at 17:30

## 2024-04-01 RX ADMIN — FUROSEMIDE 40 MG: 40 TABLET ORAL at 09:49

## 2024-04-01 RX ADMIN — LEVOTHYROXINE SODIUM 88 MCG: 0.09 TABLET ORAL at 05:51

## 2024-04-01 RX ADMIN — METHYLPREDNISOLONE SODIUM SUCCINATE 20 MG: 40 INJECTION, POWDER, FOR SOLUTION INTRAMUSCULAR; INTRAVENOUS at 17:29

## 2024-04-01 RX ADMIN — Medication: at 11:00

## 2024-04-01 RX ADMIN — SIMVASTATIN 20 MG: 20 TABLET, FILM COATED ORAL at 20:31

## 2024-04-01 RX ADMIN — IPRATROPIUM BROMIDE AND ALBUTEROL SULFATE 3 ML: 2.5; .5 SOLUTION RESPIRATORY (INHALATION) at 19:32

## 2024-04-01 RX ADMIN — PIPERACILLIN SODIUM AND TAZOBACTAM SODIUM 3.38 G: 3; .375 INJECTION, SOLUTION INTRAVENOUS at 05:51

## 2024-04-01 RX ADMIN — PIPERACILLIN SODIUM AND TAZOBACTAM SODIUM 3.38 G: 3; .375 INJECTION, SOLUTION INTRAVENOUS at 00:18

## 2024-04-01 RX ADMIN — ACETAMINOPHEN 650 MG: 325 TABLET ORAL at 12:14

## 2024-04-01 RX ADMIN — CLOPIDOGREL BISULFATE 75 MG: 75 TABLET ORAL at 09:49

## 2024-04-01 RX ADMIN — Medication 30 L/MIN: at 06:00

## 2024-04-01 RX ADMIN — IPRATROPIUM BROMIDE AND ALBUTEROL SULFATE 3 ML: 2.5; .5 SOLUTION RESPIRATORY (INHALATION) at 07:17

## 2024-04-01 RX ADMIN — Medication 30 L/MIN: at 23:00

## 2024-04-01 RX ADMIN — Medication: at 15:00

## 2024-04-01 RX ADMIN — FUROSEMIDE 40 MG: 40 TABLET ORAL at 17:30

## 2024-04-01 RX ADMIN — ACETAMINOPHEN 650 MG: 325 TABLET ORAL at 05:51

## 2024-04-01 RX ADMIN — Medication 1 SPRAY: at 20:30

## 2024-04-01 RX ADMIN — GABAPENTIN 100 MG: 100 CAPSULE ORAL at 20:31

## 2024-04-01 RX ADMIN — PIPERACILLIN SODIUM AND TAZOBACTAM SODIUM 3.38 G: 3; .375 INJECTION, SOLUTION INTRAVENOUS at 12:14

## 2024-04-01 RX ADMIN — IPRATROPIUM BROMIDE AND ALBUTEROL SULFATE 3 ML: 2.5; .5 SOLUTION RESPIRATORY (INHALATION) at 12:48

## 2024-04-01 RX ADMIN — ASPIRIN 81 MG: 81 TABLET, COATED ORAL at 09:49

## 2024-04-01 RX ADMIN — IPRATROPIUM BROMIDE AND ALBUTEROL SULFATE 3 ML: 2.5; .5 SOLUTION RESPIRATORY (INHALATION) at 16:04

## 2024-04-01 RX ADMIN — Medication 30 L/MIN: at 19:00

## 2024-04-01 RX ADMIN — METHYLPREDNISOLONE SODIUM SUCCINATE 40 MG: 40 INJECTION, POWDER, FOR SOLUTION INTRAMUSCULAR; INTRAVENOUS at 01:47

## 2024-04-01 RX ADMIN — ENOXAPARIN SODIUM 40 MG: 40 INJECTION SUBCUTANEOUS at 05:51

## 2024-04-01 RX ADMIN — Medication: at 07:00

## 2024-04-01 RX ADMIN — ACETAMINOPHEN 650 MG: 325 TABLET ORAL at 17:30

## 2024-04-01 RX ADMIN — METHYLPREDNISOLONE SODIUM SUCCINATE 20 MG: 40 INJECTION, POWDER, FOR SOLUTION INTRAMUSCULAR; INTRAVENOUS at 09:50

## 2024-04-01 SDOH — SOCIAL STABILITY: SOCIAL NETWORK: HOW OFTEN DO YOU ATTENT MEETINGS OF THE CLUB OR ORGANIZATION YOU BELONG TO?: NEVER

## 2024-04-01 SDOH — ECONOMIC STABILITY: FOOD INSECURITY: HOW HARD IS IT FOR YOU TO PAY FOR THE VERY BASICS LIKE FOOD, HOUSING, MEDICAL CARE, AND HEATING?: NOT HARD AT ALL

## 2024-04-01 SDOH — ECONOMIC STABILITY: FOOD INSECURITY: WITHIN THE PAST 12 MONTHS, YOU WORRIED THAT YOUR FOOD WOULD RUN OUT BEFORE YOU GOT MONEY TO BUY MORE.: NEVER TRUE

## 2024-04-01 SDOH — HEALTH STABILITY: MENTAL HEALTH: HOW OFTEN DO YOU HAVE A DRINK CONTAINING ALCOHOL?: NEVER

## 2024-04-01 SDOH — SOCIAL STABILITY: SOCIAL INSECURITY: WITHIN THE LAST YEAR, HAVE YOU BEEN AFRAID OF YOUR PARTNER OR EX-PARTNER?: NO

## 2024-04-01 SDOH — HEALTH STABILITY: PHYSICAL HEALTH: ON AVERAGE, HOW MANY DAYS PER WEEK DO YOU ENGAGE IN MODERATE TO STRENUOUS EXERCISE (LIKE A BRISK WALK)?: 0 DAYS

## 2024-04-01 SDOH — HEALTH STABILITY: MENTAL HEALTH: HOW MANY DRINKS CONTAINING ALCOHOL DO YOU HAVE ON A TYPICAL DAY WHEN YOU ARE DRINKING?: PATIENT DOES NOT DRINK

## 2024-04-01 SDOH — ECONOMIC STABILITY: FOOD INSECURITY: WITHIN THE PAST 12 MONTHS, THE FOOD YOU BOUGHT JUST DIDN'T LAST AND YOU DIDN'T HAVE MONEY TO GET MORE.: NEVER TRUE

## 2024-04-01 SDOH — SOCIAL STABILITY: SOCIAL NETWORK: ARE YOU MARRIED, WIDOWED, DIVORCED, SEPARATED, NEVER MARRIED, OR LIVING WITH A PARTNER?: WIDOWED

## 2024-04-01 SDOH — SOCIAL STABILITY: SOCIAL NETWORK: HOW OFTEN DO YOU GET TOGETHER WITH FRIENDS OR RELATIVES?: MORE THAN THREE TIMES A WEEK

## 2024-04-01 SDOH — HEALTH STABILITY: MENTAL HEALTH: HOW OFTEN DO YOU HAVE 6 OR MORE DRINKS ON ONE OCCASION?: NEVER

## 2024-04-01 SDOH — HEALTH STABILITY: MENTAL HEALTH: HOW MANY STANDARD DRINKS CONTAINING ALCOHOL DO YOU HAVE ON A TYPICAL DAY?: PATIENT DOES NOT DRINK

## 2024-04-01 SDOH — SOCIAL STABILITY: SOCIAL INSECURITY: WITHIN THE LAST YEAR, HAVE YOU BEEN HUMILIATED OR EMOTIONALLY ABUSED IN OTHER WAYS BY YOUR PARTNER OR EX-PARTNER?: NO

## 2024-04-01 SDOH — HEALTH STABILITY: PHYSICAL HEALTH: ON AVERAGE, HOW MANY MINUTES DO YOU ENGAGE IN EXERCISE AT THIS LEVEL?: 0 MIN

## 2024-04-01 SDOH — ECONOMIC STABILITY: HOUSING INSECURITY: IN THE LAST 12 MONTHS, WAS THERE A TIME WHEN YOU WERE NOT ABLE TO PAY THE MORTGAGE OR RENT ON TIME?: NO

## 2024-04-01 SDOH — SOCIAL STABILITY: SOCIAL NETWORK: HOW OFTEN DO YOU ATTEND MEETINGS OF THE CLUBS OR ORGANIZATIONS YOU BELONG TO?: NEVER

## 2024-04-01 SDOH — SOCIAL STABILITY: SOCIAL NETWORK: HOW OFTEN DO YOU ATTEND CHURCH OR RELIGIOUS SERVICES?: NEVER

## 2024-04-01 SDOH — ECONOMIC STABILITY: INCOME INSECURITY: IN THE LAST 12 MONTHS, WAS THERE A TIME WHEN YOU WERE NOT ABLE TO PAY THE MORTGAGE OR RENT ON TIME?: NO

## 2024-04-01 SDOH — SOCIAL STABILITY: SOCIAL INSECURITY: ARE YOU MARRIED, WIDOWED, DIVORCED, SEPARATED, NEVER MARRIED, OR LIVING WITH A PARTNER?: WIDOWED

## 2024-04-01 SDOH — ECONOMIC STABILITY: INCOME INSECURITY: HOW HARD IS IT FOR YOU TO PAY FOR THE VERY BASICS LIKE FOOD, HOUSING, MEDICAL CARE, AND HEATING?: NOT HARD AT ALL

## 2024-04-01 SDOH — ECONOMIC STABILITY: TRANSPORTATION INSECURITY: IN THE PAST 12 MONTHS, HAS LACK OF TRANSPORTATION KEPT YOU FROM MEDICAL APPOINTMENTS OR FROM GETTING MEDICATIONS?: NO

## 2024-04-01 SDOH — ECONOMIC STABILITY: FOOD INSECURITY: WITHIN THE PAST 12 MONTHS, YOU WORRIED THAT YOUR FOOD WOULD RUN OUT BEFORE YOU GOT THE MONEY TO BUY MORE.: NEVER TRUE

## 2024-04-01 SDOH — ECONOMIC STABILITY: HOUSING INSECURITY: IN THE LAST 12 MONTHS, HOW MANY PLACES HAVE YOU LIVED?: 2

## 2024-04-01 SDOH — HEALTH STABILITY: MENTAL HEALTH: HOW OFTEN DO YOU HAVE SIX OR MORE DRINKS ON ONE OCCASION?: NEVER

## 2024-04-01 SDOH — ECONOMIC STABILITY: INCOME INSECURITY: IN THE PAST 12 MONTHS HAS THE ELECTRIC, GAS, OIL, OR WATER COMPANY THREATENED TO SHUT OFF SERVICES IN YOUR HOME?: NO

## 2024-04-01 SDOH — ECONOMIC STABILITY: INCOME INSECURITY: IN THE PAST 12 MONTHS, HAS THE ELECTRIC, GAS, OIL, OR WATER COMPANY THREATENED TO SHUT OFF SERVICE IN YOUR HOME?: NO

## 2024-04-01 ASSESSMENT — ACTIVITIES OF DAILY LIVING (ADL)
LACK_OF_TRANSPORTATION: NO
HOME_MANAGEMENT_TIME_ENTRY: 30
LACK_OF_TRANSPORTATION: NO

## 2024-04-01 ASSESSMENT — PAIN SCALES - GENERAL
PAINLEVEL_OUTOF10: 0 - NO PAIN
PAINLEVEL_OUTOF10: 0 - NO PAIN
PAINLEVEL_OUTOF10: 2
PAINLEVEL_OUTOF10: 0 - NO PAIN

## 2024-04-01 ASSESSMENT — LIFESTYLE VARIABLES
SKIP TO QUESTIONS 9-10: 1
AUDIT-C TOTAL SCORE: 0

## 2024-04-01 ASSESSMENT — PAIN - FUNCTIONAL ASSESSMENT
PAIN_FUNCTIONAL_ASSESSMENT: 0-10

## 2024-04-01 ASSESSMENT — COGNITIVE AND FUNCTIONAL STATUS - GENERAL
TOILETING: A LITTLE
HELP NEEDED FOR BATHING: A LITTLE
DRESSING REGULAR UPPER BODY CLOTHING: A LITTLE
DRESSING REGULAR LOWER BODY CLOTHING: A LITTLE
DAILY ACTIVITIY SCORE: 20

## 2024-04-01 ASSESSMENT — PAIN DESCRIPTION - DESCRIPTORS: DESCRIPTORS: SORE

## 2024-04-01 NOTE — PROGRESS NOTES
Pulmonary Progress Note 04/01/24     Patient seen in follow-up for acute respiratory failure with hypoxia, pneumonia, congestive heart failure    Subjective   Interval History  Case signed out to me by Dr. Mantilla. No overnight events.  Using BiPAP at bedtime however does not particularly like it. Endorses cough. Remains on hiflow    Objective   Vital signs in last 24 hours:  Temp:  [36.1 °C (97 °F)-36.7 °C (98.1 °F)] 36.1 °C (97 °F)  Heart Rate:  [] 86  Resp:  [13-29] 13  BP: (109-134)/(60-79) 119/73  FiO2 (%):  [60 %-70 %] 60 %    Intake/Output last 3 shifts:  I/O last 3 completed shifts:  In: 1500 (20.8 mL/kg) [P.O.:1300; IV Piggyback:200]  Out: 6378 (88.4 mL/kg) [Urine:6375 (2.5 mL/kg/hr); Stool:3]  Weight: 72.1 kg   Intake/Output this shift:  No intake/output data recorded.    Physical Exam  Vitals reviewed.   Constitutional:       General: She is not in acute distress.     Interventions: Nasal cannula in place.      Comments: Vapotherm   Eyes:      Conjunctiva/sclera: Conjunctivae normal.   Cardiovascular:      Rate and Rhythm: Normal rate.      Heart sounds: Murmur heard.   Pulmonary:      Effort: Pulmonary effort is normal.      Breath sounds: Examination of the right-lower field reveals rales. Examination of the left-lower field reveals rales. Rales present.   Musculoskeletal:      Cervical back: Full passive range of motion without pain.      Right lower leg: No edema.      Left lower leg: No edema.   Skin:     General: Skin is warm and dry.   Neurological:      General: No focal deficit present.      Mental Status: She is alert.   Psychiatric:         Mood and Affect: Mood normal.         Behavior: Behavior normal.         Scheduled medications  acetaminophen, 650 mg, oral, q6h  aspirin, 81 mg, oral, Daily  clopidogrel, 75 mg, oral, Daily  enoxaparin, 40 mg, subcutaneous, Daily  furosemide, 40 mg, oral, BID with meals  gabapentin, 100 mg, oral, Nightly  ipratropium-albuteroL, 3 mL,  nebulization, 4x daily  levothyroxine, 88 mcg, oral, Daily  methylPREDNISolone sodium succinate (PF), 40 mg, intravenous, q8h  oxygen, , inhalation, q4h  piperacillin-tazobactam, 3.375 g, intravenous, q6h  simvastatin, 20 mg, oral, Nightly  sodium chloride, 1 spray, Each Nostril, 4x daily      Continuous medications  oxygen,       PRN medications  PRN medications: albuterol, bisacodyl     Labs:  Lab Results   Component Value Date     04/01/2024    K 4.3 04/01/2024    CL 92 (L) 04/01/2024    CO2 41 (H) 04/01/2024    BUN 15 04/01/2024    CREATININE 0.70 04/01/2024    GLUCOSE 139 (H) 04/01/2024    CALCIUM 9.3 04/01/2024     Lab Results   Component Value Date    WBC 3.6 (L) 04/01/2024    HGB 11.1 (L) 04/01/2024    HCT 35.9 (L) 04/01/2024    MCV 91 04/01/2024     04/01/2024       Imaging:  XR chest 1 view    Result Date: 4/1/2024  Interpreted By:  Anthony Walker, STUDY: XR CHEST 1 VIEW; 3/31/2024 9:38 am   INDICATION: Signs/Symptoms:Question of surgery   COMPARISON: 03/30/2020   ACCESSION NUMBER(S): [id number]   ORDERING CLINICIAN: KADI ALLAN   FINDINGS: The study is limited due to rotation, lordotic projection and respiratory motion. The cardiac silhouette appears prominent, exaggerated by the technique. Prosthetic aortic valve is again seen. Atherosclerotic calcifications involve the tortuous aorta. There is left pleural effusion and bilateral interstitial and mild alveolar infiltrates; allowing for differences in technique there is probably some minimal improvement. There is no pneumothorax. Degenerative changes involve the spine and shoulders.       Cardiomegaly and slightly improving congestive heart failure.   Signed by: Anthony Walker 4/1/2024 8:17 AM Dictation workstation:   [id number]    ECG 12 Lead    Result Date: 3/31/2024  Normal sinus rhythm Possible Left atrial enlargement Left bundle branch block Abnormal ECG When compared with ECG of 01-MAR-2024 00:06, No significant change was found  Confirmed by Aryan Ruiz ([id number]) on 3/31/2024 7:18:49 PM    CT head wo IV contrast    Result Date: 3/30/2024  Interpreted By:  Bautista Ruelas, STUDY: CT HEAD WO IV CONTRAST;  3/30/2024 8:10 pm   INDICATION: Signs/Symptoms:headache.   COMPARISON: None   ACCESSION NUMBER(S): [id number]   ORDERING CLINICIAN: BRISEIDA PATINO   TECHNIQUE: Contiguous axial images of the head were obtained without intravenous contrast.   FINDINGS: The examination is limited secondary to patient motion.   BRAIN PARENCHYMA:  There is cerebral atrophy and bowel chronic white matter change. The gray white matter differentiation is preserved. No mass effect or midline shift.   HEMORRHAGE:  No evidence of acute intracranial hemorrhage. VENTRICLES AND EXTRA-AXIAL SPACES:  The ventricles are within normal limits in size for brain volume.  No evidence of abnormal extraaxial fluid collection. EXTRACRANIAL SOFT TISSUES:  Within normal limits. PARANASAL SINUSES/MASTOIDS:  Mild peripheral mucosal thickening of partially imaged right maxillary sinus. CALVARIUM:  No evidence of depressed calvarial fracture.   OTHER FINDINGS:  None       Cerebral atrophy and bilateral chronic white matter change.   No evidence of acute intracranial hemorrhage.   Peripheral mucosal thickening of partially imaged right maxillary sinus.   MACRO: None   Signed by: Bautista Ruelas 3/30/2024 8:36 PM Dictation workstation:   [id number]    XR chest 1 view    Result Date: 3/30/2024  Interpreted By:  Spencer Hoskins, STUDY: XR CHEST 1 VIEW   INDICATION: Signs/Symptoms:Hypoxia/SOB.   COMPARISON: March 29, 2024   ACCESSION NUMBER(S): [id number]   ORDERING CLINICIAN: DORETHA MADDEN   FINDINGS: Significant worsening of the perihilar and basilar edema when compared to prior study.   More conglomerate consolidation in the mid lungs both right and left.   Small bilateral effusions.       Significant worsening of the perihilar and basilar edema when compared to prior study.   More  conglomerate consolidation in the mid lungs both right and left.   Small bilateral effusions.   Signed by: Spencer Hoskins 3/30/2024 8:11 AM Dictation workstation:   [id number]    XR chest 2 views    Result Date: 3/29/2024  Interpreted By:  Carlo Chan, STUDY: XR CHEST 2 VIEWS;  3/29/2024 3:37 am   INDICATION: Signs/Symptoms:sob.   COMPARISON: 02/29/2024   ACCESSION NUMBER(S): [id number]   ORDERING CLINICIAN: ANALI AGUILAR   FINDINGS:     Increasing patchy right basilar airspace opacity. Similar severe cardiomegaly. No pneumothorax. Similar endovascular aortic valve prosthesis. Similar severe diffuse interstitial opacity. Upper abdomen is unremarkable. Diffuse osteopenia. No acute osseous abnormality.       1. Increasing patchy right basilar airspace opacity. Consider pneumonia. 2. Similar severe diffuse interstitial opacity, likely pulmonary edema. 3. Stable cardiomegaly.   Signed by: Carlo Chan 3/29/2024 3:53 AM Dictation workstation:   [id number]    Lower extremity venous duplex left    Result Date: 3/5/2024  Interpreted By:  Jatinder Appiah, STUDY: [facility] LOWER EXTREMITY VENOUS DUPLEX LEFT; 3/5/2024 1:16 pm   INDICATION: Signs/Symptoms:lower extremity pain and edema.   COMPARISON: None   ACCESSION NUMBER(S): [id number]   ORDERING CLINICIAN: BREANNE YOUNG   TECHNIQUE: Black and white as well as color-flow duplex images were obtained along with Doppler spectral analysis of the deep venous system of the lower extremity from the knee to the groin. Attempts were made to image the proximal posterior tibial and peroneal veins of the proximal calf as well. Venous compression was performed.   FINDINGS: There is normal compressibility and flow within the visualized vessels of the deep venous system of the lower extremity.  The contralateral common femoral vein is patent.       No evidence for deep venous thrombosis within the limits of this examination.   MACRO: none   Signed by: Jatinder Appiah  3/5/2024 1:30 PM Dictation workstation:   [id number]              Assessment/Plan   Principal Problem:    Pneumonia of right lung due to infectious organism, unspecified part of lung  Active Problems:    Atherosclerosis of coronary artery without angina pectoris    Hyperlipidemia    Primary hypertension    Acquired hypothyroidism    Acute on chronic diastolic (congestive) heart failure (CMS/HCC)    Acute on chronic respiratory failure (CMS/HCC)      Acute on chronic respiratory failure with hypoxia  Continue with Vapotherm and hopefully we can wean to nasal cannula in the next 24 hours.  Hopefully we can target 50% of this transition FiO2  Acute on chronic diastolic heart failure  Diuretics changed to oral  Pulmonary hypertension: Group II and Group III  Diuresis  COPD: None entirely sure acute exacerbation.  First time evaluating.  Given improvement we will not look to rock the boat  Will taper Solu-Medrol to 20 mg IV every 8 and look to transition to prednisone in the next 24 hours  Probable bacterial pneumonia  Continue with piperacillin/tazobactam.  Total of 7 days of systemic antibiotics  Prophylaxis  Enoxaparin 40 subcutaneous daily confirmed  CODE STATUS  DNR, DNI noted  Pulmonary disposition  Needs another 48 hours minimum it seems, hopefully we can transition to nasal cannula within this timeframe.  Likely transfer out of the ICU in the next 24 hours    We will continue to follow    Smith House MD  Pulmonary/CC Medicine  [facility]     LOS: 3 days

## 2024-04-01 NOTE — CARE PLAN
The patient's goals for the shift include      The clinical goals for the shift include To continue wean down O2 as tolerated throughout the shift      Problem: Discharge Planning  Goal: Discharge to home or other facility with appropriate resources  Outcome: Progressing     Problem: Heart Failure  Goal: Improved gas exchange this shift  Outcome: Progressing  Goal: Improved urinary output this shift  Outcome: Progressing  Goal: Reduction in peripheral edema within 24 hours  Outcome: Progressing  Goal: Report improvement of dyspnea/breathlessness this shift  Outcome: Progressing  Goal: Weight from fluid excess reduced over 2-3 days, then stabilize  Outcome: Progressing  Goal: Increase self care and/or family involvement in 24 hours  Outcome: Progressing     Problem: Skin  Goal: Participates in plan/prevention/treatment measures  Outcome: Progressing  Flowsheets (Taken 3/31/2024 2130)  Participates in plan/prevention/treatment measures:   Discuss with provider PT/OT consult   Elevate heels   Increase activity/out of bed for meals  Goal: Prevent/manage excess moisture  Outcome: Progressing  Flowsheets (Taken 3/31/2024 2130)  Prevent/manage excess moisture:   Cleanse incontinence/protect with barrier cream   Follow provider orders for dressing changes   Moisturize dry skin   Monitor for/manage infection if present  Goal: Prevent/minimize sheer/friction injuries  Outcome: Progressing  Flowsheets (Taken 3/31/2024 2130)  Prevent/minimize sheer/friction injuries:   Turn/reposition every 2 hours/use positioning/transfer devices   Complete micro-shifts as needed if patient unable. Adjust patient position to relieve pressure points, not a full turn   Utilize specialty bed per algorithm   Increase activity/out of bed for meals   HOB 30 degrees or less   Use pull sheet  Goal: Promote/optimize nutrition  Outcome: Progressing  Flowsheets (Taken 3/31/2024 2130)  Promote/optimize nutrition:   Offer water/supplements/favorite foods    Assist with feeding   Reassess MST if dietician not consulted   Monitor/record intake including meals

## 2024-04-01 NOTE — CARE PLAN
The patient's goals for the shift include      The clinical goals for the shift include PulOx will >92% over night      Problem: Safety - Adult  Goal: Free from fall injury  Outcome: Progressing     Problem: Discharge Planning  Goal: Discharge to home or other facility with appropriate resources  Outcome: Progressing     Problem: Chronic Conditions and Co-morbidities  Goal: Patient's chronic conditions and co-morbidity symptoms are monitored and maintained or improved  Outcome: Progressing     Problem: Heart Failure  Goal: Improved gas exchange this shift  Outcome: Progressing  Goal: Improved urinary output this shift  Outcome: Progressing  Goal: Reduction in peripheral edema within 24 hours  Outcome: Progressing  Goal: Report improvement of dyspnea/breathlessness this shift  Outcome: Progressing  Goal: Weight from fluid excess reduced over 2-3 days, then stabilize  Outcome: Progressing  Goal: Increase self care and/or family involvement in 24 hours  Outcome: Progressing     Problem: Skin  Goal: Participates in plan/prevention/treatment measures  Outcome: Progressing  Flowsheets (Taken 4/1/2024 1958)  Participates in plan/prevention/treatment measures:   Discuss with provider PT/OT consult   Elevate heels   Increase activity/out of bed for meals  Goal: Prevent/manage excess moisture  Outcome: Progressing  Flowsheets (Taken 4/1/2024 1958)  Prevent/manage excess moisture:   Cleanse incontinence/protect with barrier cream   Follow provider orders for dressing changes   Moisturize dry skin   Monitor for/manage infection if present  Goal: Prevent/minimize sheer/friction injuries  Outcome: Progressing  Flowsheets (Taken 4/1/2024 1958)  Prevent/minimize sheer/friction injuries:   Turn/reposition every 2 hours/use positioning/transfer devices   Complete micro-shifts as needed if patient unable. Adjust patient position to relieve pressure points, not a full turn   HOB 30 degrees or less   Utilize specialty bed per  algorithm   Increase activity/out of bed for meals   Use pull sheet  Goal: Promote/optimize nutrition  Outcome: Progressing  Flowsheets (Taken 4/1/2024 1958)  Promote/optimize nutrition:   Reassess MST if dietician not consulted   Monitor/record intake including meals

## 2024-04-01 NOTE — PROGRESS NOTES
Occupational Therapy    Occupational Therapy Treatment    Name: Akua Almanzar  MRN: [id number]  : 3/27/1929  Date: 24  Time Calculation  Start Time: 1026  Stop Time: 1117  Time Calculation (min): 51 min    Assessment:agreeable to therapy  End of Session Communication: Bedside nurse  End of Session Patient Position: Up in chair, Alarm on  Plan:  Treatment Interventions: ADL retraining, Functional transfer training, UE strengthening/ROM, Endurance training, Patient/family training, Compensatory technique education  OT Frequency: 2 times per week  OT Discharge Recommendations: Low intensity level of continued care, 24 hr supervision due to cognition  Equipment Recommended upon Discharge: Wheeled walker  OT Recommended Transfer Status: Minimal assist, Assist of 1  OT - OK to Discharge: Yes    Subjective   Previous Visit Info:  OT Last Visit  OT Received On: 24  General:  General  Prior to Session Communication: Bedside nurse  Patient Position Received: Alarm on, Bed, 4 rail up  General Comment: [place]  Precautions:   Falls, fong, oxygen  Vitals:  Vital Signs  SpO2: 99 % (30 liters, 60% continuous forced)  BP: (!) 140/93  BP Method: Automatic  Pain Assessment:  Pain Assessment  Pain Assessment: 0-10  Pain Score: 2  Pain Type: Acute pain  Pain Location: Mouth  Pain Descriptors: Sore (notified nursing)     Objective   Activities of Daily Living: Grooming  Grooming Level of Assistance: Setup  Grooming Where Assessed: Chair  Grooming Comments: setup only to wash face andhands, comb hair    LE Dressing  LE Dressing: Yes  Sock Level of Assistance: Setup  LE Dressing Where Assessed: Chair  LE Dressing Comments: to doff/don socks using cross leg tech       Bed Mobility/Transfers: Bed Mobility  Bed Mobility: Yes  Bed Mobility 1  Bed Mobility 1: Supine to sitting  Level of Assistance 1: Close supervision  Bed Mobility Comments 1: use of bed rails    Transfer 1  Transfer From 1: Bed to  Transfer to 1: Chair with  arms  Technique 1: Stand pivot  Transfer Device 1: Walker  Transfer Level of Assistance 1: Contact guard  Trials/Comments 1: assist to manage lines/tubes    Outcome Measures:  [facility] Daily Activity  Putting on and taking off regular lower body clothing: A little  Bathing (including washing, rinsing, drying): A little  Putting on and taking off regular upper body clothing: A little  Toileting, which includes using toilet, bedpan or urinal: A little  Taking care of personal grooming such as brushing teeth: None  Eating Meals: None  Daily Activity - Total Score: 20        Education Documentation  Handouts, taught by KAYLA Currie at 4/1/2024 11:16 AM.  Learner: Patient  Readiness: Acceptance  Method: Explanation  Response: Verbalizes Understanding, Demonstrated Understanding    Body Mechanics, taught by KAYLA Currie at 4/1/2024 11:16 AM.  Learner: Patient  Readiness: Acceptance  Method: Explanation  Response: Verbalizes Understanding, Demonstrated Understanding    Precautions, taught by KAYLA Currie at 4/1/2024 11:16 AM.  Learner: Patient  Readiness: Acceptance  Method: Explanation  Response: Verbalizes Understanding, Demonstrated Understanding    Home Exercise Program, taught by KAYLA Currie at 4/1/2024 11:16 AM.  Learner: Patient  Readiness: Acceptance  Method: Explanation  Response: Verbalizes Understanding, Demonstrated Understanding    ADL Training, taught by KAYLA Currie at 4/1/2024 11:16 AM.  Learner: Patient  Readiness: Acceptance  Method: Explanation  Response: Verbalizes Understanding, Demonstrated Understanding    Education Comments  No comments found.      Goals:  Encounter Problems       Encounter Problems (Active)       OT Goals       ADLs (Progressing)       Start:  03/29/24    Expected End:  04/19/24       Patient will complete ADL tasks with Mod I, using AE as needed, in order to increase patient's safety and independence with daily tasks.         Functional  Transfers (Progressing)       Start:  03/29/24    Expected End:  04/19/24       Patient will complete functional transfers with Mod I in order to increase patient's safety and independence with daily tasks.         B UE Strengthening (Progressing)       Start:  03/29/24    Expected End:  04/19/24       Patient will increase B UE strength to 4+/5 for functional transfers.         Functional Mobility (Progressing)       Start:  03/29/24    Expected End:  04/19/24       Patient will demonstrate the ability to complete item retrieval and functional mobility with Mod I in order to increase patient's safety and independence with daily tasks.

## 2024-04-01 NOTE — NURSING NOTE
A&ox3. Pt follows commands appropriately. VSS. Preventative mepilexes applied to bilateral heels and sacrum. Small brown liquid stool noted. [place] care, cyndi care, and ADLs provided at this time, pt tolerates well. Minimum exertionn wit ADLs  noted. Continue monitoring.

## 2024-04-01 NOTE — PROGRESS NOTES
04/01/24 1535   Discharge Planning   Living Arrangements Family members   Support Systems Family members   Type of Residence Private residence   Number of Stairs to Enter Residence 7   Number of Stairs Within Residence 5   Do you have animals or pets at home? No   Who is requesting discharge planning? Provider   Home or Post Acute Services None   Patient expects to be discharged to: Home   Does the patient need discharge transport arranged? No   Financial Resource Strain   How hard is it for you to pay for the very basics like food, housing, medical care, and heating? Not hard   Housing Stability   In the last 12 months, was there a time when you were not able to pay the mortgage or rent on time? N   In the last 12 months, how many places have you lived? 2   In the last 12 months, was there a time when you did not have a steady place to sleep or slept in a shelter (including now)? N   Transportation Needs   In the past 12 months, has lack of transportation kept you from medical appointments or from getting medications? no   In the past 12 months, has lack of transportation kept you from meetings, work, or from getting things needed for daily living? No   Patient Choice   Patient / Family choosing to utilize agency / facility established prior to hospitalization No

## 2024-04-01 NOTE — PROGRESS NOTES
04/01/24 1538   Current Planned Discharge Disposition   Current Planned Discharge Disposition Home

## 2024-04-01 NOTE — PROGRESS NOTES
04/01/24 1533   Physical Activity   On average, how many days per week do you engage in moderate to strenuous exercise (like a brisk walk)? 0 days   On average, how many minutes do you engage in exercise at this level? 0 min   Financial Resource Strain   How hard is it for you to pay for the very basics like food, housing, medical care, and heating? Not hard   Housing Stability   In the last 12 months, was there a time when you were not able to pay the mortgage or rent on time? N   In the last 12 months, how many places have you lived? 2   In the last 12 months, was there a time when you did not have a steady place to sleep or slept in a shelter (including now)? N   Transportation Needs   In the past 12 months, has lack of transportation kept you from medical appointments or from getting medications? no   In the past 12 months, has lack of transportation kept you from meetings, work, or from getting things needed for daily living? No   Food Insecurity   Within the past 12 months, you worried that your food would run out before you got the money to buy more. Never true   Within the past 12 months, the food you bought just didn't last and you didn't have money to get more. Never true   Stress   Do you feel stress - tense, restless, nervous, or anxious, or unable to sleep at night because your mind is troubled all the time - these days? Not at all   Social Connections   In a typical week, how many times do you talk on the phone with family, friends, or neighbors? More than 3   How often do you get together with friends or relatives? More than 3   How often do you attend Roman Catholic or Pentecostalism services? Never   Do you belong to any clubs or organizations such as Roman Catholic groups, unions, fraternal or athletic groups, or school groups? No   How often do you attend meetings of the clubs or organizations you belong to? Never   Are you , , , , never , or living with a partner?     Intimate Partner Violence   Within the last year, have you been afraid of your partner or ex-partner? No   Within the last year, have you been humiliated or emotionally abused in other ways by your partner or ex-partner? No   Within the last year, have you been kicked, hit, slapped, or otherwise physically hurt by your partner or ex-partner? No   Within the last year, have you been raped or forced to have any kind of sexual activity by your partner or ex-partner? No   Alcohol Use   Q1: How often do you have a drink containing alcohol? Never   Q2: How many drinks containing alcohol do you have on a typical day when you are drinking? None   Q3: How often do you have six or more drinks on one occasion? Never   Utilities   In the past 12 months has the electric, gas, oil, or water company threatened to shut off services in your home? No

## 2024-04-01 NOTE — PROGRESS NOTES
Pulmonary Progress Note    Akua Almanzar is a 89 y.o. female on day 2 of admission presenting with Pneumonia of right lung due to infectious organism, unspecified part of lung.    Subjective   On HFNC  Objective   Vital Signs      3/31/2024    12:00 PM 3/31/2024     1:00 PM 3/31/2024     2:00 PM 3/31/2024     4:00 PM 3/31/2024     6:00 PM 3/31/2024     7:00 PM 3/31/2024     7:23 PM   Vitals   Systolic 121 125 130   124    Diastolic 67 71 68   61    Heart Rate 96 106 98  105 105 104   Temp    36.7 °C (98.1 °F)   36.6 °C (97.9 °F)   Resp 21 28 23  21 22 18       Oxygen Therapy  SpO2: 93 %  Medical Gas Therapy: Supplemental oxygen  O2 Delivery Method: High flow nasal cannula    FiO2 (%):  [60 %-80 %] 60 %    Intake/Output previous 24 hours:    Intake/Output Summary (Last 24 hours) at 3/31/2024 2032  Last data filed at 3/31/2024 1751  Gross per 24 hour   Intake 920 ml   Output 4225 ml   Net -3305 ml         Physical Exam  Vitals reviewed.   Constitutional:       Appearance: Normal appearance.   HENT:      Head: Normocephalic and atraumatic.      Mouth/Throat:      Mouth: Mucous membranes are moist.   Eyes:      Extraocular Movements: Extraocular movements intact.      Pupils: Pupils are equal, round, and reactive to light.   Cardiovascular:      Rate and Rhythm: Normal rate and regular rhythm.   Pulmonary:      Effort: Pulmonary effort is normal.      Breath sounds: Normal breath sounds and air entry.   Abdominal:      General: Abdomen is flat. Bowel sounds are normal.      Palpations: Abdomen is soft.   Musculoskeletal:         General: Normal range of motion.      Cervical back: Normal range of motion and neck supple.   Skin:     General: Skin is warm and dry.   Neurological:      General: No focal deficit present.      Mental Status: She is alert and oriented to person, place, and time. Mental status is at baseline.       ...  Lines and Tubes:  Peripheral IV 03/29/24 20 G Right;Anterior Forearm (Active)    Placement Date/Time: 03/29/24 1150   Size (Gauge): 20 G  Orientation: Right;Anterior  Location: Forearm  Local Anesthetic: None  Placed by: IV Nurse   Number of days: 1         Scheduled medications  acetaminophen, 650 mg, oral, q6h  aspirin, 81 mg, oral, Daily  clopidogrel, 75 mg, oral, Daily  enoxaparin, 40 mg, subcutaneous, Daily  furosemide, 40 mg, intravenous, BID  gabapentin, 100 mg, oral, Nightly  ipratropium-albuteroL, 3 mL, nebulization, 4x daily  levothyroxine, 88 mcg, oral, Daily  methylPREDNISolone sodium succinate (PF), 40 mg, intravenous, q8h  oxygen, , inhalation, q4h  piperacillin-tazobactam, 3.375 g, intravenous, q6h  simvastatin, 20 mg, oral, Nightly  sodium chloride, 1 spray, Each Nostril, 4x daily      Continuous medications  oxygen,       PRN medications  PRN medications: albuterol, bisacodyl    Relevant Results  Results from last 7 days   Lab Units 03/31/24  0610 03/30/24  0521 03/29/24  0316   WBC AUTO x10*3/uL 3.6* 9.9 6.3   HEMOGLOBIN g/dL 10.4* 11.9* 11.5*   HEMATOCRIT % 33.4* 38.2 36.0   PLATELETS AUTO x10*3/uL 175 245 234        Results from last 7 days   Lab Units 03/31/24  0610 03/30/24  0521 03/29/24  0413   SODIUM mmol/L 136 130* 131*   POTASSIUM mmol/L 4.2 5.0 4.5   CHLORIDE mmol/L 91* 90* 88*   CO2 mmol/L 39* 34* 34*   BUN mg/dL 15 15 12   CREATININE mg/dL 0.60 0.60 0.50   GLUCOSE mg/dL 197* 163* 117*   CALCIUM mg/dL 8.9 9.3 9.4        Results from last 7 days   Lab Units 03/30/24  1052   POCT PH, ARTERIAL pH 7.35*   POCT PCO2, ARTERIAL mm Hg 76*   POCT PO2, ARTERIAL mm Hg 125*   POCT HCO3 CALCULATED, ARTERIAL mmol/L 42.0*   POCT BASE EXCESS, ARTERIAL mmol/L 13.6*       XR chest 1 view 03/30/2024    Narrative  Interpreted By:  Spencer Hoskins,  STUDY:  XR CHEST 1 VIEW    INDICATION:  Signs/Symptoms:Hypoxia/SOB.    COMPARISON:  March 29, 2024    ACCESSION NUMBER(S):  [id number]    ORDERING CLINICIAN:  DORETHA MADDEN    FINDINGS:  Significant worsening of the perihilar and basilar edema  when  compared to prior study.    More conglomerate consolidation in the mid lungs both right and left.    Small bilateral effusions.    Impression  Significant worsening of the perihilar and basilar edema when  compared to prior study.    More conglomerate consolidation in the mid lungs both right and left.    Small bilateral effusions.    Signed by: Spencer Hoskins 3/30/2024 8:11 AM  Dictation workstation:   [id number]      Patient Active Problem List   Diagnosis    Atherosclerosis of coronary artery without angina pectoris    Dependence on supplemental oxygen    History of aortic valve replacement    Hypercalcemia    Dyspnea on exertion    Hyperlipidemia    Primary hypertension    Acquired hypothyroidism    Menopausal osteoporosis    Mitral valve stenosis    Nonrheumatic aortic (valve) stenosis    Pulmonary hypertension (CMS/HCC)    Transient ischemic attack    Vitamin D deficiency    Acute on chronic diastolic (congestive) heart failure (CMS/HCC)    Pneumonia of right lung due to infectious organism, unspecified part of lung    Acute on chronic respiratory failure (CMS/HCC)     Assessment/Plan   Atherosclerosis of coronary artery without angina pectoris  Pneumonia right base opacity    Hyperlipidemia    Primary hypertension    Acquired hypothyroidism    Acute on chronic diastolic (congestive) heart failure (CMS/HCC)    Acute on chronic respiratory failure (CMS/HCC)   History of aortic stenosis as well as tricuspid regurg and pulm hypertension.  Acute on chronic respiratory failure.  Worsening respiratory status overnight.  Chest x-ray shows shows some improved edema from yesterday    Continue bronchodilators  IV steroids  Continue broad-spectrum antibiotic  Continue diuresis  Cardiology following    Guarded prognosis  DNR CCA DNI     Joey Mantilla MD  [facility]

## 2024-04-01 NOTE — PROGRESS NOTES
"Auka Almanzar is a 89 y.o. female on day 3 of admission presenting with Pneumonia of right lung due to infectious organism, unspecified part of lung.    Subjective   Patient states that she is feeling better each day.  Much less shortness of breath compared to presentation.       Objective     Physical Exam  Eyes:      Conjunctiva/sclera: Conjunctivae normal.   Cardiovascular:      Rate and Rhythm: Normal rate and regular rhythm.      Heart sounds:      No gallop.   Pulmonary:      Breath sounds: Normal breath sounds. No wheezing, rhonchi or rales.   Abdominal:      Palpations: Abdomen is soft.   Neurological:      General: No focal deficit present.      Mental Status: She is alert.       Constitutional:       Appearance: Not in distress.   Eyes:      Conjunctiva/sclera: Conjunctivae normal.   Neck:      Vascular: JVD normal.   Pulmonary:      Breath sounds: Normal breath sounds. No wheezing. No rhonchi. No rales.   Cardiovascular:      Normal rate. Regular rhythm.      Murmurs: There is no murmur.      No gallop.  No click. No rub.   Abdominal:      Palpations: Abdomen is soft.   Neurological:      General: No focal deficit present.      Mental Status: Alert.        Last Recorded Vitals  Blood pressure 119/73, pulse 86, temperature 36.1 °C (97 °F), temperature source Temporal, resp. rate 13, height 1.549 m (5' 1\"), weight 72.1 kg (159 lb), SpO2 98 %.  Intake/Output last 3 Shifts:  I/O last 3 completed shifts:  In: 1500 (20.8 mL/kg) [P.O.:1300; IV Piggyback:200]  Out: 6378 (88.4 mL/kg) [Urine:6375 (2.5 mL/kg/hr); Stool:3]  Weight: 72.1 kg     Relevant Results           This patient currently has cardiac telemetry ordered; if you would like to modify or discontinue the telemetry order, click here to go to the orders activity to modify/discontinue the order.    This patient has a urinary catheter   Reason for the urinary catheter remaining today?       Results for orders placed or performed during the hospital " encounter of 03/29/24 (from the past 24 hour(s))   Comprehensive Metabolic Panel   Result Value Ref Range    Glucose 139 (H) 65 - 99 mg/dL    Sodium 140 133 - 145 mmol/L    Potassium 4.3 3.4 - 5.1 mmol/L    Chloride 92 (L) 97 - 107 mmol/L    Bicarbonate 41 (H) 24 - 31 mmol/L    Urea Nitrogen 15 8 - 25 mg/dL    Creatinine 0.70 0.40 - 1.60 mg/dL    eGFR 80 >60 mL/min/1.73m*2    Calcium 9.3 8.5 - 10.4 mg/dL    Albumin 3.2 (L) 3.5 - 5.0 g/dL    Alkaline Phosphatase 77 35 - 125 U/L    Total Protein 6.0 5.9 - 7.9 g/dL    AST 20 5 - 40 U/L    Bilirubin, Total 0.3 0.1 - 1.2 mg/dL    ALT 21 5 - 40 U/L    Anion Gap 7 <=19 mmol/L   CBC and Auto Differential   Result Value Ref Range    WBC 3.6 (L) 4.4 - 11.3 x10*3/uL    nRBC 0.0 0.0 - 0.0 /100 WBCs    RBC 3.94 (L) 4.00 - 5.20 x10*6/uL    Hemoglobin 11.1 (L) 12.0 - 16.0 g/dL    Hematocrit 35.9 (L) 36.0 - 46.0 %    MCV 91 80 - 100 fL    MCH 28.2 26.0 - 34.0 pg    MCHC 30.9 (L) 32.0 - 36.0 g/dL    RDW 14.5 11.5 - 14.5 %    Platelets 197 150 - 450 x10*3/uL    Neutrophils % 81.4 40.0 - 80.0 %    Immature Granulocytes %, Automated 0.5 0.0 - 0.9 %    Lymphocytes % 11.8 13.0 - 44.0 %    Monocytes % 6.3 2.0 - 10.0 %    Eosinophils % 0.0 0.0 - 6.0 %    Basophils % 0.0 0.0 - 2.0 %    Neutrophils Absolute 2.96 1.60 - 5.50 x10*3/uL    Immature Granulocytes Absolute, Automated 0.02 0.00 - 0.50 x10*3/uL    Lymphocytes Absolute 0.43 (L) 0.80 - 3.00 x10*3/uL    Monocytes Absolute 0.23 0.05 - 0.80 x10*3/uL    Eosinophils Absolute 0.00 0.00 - 0.40 x10*3/uL    Basophils Absolute 0.00 0.00 - 0.10 x10*3/uL             Assessment/Plan   Principal Problem:    Pneumonia of right lung due to infectious organism, unspecified part of lung  Active Problems:    Atherosclerosis of coronary artery without angina pectoris    Hyperlipidemia    Primary hypertension    Acquired hypothyroidism    Acute on chronic diastolic (congestive) heart failure (CMS/HCC)    Acute on chronic respiratory failure  (CMS/[facility])    Acute on chronic respiratory failure  Transcatheter aortic valve replacement April 2021  Heart failure preserved ejection fraction  Degenerative mitral valve stenosis  Pulmonary hypertension  Essential hypertension    3/30: This patient is a 89-year-old white female with a history of COPD requiring nasal oxygen with pulmonary hypertension as well. She did undergo a transcatheter aortic valve replacement 4/9/2021 for severe aortic valvular stenosis. She was recently admitted to [facility] from 3/1/2024 - 3/7/2024 with increased shortness of breath thought to be related to CHF diastolic variety. Her echocardiogram at that time showed a preserved LV ejection fraction and normally functioning TAVR are but moderate mitral valve stenosis and moderately severe pulmonary hypertension estimated PA systolic pressure 59 mmHg. She is currently admitted with recurrent shortness of breath and required transfer to ICU due to worsening respiratory distress and CO2 retention. She has been placed on continuous BiPAP with clinical improvement. Her hemodynamic parameters remain stable. Telemetry monitor shows sinus rhythm with left bundle branch block conduction delay. Her proBNP surprisingly is only modestly elevated despite the significant abnormalities on chest x-ray. For now she will be treated empirically with Lasix 40 mg IV twice daily. Empiric antibiotic therapy has also been instituted including vancomycin, Zosyn and azithromycin. Patient being seen by pulmonology. Patient receiving DuoNebs and IV Solu-Medrol as well. The patient and family aware of guarded prognosis.      3/31: The patient is awake alert and conversant.  She has been transition to a high flow O2 nasal cannula 30 L/min 80% FiO2.  No respiratory distress with satisfactory O2 saturations.  Telemetry monitor shows a borderline sinus tachycardia at 100/min.  The comprehensive metabolic panel includes a creatinine of 0.6 potassium of 4.2.  Will  check repeat chest x-ray tomorrow.  Head CT from yesterday showed only cerebral atrophy and bilateral chronic white matter change.  Input output negative by 915 cc yesterday.  Will continue the empiric IV Lasix.  Question the functional significance of the patient's mitral valve stenosis by echo.  The proBNP was only modestly elevated and patient remains currently on IV Rocephin for suspected pneumonia.    4/1: Patient has been gradually improving on a daily basis.  She is currently receiving antibiotic therapy for suspected pneumonia.  Chest x-ray has been ordered with results still pending at this time.  With her clinical improvement I think we can transition from IV furosemide to oral therapy.  Kidney function remains stable with serum creatinine of 0.7 on morning lab studies.  Continues to slowly improve.          I spent 20 minutes in the professional and overall care of this patient.      Pio Alberto, DO

## 2024-04-01 NOTE — PROGRESS NOTES
"Akua Almanzar is a 89 y.o. female on day 3 of admission presenting with Pneumonia of right lung due to infectious organism, unspecified part of lung.    Subjective   Symptoms (0 - 10, Best to Worst)  [place] Symptom Assessment System  Pain Score: 2  Improved, sitting up in chair, awake, alert, down to 50%/30LPM HFNC. Co feeling lightheaded while sitting, no presyncopal sensation, no nausea, appetite improved. NO pain. No dyspnea       Objective     Vitals and nursing note reviewed.   Constitutional:       Appearance: She is ill-appearing.   HENT:      Head: Normocephalic and atraumatic.      Mouth/Throat:      Mouth: Mucous membranes are moist.   Eyes:      General: No scleral icterus.     Extraocular Movements: Extraocular movements intact.      Pupils: Pupils are equal, round, and reactive to light.   Neck:      Vascular: No carotid bruit.   Cardiovascular:      Rate and Rhythm: Normal rate and regular rhythm.      Pulses: Normal pulses.      Heart sounds: Murmur heard.      No friction rub. No gallop.   Pulmonary:      Breath sounds: No wheezing, rhonchi or rales. Normal respiratory effort. HFNC 50%/30LPM  Abdominal:      General: Bowel sounds are normal.      Tenderness: There is no abdominal tenderness. There is no guarding or rebound.   Genitourinary:     Comments: [place] catheter draining yellow urine.  Musculoskeletal:         General: Tenderness present.      Cervical back: Neck supple. No rigidity or tenderness.      Right lower leg: Edema present.      Left lower leg: Edema present.      Comments: MAEx4   Lymphadenopathy:      Cervical: No cervical adenopathy.   Skin:     General: Skin is warm and dry.   Neurological:      Mental Status: She is oriented to person, place, and time.     Last Recorded Vitals  Blood pressure 129/72, pulse 102, temperature 36.3 °C (97.3 °F), temperature source Temporal, resp. rate 16, height 1.549 m (5' 1\"), weight 72.1 kg (159 lb), SpO2 98 %.  Intake/Output last 3 " Shifts:  I/O last 3 completed shifts:  In: 1500 (20.8 mL/kg) [P.O.:1300; IV Piggyback:200]  Out: 5403 (74.9 mL/kg) [Urine:5400 (2.1 mL/kg/hr); Stool:3]  Weight: 72.1 kg     Relevant Results  Results for orders placed or performed during the hospital encounter of 03/29/24 (from the past 24 hour(s))   Comprehensive Metabolic Panel   Result Value Ref Range    Glucose 139 (H) 65 - 99 mg/dL    Sodium 140 133 - 145 mmol/L    Potassium 4.3 3.4 - 5.1 mmol/L    Chloride 92 (L) 97 - 107 mmol/L    Bicarbonate 41 (H) 24 - 31 mmol/L    Urea Nitrogen 15 8 - 25 mg/dL    Creatinine 0.70 0.40 - 1.60 mg/dL    eGFR 80 >60 mL/min/1.73m*2    Calcium 9.3 8.5 - 10.4 mg/dL    Albumin 3.2 (L) 3.5 - 5.0 g/dL    Alkaline Phosphatase 77 35 - 125 U/L    Total Protein 6.0 5.9 - 7.9 g/dL    AST 20 5 - 40 U/L    Bilirubin, Total 0.3 0.1 - 1.2 mg/dL    ALT 21 5 - 40 U/L    Anion Gap 7 <=19 mmol/L   CBC and Auto Differential   Result Value Ref Range    WBC 3.6 (L) 4.4 - 11.3 x10*3/uL    nRBC 0.0 0.0 - 0.0 /100 WBCs    RBC 3.94 (L) 4.00 - 5.20 x10*6/uL    Hemoglobin 11.1 (L) 12.0 - 16.0 g/dL    Hematocrit 35.9 (L) 36.0 - 46.0 %    MCV 91 80 - 100 fL    MCH 28.2 26.0 - 34.0 pg    MCHC 30.9 (L) 32.0 - 36.0 g/dL    RDW 14.5 11.5 - 14.5 %    Platelets 197 150 - 450 x10*3/uL    Neutrophils % 81.4 40.0 - 80.0 %    Immature Granulocytes %, Automated 0.5 0.0 - 0.9 %    Lymphocytes % 11.8 13.0 - 44.0 %    Monocytes % 6.3 2.0 - 10.0 %    Eosinophils % 0.0 0.0 - 6.0 %    Basophils % 0.0 0.0 - 2.0 %    Neutrophils Absolute 2.96 1.60 - 5.50 x10*3/uL    Immature Granulocytes Absolute, Automated 0.02 0.00 - 0.50 x10*3/uL    Lymphocytes Absolute 0.43 (L) 0.80 - 3.00 x10*3/uL    Monocytes Absolute 0.23 0.05 - 0.80 x10*3/uL    Eosinophils Absolute 0.00 0.00 - 0.40 x10*3/uL    Basophils Absolute 0.00 0.00 - 0.10 x10*3/uL        Assessment/Plan   IMP:  Acute on chronic hypoxic and hypercapnic respiratory failure  -ABGs showing hypercapnia    - consider chest CT,  d-dimer, US BLE  - HFNC at 80% FiO2.      2.   Pneumonia  - WBC WNL  - MRSA PCR, strep pneumoniae urine, L pneumophila urine, MAPCR all negative  - procalcitonin 0.15   - Blood cultures NGTD  - Currently on Zosyn  - Per Chest XR      3.   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 Left ventricular systolic function is normal.   2. Spectral Doppler shows an impaired relaxation pattern of left ventricular diastolic filling.   3. There is moderate concentric left ventricular hypertrophy.   4. The left atrium is moderately dilated.   5. The mitral valve is moderately thickened.   6. There is moderate mitral annular calcification.   7. There is moderate to severe calcification of the anterior and posterior mitral valve leaflets.   8. Mild to moderate tricuspid regurgitation.   9. Moderate to severely elevated right ventricular systolic pressure.   - ProBNP 1,489     4.   TME  - suspect r/t respiratory status     5.   Electrolyte imbalance  - hyponatremia   - resolved      6.   Anemia  - of chronic disease  - H/H stable 10.4/33.4     7.   Palliative care   DNR CCA DNI  -The patient is a capable decision maker   - DPOA - HC filled out where Ray is primary and Anitra is secondary. This is in hard chart.   -The patient has 4 children.   Derek Almanzar - [place] Ayoub Mayer - [facility] - lives out of [facility]  Ginger Almanzar - lives out of town  Derek and Anitra have been primary contacts.      4/1  Slowly improving. Now down to HFNC50%/30LPM. Sitting up, awake, alert. No pain. Plan to continue to slowly wean over next 48Hrs to NC. Continue treatment model of care. Plan to discharge home with daughter and other support when medically stable. Plan to discuss further choice of support service, either HHC vs hospice.     3/31/2024  The patient respiratory status is slowly uptrending. Is now on HFNC, will switch Tylenol to PO. Palliative care will continue to follow.       3/30/2024  Family meeting held today. The patient does not want any aggressive measures. Code status reflects this. We did discuss the patient's wishes in the event of her respiratory status deteriorating she would just want to be kept comfortable.  The family is agreeable with this.  DPOA HC documents filled out and placed in hard chart.      The patient experiencing lower back and leg cramping. Tylenol routinely scheduled IV, will monitor the effectiveness of this.     I spent 25 minutes in the professional and overall care of this patient.      Carmen Vo, WANG-CNP

## 2024-04-01 NOTE — CARE PLAN
Unknown if pt has a POA or Living will    ADOD: 2 days    Phoned pts son in law Kendall at [phone] for information  Pt lives at home with her dtr and son in law Kendall  Pt has 2 L of home 02, she uses a walker. She does not wear glasses, she has hearing aids but she does not use them  Her dtr assist  her with showering, she is normally able to dress herself.  She has been in and out of the hospital and SNF's lately, she recently moved into there dtr and son in laws home  Her PCP is Dr. Giron, She uses [facility] on [address] in [place]  Pt is here with pneumonia.  PT assessed pt as Low intensity. Pts son in law did not wish to discuss discharge plans at this time because pt has been so very ill    DISCHARGE PLAN: AT THIS TIME THE PLAN IS HOME WITH FAMILY

## 2024-04-01 NOTE — PROGRESS NOTES
04/01/24 1537   [facility] Disability Status   Are you deaf or do you have serious difficulty hearing? Y   Are you blind or do you have serious difficulty seeing, even when wearing glasses? N   Because of a physical, mental, or emotional condition, do you have serious difficulty concentrating, remembering, or making decisions? (5 years old or older) Y   Do you have serious difficulty walking or climbing stairs? Y   Do you have serious difficulty dressing or bathing? Y   Because of a physical, mental, or emotional condition, do you have serious difficulty doing errands alone such as visiting the doctor? Y

## 2024-04-01 NOTE — PROGRESS NOTES
Akua Almanzar is a 89 y.o. female on day 3 of admission presenting with Pneumonia of right lung due to infectious organism, unspecified part of lung.      Subjective   89-year-old female still admits to a nonproductive cough.  Patient reports she is generalized more weak and lower legs is now requiring a walker.  Patient denies chest pain or abdominal pain.  Denies any fevers or chills.  Patient denies any nausea or vomiting.  Patient denies any dysphagia or odontophagia.    Objective     Last Recorded Vitals  /73 (Patient Position: Lying)   Pulse 86   Temp 36.1 °C (97 °F) (Temporal)   Resp 13   Wt 72.1 kg (159 lb)   SpO2 98%   Intake/Output last 3 Shifts:    Intake/Output Summary (Last 24 hours) at 4/1/2024 0934  Last data filed at 4/1/2024 0621  Gross per 24 hour   Intake 1140 ml   Output 4328 ml   Net -3188 ml       Admission Weight  Weight: 72.7 kg (160 lb 4.4 oz) (03/29/24 0313)    Daily Weight  04/01/24 : 72.1 kg (159 lb)    Image Results  XR chest 1 view  Narrative: Interpreted By:  Anthony Walker,   STUDY:  XR CHEST 1 VIEW; 3/31/2024 9:38 am      INDICATION:  Signs/Symptoms:Question of surgery      COMPARISON:  03/30/2020      ACCESSION NUMBER(S):  [id number]      ORDERING CLINICIAN:  KADI ALLAN      FINDINGS:  The study is limited due to rotation, lordotic projection and  respiratory motion. The cardiac silhouette appears prominent,  exaggerated by the technique. Prosthetic aortic valve is again seen.  Atherosclerotic calcifications involve the tortuous aorta. There is  left pleural effusion and bilateral interstitial and mild alveolar  infiltrates; allowing for differences in technique there is probably  some minimal improvement. There is no pneumothorax.  Degenerative changes involve the spine and shoulders.      Impression: Cardiomegaly and slightly improving congestive heart failure.      Signed by: Anthony Walker 4/1/2024 8:17 AM  Dictation workstation:   [id number]      Physical  Exam  HENT:      Head: Normocephalic.      Ears:      Comments: Hard of hearing  Eyes:      Extraocular Movements: Extraocular movements intact.      Pupils: Pupils are equal, round, and reactive to light.   Cardiovascular:      Rate and Rhythm: Normal rate and regular rhythm.      Pulses: Normal pulses.      Heart sounds: Normal heart sounds.   Pulmonary:      Effort: Pulmonary effort is normal.      Breath sounds: Normal breath sounds.   Abdominal:      General: Abdomen is flat. Bowel sounds are normal.      Palpations: Abdomen is soft.   Musculoskeletal:         General: Normal range of motion.      Cervical back: Normal range of motion.   Skin:     General: Skin is warm.   Neurological:      General: No focal deficit present.      Mental Status: She is alert.   Psychiatric:         Mood and Affect: Mood normal.         Relevant Results               Assessment/Plan   This patient currently has cardiac telemetry ordered; if you would like to modify or discontinue the telemetry order, click here to go to the orders activity to modify/discontinue the order.      This patient has a urinary catheter   Reason for the urinary catheter remaining today? urinary retention/bladder outlet obstruction, acute or chronic          Principal Problem:    Pneumonia of right lung due to infectious organism, unspecified part of lung  Active Problems:    Atherosclerosis of coronary artery without angina pectoris    Hyperlipidemia    Primary hypertension    Acquired hypothyroidism    Acute on chronic diastolic (congestive) heart failure (CMS/HCC)    Acute on chronic respiratory failure (CMS/HCC)    1.  Acute hypercapnic respiratory failure secondary to CHF reduced ejection fraction currently on high flow 30 L 80% wean as tolerated currently on IV Zosyn  2.  CHF is currently on oral Lasix 40 oral twice a day adequate diuresis daily net -2972  3.  COPD currently on Solu-Medrol milligrams IV every 8  4.  Functional quadriplegia continue  PT OT therapy  5.  Plan of care likely discharge in 1 to 2 days         Jn Stapleton MD

## 2024-04-02 LAB
ALBUMIN SERPL-MCNC: 3.2 G/DL (ref 3.5–5)
ALP BLD-CCNC: 73 U/L (ref 35–125)
ALT SERPL-CCNC: 18 U/L (ref 5–40)
ANION GAP SERPL CALC-SCNC: 6 MMOL/L
AST SERPL-CCNC: 16 U/L (ref 5–40)
BACTERIA BLD CULT: NORMAL
BACTERIA BLD CULT: NORMAL
BASOPHILS # BLD AUTO: 0 X10*3/UL (ref 0–0.1)
BASOPHILS NFR BLD AUTO: 0 %
BILIRUB SERPL-MCNC: 0.3 MG/DL (ref 0.1–1.2)
BUN SERPL-MCNC: 17 MG/DL (ref 8–25)
CALCIUM SERPL-MCNC: 9.2 MG/DL (ref 8.5–10.4)
CHLORIDE SERPL-SCNC: 89 MMOL/L (ref 97–107)
CO2 SERPL-SCNC: 43 MMOL/L (ref 24–31)
CREAT SERPL-MCNC: 0.7 MG/DL (ref 0.4–1.6)
EGFRCR SERPLBLD CKD-EPI 2021: 80 ML/MIN/1.73M*2
EOSINOPHIL # BLD AUTO: 0 X10*3/UL (ref 0–0.4)
EOSINOPHIL NFR BLD AUTO: 0 %
ERYTHROCYTE [DISTWIDTH] IN BLOOD BY AUTOMATED COUNT: 14.6 % (ref 11.5–14.5)
GLUCOSE SERPL-MCNC: 140 MG/DL (ref 65–99)
HCT VFR BLD AUTO: 36.2 % (ref 36–46)
HGB BLD-MCNC: 11.4 G/DL (ref 12–16)
IMM GRANULOCYTES # BLD AUTO: 0.03 X10*3/UL (ref 0–0.5)
IMM GRANULOCYTES NFR BLD AUTO: 0.8 % (ref 0–0.9)
LACTATE BLDV-SCNC: 3 MMOL/L (ref 0.4–2)
LYMPHOCYTES # BLD AUTO: 0.43 X10*3/UL (ref 0.8–3)
LYMPHOCYTES NFR BLD AUTO: 11.2 %
MCH RBC QN AUTO: 28.4 PG (ref 26–34)
MCHC RBC AUTO-ENTMCNC: 31.5 G/DL (ref 32–36)
MCV RBC AUTO: 90 FL (ref 80–100)
MONOCYTES # BLD AUTO: 0.32 X10*3/UL (ref 0.05–0.8)
MONOCYTES NFR BLD AUTO: 8.3 %
NEUTROPHILS # BLD AUTO: 3.07 X10*3/UL (ref 1.6–5.5)
NEUTROPHILS NFR BLD AUTO: 79.7 %
NRBC BLD-RTO: 0 /100 WBCS (ref 0–0)
PLATELET # BLD AUTO: 204 X10*3/UL (ref 150–450)
POTASSIUM SERPL-SCNC: 4.5 MMOL/L (ref 3.4–5.1)
PROT SERPL-MCNC: 6 G/DL (ref 5.9–7.9)
RBC # BLD AUTO: 4.02 X10*6/UL (ref 4–5.2)
SODIUM SERPL-SCNC: 138 MMOL/L (ref 133–145)
WBC # BLD AUTO: 3.9 X10*3/UL (ref 4.4–11.3)

## 2024-04-02 PROCEDURE — 85025 COMPLETE CBC W/AUTO DIFF WBC: CPT | Performed by: STUDENT IN AN ORGANIZED HEALTH CARE EDUCATION/TRAINING PROGRAM

## 2024-04-02 PROCEDURE — 2500000005 HC RX 250 GENERAL PHARMACY W/O HCPCS: Performed by: STUDENT IN AN ORGANIZED HEALTH CARE EDUCATION/TRAINING PROGRAM

## 2024-04-02 PROCEDURE — 99232 SBSQ HOSP IP/OBS MODERATE 35: CPT | Performed by: NURSE PRACTITIONER

## 2024-04-02 PROCEDURE — 2500000002 HC RX 250 W HCPCS SELF ADMINISTERED DRUGS (ALT 637 FOR MEDICARE OP, ALT 636 FOR OP/ED): Performed by: INTERNAL MEDICINE

## 2024-04-02 PROCEDURE — 83605 ASSAY OF LACTIC ACID: CPT | Performed by: INTERNAL MEDICINE

## 2024-04-02 PROCEDURE — 2500000004 HC RX 250 GENERAL PHARMACY W/ HCPCS (ALT 636 FOR OP/ED): Performed by: HOSPITALIST

## 2024-04-02 PROCEDURE — 97110 THERAPEUTIC EXERCISES: CPT | Mod: GP

## 2024-04-02 PROCEDURE — 2500000001 HC RX 250 WO HCPCS SELF ADMINISTERED DRUGS (ALT 637 FOR MEDICARE OP): Performed by: INTERNAL MEDICINE

## 2024-04-02 PROCEDURE — 80053 COMPREHEN METABOLIC PANEL: CPT | Performed by: STUDENT IN AN ORGANIZED HEALTH CARE EDUCATION/TRAINING PROGRAM

## 2024-04-02 PROCEDURE — 2500000002 HC RX 250 W HCPCS SELF ADMINISTERED DRUGS (ALT 637 FOR MEDICARE OP, ALT 636 FOR OP/ED): Performed by: STUDENT IN AN ORGANIZED HEALTH CARE EDUCATION/TRAINING PROGRAM

## 2024-04-02 PROCEDURE — 9420000001 HC RT PATIENT EDUCATION 5 MIN

## 2024-04-02 PROCEDURE — 94660 CPAP INITIATION&MGMT: CPT

## 2024-04-02 PROCEDURE — 94762 N-INVAS EAR/PLS OXIMTRY CONT: CPT

## 2024-04-02 PROCEDURE — 99232 SBSQ HOSP IP/OBS MODERATE 35: CPT | Performed by: INTERNAL MEDICINE

## 2024-04-02 PROCEDURE — 36415 COLL VENOUS BLD VENIPUNCTURE: CPT | Performed by: STUDENT IN AN ORGANIZED HEALTH CARE EDUCATION/TRAINING PROGRAM

## 2024-04-02 PROCEDURE — 94640 AIRWAY INHALATION TREATMENT: CPT

## 2024-04-02 PROCEDURE — 36415 COLL VENOUS BLD VENIPUNCTURE: CPT | Performed by: INTERNAL MEDICINE

## 2024-04-02 PROCEDURE — 2500000004 HC RX 250 GENERAL PHARMACY W/ HCPCS (ALT 636 FOR OP/ED): Performed by: STUDENT IN AN ORGANIZED HEALTH CARE EDUCATION/TRAINING PROGRAM

## 2024-04-02 PROCEDURE — 2500000004 HC RX 250 GENERAL PHARMACY W/ HCPCS (ALT 636 FOR OP/ED): Performed by: INTERNAL MEDICINE

## 2024-04-02 PROCEDURE — 2020000001 HC ICU ROOM DAILY

## 2024-04-02 PROCEDURE — 2500000002 HC RX 250 W HCPCS SELF ADMINISTERED DRUGS (ALT 637 FOR MEDICARE OP, ALT 636 FOR OP/ED): Mod: MUE | Performed by: INTERNAL MEDICINE

## 2024-04-02 PROCEDURE — 2500000001 HC RX 250 WO HCPCS SELF ADMINISTERED DRUGS (ALT 637 FOR MEDICARE OP): Performed by: STUDENT IN AN ORGANIZED HEALTH CARE EDUCATION/TRAINING PROGRAM

## 2024-04-02 PROCEDURE — 97530 THERAPEUTIC ACTIVITIES: CPT | Mod: GP

## 2024-04-02 PROCEDURE — 2500000002 HC RX 250 W HCPCS SELF ADMINISTERED DRUGS (ALT 637 FOR MEDICARE OP, ALT 636 FOR OP/ED): Mod: MUE | Performed by: STUDENT IN AN ORGANIZED HEALTH CARE EDUCATION/TRAINING PROGRAM

## 2024-04-02 RX ORDER — PREDNISONE 20 MG/1
20 TABLET ORAL 2 TIMES DAILY
Status: DISCONTINUED | OUTPATIENT
Start: 2024-04-02 | End: 2024-04-05

## 2024-04-02 RX ADMIN — ACETAMINOPHEN 650 MG: 325 TABLET ORAL at 18:42

## 2024-04-02 RX ADMIN — METHYLPREDNISOLONE SODIUM SUCCINATE 20 MG: 40 INJECTION, POWDER, FOR SOLUTION INTRAMUSCULAR; INTRAVENOUS at 02:01

## 2024-04-02 RX ADMIN — GABAPENTIN 100 MG: 100 CAPSULE ORAL at 20:08

## 2024-04-02 RX ADMIN — Medication 1 SPRAY: at 20:08

## 2024-04-02 RX ADMIN — PIPERACILLIN SODIUM AND TAZOBACTAM SODIUM 3.38 G: 3; .375 INJECTION, SOLUTION INTRAVENOUS at 00:35

## 2024-04-02 RX ADMIN — FUROSEMIDE 40 MG: 40 TABLET ORAL at 17:35

## 2024-04-02 RX ADMIN — PIPERACILLIN SODIUM AND TAZOBACTAM SODIUM 3.38 G: 3; .375 INJECTION, SOLUTION INTRAVENOUS at 18:42

## 2024-04-02 RX ADMIN — Medication 30 L/MIN: at 15:00

## 2024-04-02 RX ADMIN — IPRATROPIUM BROMIDE AND ALBUTEROL SULFATE 3 ML: 2.5; .5 SOLUTION RESPIRATORY (INHALATION) at 07:44

## 2024-04-02 RX ADMIN — Medication: at 03:00

## 2024-04-02 RX ADMIN — Medication: at 11:00

## 2024-04-02 RX ADMIN — Medication 1 SPRAY: at 17:35

## 2024-04-02 RX ADMIN — SIMVASTATIN 20 MG: 20 TABLET, FILM COATED ORAL at 20:08

## 2024-04-02 RX ADMIN — CLOPIDOGREL BISULFATE 75 MG: 75 TABLET ORAL at 09:24

## 2024-04-02 RX ADMIN — IPRATROPIUM BROMIDE AND ALBUTEROL SULFATE 3 ML: 2.5; .5 SOLUTION RESPIRATORY (INHALATION) at 19:14

## 2024-04-02 RX ADMIN — LEVOTHYROXINE SODIUM 88 MCG: 0.09 TABLET ORAL at 05:42

## 2024-04-02 RX ADMIN — ACETAMINOPHEN 650 MG: 325 TABLET ORAL at 12:16

## 2024-04-02 RX ADMIN — ACETAMINOPHEN 650 MG: 325 TABLET ORAL at 05:42

## 2024-04-02 RX ADMIN — FUROSEMIDE 40 MG: 40 TABLET ORAL at 09:24

## 2024-04-02 RX ADMIN — Medication 1 SPRAY: at 12:17

## 2024-04-02 RX ADMIN — METHYLPREDNISOLONE SODIUM SUCCINATE 20 MG: 40 INJECTION, POWDER, FOR SOLUTION INTRAMUSCULAR; INTRAVENOUS at 09:23

## 2024-04-02 RX ADMIN — Medication: at 07:00

## 2024-04-02 RX ADMIN — ASPIRIN 81 MG: 81 TABLET, COATED ORAL at 09:24

## 2024-04-02 RX ADMIN — Medication 30 L/MIN: at 19:00

## 2024-04-02 RX ADMIN — ENOXAPARIN SODIUM 40 MG: 40 INJECTION SUBCUTANEOUS at 05:42

## 2024-04-02 RX ADMIN — PIPERACILLIN SODIUM AND TAZOBACTAM SODIUM 3.38 G: 3; .375 INJECTION, SOLUTION INTRAVENOUS at 05:41

## 2024-04-02 RX ADMIN — IPRATROPIUM BROMIDE AND ALBUTEROL SULFATE 3 ML: 2.5; .5 SOLUTION RESPIRATORY (INHALATION) at 15:04

## 2024-04-02 RX ADMIN — PREDNISONE 20 MG: 20 TABLET ORAL at 15:06

## 2024-04-02 RX ADMIN — PIPERACILLIN SODIUM AND TAZOBACTAM SODIUM 3.38 G: 3; .375 INJECTION, SOLUTION INTRAVENOUS at 12:17

## 2024-04-02 ASSESSMENT — PAIN - FUNCTIONAL ASSESSMENT
PAIN_FUNCTIONAL_ASSESSMENT: 0-10
PAIN_FUNCTIONAL_ASSESSMENT: FLACC (FACE, LEGS, ACTIVITY, CRY, CONSOLABILITY)
PAIN_FUNCTIONAL_ASSESSMENT: 0-10

## 2024-04-02 ASSESSMENT — COGNITIVE AND FUNCTIONAL STATUS - GENERAL
MOVING FROM LYING ON BACK TO SITTING ON SIDE OF FLAT BED WITH BEDRAILS: A LITTLE
TURNING FROM BACK TO SIDE WHILE IN FLAT BAD: A LITTLE
TURNING FROM BACK TO SIDE WHILE IN FLAT BAD: A LITTLE
MOBILITY SCORE: 15
MOVING FROM LYING ON BACK TO SITTING ON SIDE OF FLAT BED WITH BEDRAILS: A LITTLE
WALKING IN HOSPITAL ROOM: A LOT
MOVING TO AND FROM BED TO CHAIR: A LITTLE
MOVING TO AND FROM BED TO CHAIR: A LITTLE
MOBILITY SCORE: 15
CLIMB 3 TO 5 STEPS WITH RAILING: TOTAL
CLIMB 3 TO 5 STEPS WITH RAILING: TOTAL
STANDING UP FROM CHAIR USING ARMS: A LITTLE
STANDING UP FROM CHAIR USING ARMS: A LITTLE
WALKING IN HOSPITAL ROOM: A LOT

## 2024-04-02 ASSESSMENT — PAIN SCALES - GENERAL
PAINLEVEL_OUTOF10: 0 - NO PAIN
PAINLEVEL_OUTOF10: 2

## 2024-04-02 NOTE — PROGRESS NOTES
"Akua Almanzar is a 89 y.o. female on day 4 of admission presenting with Pneumonia of right lung due to infectious organism, unspecified part of lung.    Subjective   Patient states that she is feeling better each day.  Sat in a chair yesterday for a good period of time and overall things breathing is better.       Objective     Physical Exam  Eyes:      Conjunctiva/sclera: Conjunctivae normal.   Cardiovascular:      Rate and Rhythm: Normal rate and regular rhythm.      Heart sounds:      No gallop.   Pulmonary:      Breath sounds: Normal breath sounds. No wheezing, rhonchi or rales.   Abdominal:      Palpations: Abdomen is soft.   Neurological:      General: No focal deficit present.      Mental Status: She is alert.       Constitutional:       Appearance: Not in distress.   Eyes:      Conjunctiva/sclera: Conjunctivae normal.   Neck:      Vascular: JVD normal.   Pulmonary:      Breath sounds: Normal breath sounds. No wheezing. No rhonchi. No rales.   Cardiovascular:      Normal rate. Regular rhythm.      Murmurs: There is no murmur.      No gallop.  No click. No rub.   Abdominal:      Palpations: Abdomen is soft.   Neurological:      General: No focal deficit present.      Mental Status: Alert.        Last Recorded Vitals  Blood pressure 126/70, pulse 92, temperature 36.5 °C (97.7 °F), temperature source Temporal, resp. rate 16, height 1.549 m (5' 1\"), weight 73.1 kg (161 lb 1.6 oz), SpO2 95 %.  Intake/Output last 3 Shifts:  I/O last 3 completed shifts:  In: 1140 (15.6 mL/kg) [P.O.:840; IV Piggyback:300]  Out: 4254 (58.2 mL/kg) [Urine:4250 (1.6 mL/kg/hr); Stool:4]  Weight: 73.1 kg     Relevant Results           This patient currently has cardiac telemetry ordered; if you would like to modify or discontinue the telemetry order, click here to go to the orders activity to modify/discontinue the order.      Results for orders placed or performed during the hospital encounter of 03/29/24 (from the past 24 hour(s)) "   Comprehensive Metabolic Panel   Result Value Ref Range    Glucose 140 (H) 65 - 99 mg/dL    Sodium 138 133 - 145 mmol/L    Potassium 4.5 3.4 - 5.1 mmol/L    Chloride 89 (L) 97 - 107 mmol/L    Bicarbonate 43 (H) 24 - 31 mmol/L    Urea Nitrogen 17 8 - 25 mg/dL    Creatinine 0.70 0.40 - 1.60 mg/dL    eGFR 80 >60 mL/min/1.73m*2    Calcium 9.2 8.5 - 10.4 mg/dL    Albumin 3.2 (L) 3.5 - 5.0 g/dL    Alkaline Phosphatase 73 35 - 125 U/L    Total Protein 6.0 5.9 - 7.9 g/dL    AST 16 5 - 40 U/L    Bilirubin, Total 0.3 0.1 - 1.2 mg/dL    ALT 18 5 - 40 U/L    Anion Gap 6 <=19 mmol/L   CBC and Auto Differential   Result Value Ref Range    WBC 3.9 (L) 4.4 - 11.3 x10*3/uL    nRBC 0.0 0.0 - 0.0 /100 WBCs    RBC 4.02 4.00 - 5.20 x10*6/uL    Hemoglobin 11.4 (L) 12.0 - 16.0 g/dL    Hematocrit 36.2 36.0 - 46.0 %    MCV 90 80 - 100 fL    MCH 28.4 26.0 - 34.0 pg    MCHC 31.5 (L) 32.0 - 36.0 g/dL    RDW 14.6 (H) 11.5 - 14.5 %    Platelets 204 150 - 450 x10*3/uL    Neutrophils % 79.7 40.0 - 80.0 %    Immature Granulocytes %, Automated 0.8 0.0 - 0.9 %    Lymphocytes % 11.2 13.0 - 44.0 %    Monocytes % 8.3 2.0 - 10.0 %    Eosinophils % 0.0 0.0 - 6.0 %    Basophils % 0.0 0.0 - 2.0 %    Neutrophils Absolute 3.07 1.60 - 5.50 x10*3/uL    Immature Granulocytes Absolute, Automated 0.03 0.00 - 0.50 x10*3/uL    Lymphocytes Absolute 0.43 (L) 0.80 - 3.00 x10*3/uL    Monocytes Absolute 0.32 0.05 - 0.80 x10*3/uL    Eosinophils Absolute 0.00 0.00 - 0.40 x10*3/uL    Basophils Absolute 0.00 0.00 - 0.10 x10*3/uL               Assessment/Plan   Principal Problem:    Pneumonia of right lung due to infectious organism, unspecified part of lung  Active Problems:    Atherosclerosis of coronary artery without angina pectoris    Hyperlipidemia    Primary hypertension    Acquired hypothyroidism    Acute on chronic diastolic (congestive) heart failure (CMS/HCC)    Acute on chronic respiratory failure (CMS/HCC)    Acute on chronic respiratory failure  Transcatheter  aortic valve replacement April 2021  Heart failure preserved ejection fraction  Degenerative mitral valve stenosis  Pulmonary hypertension  Essential hypertension     3/30: This patient is a 89-year-old white female with a history of COPD requiring nasal oxygen with pulmonary hypertension as well. She did undergo a transcatheter aortic valve replacement 4/9/2021 for severe aortic valvular stenosis. She was recently admitted to [facility], [place] from 3/1/2024 - 3/7/2024 with increased shortness of breath thought to be related to CHF diastolic variety. Her echocardiogram at that time showed a preserved LV ejection fraction and normally functioning TAVR are but moderate mitral valve stenosis and moderately severe pulmonary hypertension estimated PA systolic pressure 59 mmHg. She is currently admitted with recurrent shortness of breath and required transfer to ICU due to worsening respiratory distress and CO2 retention. She has been placed on continuous BiPAP with clinical improvement. Her hemodynamic parameters remain stable. Telemetry monitor shows sinus rhythm with left bundle branch block conduction delay. Her proBNP surprisingly is only modestly elevated despite the significant abnormalities on chest x-ray. For now she will be treated empirically with Lasix 40 mg IV twice daily. Empiric antibiotic therapy has also been instituted including vancomycin, Zosyn and azithromycin. Patient being seen by pulmonology. Patient receiving DuoNebs and IV Solu-Medrol as well. The patient and family aware of guarded prognosis.      3/31: The patient is awake alert and conversant.  She has been transition to a high flow O2 nasal cannula 30 L/min 80% FiO2.  No respiratory distress with satisfactory O2 saturations.  Telemetry monitor shows a borderline sinus tachycardia at 100/min.  The comprehensive metabolic panel includes a creatinine of 0.6 potassium of 4.2.  Will check repeat chest x-ray tomorrow.  Head CT from yesterday  showed only cerebral atrophy and bilateral chronic white matter change.  Input output negative by 915 cc yesterday.  Will continue the empiric IV Lasix.  Question the functional significance of the patient's mitral valve stenosis by echo.  The proBNP was only modestly elevated and patient remains currently on IV Rocephin for suspected pneumonia.     4/1: Patient has been gradually improving on a daily basis.  She is currently receiving antibiotic therapy for suspected pneumonia.  Chest x-ray has been ordered with results still pending at this time.  With her clinical improvement I think we can transition from IV furosemide to oral therapy.  Kidney function remains stable with serum creatinine of 0.7 on morning lab studies.  Continues to slowly improve.    4/2: We transition from IV furosemide to oral therapy yesterday.  There is a -1500 cc fluid balance over the last 24 hours.  Serum creatinine remains stable at 0.7.  Chest x-ray done yesterday revealing improved heart failure findings.  Will continue current therapy and monitor on a clinical basis.             I spent 20 minutes in the professional and overall care of this patient.      Pio Alberto, DO

## 2024-04-02 NOTE — PROGRESS NOTES
"Akua Almanzar is a 89 y.o. female on day 4 of admission presenting with Pneumonia of right lung due to infectious organism, unspecified part of lung.    Subjective   Symptoms (0 - 10, Best to Worst)  [place] Symptom Assessment System  Pain Score: 0 - No pain  Patient sitting up in bed remains on high flow remains hard of hearing but otherwise in good mood.  No complaints.  Difficulty breathing not any different than yesterday overall in good spirits.  Desires to continue with treatment to see if can go back down to nasal cannula        Objective     Physical Exam    Constitutional:       General: Patient is not in acute distress.  HENT:      Head: Normocephalic.      Mouth: Mucous membranes are moist.   Eyes:      Conjunctiva/sclera: Conjunctivae clear, sclerae white. No discharge.     Pupils: Pupils are equal, round, and reactive to light.   Neck:      Vascular: No carotid bruit.   Cardiovascular:      Rate and Rhythm: Normal rate and regular rhythm.      Heart sounds: murmur heard.  Pulmonary:      Effort: No respiratory distress.      Breath sounds: Clear to auscultation diminished bases  Abdominal:      General: There is no distension.      Tenderness: There is no abdominal tenderness. There is no guarding.   Urology:     Genitalia: normal genitalia    Urine: urine yellow and clear Sevilla catheter  Musculoskeletal:         General: No deformity.   Skin:     Coloration: Skin is not jaundiced.  +1 edema BLE  Neurological:      General: No focal deficit present.      Mental Status: He is oriented to person, place, and time.   Psychiatric:         Behavior: Behavior normal. Behavior is cooperative.         Last Recorded Vitals  Blood pressure 125/68, pulse 103, temperature 36.5 °C (97.7 °F), temperature source Temporal, resp. rate 21, height 1.549 m (5' 1\"), weight 73.1 kg (161 lb 1.6 oz), SpO2 96 %.  Intake/Output last 3 Shifts:  I/O last 3 completed shifts:  In: 1140 (15.6 mL/kg) [P.O.:840; IV " Piggyback:300]  Out: 4254 (58.2 mL/kg) [Urine:4250 (1.6 mL/kg/hr); Stool:4]  Weight: 73.1 kg     Relevant Results  Results for orders placed or performed during the hospital encounter of 03/29/24 (from the past 24 hour(s))   Comprehensive Metabolic Panel   Result Value Ref Range    Glucose 140 (H) 65 - 99 mg/dL    Sodium 138 133 - 145 mmol/L    Potassium 4.5 3.4 - 5.1 mmol/L    Chloride 89 (L) 97 - 107 mmol/L    Bicarbonate 43 (H) 24 - 31 mmol/L    Urea Nitrogen 17 8 - 25 mg/dL    Creatinine 0.70 0.40 - 1.60 mg/dL    eGFR 80 >60 mL/min/1.73m*2    Calcium 9.2 8.5 - 10.4 mg/dL    Albumin 3.2 (L) 3.5 - 5.0 g/dL    Alkaline Phosphatase 73 35 - 125 U/L    Total Protein 6.0 5.9 - 7.9 g/dL    AST 16 5 - 40 U/L    Bilirubin, Total 0.3 0.1 - 1.2 mg/dL    ALT 18 5 - 40 U/L    Anion Gap 6 <=19 mmol/L   CBC and Auto Differential   Result Value Ref Range    WBC 3.9 (L) 4.4 - 11.3 x10*3/uL    nRBC 0.0 0.0 - 0.0 /100 WBCs    RBC 4.02 4.00 - 5.20 x10*6/uL    Hemoglobin 11.4 (L) 12.0 - 16.0 g/dL    Hematocrit 36.2 36.0 - 46.0 %    MCV 90 80 - 100 fL    MCH 28.4 26.0 - 34.0 pg    MCHC 31.5 (L) 32.0 - 36.0 g/dL    RDW 14.6 (H) 11.5 - 14.5 %    Platelets 204 150 - 450 x10*3/uL    Neutrophils % 79.7 40.0 - 80.0 %    Immature Granulocytes %, Automated 0.8 0.0 - 0.9 %    Lymphocytes % 11.2 13.0 - 44.0 %    Monocytes % 8.3 2.0 - 10.0 %    Eosinophils % 0.0 0.0 - 6.0 %    Basophils % 0.0 0.0 - 2.0 %    Neutrophils Absolute 3.07 1.60 - 5.50 x10*3/uL    Immature Granulocytes Absolute, Automated 0.03 0.00 - 0.50 x10*3/uL    Lymphocytes Absolute 0.43 (L) 0.80 - 3.00 x10*3/uL    Monocytes Absolute 0.32 0.05 - 0.80 x10*3/uL    Eosinophils Absolute 0.00 0.00 - 0.40 x10*3/uL    Basophils Absolute 0.00 0.00 - 0.10 x10*3/uL     No current facility-administered medications on file prior to encounter.     Current Outpatient Medications on File Prior to Encounter   Medication Sig Dispense Refill    acidophilus-pectin, citrus 100 million cell-10 mg  capsule Take by mouth.      amLODIPine (Norvasc) 2.5 mg tablet Take 1 tablet (2.5 mg) by mouth once daily.      ascorbic acid (Vitamin C) 250 MG chewable tablet Chew 2 tablets (500 mg). Reports chewable 500mg      aspirin 81 mg EC tablet Take 1 tablet (81 mg) by mouth once daily.      carvedilol (Coreg) 6.25 mg tablet Take 1 tablet twice a day by oral route.      cholecalciferol (Vitamin D-3) 25 MCG (1000 UT) capsule Take by mouth.      clopidogrel (Plavix) 75 mg tablet Take 1 tablet (75 mg) by mouth once daily.      docusate sodium (Colace) 100 mg capsule Take 1 capsule (100 mg) by mouth 2 times a day.  0    furosemide (Lasix) 40 mg tablet Take 1 tablet (40 mg) by mouth once daily.  0    gabapentin (Neurontin) 100 mg capsule Take 1 capsule (100 mg) by mouth once daily at bedtime.  0    hydrOXYzine HCL (Atarax) 25 mg tablet Take 1 tablet (25 mg) by mouth every 6 hours if needed for itching.  0    levothyroxine (Synthroid, Levoxyl) 88 mcg tablet Take 1 tablet (88 mcg) by mouth once daily.      oxygen (O2) gas therapy Inhale 1 each continuously.      simvastatin (Zocor) 20 mg tablet Take 1 tablet (20 mg) by mouth once daily at bedtime.         Assessment/Plan   IMP:  Acute on chronic hypoxic and hypercapnic respiratory failure  -ABGs showing hypercapnia   - HFNC at 80% FiO2.      2.   Pneumonia  - Currently on Zosyn    3.   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 Left ventricular systolic function is normal.   2. Spectral Doppler shows an impaired relaxation pattern of left ventricular diastolic filling.   3. There is moderate concentric left ventricular hypertrophy.   4. The left atrium is moderately dilated.   5. The mitral valve is moderately thickened.   6. There is moderate mitral annular calcification.   7. There is moderate to severe calcification of the anterior and posterior mitral valve leaflets.   8. Mild to moderate tricuspid regurgitation.   9.  Moderate to severely elevated right ventricular systolic pressure.   - ProBNP 1,489     4.   TME  - suspect r/t respiratory status, resolved     5.   Electrolyte imbalance  - hyponatremia, resolved      6.   Anemia  - of chronic disease  - H/H stable 10.4/33.4     7.   Palliative care   DNR CCA DNI  -The patient is a capable decision maker   - DPOA - HC filled out where Derek is primary and Anitra is secondary. This is in hard chart.   -The patient has 4 children.   Derek Almanzar - Westley Mayer - [facility] - lives out of [facility]  Ginger Almanzar - lives out of town  Derek and Anitra have been primary contacts.     Remains in disease-modifying mode continue weaning attempts to nasal cannula.  Patient would like to home at discharge this corresponds with the intensity rehab recommendation.  Depending on functional status at time of discharge patient is interested either in home health care and Navigator or if still suffering from shortness of breath and having unpleasant symptoms might also be open for hospice at that time.  Would like to see how she feels when she is off oxygen.  Will follow       I spent 35 minutes in the professional and overall care of this patient.      Sulema Galvin, APRN-CNP

## 2024-04-02 NOTE — PROGRESS NOTES
Pulmonary Progress Note 04/02/24     Patient seen in follow-up for acute respiratory failure with hypoxia, pneumonia, congestive heart failure    Subjective   Interval History  No overnight events.  Kaia on Vapotherm although decreasing oxygen requirements.  Breathing is fine.    Objective   Vital signs in last 24 hours:  Temp:  [35.8 °C (96.4 °F)-36.5 °C (97.7 °F)] 36.5 °C (97.7 °F)  Heart Rate:  [] 103  Resp:  [14-29] 21  BP: (105-152)/(60-91) 125/68  FiO2 (%):  [50 %] 50 %    Intake/Output last 3 shifts:  I/O last 3 completed shifts:  In: 1140 (15.6 mL/kg) [P.O.:840; IV Piggyback:300]  Out: 4254 (58.2 mL/kg) [Urine:4250 (1.6 mL/kg/hr); Stool:4]  Weight: 73.1 kg   Intake/Output this shift:  I/O this shift:  In: 50 [IV Piggyback:50]  Out: 500 [Urine:500]    Physical Exam  Vitals reviewed.   Constitutional:       General: She is not in acute distress.     Interventions: Nasal cannula in place.      Comments: Vapotherm   Eyes:      Conjunctiva/sclera: Conjunctivae normal.   Cardiovascular:      Rate and Rhythm: Normal rate.      Heart sounds: Murmur heard.   Pulmonary:      Effort: Pulmonary effort is normal.      Breath sounds: No wheezing or rales.   Musculoskeletal:      Cervical back: Full passive range of motion without pain.      Right lower leg: No edema.      Left lower leg: No edema.   Skin:     General: Skin is warm and dry.   Neurological:      General: No focal deficit present.      Mental Status: She is alert.   Psychiatric:         Mood and Affect: Mood normal.         Behavior: Behavior normal.         Scheduled medications  acetaminophen, 650 mg, oral, q6h  aspirin, 81 mg, oral, Daily  clopidogrel, 75 mg, oral, Daily  enoxaparin, 40 mg, subcutaneous, Daily  furosemide, 40 mg, oral, BID with meals  gabapentin, 100 mg, oral, Nightly  ipratropium-albuteroL, 3 mL, nebulization, 4x daily  levothyroxine, 88 mcg, oral, Daily  methylPREDNISolone sodium succinate (PF), 20 mg, intravenous,  q8h  oxygen, , inhalation, q4h  piperacillin-tazobactam, 3.375 g, intravenous, q6h  simvastatin, 20 mg, oral, Nightly  sodium chloride, 1 spray, Each Nostril, 4x daily      Continuous medications  oxygen,       PRN medications  PRN medications: albuterol, bisacodyl     Labs:  Lab Results   Component Value Date     04/02/2024    K 4.5 04/02/2024    CL 89 (L) 04/02/2024    CO2 43 (H) 04/02/2024    BUN 17 04/02/2024    CREATININE 0.70 04/02/2024    GLUCOSE 140 (H) 04/02/2024    CALCIUM 9.2 04/02/2024     Lab Results   Component Value Date    WBC 3.9 (L) 04/02/2024    HGB 11.4 (L) 04/02/2024    HCT 36.2 04/02/2024    MCV 90 04/02/2024     04/02/2024       Imaging:  XR chest 1 view    Result Date: 4/1/2024  Interpreted By:  Anthony Walker, STUDY: XR CHEST 1 VIEW; 3/31/2024 9:38 am   INDICATION: Signs/Symptoms:Question of surgery   COMPARISON: 03/30/2020   ACCESSION NUMBER(S): [id number]   ORDERING CLINICIAN: KADI ALLAN   FINDINGS: The study is limited due to rotation, lordotic projection and respiratory motion. The cardiac silhouette appears prominent, exaggerated by the technique. Prosthetic aortic valve is again seen. Atherosclerotic calcifications involve the tortuous aorta. There is left pleural effusion and bilateral interstitial and mild alveolar infiltrates; allowing for differences in technique there is probably some minimal improvement. There is no pneumothorax. Degenerative changes involve the spine and shoulders.       Cardiomegaly and slightly improving congestive heart failure.   Signed by: Anthony Walker 4/1/2024 8:17 AM Dictation workstation:   [id number]    ECG 12 Lead    Result Date: 3/31/2024  Normal sinus rhythm Possible Left atrial enlargement Left bundle branch block Abnormal ECG When compared with ECG of 01-MAR-2024 00:06, No significant change was found Confirmed by Aryan Ruiz ([id number]) on 3/31/2024 7:18:49 PM    CT head wo IV contrast    Result Date: 3/30/2024  Interpreted By:   Bautista Ruelas, STUDY: CT HEAD WO IV CONTRAST;  3/30/2024 8:10 pm   INDICATION: Signs/Symptoms:headache.   COMPARISON: None   ACCESSION NUMBER(S): [id number]   ORDERING CLINICIAN: BRISEIDA PATINO   TECHNIQUE: Contiguous axial images of the head were obtained without intravenous contrast.   FINDINGS: The examination is limited secondary to patient motion.   BRAIN PARENCHYMA:  There is cerebral atrophy and bowel chronic white matter change. The gray white matter differentiation is preserved. No mass effect or midline shift.   HEMORRHAGE:  No evidence of acute intracranial hemorrhage. VENTRICLES AND EXTRA-AXIAL SPACES:  The ventricles are within normal limits in size for brain volume.  No evidence of abnormal extraaxial fluid collection. EXTRACRANIAL SOFT TISSUES:  Within normal limits. PARANASAL SINUSES/MASTOIDS:  Mild peripheral mucosal thickening of partially imaged right maxillary sinus. CALVARIUM:  No evidence of depressed calvarial fracture.   OTHER FINDINGS:  None       Cerebral atrophy and bilateral chronic white matter change.   No evidence of acute intracranial hemorrhage.   Peripheral mucosal thickening of partially imaged right maxillary sinus.   MACRO: None   Signed by: Bautista Ruelas 3/30/2024 8:36 PM Dictation workstation:   [id number]    XR chest 1 view    Result Date: 3/30/2024  Interpreted By:  Spencer Hoskins, STUDY: XR CHEST 1 VIEW   INDICATION: Signs/Symptoms:Hypoxia/SOB.   COMPARISON: March 29, 2024   ACCESSION NUMBER(S): [id number]   ORDERING CLINICIAN: DORETHA MADDEN   FINDINGS: Significant worsening of the perihilar and basilar edema when compared to prior study.   More conglomerate consolidation in the mid lungs both right and left.   Small bilateral effusions.       Significant worsening of the perihilar and basilar edema when compared to prior study.   More conglomerate consolidation in the mid lungs both right and left.   Small bilateral effusions.   Signed by: Spencer Hoskins 3/30/2024 8:11 AM  Dictation workstation:   [id number]    XR chest 2 views    Result Date: 3/29/2024  Interpreted By:  Carlo Chan, STUDY: XR CHEST 2 VIEWS;  3/29/2024 3:37 am   INDICATION: Signs/Symptoms:sob.   COMPARISON: 02/29/2024   ACCESSION NUMBER(S): [id number]   ORDERING CLINICIAN: ANALI AGUILAR   FINDINGS:     Increasing patchy right basilar airspace opacity. Similar severe cardiomegaly. No pneumothorax. Similar endovascular aortic valve prosthesis. Similar severe diffuse interstitial opacity. Upper abdomen is unremarkable. Diffuse osteopenia. No acute osseous abnormality.       1. Increasing patchy right basilar airspace opacity. Consider pneumonia. 2. Similar severe diffuse interstitial opacity, likely pulmonary edema. 3. Stable cardiomegaly.   Signed by: Carlo Chan 3/29/2024 3:53 AM Dictation workstation:   [id number]    Lower extremity venous duplex left    Result Date: 3/5/2024  Interpreted By:  Jatinder Appiah, STUDY: [facility] LOWER EXTREMITY VENOUS DUPLEX LEFT; 3/5/2024 1:16 pm   INDICATION: Signs/Symptoms:lower extremity pain and edema.   COMPARISON: None   ACCESSION NUMBER(S): [id number]   ORDERING CLINICIAN: BREANNE YOUNG   TECHNIQUE: Black and white as well as color-flow duplex images were obtained along with Doppler spectral analysis of the deep venous system of the lower extremity from the knee to the groin. Attempts were made to image the proximal posterior tibial and peroneal veins of the proximal calf as well. Venous compression was performed.   FINDINGS: There is normal compressibility and flow within the visualized vessels of the deep venous system of the lower extremity.  The contralateral common femoral vein is patent.       No evidence for deep venous thrombosis within the limits of this examination.   MACRO: none   Signed by: Jatinder Appiah 3/5/2024 1:30 PM Dictation workstation:   [id number]              Assessment/Plan   Principal Problem:    Pneumonia of right lung due to  infectious organism, unspecified part of lung  Active Problems:    Atherosclerosis of coronary artery without angina pectoris    Hyperlipidemia    Primary hypertension    Acquired hypothyroidism    Acute on chronic diastolic (congestive) heart failure (CMS/HCC)    Acute on chronic respiratory failure (CMS/HCC)      Acute on chronic respiratory failure with hypoxia  Now that we are down to 50% with 100% pulse ox, believe we can trial nasal cannula  Acute on chronic diastolic heart failure  Oral diuretics  Pulmonary hypertension: Group II and Group III  Diuresis  COPD: None entirely sure acute exacerbation.  Overall improved so we will maintain steroids and taper off rapidly though  Transition to oral prednisone  Probable bacterial pneumonia  Continue with piperacillin/tazobactam.  Total of 7 days of systemic antibiotics  Prophylaxis  Enoxaparin 40 subcutaneous daily confirmed  CODE STATUS  DNR, DNI noted  Pulmonary disposition  Transition out of the ICU    We will continue to follow    Smith House MD  Pulmonary/CC Medicine  [facility]     LOS: 4 days

## 2024-04-02 NOTE — PROGRESS NOTES
Physical Therapy    Physical Therapy Treatment    Patient Name: Akua Almanzar  MRN: [id number]  Today's Date: 4/2/2024  Time Calculation  Start Time: 1125  Stop Time: 1213  Time Calculation (min): 48 min   Documentation and services performed by student physical therapist under the supervision of a licensed physical therapist.    Assessment/Plan   PT Assessment  PT Assessment Results: Decreased strength, Decreased endurance, Impaired balance, Decreased mobility, Decreased safety awareness, Impaired hearing  Rehab Prognosis: Good  Medical Staff Made Aware: Yes  End of Session Communication: Bedside nurse  End of Session Patient Position: Up in chair, Alarm on    Assessment Comment: 89 y/o female transitioned to ICU following PNA and SOB demonstrating reduced mobility and endurance. Pt had no LOB or signs of physical distress / desaturation with functional mobility of transfers and ambulation. Anticiiapte pt will continue to progress in mobility with resolution of medical condition and continued physical therapy interventions to aid in improved strengthening and mobility, however at this time, unsafe to return home.Will cont to monitor progress and mobility.      PT Plan  Inpatient/Swing Bed or Outpatient: Inpatient  PT Plan  Treatment/Interventions: Bed mobility, Transfer training, Gait training, Balance training, Neuromuscular re-education, Strengthening, Stair training, Endurance training, Therapeutic exercise, Therapeutic activity  PT Plan: Skilled PT  PT Frequency: 3 times per week  PT Discharge Recommendations: Moderate intensity level of continued care  Equipment Recommended upon Discharge: Wheeled walker  PT Recommended Transfer Status: Assist x1  PT - OK to Discharge: Yes      General Visit Information:   PT  Visit  PT Received On: 04/02/24  Response to Previous Treatment: Patient with no complaints from previous session. (No reported complaints)  General  Reason for Referral: Impaired mobility  Referred By:  Dr. JACQUELIN Gilman  Past Medical History Relevant to Rehab: CHF, PNA, HTN, HLD  Missed Visit: No  Prior to Session Communication: Bedside nurse  Patient Position Received: Bed, 4 rail up, Alarm on  Preferred Learning Style: verbal  General Comment: Pt presents supine in bed with x 4 HR raised and HOB elevated on high flow O2 via nasal canula on Vapotherm at 50% FiO2 and 30 L/min. Spo2 94%and  when starting therapy. Pt cleared by nursing to participate in therapy and pt pleasant and willing to participate in therapy.    Subjective   Precautions:     Vital Signs:  Vital Signs  Heart Rate: 100  Resp: 26  SpO2: 95 %    Objective   Pain:  Pain Assessment  Pain Assessment: FLACC (Face, Legs, Activity, Cry, Consolability)  Pain Score: 2  Pain Type: Other (Comment) (discomfort or stiffness from laying in bed)  Cognition:  Cognition  Overall Cognitive Status: Within Functional Limits  Orientation Level: Oriented X4  Postural Control:  Postural Control  Posture Comment: forward head and rounded shoulders.  Static Sitting Balance  Static Sitting-Balance Support: Bilateral upper extremity supported  Static Sitting-Level of Assistance: Contact guard, Close supervision  Static Sitting-Comment/Number of Minutes: sitting EOB and on bedside commode  Dynamic Sitting Balance  Dynamic Sitting-Balance Support: Bilateral upper extremity supported  Dynamic Sitting-Balance:  (with scooting and LE movement to allow for pericare.)  Static Standing Balance  Static Standing-Balance Support: Bilateral upper extremity supported  Static Standing-Level of Assistance: Contact guard  Static Standing-Comment/Number of Minutes: 2ww in standing during pericare and transfers.  Dynamic Standing Balance  Dynamic Standing-Balance Support: Bilateral upper extremity supported  Dynamic Standing-Comments: 2ww in standing during pericare and transfers.  Extremity/Trunk Assessments:    Activity Tolerance:  Activity Tolerance  Endurance: Decreased tolerance for  upright activites  Treatments:  Therapeutic Exercise  Therapeutic Exercise Performed: Yes  Therapeutic Exercise Activity 1: x 10 reps ea: ankle pumps, LAQ, and marches (marches in standing, rest in seated EOB).    Therapeutic Activity  Therapeutic Activity Performed: Yes  Therapeutic Activity 1: Bed mobility rolling and supine to sit transfer at supervision SBA  Therapeutic Activity 2: sit to stand and stand pivot transfers at [facility] with assist of 2ww for A.D and management of lines, tubes, drains.       Bed Mobility  Bed Mobility: Yes  Bed Mobility 1  Bed Mobility 1: Supine to sitting  Level of Assistance 1: Close supervision (SBA)    Ambulation/Gait Training  Ambulation/Gait Training Performed: Yes  Ambulation/Gait Training 1  Surface 1: Level tile  Device 1: Rolling walker  Assistance 1: Contact guard  Quality of Gait 1: Diminished heel strike, Decreased step length, Forward flexed posture  Comments/Distance (ft) 1: x 3 feet x 2 reps for bed to bedside commode and bedside commode to standard chair with arm rests. at [facility] w/ 2ww    Transfers  Transfer: Yes  Transfer 1  Transfer From 1: Sit to  Transfer to 1: Stand  Transfer Device 1: Walker  Transfer Level of Assistance 1: Contact guard  Trials/Comments 1: x 2-3 reps for standing exercises and to assist in pericare following BM  Transfers 2  Transfer From 2: Bed to  Transfer to 2: Commode-standard  Technique 2: Sit to stand, Stand to sit  Transfer Device 2: Walker  Transfer Level of Assistance 2: Contact guard  Trials/Comments 2: bed to bedside commode for BM.  Second trial, BSC to bedside chair.  Transfers 3  Transfer From 3: Stand to  Transfer to 3: Sit  Technique 3: Stand to sit  Transfer Device 3: Walker  Transfer Level of Assistance 3: Contact guard         Outcome Measures:  [facility] Basic Mobility  Turning from your back to your side while in a flat bed without using bedrails: A little  Moving from lying on your back to sitting on the side of a flat bed without  using bedrails: A little  Moving to and from bed to chair (including a wheelchair): A little  Standing up from a chair using your arms (e.g. wheelchair or bedside chair): A little  To walk in hospital room: A lot  Climbing 3-5 steps with railing: Total  Basic Mobility - Total Score: 15    Education Documentation  Body Mechanics, taught by Aniket Moon PTA at 4/2/2024  2:33 PM.  Learner: Patient  Readiness: Acceptance  Method: Explanation  Response: Needs Reinforcement    Precautions, taught by Aniket Moon PTA at 4/2/2024  2:33 PM.  Learner: Patient  Readiness: Acceptance  Method: Explanation  Response: Needs Reinforcement    Mobility Training, taught by Aniket Moon PTA at 4/2/2024  2:33 PM.  Learner: Patient  Readiness: Acceptance  Method: Explanation  Response: Needs Reinforcement    Body Mechanics, taught by Aniket Moon PTA at 4/2/2024  2:33 PM.  Learner: Patient  Readiness: Acceptance  Method: Explanation  Response: Needs Reinforcement    Precautions, taught by Aniket Moon PTA at 4/2/2024  2:33 PM.  Learner: Patient  Readiness: Acceptance  Method: Explanation  Response: Needs Reinforcement    Mobility Training, taught by Aniket Moon PTA at 4/2/2024  2:33 PM.  Learner: Patient  Readiness: Acceptance  Method: Explanation  Response: Needs Reinforcement    Education Comments  No comments found.        OP EDUCATION:       Encounter Problems       Encounter Problems (Active)       Balance       Dynamic Balance (Progressing)       Start:  03/29/24    Expected End:  04/12/24       Pt to improve dynamic balance to fair (+) with UE assist on 2ww to improve stability and safety to promote safety within home environment.            Mobility       Bed mobility (Progressing)       Start:  03/29/24    Expected End:  04/12/24       Pt to be independent with bed mobility to aid in home going environment.          ambulation (Progressing)       Start:  03/29/24    Expected End:  04/12/24       Pt will  ambulate x > 100 feet  at distant sup with UE assist on 2ww to allow for safety within home environment.         Stairs (Progressing)       Start:  03/29/24    Expected End:  04/12/24       Will ascend and descend x 12 steps with one handrail at [facility] to facilitate improved dependence and allow for safety with home environment.            PT Transfers       Transfers (Progressing)       Start:  03/29/24    Expected End:  04/12/24       Pt will complete all transfers at Mod I with UE assist as needed and improved safety awareness to improve functional mobility and promote increased safety awareness.             Pain - Adult

## 2024-04-02 NOTE — CARE PLAN
Pt is not ready for discharge at this time; pt is still on 02, 50%/30 LPM   Pt was discharged from [facility] on 3/7 to [facility], then to home. Received a call today from [facility] [place]; pt still has Medicare days  Unknown at this time if pt will go to a SNF or home. Pts son did not wish to discuss discharge planning yesterday.    DISCHARGE PLAN: TBD--PT DOES NOT YET HAVE A DISCHARGE PLAN IN PLACE.

## 2024-04-02 NOTE — CARE PLAN
The patient's goals for the shift include  rNo fall by the end of shift.     The clinical goals for the shift include Pt will remain hemodynamically stable over night      Problem: Pain - Adult  Goal: Verbalizes/displays adequate comfort level or baseline comfort level  Outcome: Progressing     Problem: Safety - Adult  Goal: Free from fall injury  Outcome: Progressing     Problem: Discharge Planning  Goal: Discharge to home or other facility with appropriate resources  Outcome: Progressing     Problem: Respiratory  Goal: Minimize anxiety/maximize coping throughout shift  Outcome: Progressing  Goal: Minimal/no exertional discomfort or dyspnea this shift  Outcome: Progressing  Goal: No signs of respiratory distress (eg. Use of accessory muscles. Peds grunting)  Outcome: Progressing  Goal: Verbalize decreased shortness of breath this shift  Outcome: Progressing  Goal: Wean oxygen to maintain O2 saturation per order/standard this shift  Outcome: Progressing     Problem: Heart Failure  Goal: Improved gas exchange this shift  Outcome: Progressing  Goal: Improved urinary output this shift  Outcome: Progressing  Goal: Reduction in peripheral edema within 24 hours  Outcome: Progressing  Goal: Report improvement of dyspnea/breathlessness this shift  Outcome: Progressing  Goal: Weight from fluid excess reduced over 2-3 days, then stabilize  Outcome: Progressing  Goal: Increase self care and/or family involvement in 24 hours  Outcome: Progressing     Problem: Skin  Goal: Participates in plan/prevention/treatment measures  Outcome: Progressing  Flowsheets (Taken 4/2/2024 1931)  Participates in plan/prevention/treatment measures:   Discuss with provider PT/OT consult   Elevate heels  Goal: Prevent/manage excess moisture  Outcome: Progressing  Flowsheets (Taken 4/2/2024 1931)  Prevent/manage excess moisture:   Cleanse incontinence/protect with barrier cream   Follow provider orders for dressing changes   Moisturize dry skin    Monitor for/manage infection if present  Goal: Prevent/minimize sheer/friction injuries  Outcome: Progressing  Flowsheets (Taken 4/2/2024 1931)  Prevent/minimize sheer/friction injuries:   Turn/reposition every 2 hours/use positioning/transfer devices   Complete micro-shifts as needed if patient unable. Adjust patient position to relieve pressure points, not a full turn   Use pull sheet   HOB 30 degrees or less   Utilize specialty bed per algorithm   Increase activity/out of bed for meals  Goal: Promote/optimize nutrition  Outcome: Progressing  Flowsheets (Taken 4/2/2024 1931)  Promote/optimize nutrition:   Reassess MST if dietician not consulted   Assist with feeding   Monitor/record intake including meals

## 2024-04-02 NOTE — PROGRESS NOTES
Akua Almanzar is a 89 y.o. female on day 4 of admission presenting with Pneumonia of right lung due to infectious organism, unspecified part of lung.      Subjective   89-year-old was sleeping this morning arousable alert.  Patient denies chest pain or abdominal pain denies any cough or shortness of breath.  Denies any lower extremity swelling.  Patient denies any nausea or vomiting.  Patient denies any headache or dizziness.       Objective     Last Recorded Vitals  /70   Pulse 92   Temp 36.5 °C (97.7 °F) (Temporal)   Resp 16   Wt 73.1 kg (161 lb 1.6 oz)   SpO2 95%   Intake/Output last 3 Shifts:    Intake/Output Summary (Last 24 hours) at 4/2/2024 0959  Last data filed at 4/2/2024 0611  Gross per 24 hour   Intake 560 ml   Output 2101 ml   Net -1541 ml       Admission Weight  Weight: 72.7 kg (160 lb 4.4 oz) (03/29/24 0313)    Daily Weight  04/02/24 : 73.1 kg (161 lb 1.6 oz)    Image Results  XR chest 1 view  Narrative: Interpreted By:  Anthony Walker,   STUDY:  XR CHEST 1 VIEW; 3/31/2024 9:38 am      INDICATION:  Signs/Symptoms:Question of surgery      COMPARISON:  03/30/2020      ACCESSION NUMBER(S):  [id number]      ORDERING CLINICIAN:  KADI ALLAN      FINDINGS:  The study is limited due to rotation, lordotic projection and  respiratory motion. The cardiac silhouette appears prominent,  exaggerated by the technique. Prosthetic aortic valve is again seen.  Atherosclerotic calcifications involve the tortuous aorta. There is  left pleural effusion and bilateral interstitial and mild alveolar  infiltrates; allowing for differences in technique there is probably  some minimal improvement. There is no pneumothorax.  Degenerative changes involve the spine and shoulders.      Impression: Cardiomegaly and slightly improving congestive heart failure.      Signed by: Anthony Walker 4/1/2024 8:17 AM  Dictation workstation:   [id number]      Physical Exam  HENT:      Head: Normocephalic.      Ears:       Comments: Hard of hearing  Eyes:      Extraocular Movements: Extraocular movements intact.      Pupils: Pupils are equal, round, and reactive to light.   Cardiovascular:      Rate and Rhythm: Normal rate and regular rhythm.      Pulses: Normal pulses.      Heart sounds: Normal heart sounds.   Pulmonary:      Effort: Pulmonary effort is normal.      Breath sounds: Normal breath sounds.   Abdominal:      General: Abdomen is flat. Bowel sounds are normal.      Palpations: Abdomen is soft.   Musculoskeletal:         General: Normal range of motion.      Cervical back: Normal range of motion.   Skin:     General: Skin is warm.   Neurological:      General: No focal deficit present.      Mental Status: She is alert.   Psychiatric:         Mood and Affect: Mood normal.      Relevant Results               Assessment/Plan   This patient currently has cardiac telemetry ordered; if you would like to modify or discontinue the telemetry order, click here to go to the orders activity to modify/discontinue the order.              Principal Problem:    Pneumonia of right lung due to infectious organism, unspecified part of lung  Active Problems:    Atherosclerosis of coronary artery without angina pectoris    Hyperlipidemia    Primary hypertension    Acquired hypothyroidism    Acute on chronic diastolic (congestive) heart failure (CMS/HCC)    Acute on chronic respiratory failure (CMS/HCC)    1.  Acute hypercapnic respiratory failure secondary to CHF reduced ejection fraction currently on high flow 30 L 50% FiO2 % wean as tolerated currently on IV Zosyn  2.  CHF is currently on oral Lasix 40 oral twice a day adequate diuresis daily net - 500  3.  COPD currently on Solu-Medrol milligrams IV every 8  4.  Functional quadriplegia continue PT OT therapy  5.  Plan of care likely discharge in 1 to 2 days              Jn Stapleton MD

## 2024-04-03 ENCOUNTER — APPOINTMENT (OUTPATIENT)
Dept: RADIOLOGY | Facility: HOSPITAL | Age: 89
DRG: 177 | End: 2024-04-03
Payer: COMMERCIAL

## 2024-04-03 LAB
ALBUMIN SERPL-MCNC: 3.1 G/DL (ref 3.5–5)
ALP BLD-CCNC: 67 U/L (ref 35–125)
ALT SERPL-CCNC: 19 U/L (ref 5–40)
ANION GAP SERPL CALC-SCNC: 6 MMOL/L
AST SERPL-CCNC: 20 U/L (ref 5–40)
BASOPHILS # BLD AUTO: 0 X10*3/UL (ref 0–0.1)
BASOPHILS NFR BLD AUTO: 0 %
BILIRUB SERPL-MCNC: 0.3 MG/DL (ref 0.1–1.2)
BUN SERPL-MCNC: 18 MG/DL (ref 8–25)
CALCIUM SERPL-MCNC: 9.6 MG/DL (ref 8.5–10.4)
CHLORIDE SERPL-SCNC: 90 MMOL/L (ref 97–107)
CO2 SERPL-SCNC: 42 MMOL/L (ref 24–31)
CREAT SERPL-MCNC: 0.7 MG/DL (ref 0.4–1.6)
EGFRCR SERPLBLD CKD-EPI 2021: 80 ML/MIN/1.73M*2
EOSINOPHIL # BLD AUTO: 0 X10*3/UL (ref 0–0.4)
EOSINOPHIL NFR BLD AUTO: 0 %
ERYTHROCYTE [DISTWIDTH] IN BLOOD BY AUTOMATED COUNT: 15 % (ref 11.5–14.5)
GLUCOSE SERPL-MCNC: 111 MG/DL (ref 65–99)
HCT VFR BLD AUTO: 36.1 % (ref 36–46)
HGB BLD-MCNC: 11.2 G/DL (ref 12–16)
IMM GRANULOCYTES # BLD AUTO: 0.03 X10*3/UL (ref 0–0.5)
IMM GRANULOCYTES NFR BLD AUTO: 0.6 % (ref 0–0.9)
LACTATE BLDV-SCNC: 2.9 MMOL/L (ref 0.4–2)
LACTATE BLDV-SCNC: 3.7 MMOL/L (ref 0.4–2)
LYMPHOCYTES # BLD AUTO: 0.9 X10*3/UL (ref 0.8–3)
LYMPHOCYTES NFR BLD AUTO: 17.2 %
MCH RBC QN AUTO: 28.1 PG (ref 26–34)
MCHC RBC AUTO-ENTMCNC: 31 G/DL (ref 32–36)
MCV RBC AUTO: 91 FL (ref 80–100)
MONOCYTES # BLD AUTO: 0.48 X10*3/UL (ref 0.05–0.8)
MONOCYTES NFR BLD AUTO: 9.2 %
NEUTROPHILS # BLD AUTO: 3.82 X10*3/UL (ref 1.6–5.5)
NEUTROPHILS NFR BLD AUTO: 73 %
NRBC BLD-RTO: 0 /100 WBCS (ref 0–0)
PLATELET # BLD AUTO: 204 X10*3/UL (ref 150–450)
POTASSIUM SERPL-SCNC: 3.9 MMOL/L (ref 3.4–5.1)
PROT SERPL-MCNC: 5.6 G/DL (ref 5.9–7.9)
RBC # BLD AUTO: 3.98 X10*6/UL (ref 4–5.2)
SODIUM SERPL-SCNC: 138 MMOL/L (ref 133–145)
WBC # BLD AUTO: 5.2 X10*3/UL (ref 4.4–11.3)

## 2024-04-03 PROCEDURE — 80053 COMPREHEN METABOLIC PANEL: CPT | Performed by: STUDENT IN AN ORGANIZED HEALTH CARE EDUCATION/TRAINING PROGRAM

## 2024-04-03 PROCEDURE — 2500000001 HC RX 250 WO HCPCS SELF ADMINISTERED DRUGS (ALT 637 FOR MEDICARE OP): Performed by: STUDENT IN AN ORGANIZED HEALTH CARE EDUCATION/TRAINING PROGRAM

## 2024-04-03 PROCEDURE — 2500000002 HC RX 250 W HCPCS SELF ADMINISTERED DRUGS (ALT 637 FOR MEDICARE OP, ALT 636 FOR OP/ED): Mod: MUE | Performed by: INTERNAL MEDICINE

## 2024-04-03 PROCEDURE — 2500000004 HC RX 250 GENERAL PHARMACY W/ HCPCS (ALT 636 FOR OP/ED): Performed by: STUDENT IN AN ORGANIZED HEALTH CARE EDUCATION/TRAINING PROGRAM

## 2024-04-03 PROCEDURE — 85025 COMPLETE CBC W/AUTO DIFF WBC: CPT | Performed by: STUDENT IN AN ORGANIZED HEALTH CARE EDUCATION/TRAINING PROGRAM

## 2024-04-03 PROCEDURE — 94640 AIRWAY INHALATION TREATMENT: CPT

## 2024-04-03 PROCEDURE — 94762 N-INVAS EAR/PLS OXIMTRY CONT: CPT

## 2024-04-03 PROCEDURE — 71045 X-RAY EXAM CHEST 1 VIEW: CPT

## 2024-04-03 PROCEDURE — 36415 COLL VENOUS BLD VENIPUNCTURE: CPT | Performed by: INTERNAL MEDICINE

## 2024-04-03 PROCEDURE — 2500000004 HC RX 250 GENERAL PHARMACY W/ HCPCS (ALT 636 FOR OP/ED): Performed by: INTERNAL MEDICINE

## 2024-04-03 PROCEDURE — 83605 ASSAY OF LACTIC ACID: CPT | Performed by: INTERNAL MEDICINE

## 2024-04-03 PROCEDURE — 2500000001 HC RX 250 WO HCPCS SELF ADMINISTERED DRUGS (ALT 637 FOR MEDICARE OP): Performed by: INTERNAL MEDICINE

## 2024-04-03 PROCEDURE — 87040 BLOOD CULTURE FOR BACTERIA: CPT | Mod: TRILAB | Performed by: INTERNAL MEDICINE

## 2024-04-03 PROCEDURE — 71045 X-RAY EXAM CHEST 1 VIEW: CPT | Performed by: RADIOLOGY

## 2024-04-03 PROCEDURE — 2500000004 HC RX 250 GENERAL PHARMACY W/ HCPCS (ALT 636 FOR OP/ED): Performed by: HOSPITALIST

## 2024-04-03 PROCEDURE — 97530 THERAPEUTIC ACTIVITIES: CPT | Mod: GP

## 2024-04-03 PROCEDURE — 97110 THERAPEUTIC EXERCISES: CPT | Mod: GO

## 2024-04-03 PROCEDURE — 9420000001 HC RT PATIENT EDUCATION 5 MIN

## 2024-04-03 PROCEDURE — 2500000002 HC RX 250 W HCPCS SELF ADMINISTERED DRUGS (ALT 637 FOR MEDICARE OP, ALT 636 FOR OP/ED): Mod: MUE | Performed by: STUDENT IN AN ORGANIZED HEALTH CARE EDUCATION/TRAINING PROGRAM

## 2024-04-03 PROCEDURE — 2020000001 HC ICU ROOM DAILY

## 2024-04-03 PROCEDURE — 94660 CPAP INITIATION&MGMT: CPT

## 2024-04-03 PROCEDURE — 2500000002 HC RX 250 W HCPCS SELF ADMINISTERED DRUGS (ALT 637 FOR MEDICARE OP, ALT 636 FOR OP/ED): Performed by: INTERNAL MEDICINE

## 2024-04-03 PROCEDURE — 99232 SBSQ HOSP IP/OBS MODERATE 35: CPT | Performed by: NURSE PRACTITIONER

## 2024-04-03 PROCEDURE — 2500000005 HC RX 250 GENERAL PHARMACY W/O HCPCS: Performed by: STUDENT IN AN ORGANIZED HEALTH CARE EDUCATION/TRAINING PROGRAM

## 2024-04-03 PROCEDURE — 2500000002 HC RX 250 W HCPCS SELF ADMINISTERED DRUGS (ALT 637 FOR MEDICARE OP, ALT 636 FOR OP/ED): Performed by: STUDENT IN AN ORGANIZED HEALTH CARE EDUCATION/TRAINING PROGRAM

## 2024-04-03 PROCEDURE — 36415 COLL VENOUS BLD VENIPUNCTURE: CPT | Performed by: STUDENT IN AN ORGANIZED HEALTH CARE EDUCATION/TRAINING PROGRAM

## 2024-04-03 PROCEDURE — 99232 SBSQ HOSP IP/OBS MODERATE 35: CPT | Performed by: INTERNAL MEDICINE

## 2024-04-03 RX ORDER — LIDOCAINE 50 MG/G
OINTMENT TOPICAL 4 TIMES DAILY PRN
Status: DISCONTINUED | OUTPATIENT
Start: 2024-04-03 | End: 2024-04-15 | Stop reason: HOSPADM

## 2024-04-03 RX ORDER — FUROSEMIDE 40 MG/1
40 TABLET ORAL DAILY
Status: DISCONTINUED | OUTPATIENT
Start: 2024-04-03 | End: 2024-04-15 | Stop reason: HOSPADM

## 2024-04-03 RX ORDER — HYDROXYZINE HYDROCHLORIDE 10 MG/1
10 TABLET, FILM COATED ORAL EVERY 4 HOURS PRN
Status: DISCONTINUED | OUTPATIENT
Start: 2024-04-03 | End: 2024-04-15 | Stop reason: HOSPADM

## 2024-04-03 RX ADMIN — GABAPENTIN 100 MG: 100 CAPSULE ORAL at 20:34

## 2024-04-03 RX ADMIN — CLOPIDOGREL BISULFATE 75 MG: 75 TABLET ORAL at 09:39

## 2024-04-03 RX ADMIN — PIPERACILLIN SODIUM AND TAZOBACTAM SODIUM 3.38 G: 3; .375 INJECTION, SOLUTION INTRAVENOUS at 00:32

## 2024-04-03 RX ADMIN — Medication: at 15:00

## 2024-04-03 RX ADMIN — Medication: at 19:00

## 2024-04-03 RX ADMIN — SIMVASTATIN 20 MG: 20 TABLET, FILM COATED ORAL at 20:35

## 2024-04-03 RX ADMIN — IPRATROPIUM BROMIDE AND ALBUTEROL SULFATE 3 ML: 2.5; .5 SOLUTION RESPIRATORY (INHALATION) at 19:10

## 2024-04-03 RX ADMIN — Medication: at 07:00

## 2024-04-03 RX ADMIN — PIPERACILLIN SODIUM AND TAZOBACTAM SODIUM 3.38 G: 3; .375 INJECTION, SOLUTION INTRAVENOUS at 13:00

## 2024-04-03 RX ADMIN — IPRATROPIUM BROMIDE AND ALBUTEROL SULFATE 3 ML: 2.5; .5 SOLUTION RESPIRATORY (INHALATION) at 07:36

## 2024-04-03 RX ADMIN — PIPERACILLIN SODIUM AND TAZOBACTAM SODIUM 3.38 G: 3; .375 INJECTION, SOLUTION INTRAVENOUS at 17:42

## 2024-04-03 RX ADMIN — Medication: at 11:00

## 2024-04-03 RX ADMIN — PREDNISONE 20 MG: 20 TABLET ORAL at 17:42

## 2024-04-03 RX ADMIN — PIPERACILLIN SODIUM AND TAZOBACTAM SODIUM 3.38 G: 3; .375 INJECTION, SOLUTION INTRAVENOUS at 05:35

## 2024-04-03 RX ADMIN — ACETAMINOPHEN 650 MG: 325 TABLET ORAL at 17:42

## 2024-04-03 RX ADMIN — PREDNISONE 20 MG: 20 TABLET ORAL at 05:35

## 2024-04-03 RX ADMIN — ACETAMINOPHEN 650 MG: 325 TABLET ORAL at 13:00

## 2024-04-03 RX ADMIN — ENOXAPARIN SODIUM 40 MG: 40 INJECTION SUBCUTANEOUS at 05:35

## 2024-04-03 RX ADMIN — LEVOTHYROXINE SODIUM 88 MCG: 0.09 TABLET ORAL at 05:35

## 2024-04-03 RX ADMIN — ASPIRIN 81 MG: 81 TABLET, COATED ORAL at 09:39

## 2024-04-03 RX ADMIN — IPRATROPIUM BROMIDE AND ALBUTEROL SULFATE 3 ML: 2.5; .5 SOLUTION RESPIRATORY (INHALATION) at 10:57

## 2024-04-03 RX ADMIN — ACETAMINOPHEN 650 MG: 325 TABLET ORAL at 05:35

## 2024-04-03 RX ADMIN — FUROSEMIDE 40 MG: 40 TABLET ORAL at 09:40

## 2024-04-03 RX ADMIN — IPRATROPIUM BROMIDE AND ALBUTEROL SULFATE 3 ML: 2.5; .5 SOLUTION RESPIRATORY (INHALATION) at 16:00

## 2024-04-03 ASSESSMENT — PAIN SCALES - GENERAL
PAINLEVEL_OUTOF10: 0 - NO PAIN

## 2024-04-03 ASSESSMENT — COGNITIVE AND FUNCTIONAL STATUS - GENERAL
MOVING TO AND FROM BED TO CHAIR: A LITTLE
MOVING FROM LYING ON BACK TO SITTING ON SIDE OF FLAT BED WITH BEDRAILS: A LITTLE
PERSONAL GROOMING: A LITTLE
DRESSING REGULAR LOWER BODY CLOTHING: A LOT
WALKING IN HOSPITAL ROOM: A LOT
HELP NEEDED FOR BATHING: A LOT
CLIMB 3 TO 5 STEPS WITH RAILING: TOTAL
TOILETING: A LITTLE
DAILY ACTIVITIY SCORE: 17
DRESSING REGULAR UPPER BODY CLOTHING: A LITTLE
STANDING UP FROM CHAIR USING ARMS: A LITTLE
MOBILITY SCORE: 15
TURNING FROM BACK TO SIDE WHILE IN FLAT BAD: A LITTLE

## 2024-04-03 ASSESSMENT — PAIN - FUNCTIONAL ASSESSMENT
PAIN_FUNCTIONAL_ASSESSMENT: 0-10

## 2024-04-03 ASSESSMENT — ACTIVITIES OF DAILY LIVING (ADL): BATHING_COMMENTS: COMPLETED EARLIER

## 2024-04-03 NOTE — PROGRESS NOTES
"Akua Almanzar is a 89 y.o. female on day 5 of admission presenting with Pneumonia of right lung due to infectious organism, unspecified part of lung.    Subjective   States she feels a little bit better each day.  Still requiring high flow oxygen therapy at this time.       Objective     Physical Exam  Eyes:      Conjunctiva/sclera: Conjunctivae normal.   Cardiovascular:      Rate and Rhythm: Normal rate and regular rhythm.      Heart sounds:      No gallop.   Pulmonary:      Breath sounds: Normal breath sounds. No wheezing, rhonchi or rales.   Abdominal:      Palpations: Abdomen is soft.   Neurological:      General: No focal deficit present.      Mental Status: She is alert.       Constitutional:       Appearance: Not in distress.   Eyes:      Conjunctiva/sclera: Conjunctivae normal.   Neck:      Vascular: JVD normal.   Pulmonary:      Breath sounds: Normal breath sounds. No wheezing. No rhonchi. No rales.   Cardiovascular:      Normal rate. Regular rhythm.      Murmurs: There is no murmur.      No gallop.  No click. No rub.   Abdominal:      Palpations: Abdomen is soft.   Neurological:      General: No focal deficit present.      Mental Status: Alert.        Last Recorded Vitals  Blood pressure 113/67, pulse 90, temperature 36.7 °C (98.1 °F), temperature source Temporal, resp. rate 26, height 1.549 m (5' 1\"), weight 73.8 kg (162 lb 11.2 oz), SpO2 95 %.  Intake/Output last 3 Shifts:  I/O last 3 completed shifts:  In: 1900 (25.7 mL/kg) [P.O.:1500; IV Piggyback:400]  Out: 3276 (44.4 mL/kg) [Urine:3275 (1.2 mL/kg/hr); Stool:1]  Weight: 73.8 kg     Relevant Results           This patient currently has cardiac telemetry ordered; if you would like to modify or discontinue the telemetry order, click here to go to the orders activity to modify/discontinue the order.    This patient has a urinary catheter   Reason for the urinary catheter remaining today?       Results for orders placed or performed during the hospital " encounter of 03/29/24 (from the past 24 hour(s))   BLOOD GAS LACTIC ACID, VENOUS   Result Value Ref Range    POCT Lactate, Venous 3.0 (H) 0.4 - 2.0 mmol/L   Comprehensive Metabolic Panel   Result Value Ref Range    Glucose 111 (H) 65 - 99 mg/dL    Sodium 138 133 - 145 mmol/L    Potassium 3.9 3.4 - 5.1 mmol/L    Chloride 90 (L) 97 - 107 mmol/L    Bicarbonate 42 (H) 24 - 31 mmol/L    Urea Nitrogen 18 8 - 25 mg/dL    Creatinine 0.70 0.40 - 1.60 mg/dL    eGFR 80 >60 mL/min/1.73m*2    Calcium 9.6 8.5 - 10.4 mg/dL    Albumin 3.1 (L) 3.5 - 5.0 g/dL    Alkaline Phosphatase 67 35 - 125 U/L    Total Protein 5.6 (L) 5.9 - 7.9 g/dL    AST 20 5 - 40 U/L    Bilirubin, Total 0.3 0.1 - 1.2 mg/dL    ALT 19 5 - 40 U/L    Anion Gap 6 <=19 mmol/L             Assessment/Plan   Principal Problem:    Pneumonia of right lung due to infectious organism, unspecified part of lung  Active Problems:    Atherosclerosis of coronary artery without angina pectoris    Hyperlipidemia    Primary hypertension    Acquired hypothyroidism    Acute on chronic diastolic (congestive) heart failure (CMS/HCC)    Acute on chronic respiratory failure (CMS/HCC)      Acute on chronic respiratory failure  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 She did undergo a transcatheter aortic valve replacement 4/9/2021 for severe aortic valvular stenosis. She was recently admitted to [facility] from 3/1/2024 - 3/7/2024 with increased shortness of breath thought to be related to CHF diastolic variety. Her echocardiogram at that time showed a preserved LV ejection fraction and normally functioning TAVR are but moderate mitral valve stenosis and moderately severe pulmonary hypertension estimated PA  systolic pressure 59 mmHg. She is currently admitted with recurrent shortness of breath and required transfer to ICU due to worsening respiratory distress and CO2 retention. She has been placed on continuous BiPAP with clinical improvement. Her hemodynamic parameters remain stable. Telemetry monitor shows sinus rhythm with left bundle branch block conduction delay. Her proBNP surprisingly is only modestly elevated despite the significant abnormalities on chest x-ray. For now she will be treated empirically with Lasix 40 mg IV twice daily. Empiric antibiotic therapy has also been instituted including vancomycin, Zosyn and azithromycin. Patient being seen by pulmonology. Patient receiving DuoNebs and IV Solu-Medrol as well. The patient and family aware of guarded prognosis.      3/31: The patient is awake alert and conversant.  She has been transition to a high flow O2 nasal cannula 30 L/min 80% FiO2.  No respiratory distress with satisfactory O2 saturations.  Telemetry monitor shows a borderline sinus tachycardia at 100/min.  The comprehensive metabolic panel includes a creatinine of 0.6 potassium of 4.2.  Will check repeat chest x-ray tomorrow.  Head CT from yesterday showed only cerebral atrophy and bilateral chronic white matter change.  Input output negative by 915 cc yesterday.  Will continue the empiric IV Lasix.  Question the functional significance of the patient's mitral valve stenosis by echo.  The proBNP was only modestly elevated and patient remains currently on IV Rocephin for suspected pneumonia.     4/1: Patient has been gradually improving on a daily basis.  She is currently receiving antibiotic therapy for suspected pneumonia.  Chest x-ray has been ordered with results still pending at this time.  With her clinical improvement I think we can transition from IV furosemide to oral therapy.  Kidney function remains stable with serum creatinine of 0.7 on morning lab studies.  Continues to slowly  improve.     4/2: We transition from IV furosemide to oral therapy yesterday.  There is a -1500 cc fluid balance over the last 24 hours.  Serum creatinine remains stable at 0.7.  Chest x-ray done yesterday revealing improved heart failure findings.  Will continue current therapy and monitor on a clinical basis.    4/3: Patient continues to slowly improve on a daily basis.  Still receiving IV antibiotic therapy.  -750 cc fluid balance over the last 24 hours.  Will decrease the furosemide from twice daily down to 40 mg once daily.  Overall patient continues to improve although still requiring high flow oxygen therapy at this time.             I spent 19 minutes in the professional and overall care of this patient.      Pio Alberto, DO

## 2024-04-03 NOTE — NURSING NOTE
"  Pt appears to be sad and depressive, Pt states , \"I have been sad all day.\" Pt was informed today that one of her close family member passed away from cancer. Tearful at times. I sat down and talked to pt about 30 minutes. Pt reports of feeling better. ADLs provided and warm water provided. Pt denies further needs. Will continue monitoring.   "

## 2024-04-03 NOTE — CARE PLAN
Per Palliative, pts breathing is worsening today. Palliative to speak to pts son and dtr.    DISCHARGE PLAN: DEBORA

## 2024-04-03 NOTE — PROGRESS NOTES
Physical Therapy    Physical Therapy Treatment    Patient Name: Akua Almanzar  MRN: [id number]  Today's Date: 4/3/2024  Time Calculation  Start Time: 1423  Stop Time: 1441  Time Calculation (min): 18 min  Documentation and services provided by student physical therapist while supervised under a licensed physical therapist.   Aniket Moon SPT       Assessment/Plan   PT Assessment  PT Assessment Results: Decreased strength, Decreased endurance, Impaired balance, Decreased mobility, Decreased safety awareness, Impaired hearing  Rehab Prognosis: Good  Medical Staff Made Aware: Yes  End of Session Communication: Bedside nurse  End of Session Patient Position: Up in chair (bedside commode)    Assessment Comment: Pt had no LOB or signs of physical distress / desaturation with functional mobility of transfers and ambulation. Anticiapte pt will continue to progress in mobility with resolution of medical condition and continued physical therapy interventions to aid in improved strengthening and mobility, however at this time, unsafe to return home.Will cont to monitor progress and mobility.    PT Plan  Inpatient/Swing Bed or Outpatient: Inpatient  PT Plan  Treatment/Interventions: Transfer training, Gait training, Balance training, Therapeutic activity  PT Plan: Skilled PT  PT Frequency: 3 times per week  PT Discharge Recommendations: Moderate intensity level of continued care  Equipment Recommended upon Discharge: Wheeled walker  PT Recommended Transfer Status: Assist x1  PT - OK to Discharge: Yes      General Visit Information:   PT  Visit  PT Received On: 04/03/24  Response to Previous Treatment: Patient with no complaints from previous session.  General  Reason for Referral: Impaired mobility  Referred By: Dr. JACQUELIN Gilman  Past Medical History Relevant to Rehab: CHF, PNA, HTN, HLD  Missed Visit: No  Family/Caregiver Present: No  Prior to Session Communication: Bedside nurse  Patient Position Received: Bed, 4 rail up, Alarm  on  Preferred Learning Style: verbal  General Comment: Pt presents supine in bed with x 4 HR raised and HOB elevated on 6 LO2 via nasal canula Spo2 97%. Pt cleared by nursing to participate in therapy and pt pleasant and willing to participate in therapy.    Subjective   Precautions:     Vital Signs:  Vital Signs  SpO2: 97 %    Objective   Pain:  Pain Assessment  Pain Assessment: 0-10  Pain Score: 0 - No pain  Cognition:  Cognition  Overall Cognitive Status: Within Functional Limits  Orientation Level: Oriented X4  Attention: Within Functional Limits  Memory: Within Funtional Limits          Postural Control:  Static Sitting Balance  Static Sitting-Balance Support: Bilateral upper extremity supported, Feet supported  Static Sitting-Level of Assistance: Distant supervision  Dynamic Sitting Balance  Dynamic Sitting-Balance Support: Bilateral upper extremity supported, Feet supported  Dynamic Sitting-Comments: dist supervision seated EOB  Static Standing Balance  Static Standing-Balance Support: Bilateral upper extremity supported  Static Standing-Level of Assistance: Contact guard  Static Standing-Comment/Number of Minutes: 2ww  Dynamic Standing Balance  Dynamic Standing-Balance Support: Bilateral upper extremity supported  Dynamic Standing-Balance: Turning  Dynamic Standing-Comments: 2ww at CGA    Pt required assistance with functional mobilityand transfers supplemental O2, tele and PIV intact for entire session. Pt had good response to bed mobility and transfers with no signs of desaturation. Repeated sit <> stand transfers secondary to bowel movement when standing to 2ww. Pt transfered to bedside commode and assisted in pericare by PT and student PT. Pt felt like she would need more time to finish second BM so call bell left in visible reach and nursing informed that pt is still on bedisde commode. Pt understands and verbilizes that she will bring call bell when ready for nursing to help clean pt.         Activity  Tolerance:  Activity Tolerance  Endurance: Tolerates 10 - 20 min exercise with multiple rests  Rate of Perceived Dyspnea (RPD): 3-4 out of 10  Treatments:  Therapeutic Exercise  Therapeutic Exercise Performed: No    Therapeutic Activity  Therapeutic Activity Performed: Yes  Therapeutic Activity 1: Bed mobility at distant supervision supine to sit with HOB elevated and bedrails up.  Therapeutic Activity 2: Transfers in room with 2ww at CGA while supplemental O2 continuous. Cues to turn to bedside commode.       Bed Mobility  Bed Mobility: Yes  Bed Mobility 1  Bed Mobility 1: Supine to sitting  Level of Assistance 1: Distant supervision  Bed Mobility Comments 1: HOB elevated and bedrails up.    Ambulation/Gait Training 1  Surface 1: Level tile  Device 1: Rolling walker  Assistance 1: Contact guard  Quality of Gait 1: Decreased step length, Diminished heel strike, Shuffling gait, Forward flexed posture (cues to stand up)  Comments/Distance (ft) 1: x 3 feet from bed to bedside commode  Transfers  Transfer: Yes  Transfer 1  Transfer From 1: Sit to  Transfer to 1: Stand  Technique 1: Sit to stand  Transfer Device 1: Walker  Transfer Level of Assistance 1: Contact guard  Transfers 2  Transfer From 2: Stand to  Transfer to 2: Sit  Technique 2: Stand to sit  Transfer Device 2: Walker  Transfer Level of Assistance 2: Contact guard  Transfers 3  Transfer From 3: Bed to  Transfer to 3: Commode-standard  Transfer Device 3: Walker  Transfer Level of Assistance 3: Contact guard  Trials/Comments 3: cues for turning and management of 2ww.    Outcome Measures:  [facility] Basic Mobility  Turning from your back to your side while in a flat bed without using bedrails: A little  Moving from lying on your back to sitting on the side of a flat bed without using bedrails: A little  Moving to and from bed to chair (including a wheelchair): A little  Standing up from a chair using your arms (e.g. wheelchair or bedside chair): A little  To walk in  hospital room: A lot  Climbing 3-5 steps with railing: Total  Basic Mobility - Total Score: 15    Education Documentation  Body Mechanics, taught by JOSE LUIS Gomez at 4/3/2024  3:14 PM.  Learner: Patient  Readiness: Acceptance  Method: Explanation  Response: Needs Reinforcement    Precautions, taught by JOSE LUIS Gomez at 4/3/2024  3:14 PM.  Learner: Patient  Readiness: Acceptance  Method: Explanation  Response: Needs Reinforcement    Mobility Training, taught by JOSE LUIS Gomez at 4/3/2024  3:14 PM.  Learner: Patient  Readiness: Acceptance  Method: Explanation  Response: Needs Reinforcement    Education Comments  No comments found.        OP EDUCATION:       Encounter Problems       Encounter Problems (Active)       Balance       Dynamic Balance (Progressing)       Start:  03/29/24    Expected End:  04/12/24       Pt to improve dynamic balance to fair (+) with UE assist on 2ww to improve stability and safety to promote safety within home environment.            Mobility       Bed mobility (Progressing)       Start:  03/29/24    Expected End:  04/12/24       Pt to be independent with bed mobility to aid in home going environment.          ambulation (Progressing)       Start:  03/29/24    Expected End:  04/12/24       Pt will ambulate x > 100 feet  at distant sup with UE assist on 2ww to allow for safety within home environment.         Stairs (Progressing)       Start:  03/29/24    Expected End:  04/12/24       Will ascend and descend x 12 steps with one handrail at CGA to facilitate improved dependence and allow for safety with home environment.            PT Transfers       Transfers (Progressing)       Start:  03/29/24    Expected End:  04/12/24       Pt will complete all transfers at Mod I with UE assist as needed and improved safety awareness to improve functional mobility and promote increased safety awareness.             Pain - Adult

## 2024-04-03 NOTE — PROGRESS NOTES
Akua Almanzar is a 89 y.o. female on day 5 of admission presenting with Pneumonia of right lung due to infectious organism, unspecified part of lung.    Subjective   Symptoms (0 - 10, Best to Worst)  [place] Symptom Assessment System  Pain Score: 0 - No pain     Increased fatigue, work of breathing today, tachypneic on high flow at 50% FiO2/30 L, complaining of moist productive cough, generally more tearful increased anxiety.  Complains of increased pain to the bilateral lower extremities, with skin sensitivity, chronic, new pain from tubing of urinary catheter to left anterior shin    Objective     Physical Exam    Constitutional:       General: Patient is in acute distress.  Increased work of breathing and tachypnea noted tearful  HENT:      Head: Normocephalic.      Mouth: Mucous membranes are moist.   Eyes:      Conjunctiva/sclera: Conjunctivae clear, sclerae white. No discharge.     Pupils: Pupils are equal, round, and reactive to light.   Neck:      Vascular: No carotid bruit.   Cardiovascular:      Rate and Rhythm: Normal rate and regular rhythm.  Heart rate 103     Heart sounds: murmur heard.  Pulmonary:      Effort: accessory muscle use noted, tachypneic     Breath sounds: Rales noted to bilateral lung bases, moist nonproductive cough  High flow nasal cannula at 50% FiO2/30 L/min.  Abdominal:      General: There is no distension.  Bowel sounds are present     Tenderness: There is no abdominal tenderness. There is no guarding.   Urology:     Genitalia: Not assessed    Urine: urine yellow and clear [place] catheter  Musculoskeletal:         General: No deformity.   Skin:     Coloration: Skin is not jaundiced.  + trace edema BLE, left anterior shin with area of redness noted from urinary catheter tubing, increase sensitivity to touch bilateral lower extremities, no redness warmth or swelling noted  Neurological:      General: No focal deficit present.       Mental Status: oriented to person, place, and time.  "  Psychiatric:         Behavior: Anxious tearful today, oriented to person place and time        Last Recorded Vitals  Blood pressure 113/67, pulse 90, temperature 36.7 °C (98.1 °F), temperature source Temporal, resp. rate 26, height 1.549 m (5' 1\"), weight 73.8 kg (162 lb 11.2 oz), SpO2 95 %.  Intake/Output last 3 Shifts:  I/O last 3 completed shifts:  In: 1900 (25.7 mL/kg) [P.O.:1500; IV Piggyback:400]  Out: 3276 (44.4 mL/kg) [Urine:3275 (1.2 mL/kg/hr); Stool:1]  Weight: 73.8 kg     Relevant Results  Results for orders placed or performed during the hospital encounter of 03/29/24 (from the past 24 hour(s))   BLOOD GAS LACTIC ACID, VENOUS   Result Value Ref Range    POCT Lactate, Venous 3.0 (H) 0.4 - 2.0 mmol/L   Comprehensive Metabolic Panel   Result Value Ref Range    Glucose 111 (H) 65 - 99 mg/dL    Sodium 138 133 - 145 mmol/L    Potassium 3.9 3.4 - 5.1 mmol/L    Chloride 90 (L) 97 - 107 mmol/L    Bicarbonate 42 (H) 24 - 31 mmol/L    Urea Nitrogen 18 8 - 25 mg/dL    Creatinine 0.70 0.40 - 1.60 mg/dL    eGFR 80 >60 mL/min/1.73m*2    Calcium 9.6 8.5 - 10.4 mg/dL    Albumin 3.1 (L) 3.5 - 5.0 g/dL    Alkaline Phosphatase 67 35 - 125 U/L    Total Protein 5.6 (L) 5.9 - 7.9 g/dL    AST 20 5 - 40 U/L    Bilirubin, Total 0.3 0.1 - 1.2 mg/dL    ALT 19 5 - 40 U/L    Anion Gap 6 <=19 mmol/L   CBC and Auto Differential   Result Value Ref Range    WBC 5.2 4.4 - 11.3 x10*3/uL    nRBC 0.0 0.0 - 0.0 /100 WBCs    RBC 3.98 (L) 4.00 - 5.20 x10*6/uL    Hemoglobin 11.2 (L) 12.0 - 16.0 g/dL    Hematocrit 36.1 36.0 - 46.0 %    MCV 91 80 - 100 fL    MCH 28.1 26.0 - 34.0 pg    MCHC 31.0 (L) 32.0 - 36.0 g/dL    RDW 15.0 (H) 11.5 - 14.5 %    Platelets 204 150 - 450 x10*3/uL    Neutrophils % 73.0 40.0 - 80.0 %    Immature Granulocytes %, Automated 0.6 0.0 - 0.9 %    Lymphocytes % 17.2 13.0 - 44.0 %    Monocytes % 9.2 2.0 - 10.0 %    Eosinophils % 0.0 0.0 - 6.0 %    Basophils % 0.0 0.0 - 2.0 %    Neutrophils Absolute 3.82 1.60 - 5.50 " x10*3/uL    Immature Granulocytes Absolute, Automated 0.03 0.00 - 0.50 x10*3/uL    Lymphocytes Absolute 0.90 0.80 - 3.00 x10*3/uL    Monocytes Absolute 0.48 0.05 - 0.80 x10*3/uL    Eosinophils Absolute 0.00 0.00 - 0.40 x10*3/uL    Basophils Absolute 0.00 0.00 - 0.10 x10*3/uL     No current facility-administered medications on file prior to encounter.     Current Outpatient Medications on File Prior to Encounter   Medication Sig Dispense Refill    acidophilus-pectin, citrus 100 million cell-10 mg capsule Take by mouth.      amLODIPine (Norvasc) 2.5 mg tablet Take 1 tablet (2.5 mg) by mouth once daily.      ascorbic acid (Vitamin C) 250 MG chewable tablet Chew 2 tablets (500 mg). Reports chewable 500mg      aspirin 81 mg EC tablet Take 1 tablet (81 mg) by mouth once daily.      carvedilol (Coreg) 6.25 mg tablet Take 1 tablet twice a day by oral route.      cholecalciferol (Vitamin D-3) 25 MCG (1000 UT) capsule Take by mouth.      clopidogrel (Plavix) 75 mg tablet Take 1 tablet (75 mg) by mouth once daily.      docusate sodium (Colace) 100 mg capsule Take 1 capsule (100 mg) by mouth 2 times a day.  0    furosemide (Lasix) 40 mg tablet Take 1 tablet (40 mg) by mouth once daily.  0    gabapentin (Neurontin) 100 mg capsule Take 1 capsule (100 mg) by mouth once daily at bedtime.  0    hydrOXYzine HCL (Atarax) 25 mg tablet Take 1 tablet (25 mg) by mouth every 6 hours if needed for itching.  0    levothyroxine (Synthroid, Levoxyl) 88 mcg tablet Take 1 tablet (88 mcg) by mouth once daily.      oxygen (O2) gas therapy Inhale 1 each continuously.      simvastatin (Zocor) 20 mg tablet Take 1 tablet (20 mg) by mouth once daily at bedtime.         Assessment/Plan   IMP:  Acute on chronic hypoxic and hypercapnic respiratory failure  -ABGs showing hypercapnia   - HFNC at 50% FiO2/30     2.   Pneumonia  - Currently on Zosyn    3.   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  Left ventricular systolic function is normal.   2. Spectral Doppler shows an impaired relaxation pattern of left ventricular diastolic filling.   3. There is moderate concentric left ventricular hypertrophy.   4. The left atrium is moderately dilated.   5. The mitral valve is moderately thickened.   6. There is moderate mitral annular calcification.   7. There is moderate to severe calcification of the anterior and posterior mitral valve leaflets.   8. Mild to moderate tricuspid regurgitation.   9. Moderate to severely elevated right ventricular systolic pressure.   - ProBNP 1,489     4.   TME  - suspect r/t respiratory status, resolved     5.   Electrolyte imbalance  - hyponatremia, resolved      6.   Anemia  - of chronic disease  - H/H stable 10.4/33.4     7.   Palliative care   DNR CCA DNI  -The patient is a capable decision maker   - DPOA - HC filled out where Derek is primary and Anitra is secondary. This is in hard chart.   -The patient has 4 children.   Derek Almanzar - Westley Mayer - [facility] - lives out of [facility]  Ginger Almanzar - lives out of [facility]  Derek and Anitra have been primary contacts.     Remains in disease-modifying mode continue weaning attempts to nasal cannula.  Patient would like to home at discharge this corresponds with the intensity rehab recommendation.  Depending on functional status at time of discharge patient is interested either in home health care and Navigator or if still suffering from shortness of breath and having unpleasant symptoms might also be open for hospice at that time.  Would like to see how she feels when she is off oxygen.     4/3  Increased work of breathing today, SpO2 was 96% on her high flow at 50% however patient is tachypneic with increased Rales, discussed with pulmonology, chest x-ray ordered for further evaluation.  Reached out to the daughter Anitra to provide her updates, daughters will be at bedside this afternoon.    Added lidocaine cream to  hypersensitivity to bilateral lower extremities, suspect there is a component of neuropathic pain given the chronicity, continue Tylenol scheduled.    Tentative plan would be to discharge home however daughter is now questioning this given the patient's setback and her overall weakness.  Will plan to discuss with the daughter and son.    I spent 35 minutes in the professional and overall care of this patient.      Carmen Vo, APRN-CNP

## 2024-04-03 NOTE — PROGRESS NOTES
Pulmonary Progress Note 04/03/24     Patient seen in follow-up for acute respiratory failure with hypoxia, pneumonia, congestive heart failure    Subjective   Interval History  Not doing as well today.  Endorses a productive cough.  Increasing her anxiety.  Remains on Vapotherm.  Use the BiPAP last night.    Objective   Vital signs in last 24 hours:  Temp:  [36 °C (96.8 °F)-36.8 °C (98.2 °F)] 36.7 °C (98.1 °F)  Heart Rate:  [] 90  Resp:  [14-26] 26  BP: (104-147)/(53-94) 113/67  FiO2 (%):  [40 %-50 %] 40 %    Intake/Output last 3 shifts:  I/O last 3 completed shifts:  In: 1900 (25.7 mL/kg) [P.O.:1500; IV Piggyback:400]  Out: 3276 (44.4 mL/kg) [Urine:3275 (1.2 mL/kg/hr); Stool:1]  Weight: 73.8 kg   Intake/Output this shift:  No intake/output data recorded.    Physical Exam  Vitals reviewed.   Constitutional:       General: She is not in acute distress.     Interventions: Nasal cannula in place.      Comments: Vapotherm   Eyes:      Conjunctiva/sclera: Conjunctivae normal.   Cardiovascular:      Rate and Rhythm: Normal rate.      Heart sounds: Murmur heard.   Pulmonary:      Effort: Pulmonary effort is normal.      Breath sounds: Rales present. No wheezing.   Musculoskeletal:      Cervical back: Full passive range of motion without pain.      Right lower leg: No edema.      Left lower leg: No edema.   Skin:     General: Skin is warm and dry.   Neurological:      General: No focal deficit present.      Mental Status: She is alert.   Psychiatric:         Mood and Affect: Mood is anxious. Affect is tearful.         Scheduled medications  acetaminophen, 650 mg, oral, q6h  aspirin, 81 mg, oral, Daily  clopidogrel, 75 mg, oral, Daily  enoxaparin, 40 mg, subcutaneous, Daily  furosemide, 40 mg, oral, Daily  gabapentin, 100 mg, oral, Nightly  ipratropium-albuteroL, 3 mL, nebulization, 4x daily  levothyroxine, 88 mcg, oral, Daily  oxygen, , inhalation, q4h  piperacillin-tazobactam, 3.375 g, intravenous,  q6h  predniSONE, 20 mg, oral, BID  simvastatin, 20 mg, oral, Nightly  sodium chloride, 1 spray, Each Nostril, 4x daily      Continuous medications  oxygen,       PRN medications  PRN medications: albuterol, bisacodyl, hydrOXYzine HCL, lidocaine     Labs:  Lab Results   Component Value Date     04/03/2024    K 3.9 04/03/2024    CL 90 (L) 04/03/2024    CO2 42 (H) 04/03/2024    BUN 18 04/03/2024    CREATININE 0.70 04/03/2024    GLUCOSE 111 (H) 04/03/2024    CALCIUM 9.6 04/03/2024     Lab Results   Component Value Date    WBC 5.2 04/03/2024    HGB 11.2 (L) 04/03/2024    HCT 36.1 04/03/2024    MCV 91 04/03/2024     04/03/2024       Imaging:  XR chest 1 view    Result Date: 4/1/2024  Interpreted By:  Anthony Walker, STUDY: XR CHEST 1 VIEW; 3/31/2024 9:38 am   INDICATION: Signs/Symptoms:Question of surgery   COMPARISON: 03/30/2020   ACCESSION NUMBER(S): [id number]   ORDERING CLINICIAN: KADI ALLAN   FINDINGS: The study is limited due to rotation, lordotic projection and respiratory motion. The cardiac silhouette appears prominent, exaggerated by the technique. Prosthetic aortic valve is again seen. Atherosclerotic calcifications involve the tortuous aorta. There is left pleural effusion and bilateral interstitial and mild alveolar infiltrates; allowing for differences in technique there is probably some minimal improvement. There is no pneumothorax. Degenerative changes involve the spine and shoulders.       Cardiomegaly and slightly improving congestive heart failure.   Signed by: Anthony Walker 4/1/2024 8:17 AM Dictation workstation:   [id number]    ECG 12 Lead    Result Date: 3/31/2024  Normal sinus rhythm Possible Left atrial enlargement Left bundle branch block Abnormal ECG When compared with ECG of 01-MAR-2024 00:06, No significant change was found Confirmed by Aryan Ruiz ([id number]) on 3/31/2024 7:18:49 PM    CT head wo IV contrast    Result Date: 3/30/2024  Interpreted By:  Bautista Ruelas, STUDY:  CT HEAD WO IV CONTRAST;  3/30/2024 8:10 pm   INDICATION: Signs/Symptoms:headache.   COMPARISON: None   ACCESSION NUMBER(S): [id number]   ORDERING CLINICIAN: BRISEIDA PATINO   TECHNIQUE: Contiguous axial images of the head were obtained without intravenous contrast.   FINDINGS: The examination is limited secondary to patient motion.   BRAIN PARENCHYMA:  There is cerebral atrophy and bowel chronic white matter change. The gray white matter differentiation is preserved. No mass effect or midline shift.   HEMORRHAGE:  No evidence of acute intracranial hemorrhage. VENTRICLES AND EXTRA-AXIAL SPACES:  The ventricles are within normal limits in size for brain volume.  No evidence of abnormal extraaxial fluid collection. EXTRACRANIAL SOFT TISSUES:  Within normal limits. PARANASAL SINUSES/MASTOIDS:  Mild peripheral mucosal thickening of partially imaged right maxillary sinus. CALVARIUM:  No evidence of depressed calvarial fracture.   OTHER FINDINGS:  None       Cerebral atrophy and bilateral chronic white matter change.   No evidence of acute intracranial hemorrhage.   Peripheral mucosal thickening of partially imaged right maxillary sinus.   MACRO: None   Signed by: Bautista Ruelas 3/30/2024 8:36 PM Dictation workstation:   [id number]    XR chest 1 view    Result Date: 3/30/2024  Interpreted By:  Spencer Hoskins, STUDY: XR CHEST 1 VIEW   INDICATION: Signs/Symptoms:Hypoxia/SOB.   COMPARISON: March 29, 2024   ACCESSION NUMBER(S): [id number]   ORDERING CLINICIAN: DORETHA MADDEN   FINDINGS: Significant worsening of the perihilar and basilar edema when compared to prior study.   More conglomerate consolidation in the mid lungs both right and left.   Small bilateral effusions.       Significant worsening of the perihilar and basilar edema when compared to prior study.   More conglomerate consolidation in the mid lungs both right and left.   Small bilateral effusions.   Signed by: Spencer Hoskins 3/30/2024 8:11 AM Dictation workstation:    [id number]    XR chest 2 views    Result Date: 3/29/2024  Interpreted By:  Carlo Chan, STUDY: XR CHEST 2 VIEWS;  3/29/2024 3:37 am   INDICATION: Signs/Symptoms:sob.   COMPARISON: 02/29/2024   ACCESSION NUMBER(S): [id number]   ORDERING CLINICIAN: ANALI AGUILAR   FINDINGS:     Increasing patchy right basilar airspace opacity. Similar severe cardiomegaly. No pneumothorax. Similar endovascular aortic valve prosthesis. Similar severe diffuse interstitial opacity. Upper abdomen is unremarkable. Diffuse osteopenia. No acute osseous abnormality.       1. Increasing patchy right basilar airspace opacity. Consider pneumonia. 2. Similar severe diffuse interstitial opacity, likely pulmonary edema. 3. Stable cardiomegaly.   Signed by: Carlo Chan 3/29/2024 3:53 AM Dictation workstation:   [id number]    Lower extremity venous duplex left    Result Date: 3/5/2024  Interpreted By:  Jatinder Appiah, STUDY: [facility] LOWER EXTREMITY VENOUS DUPLEX LEFT; 3/5/2024 1:16 pm   INDICATION: Signs/Symptoms:lower extremity pain and edema.   COMPARISON: None   ACCESSION NUMBER(S): [id number]   ORDERING CLINICIAN: BREANNE YOUNG   TECHNIQUE: Black and white as well as color-flow duplex images were obtained along with Doppler spectral analysis of the deep venous system of the lower extremity from the knee to the groin. Attempts were made to image the proximal posterior tibial and peroneal veins of the proximal calf as well. Venous compression was performed.   FINDINGS: There is normal compressibility and flow within the visualized vessels of the deep venous system of the lower extremity.  The contralateral common femoral vein is patent.       No evidence for deep venous thrombosis within the limits of this examination.   MACRO: none   Signed by: Jatinder Appiah 3/5/2024 1:30 PM Dictation workstation:   [id number]              Assessment/Plan   Principal Problem:    Pneumonia of right lung due to infectious organism,  unspecified part of lung  Active Problems:    Atherosclerosis of coronary artery without angina pectoris    Hyperlipidemia    Primary hypertension    Acquired hypothyroidism    Acute on chronic diastolic (congestive) heart failure (CMS/HCC)    Acute on chronic respiratory failure (CMS/HCC)      Acute on chronic respiratory failure with hypoxia: not doing as well today.   Continue with vapotherm  Portable CXR  Acute on chronic diastolic heart failure  Oral diuretics  Pulmonary hypertension: Group II and Group III  Diuresis  COPD: None entirely sure acute exacerbation.  Overall improved so we will maintain steroids and taper off rapidly though  Oral prednisone  Pulmonary hygiene  Probable bacterial pneumonia  Continue with piperacillin/tazobactam.  Total of 7 days of systemic antibiotics. Maintain on IV for now  Lactate sent yesterday, elevated and wonder in setting of pulmonary HTN vs hypoxia. No evidence of hypotension or abdominal pain. Will send another one today to ensure stability or downtrending  Prophylaxis  Enoxaparin 40 subcutaneous daily confirmed  CODE STATUS  DNR, DNI noted  Pulmonary disposition  Still ok to transfer out of ICU    We will continue to follow    Smith House MD  Pulmonary/CC Medicine  [facility]     LOS: 5 days

## 2024-04-03 NOTE — PROGRESS NOTES
"Spiritual Care Visit    Clinical Encounter Type  Visited With: Patient  Routine Visit: Introduction  Crisis Visit: Critical care  Referral From: Nurse  Referral To:     Anabaptist Encounters  Anabaptist Needs: Prayer    Visited patient and her family members there. The patient very hard of hearing so I spoke very loud for her to hear me. The patient recently had a birthday, so I showed her and her family the Beatles Birthday video which they so much enjoyed to help celebrate the patient's birthday.    I shared some verses from the Bible with them for encouragement and then offered up several prayers for continued healing and for God's care and goodness to uplift the patient going forward. Prayed also for all of the medical team for them to do their best for this patient and all the patients in the hospital under their care.    The patient and family were so very appreciative of the spiritual care visit and all that we experienced together while I was there.    I even had them all laughing at the end of the visit when I told them my standard joke about being in \"good hands' with all of the medical team, and in \"good hands\" with God, and what that means because of it.    Great visit. Wonderful people.                                               "

## 2024-04-03 NOTE — PROGRESS NOTES
Occupational Therapy    Occupational Therapy Treatment    Name: Akua Almanzar  MRN: [id number]  : 3/27/1929  Date: 24  Time Calculation  Start Time: 1534  Stop Time: 1554  Time Calculation (min): 20 min    Assessment:  End of Session Communication: Bedside nurse  End of Session Patient Position: Bed, 3 rail up, Alarm off, not on at start of session  Plan:  Treatment Interventions: ADL retraining, Functional transfer training, UE strengthening/ROM, Endurance training, Patient/family training, Compensatory technique education  OT Frequency: 2 times per week  OT Discharge Recommendations: Low intensity level of continued care, 24 hr supervision due to cognition  Equipment Recommended upon Discharge: Wheeled walker  OT Recommended Transfer Status: Minimal assist, Assist of 1  OT - OK to Discharge: Yes    Subjective   Previous Visit Info:  OT Last Visit  OT Received On: 24  General:  General  Family/Caregiver Present: Yes  Caregiver Feedback: Dtr present  Prior to Session Communication: Bedside nurse  Patient Position Received: Bed, 3 rail up, Alarm off, not on at start of session  Preferred Learning Style: verbal  General Comment: Pt agreeable to therapy, declining OOB d/t fatigue from being up twice today, just put back to bed  Precautions:  Hearing/Visual Limitations: very [place]  Medical Precautions: Fall precautions  Vitals:     Pain Assessment:  Pain Assessment  Pain Assessment: 0-10  Pain Score: 0 - No pain     Objective   Activities of Daily Living: Grooming  Grooming Level of Assistance: Setup  Grooming Where Assessed: Bed level  Grooming Comments: to wash face    Functional Standing Tolerance:     Bed Mobility/Transfers:   IADL's:   Communication:     Splinting:     Therapy/Activity: Therapeutic Exercise  Therapeutic Exercise Performed: Yes  Therapeutic Exercise Activity 1: x15 (B shld flex/ ext)  Therapeutic Exercise Activity 2: x15 B horiz shld abd/ add  Therapeutic Exercise Activity 3: x15 (B  elbow flex/ ext)  Therapeutic Exercise Activity 4: x15 (B forearm pron/ sup)  Therapeutic Exercise Activity 5: x15 (B wrist flex/ ext)  Sensory:     Cognitive Skill Development:     Vision:     Strength:     Other Activity:               Outcome Measures:  [facility] Daily Activity  Putting on and taking off regular lower body clothing: A lot  Bathing (including washing, rinsing, drying): A lot  Putting on and taking off regular upper body clothing: A little  Toileting, which includes using toilet, bedpan or urinal: A little  Taking care of personal grooming such as brushing teeth: A little  Eating Meals: None  Daily Activity - Total Score: 17        Education Documentation  Home Exercise Program, taught by Abigail Hopkins OT at 4/3/2024  4:13 PM.  Learner: Patient  Readiness: Acceptance  Method: Explanation  Response: Demonstrated Understanding, Needs Reinforcement    ADL Training, taught by Abigail Hopkins OT at 4/3/2024  4:13 PM.  Learner: Patient  Readiness: Acceptance  Method: Explanation  Response: Demonstrated Understanding, Needs Reinforcement    Education Comments  No comments found.      Goals:  Encounter Problems       Encounter Problems (Active)       OT Goals       ADLs (Progressing)       Start:  03/29/24    Expected End:  04/19/24       Patient will complete ADL tasks with Mod I, using AE as needed, in order to increase patient's safety and independence with daily tasks.         Functional Transfers (Progressing)       Start:  03/29/24    Expected End:  04/19/24       Patient will complete functional transfers with Mod I in order to increase patient's safety and independence with daily tasks.         B UE Strengthening (Progressing)       Start:  03/29/24    Expected End:  04/19/24       Patient will increase B UE strength to 4+/5 for functional transfers.         Functional Mobility (Progressing)       Start:  03/29/24    Expected End:  04/19/24       Patient will demonstrate the ability to complete  item retrieval and functional mobility with Mod I in order to increase patient's safety and independence with daily tasks.

## 2024-04-03 NOTE — PROGRESS NOTES
Akua Almanzar is a 89 y.o. female on day 5 of admission presenting with Pneumonia of right lung due to infectious organism, unspecified part of lung.      Subjective   89-year-old female who reports that shortness of breath.  Denies any lower extremity swelling.  Patient denies any cough.  Patient denies fevers or chills.  Patient denies headache or dizziness.  Patient tolerating diet.       Objective     Last Recorded Vitals  /67   Pulse 90   Temp 36.7 °C (98.1 °F) (Temporal)   Resp 26   Wt 73.8 kg (162 lb 11.2 oz)   SpO2 94%   Intake/Output last 3 Shifts:    Intake/Output Summary (Last 24 hours) at 4/3/2024 1108  Last data filed at 4/3/2024 0605  Gross per 24 hour   Intake 1160 ml   Output 1950 ml   Net -790 ml       Admission Weight  Weight: 72.7 kg (160 lb 4.4 oz) (03/29/24 0313)    Daily Weight  04/03/24 : 73.8 kg (162 lb 11.2 oz)    Image Results  XR chest 1 view  Narrative: Interpreted By:  Rosa Covington,   STUDY:  XR CHEST 1 VIEW 4/3/2024 10:49 am      INDICATION:  Signs/Symptoms:respiratory failure      COMPARISON:  03/31/2024      ACCESSION NUMBER(S):  [id number]      ORDERING CLINICIAN:  JULIAN CALLE      TECHNIQUE:  AP erect view of the chest      FINDINGS:  Postoperative change from TAVR is seen with heart enlarged. There is  atherosclerosis of the aortic arch and descending thoracic aorta.      When compared with prior study, the CHF has improved significantly  with less interstitial and alveolar edema. Mild pulmonary vascular  congestion persists. There is some platelike atelectasis at the right  lung base.      Impression: Near complete resolution of the CHF.      Right basilar platelike atelectasis.      Status post TAVR.      Signed by: Rosa Covington 4/3/2024 11:06 AM  Dictation workstation:   [id number]      Physical Exam  HENT:      Head: Normocephalic.      Ears:      Comments: Hard of hearing  Eyes:      Extraocular Movements: Extraocular movements intact.      Pupils: Pupils are  equal, round, and reactive to light.   Cardiovascular:      Rate and Rhythm: Normal rate and regular rhythm.      Pulses: Normal pulses.      Heart sounds: Normal heart sounds.   Pulmonary:      Effort: Pulmonary effort is normal.      Breath sounds: Normal breath sounds.   Abdominal:      General: Abdomen is flat. Bowel sounds are normal.      Palpations: Abdomen is soft.   Musculoskeletal:         General: Normal range of motion.      Cervical back: Normal range of motion.   Skin:     General: Skin is warm.   Neurological:      General: No focal deficit present.      Mental Status: She is alert.   Psychiatric:         Mood and Affect: Mood normal.   Relevant Results               Assessment/Plan   This patient currently has cardiac telemetry ordered; if you would like to modify or discontinue the telemetry order, click here to go to the orders activity to modify/discontinue the order.      This patient has a urinary catheter   Reason for the urinary catheter remaining today? urinary retention/bladder outlet obstruction, acute or chronic          Principal Problem:    Pneumonia of right lung due to infectious organism, unspecified part of lung  Active Problems:    Atherosclerosis of coronary artery without angina pectoris    Hyperlipidemia    Primary hypertension    Acquired hypothyroidism    Acute on chronic diastolic (congestive) heart failure (CMS/HCC)    Acute on chronic respiratory failure (CMS/HCC)    1.  Acute hypercapnic respiratory failure secondary to CHF reduced ejection fraction currently on high flow 30 L now decreased to 30% FiO2 % wean as tolerated currently on IV Zosyn.  Patient had elevated lactic.  Will continue monitor for sepsis however no signs of leukocytosis.  2.  CHF is currently on oral Lasix 40 oral twice a day adequate diuresis daily net - 500  3.  COPD currently on Solu-Medrol milligrams IV every 8  4.  Functional quadriplegia continue PT OT therapy  5.  Plan of care continue PT OT  therapy              Jn Stapleton MD

## 2024-04-04 LAB
ALBUMIN SERPL-MCNC: 3.1 G/DL (ref 3.5–5)
ALP BLD-CCNC: 63 U/L (ref 35–125)
ALT SERPL-CCNC: 32 U/L (ref 5–40)
ANION GAP SERPL CALC-SCNC: 5 MMOL/L
APPEARANCE UR: ABNORMAL
AST SERPL-CCNC: 28 U/L (ref 5–40)
BACTERIA #/AREA URNS AUTO: ABNORMAL /HPF
BASOPHILS # BLD AUTO: 0 X10*3/UL (ref 0–0.1)
BASOPHILS NFR BLD AUTO: 0 %
BILIRUB SERPL-MCNC: 0.3 MG/DL (ref 0.1–1.2)
BILIRUB UR STRIP.AUTO-MCNC: NEGATIVE MG/DL
BUN SERPL-MCNC: 18 MG/DL (ref 8–25)
CALCIUM SERPL-MCNC: 9.1 MG/DL (ref 8.5–10.4)
CHLORIDE SERPL-SCNC: 91 MMOL/L (ref 97–107)
CO2 SERPL-SCNC: 41 MMOL/L (ref 24–31)
COLOR UR: ABNORMAL
CREAT SERPL-MCNC: 0.7 MG/DL (ref 0.4–1.6)
EGFRCR SERPLBLD CKD-EPI 2021: 80 ML/MIN/1.73M*2
EOSINOPHIL # BLD AUTO: 0 X10*3/UL (ref 0–0.4)
EOSINOPHIL NFR BLD AUTO: 0 %
ERYTHROCYTE [DISTWIDTH] IN BLOOD BY AUTOMATED COUNT: 14.9 % (ref 11.5–14.5)
GLUCOSE SERPL-MCNC: 116 MG/DL (ref 65–99)
GLUCOSE UR STRIP.AUTO-MCNC: NORMAL MG/DL
HCT VFR BLD AUTO: 34.1 % (ref 36–46)
HGB BLD-MCNC: 11.1 G/DL (ref 12–16)
IMM GRANULOCYTES # BLD AUTO: 0.06 X10*3/UL (ref 0–0.5)
IMM GRANULOCYTES NFR BLD AUTO: 0.8 % (ref 0–0.9)
KETONES UR STRIP.AUTO-MCNC: NEGATIVE MG/DL
LEUKOCYTE ESTERASE UR QL STRIP.AUTO: ABNORMAL
LYMPHOCYTES # BLD AUTO: 0.87 X10*3/UL (ref 0.8–3)
LYMPHOCYTES NFR BLD AUTO: 12.2 %
MCH RBC QN AUTO: 28.3 PG (ref 26–34)
MCHC RBC AUTO-ENTMCNC: 32.6 G/DL (ref 32–36)
MCV RBC AUTO: 87 FL (ref 80–100)
MONOCYTES # BLD AUTO: 0.47 X10*3/UL (ref 0.05–0.8)
MONOCYTES NFR BLD AUTO: 6.6 %
NEUTROPHILS # BLD AUTO: 5.75 X10*3/UL (ref 1.6–5.5)
NEUTROPHILS NFR BLD AUTO: 80.4 %
NITRITE UR QL STRIP.AUTO: NEGATIVE
NRBC BLD-RTO: 0 /100 WBCS (ref 0–0)
PH UR STRIP.AUTO: 7.5 [PH]
PLATELET # BLD AUTO: 210 X10*3/UL (ref 150–450)
POTASSIUM SERPL-SCNC: 3.9 MMOL/L (ref 3.4–5.1)
PROT SERPL-MCNC: 5.5 G/DL (ref 5.9–7.9)
PROT UR STRIP.AUTO-MCNC: NEGATIVE MG/DL
RBC # BLD AUTO: 3.92 X10*6/UL (ref 4–5.2)
RBC # UR STRIP.AUTO: ABNORMAL /UL
RBC #/AREA URNS AUTO: >20 /HPF
SODIUM SERPL-SCNC: 137 MMOL/L (ref 133–145)
SP GR UR STRIP.AUTO: 1.01
UROBILINOGEN UR STRIP.AUTO-MCNC: NORMAL MG/DL
WBC # BLD AUTO: 7.2 X10*3/UL (ref 4.4–11.3)
WBC #/AREA URNS AUTO: ABNORMAL /HPF
YEAST BUDDING #/AREA UR COMP ASSIST: PRESENT /HPF

## 2024-04-04 PROCEDURE — 2500000004 HC RX 250 GENERAL PHARMACY W/ HCPCS (ALT 636 FOR OP/ED): Performed by: STUDENT IN AN ORGANIZED HEALTH CARE EDUCATION/TRAINING PROGRAM

## 2024-04-04 PROCEDURE — 2500000001 HC RX 250 WO HCPCS SELF ADMINISTERED DRUGS (ALT 637 FOR MEDICARE OP): Performed by: STUDENT IN AN ORGANIZED HEALTH CARE EDUCATION/TRAINING PROGRAM

## 2024-04-04 PROCEDURE — 2500000004 HC RX 250 GENERAL PHARMACY W/ HCPCS (ALT 636 FOR OP/ED): Performed by: INTERNAL MEDICINE

## 2024-04-04 PROCEDURE — 2500000004 HC RX 250 GENERAL PHARMACY W/ HCPCS (ALT 636 FOR OP/ED): Performed by: NURSE PRACTITIONER

## 2024-04-04 PROCEDURE — 87086 URINE CULTURE/COLONY COUNT: CPT | Mod: TRILAB,WESLAB | Performed by: INTERNAL MEDICINE

## 2024-04-04 PROCEDURE — 94660 CPAP INITIATION&MGMT: CPT

## 2024-04-04 PROCEDURE — 81001 URINALYSIS AUTO W/SCOPE: CPT | Performed by: INTERNAL MEDICINE

## 2024-04-04 PROCEDURE — 97110 THERAPEUTIC EXERCISES: CPT | Mod: GP

## 2024-04-04 PROCEDURE — 94640 AIRWAY INHALATION TREATMENT: CPT

## 2024-04-04 PROCEDURE — 2500000001 HC RX 250 WO HCPCS SELF ADMINISTERED DRUGS (ALT 637 FOR MEDICARE OP): Performed by: INTERNAL MEDICINE

## 2024-04-04 PROCEDURE — 85025 COMPLETE CBC W/AUTO DIFF WBC: CPT | Performed by: STUDENT IN AN ORGANIZED HEALTH CARE EDUCATION/TRAINING PROGRAM

## 2024-04-04 PROCEDURE — 99232 SBSQ HOSP IP/OBS MODERATE 35: CPT | Performed by: NURSE PRACTITIONER

## 2024-04-04 PROCEDURE — 2500000004 HC RX 250 GENERAL PHARMACY W/ HCPCS (ALT 636 FOR OP/ED): Performed by: HOSPITALIST

## 2024-04-04 PROCEDURE — 2500000002 HC RX 250 W HCPCS SELF ADMINISTERED DRUGS (ALT 637 FOR MEDICARE OP, ALT 636 FOR OP/ED): Mod: MUE | Performed by: INTERNAL MEDICINE

## 2024-04-04 PROCEDURE — 2500000005 HC RX 250 GENERAL PHARMACY W/O HCPCS: Performed by: STUDENT IN AN ORGANIZED HEALTH CARE EDUCATION/TRAINING PROGRAM

## 2024-04-04 PROCEDURE — 2500000002 HC RX 250 W HCPCS SELF ADMINISTERED DRUGS (ALT 637 FOR MEDICARE OP, ALT 636 FOR OP/ED): Performed by: STUDENT IN AN ORGANIZED HEALTH CARE EDUCATION/TRAINING PROGRAM

## 2024-04-04 PROCEDURE — 99497 ADVNCD CARE PLAN 30 MIN: CPT | Performed by: NURSE PRACTITIONER

## 2024-04-04 PROCEDURE — 2500000005 HC RX 250 GENERAL PHARMACY W/O HCPCS: Performed by: INTERNAL MEDICINE

## 2024-04-04 PROCEDURE — 97530 THERAPEUTIC ACTIVITIES: CPT | Mod: GO

## 2024-04-04 PROCEDURE — 94667 MNPJ CHEST WALL 1ST: CPT

## 2024-04-04 PROCEDURE — 94668 MNPJ CHEST WALL SBSQ: CPT

## 2024-04-04 PROCEDURE — 2500000002 HC RX 250 W HCPCS SELF ADMINISTERED DRUGS (ALT 637 FOR MEDICARE OP, ALT 636 FOR OP/ED): Performed by: INTERNAL MEDICINE

## 2024-04-04 PROCEDURE — 2020000001 HC ICU ROOM DAILY

## 2024-04-04 PROCEDURE — 99231 SBSQ HOSP IP/OBS SF/LOW 25: CPT | Performed by: INTERNAL MEDICINE

## 2024-04-04 PROCEDURE — 9420000001 HC RT PATIENT EDUCATION 5 MIN

## 2024-04-04 PROCEDURE — 2500000004 HC RX 250 GENERAL PHARMACY W/ HCPCS (ALT 636 FOR OP/ED): Mod: MUE | Performed by: NURSE PRACTITIONER

## 2024-04-04 PROCEDURE — 84075 ASSAY ALKALINE PHOSPHATASE: CPT | Performed by: STUDENT IN AN ORGANIZED HEALTH CARE EDUCATION/TRAINING PROGRAM

## 2024-04-04 PROCEDURE — 2500000002 HC RX 250 W HCPCS SELF ADMINISTERED DRUGS (ALT 637 FOR MEDICARE OP, ALT 636 FOR OP/ED): Mod: MUE | Performed by: STUDENT IN AN ORGANIZED HEALTH CARE EDUCATION/TRAINING PROGRAM

## 2024-04-04 PROCEDURE — 36415 COLL VENOUS BLD VENIPUNCTURE: CPT | Performed by: STUDENT IN AN ORGANIZED HEALTH CARE EDUCATION/TRAINING PROGRAM

## 2024-04-04 RX ORDER — ACETYLCYSTEINE 200 MG/ML
3 SOLUTION ORAL; RESPIRATORY (INHALATION)
Status: DISCONTINUED | OUTPATIENT
Start: 2024-04-04 | End: 2024-04-14

## 2024-04-04 RX ORDER — GUAIFENESIN 600 MG/1
600 TABLET, EXTENDED RELEASE ORAL 2 TIMES DAILY PRN
Status: DISCONTINUED | OUTPATIENT
Start: 2024-04-04 | End: 2024-04-15 | Stop reason: HOSPADM

## 2024-04-04 RX ADMIN — LEVOTHYROXINE SODIUM 88 MCG: 0.09 TABLET ORAL at 05:40

## 2024-04-04 RX ADMIN — IPRATROPIUM BROMIDE AND ALBUTEROL SULFATE 3 ML: 2.5; .5 SOLUTION RESPIRATORY (INHALATION) at 19:27

## 2024-04-04 RX ADMIN — ACETYLCYSTEINE 800 MG: 200 SOLUTION ORAL; RESPIRATORY (INHALATION) at 19:26

## 2024-04-04 RX ADMIN — ACETAMINOPHEN 650 MG: 325 TABLET ORAL at 05:28

## 2024-04-04 RX ADMIN — Medication: at 23:00

## 2024-04-04 RX ADMIN — PIPERACILLIN SODIUM AND TAZOBACTAM SODIUM 3.38 G: 3; .375 INJECTION, SOLUTION INTRAVENOUS at 00:13

## 2024-04-04 RX ADMIN — PIPERACILLIN SODIUM AND TAZOBACTAM SODIUM 3.38 G: 3; .375 INJECTION, SOLUTION INTRAVENOUS at 12:27

## 2024-04-04 RX ADMIN — GABAPENTIN 100 MG: 100 CAPSULE ORAL at 20:53

## 2024-04-04 RX ADMIN — Medication: at 07:42

## 2024-04-04 RX ADMIN — ASPIRIN 81 MG: 81 TABLET, COATED ORAL at 08:22

## 2024-04-04 RX ADMIN — ACETAMINOPHEN 650 MG: 325 TABLET ORAL at 18:39

## 2024-04-04 RX ADMIN — ACETYLCYSTEINE 600 MG: 200 SOLUTION ORAL; RESPIRATORY (INHALATION) at 12:00

## 2024-04-04 RX ADMIN — ACETAMINOPHEN 650 MG: 325 TABLET ORAL at 00:13

## 2024-04-04 RX ADMIN — Medication: at 15:00

## 2024-04-04 RX ADMIN — PREDNISONE 20 MG: 20 TABLET ORAL at 05:28

## 2024-04-04 RX ADMIN — CLOPIDOGREL BISULFATE 75 MG: 75 TABLET ORAL at 08:22

## 2024-04-04 RX ADMIN — IPRATROPIUM BROMIDE AND ALBUTEROL SULFATE 3 ML: 2.5; .5 SOLUTION RESPIRATORY (INHALATION) at 07:42

## 2024-04-04 RX ADMIN — SIMVASTATIN 20 MG: 20 TABLET, FILM COATED ORAL at 20:53

## 2024-04-04 RX ADMIN — ENOXAPARIN SODIUM 40 MG: 40 INJECTION SUBCUTANEOUS at 05:28

## 2024-04-04 RX ADMIN — FUROSEMIDE 40 MG: 40 TABLET ORAL at 08:22

## 2024-04-04 RX ADMIN — IPRATROPIUM BROMIDE AND ALBUTEROL SULFATE 3 ML: 2.5; .5 SOLUTION RESPIRATORY (INHALATION) at 12:00

## 2024-04-04 RX ADMIN — PIPERACILLIN SODIUM AND TAZOBACTAM SODIUM 3.38 G: 3; .375 INJECTION, SOLUTION INTRAVENOUS at 18:39

## 2024-04-04 RX ADMIN — ACETAMINOPHEN 650 MG: 325 TABLET ORAL at 12:27

## 2024-04-04 RX ADMIN — PIPERACILLIN SODIUM AND TAZOBACTAM SODIUM 3.38 G: 3; .375 INJECTION, SOLUTION INTRAVENOUS at 05:28

## 2024-04-04 ASSESSMENT — COGNITIVE AND FUNCTIONAL STATUS - GENERAL
STANDING UP FROM CHAIR USING ARMS: A LITTLE
CLIMB 3 TO 5 STEPS WITH RAILING: TOTAL
WALKING IN HOSPITAL ROOM: A LOT
MOBILITY SCORE: 15
MOVING TO AND FROM BED TO CHAIR: A LITTLE
TURNING FROM BACK TO SIDE WHILE IN FLAT BAD: A LITTLE
MOVING FROM LYING ON BACK TO SITTING ON SIDE OF FLAT BED WITH BEDRAILS: A LITTLE

## 2024-04-04 ASSESSMENT — PAIN SCALES - GENERAL
PAINLEVEL_OUTOF10: 0 - NO PAIN

## 2024-04-04 ASSESSMENT — PAIN - FUNCTIONAL ASSESSMENT
PAIN_FUNCTIONAL_ASSESSMENT: 0-10
PAIN_FUNCTIONAL_ASSESSMENT: 0-10

## 2024-04-04 NOTE — PROGRESS NOTES
Pulmonary Progress Note 04/04/24     Patient seen in follow-up for acute respiratory failure with hypoxia, pneumonia, congestive heart failure    Subjective   Interval History  Transition to nasal cannula.  Endorses productive cough with difficulty at times clearing.    Objective   Vital signs in last 24 hours:  Temp:  [36.3 °C (97.3 °F)-36.8 °C (98.2 °F)] 36.3 °C (97.3 °F)  Heart Rate:  [] 90  Resp:  [7-36] 19  BP: ()/() 123/67  FiO2 (%):  [36 %] 36 %    Intake/Output last 3 shifts:  I/O last 3 completed shifts:  In: 440 (6 mL/kg) [P.O.:240; IV Piggyback:200]  Out: 3400 (46.1 mL/kg) [Urine:3400 (1.3 mL/kg/hr)]  Weight: 73.8 kg   Intake/Output this shift:  No intake/output data recorded.    Physical Exam  Vitals reviewed.   Constitutional:       General: She is not in acute distress.     Interventions: Nasal cannula in place.   Eyes:      Conjunctiva/sclera: Conjunctivae normal.   Cardiovascular:      Rate and Rhythm: Normal rate.      Heart sounds: Murmur heard.   Pulmonary:      Effort: Pulmonary effort is normal.      Breath sounds: Rales present. No wheezing.   Musculoskeletal:      Cervical back: Full passive range of motion without pain.      Right lower leg: No edema.      Left lower leg: No edema.   Skin:     General: Skin is warm and dry.   Neurological:      General: No focal deficit present.      Mental Status: She is alert.   Psychiatric:         Mood and Affect: Mood is anxious. Affect is tearful.         Scheduled medications  acetaminophen, 650 mg, oral, q6h  acetylcysteine, 3 mL, nebulization, 4x daily  aspirin, 81 mg, oral, Daily  clopidogrel, 75 mg, oral, Daily  enoxaparin, 40 mg, subcutaneous, Daily  furosemide, 40 mg, oral, Daily  gabapentin, 100 mg, oral, Nightly  ipratropium-albuteroL, 3 mL, nebulization, 4x daily  levothyroxine, 88 mcg, oral, Daily  oxygen, , inhalation, q8h  piperacillin-tazobactam, 3.375 g, intravenous, q6h  predniSONE, 20 mg, oral,  BID  simvastatin, 20 mg, oral, Nightly  sodium chloride, 1 spray, Each Nostril, 4x daily      Continuous medications       PRN medications  PRN medications: albuterol, bisacodyl, guaiFENesin, hydrOXYzine HCL, lidocaine     Labs:  Lab Results   Component Value Date     04/04/2024    K 3.9 04/04/2024    CL 91 (L) 04/04/2024    CO2 41 (H) 04/04/2024    BUN 18 04/04/2024    CREATININE 0.70 04/04/2024    GLUCOSE 116 (H) 04/04/2024    CALCIUM 9.1 04/04/2024     Lab Results   Component Value Date    WBC 7.2 04/04/2024    HGB 11.1 (L) 04/04/2024    HCT 34.1 (L) 04/04/2024    MCV 87 04/04/2024     04/04/2024       Imaging:  XR chest 1 view    Result Date: 4/1/2024  Interpreted By:  Anthony Walker, STUDY: XR CHEST 1 VIEW; 3/31/2024 9:38 am   INDICATION: Signs/Symptoms:Question of surgery   COMPARISON: 03/30/2020   ACCESSION NUMBER(S): [id number]   ORDERING CLINICIAN: KADI ALLAN   FINDINGS: The study is limited due to rotation, lordotic projection and respiratory motion. The cardiac silhouette appears prominent, exaggerated by the technique. Prosthetic aortic valve is again seen. Atherosclerotic calcifications involve the tortuous aorta. There is left pleural effusion and bilateral interstitial and mild alveolar infiltrates; allowing for differences in technique there is probably some minimal improvement. There is no pneumothorax. Degenerative changes involve the spine and shoulders.       Cardiomegaly and slightly improving congestive heart failure.   Signed by: Anthony Walker 4/1/2024 8:17 AM Dictation workstation:   [id number]    ECG 12 Lead    Result Date: 3/31/2024  Normal sinus rhythm Possible Left atrial enlargement Left bundle branch block Abnormal ECG When compared with ECG of 01-MAR-2024 00:06, No significant change was found Confirmed by Aryan uRiz ([id number]) on 3/31/2024 7:18:49 PM    CT head wo IV contrast    Result Date: 3/30/2024  Interpreted By:  Bautista Ruelas, STUDY: CT HEAD WO IV CONTRAST;   3/30/2024 8:10 pm   INDICATION: Signs/Symptoms:headache.   COMPARISON: None   ACCESSION NUMBER(S): [id number]   ORDERING CLINICIAN: BRISEIDA PATINO   TECHNIQUE: Contiguous axial images of the head were obtained without intravenous contrast.   FINDINGS: The examination is limited secondary to patient motion.   BRAIN PARENCHYMA:  There is cerebral atrophy and bowel chronic white matter change. The gray white matter differentiation is preserved. No mass effect or midline shift.   HEMORRHAGE:  No evidence of acute intracranial hemorrhage. VENTRICLES AND EXTRA-AXIAL SPACES:  The ventricles are within normal limits in size for brain volume.  No evidence of abnormal extraaxial fluid collection. EXTRACRANIAL SOFT TISSUES:  Within normal limits. PARANASAL SINUSES/MASTOIDS:  Mild peripheral mucosal thickening of partially imaged right maxillary sinus. CALVARIUM:  No evidence of depressed calvarial fracture.   OTHER FINDINGS:  None       Cerebral atrophy and bilateral chronic white matter change.   No evidence of acute intracranial hemorrhage.   Peripheral mucosal thickening of partially imaged right maxillary sinus.   MACRO: None   Signed by: Bautista Ruelas 3/30/2024 8:36 PM Dictation workstation:   [id number]    XR chest 1 view    Result Date: 3/30/2024  Interpreted By:  Spencer Hoskins, STUDY: XR CHEST 1 VIEW   INDICATION: Signs/Symptoms:Hypoxia/SOB.   COMPARISON: March 29, 2024   ACCESSION NUMBER(S): [id number]   ORDERING CLINICIAN: DORETHA MADDEN   FINDINGS: Significant worsening of the perihilar and basilar edema when compared to prior study.   More conglomerate consolidation in the mid lungs both right and left.   Small bilateral effusions.       Significant worsening of the perihilar and basilar edema when compared to prior study.   More conglomerate consolidation in the mid lungs both right and left.   Small bilateral effusions.   Signed by: Spencer Hoskins 3/30/2024 8:11 AM Dictation workstation:   [id number]    XR  chest 2 views    Result Date: 3/29/2024  Interpreted By:  Carlo Chan, STUDY: XR CHEST 2 VIEWS;  3/29/2024 3:37 am   INDICATION: Signs/Symptoms:sob.   COMPARISON: 02/29/2024   ACCESSION NUMBER(S): [id number]   ORDERING CLINICIAN: ANALI AGUILAR   FINDINGS:     Increasing patchy right basilar airspace opacity. Similar severe cardiomegaly. No pneumothorax. Similar endovascular aortic valve prosthesis. Similar severe diffuse interstitial opacity. Upper abdomen is unremarkable. Diffuse osteopenia. No acute osseous abnormality.       1. Increasing patchy right basilar airspace opacity. Consider pneumonia. 2. Similar severe diffuse interstitial opacity, likely pulmonary edema. 3. Stable cardiomegaly.   Signed by: Calro Chan 3/29/2024 3:53 AM Dictation workstation:   [id number]    Lower extremity venous duplex left    Result Date: 3/5/2024  Interpreted By:  Jatinder Appiah, STUDY: [facility] LOWER EXTREMITY VENOUS DUPLEX LEFT; 3/5/2024 1:16 pm   INDICATION: Signs/Symptoms:lower extremity pain and edema.   COMPARISON: None   ACCESSION NUMBER(S): [id number]   ORDERING CLINICIAN: BREANNE YOUNG   TECHNIQUE: Black and white as well as color-flow duplex images were obtained along with Doppler spectral analysis of the deep venous system of the lower extremity from the knee to the groin. Attempts were made to image the proximal posterior tibial and peroneal veins of the proximal calf as well. Venous compression was performed.   FINDINGS: There is normal compressibility and flow within the visualized vessels of the deep venous system of the lower extremity.  The contralateral common femoral vein is patent.       No evidence for deep venous thrombosis within the limits of this examination.   MACRO: none   Signed by: Jatinder Appiah 3/5/2024 1:30 PM Dictation workstation:   [id number]              Assessment/Plan   Principal Problem:    Pneumonia of right lung due to infectious organism, unspecified part of  No lung  Active Problems:    Atherosclerosis of coronary artery without angina pectoris    Hyperlipidemia    Primary hypertension    Acquired hypothyroidism    Acute on chronic diastolic (congestive) heart failure (CMS/HCC)    Acute on chronic respiratory failure (CMS/HCC)      Acute on chronic respiratory failure with hypoxia: Suspect a component of airway disease given x-ray not overly impressive  Nasal cannula  Acute on chronic diastolic heart failure  Oral diuretics  Pulmonary hypertension: Group II and Group III  Diuresis  COPD: Potential component of acute bronchitis.  Oral prednisone  Pulmonary hygiene: Agree with continuing DuoNebs and addition of Mucomyst  Probable bacterial pneumonia  Piperacillin/tazobactam, stop date 4/5/24  Prophylaxis  Enoxaparin 40 subcutaneous daily confirmed  CODE STATUS  DNR, DNI noted  Pulmonary disposition  Transfer out of ICU    We will continue to follow    Smith House MD  Pulmonary/CC Medicine  [facility]     LOS: 6 days

## 2024-04-04 NOTE — PROGRESS NOTES
Physical Therapy    Physical Therapy Treatment    Patient Name: Akua Almanzar  MRN: [id number]  Today's Date: 4/4/2024  Time Calculation  Start Time: 1007  Stop Time: 1050  Time Calculation (min): 43 min   Documentation and services provided by student physical therapist while supervised under a licensed physical therapist.   EMMA Valenzuela       Assessment/Plan   PT Assessment  PT Assessment Results: Decreased strength, Decreased endurance, Impaired balance, Decreased mobility, Decreased safety awareness, Impaired hearing  Rehab Prognosis: Good  Medical Staff Made Aware: Yes  End of Session Communication: Bedside nurse  End of Session Patient Position: Up in chair (commode with call bell in visible reach.)    Assessment Comment: Pt had no LOB but did show signs of emotional distress / with hyperventaltion and brief desaturation but able to self recover with cues for diaphragmatic and paced breathing. Once stable pt wanted to continue with therapy with functional mobility of transfers and ambulation. Continued anticipation with pt progressing in mobility with resolution of medical condition and continued physical therapy interventions to aid in improved strengthening and mobility, however at this time, unsafe to return home.Will cont to monitor progress and mobility.    PT Plan  Inpatient/Swing Bed or Outpatient: Inpatient  PT Plan  Treatment/Interventions: Bed mobility, Transfer training, Gait training, Therapeutic exercise, Therapeutic activity  PT Plan: Skilled PT  PT Frequency: 3 times per week  PT Discharge Recommendations: Moderate intensity level of continued care  Equipment Recommended upon Discharge: Wheeled walker  PT Recommended Transfer Status: Assist x1  PT - OK to Discharge: Yes    General Visit Information:   PT  Visit  PT Received On: 04/04/24  Response to Previous Treatment: Patient with no complaints from previous session.  General  Reason for Referral: Impaired mobility  Referred By: Dr. ROPER  Mukul  Past Medical History Relevant to Rehab: CHF, PNA, HTN, HLD  Missed Visit: No  Family/Caregiver Present: No  Prior to Session Communication: Bedside nurse  Patient Position Received: Bed, 3 rail up, Alarm off, not on at start of session  Preferred Learning Style: verbal  General Comment: Pt presents supine in bed with HOB elevated. Pt sleeping upon arrival but able to awake and arousal with introduction and agrees to participate in therapy today. Denies current SOB and still on supplemental O2 via nasal canula (6 L O2), PIV, fong catheter, and tele.    Subjective   Precautions:  Precautions  Medical Precautions: Fall precautions, Oxygen therapy device and L/min (6 L)  Vital Signs:  Vital Signs  Heart Rate: 98 (at rest. with activity varried with 100's up to high 120's.)  Resp: 15 (10-20 at rest. cues for diaphragmatic breathing. RR increased with signs of hyperventalation due to emotional distress and crying with difficulty reporting reason for emotional state when sitting EOB. RR increased to high 20's and 30's. Able to recover)  SpO2: 92 % (90-94%)  BP: 123/65    Objective   Pain:  Pain Assessment  Pain Assessment: 0-10  Pain Score: 0 - No pain  Cognition:  Cognition  Overall Cognitive Status: Within Functional Limits  Orientation Level: Oriented X4  Attention: Within Functional Limits  Memory: Within Funtional Limits  Postural Control:  Postural Control  Posture Comment: forward head and rounded shoulders.  Static Sitting Balance  Static Sitting-Balance Support: Bilateral upper extremity supported, Feet supported  Static Sitting-Level of Assistance: Distant supervision.  Pt sat on EOB for about 20 minutes total. CNP from palliative medicine at bedside for period of time while pt sitting on EOB.    Dynamic Sitting Balance  Dynamic Sitting-Balance Support: Bilateral upper extremity supported, Feet supported  Dynamic Sitting-Comments: dist supervision    Static Standing Balance  Static Standing-Balance  Support: Bilateral upper extremity supported  Static Standing-Level of Assistance: Contact guard  Static Standing-Comment/Number of Minutes: static standing up to 5 min in length w/ 2ww    Dynamic Standing Balance  Dynamic Standing-Balance Support: Bilateral upper extremity supported  Dynamic Standing-Balance: Turning  Dynamic Standing-Comments: mobility in room with 2ww    General observation:  Assistance with functional mobility and transfers w/ supplemental O2, tele and PIV intact. Education for diaphragmatic breathing and cough assist techniques with initiation of VetoPep (acapella) initiated by nurse practitioner.    Activity Tolerance:  Activity Tolerance  Endurance: Decreased tolerance for upright activites  Rate of Perceived Dyspnea (RPD): 3-4 out of 10  Treatments:  Therapeutic Exercise  Therapeutic Exercise Performed: Yes  Therapeutic Exercise Activity 1: LAQ x 10 ea and hip flex x 10 ea while seated EOB with BUE support    Therapeutic Activity  Therapeutic Activity Performed: Yes  Therapeutic Activity 1: Bed mobility to sitting EOB. Education on use of VentoPEP (acapella) breath in through the nose and exhale through acapella to aid in lung clearance and assist in productive cough. Continue use of 2ww with transfers and mobility in room.       Bed Mobility  Bed Mobility: Yes  Bed Mobility 1  Bed Mobility 1: Supine to sitting  Level of Assistance 1: Distant supervision  Bed Mobility Comments 1: HOB elevated and bedrails up.    Ambulation/Gait Training  Ambulation/Gait Training Performed: Yes  Ambulation/Gait Training 1  Surface 1: Level tile  Device 1: Rolling walker  Assistance 1: Contact guard  Quality of Gait 1: Decreased step length, Diminished heel strike, Shuffling gait, Forward flexed posture  Comments/Distance (ft) 1: x 7 feet from bed to window. Further ambulation deferred due to episode of coughing and need to use bedside commode.     Transfers  Transfer: Yes  Transfer 1  Transfer From 1: Sit  to  Transfer to 1: Stand  Technique 1: Sit to stand  Transfer Device 1: Walker  Transfer Level of Assistance 1: Contact guard  Trials/Comments 1: mult reps from bed.  Transfers 2  Transfer From 2: Stand to  Transfer to 2: Commode-standard  Technique 2: Stand to sit  Transfer Device 2: Walker  Transfer Level of Assistance 2: Contact guard  Transfers 3  Transfer From 3: Bed to  Transfer to 3: Commode-standard  Transfer Device 3: Walker  Transfer Level of Assistance 3: Contact guard  Trials/Comments 3: cues for turning and management of 2ww.         Outcome Measures:  [facility] Basic Mobility  Turning from your back to your side while in a flat bed without using bedrails: A little  Moving from lying on your back to sitting on the side of a flat bed without using bedrails: A little  Moving to and from bed to chair (including a wheelchair): A little  Standing up from a chair using your arms (e.g. wheelchair or bedside chair): A little  To walk in hospital room: A lot  Climbing 3-5 steps with railing: Total  Basic Mobility - Total Score: 15    Education Documentation  Body Mechanics, taught by JOSE LUIS Gomez at 4/4/2024 11:51 AM.  Learner: Patient  Readiness: Acceptance  Method: Explanation  Response: Needs Reinforcement    Precautions, taught by JSOE LUIS Gomez at 4/4/2024 11:51 AM.  Learner: Patient  Readiness: Acceptance  Method: Explanation  Response: Needs Reinforcement    Mobility Training, taught by JOSE LUIS Gomez at 4/4/2024 11:51 AM.  Learner: Patient  Readiness: Acceptance  Method: Explanation  Response: Needs Reinforcement    Education Comments  No comments found.        OP EDUCATION:       Encounter Problems       Encounter Problems (Active)       Balance       Dynamic Balance (Progressing)       Start:  03/29/24    Expected End:  04/12/24       Pt to improve dynamic balance to fair (+) with UE assist on 2ww to improve stability and safety to promote safety within home environment.             Mobility       Bed mobility (Progressing)       Start:  03/29/24    Expected End:  04/12/24       Pt to be independent with bed mobility to aid in home going environment.          ambulation (Progressing)       Start:  03/29/24    Expected End:  04/12/24       Pt will ambulate x > 100 feet  at distant sup with UE assist on 2ww to allow for safety within home environment.         Stairs (Progressing)       Start:  03/29/24    Expected End:  04/12/24       Will ascend and descend x 12 steps with one handrail at [facility] to facilitate improved dependence and allow for safety with home environment.            PT Transfers       Transfers (Progressing)       Start:  03/29/24    Expected End:  04/12/24       Pt will complete all transfers at Mod I with UE assist as needed and improved safety awareness to improve functional mobility and promote increased safety awareness.             Pain - Adult

## 2024-04-04 NOTE — PROGRESS NOTES
Akua Almanzar is a 89 y.o. female on day 6 of admission presenting with Pneumonia of right lung due to infectious organism, unspecified part of lung.      Subjective   89-year-old female reports less shortness of breath.  Is currently on 6 L nasal cannula oxygen.  Patient denies chest pain or abdominal pain.  Participated with physical therapy.  Patient had a cough.  Denies any dysphagia or odontophagia.       Objective     Last Recorded Vitals  /67   Pulse 90   Temp 36.3 °C (97.3 °F) (Temporal)   Resp 19   Wt 73.6 kg (162 lb 3.2 oz)   SpO2 (!) 89%   Intake/Output last 3 Shifts:    Intake/Output Summary (Last 24 hours) at 4/4/2024 0918  Last data filed at 4/4/2024 0200  Gross per 24 hour   Intake 50 ml   Output 2350 ml   Net -2300 ml       Admission Weight  Weight: 72.7 kg (160 lb 4.4 oz) (03/29/24 0313)    Daily Weight  04/04/24 : 73.6 kg (162 lb 3.2 oz)    Image Results  XR chest 1 view  Narrative: Interpreted By:  Rosa Covington,   STUDY:  XR CHEST 1 VIEW 4/3/2024 10:49 am      INDICATION:  Signs/Symptoms:respiratory failure      COMPARISON:  03/31/2024      ACCESSION NUMBER(S):  [id number]      ORDERING CLINICIAN:  JULIAN CALLE      TECHNIQUE:  AP erect view of the chest      FINDINGS:  Postoperative change from TAVR is seen with heart enlarged. There is  atherosclerosis of the aortic arch and descending thoracic aorta.      When compared with prior study, the CHF has improved significantly  with less interstitial and alveolar edema. Mild pulmonary vascular  congestion persists. There is some platelike atelectasis at the right  lung base.      Impression: Near complete resolution of the CHF.      Right basilar platelike atelectasis.      Status post TAVR.      Signed by: Rosa Covington 4/3/2024 11:06 AM  Dictation workstation:   [id number]      Physical Exam  HENT:      Head: Normocephalic.      Ears:      Comments: Hard of hearing  Eyes:      Extraocular Movements: Extraocular movements intact.       Pupils: Pupils are equal, round, and reactive to light.   Cardiovascular:      Rate and Rhythm: Normal rate and regular rhythm.      Pulses: Normal pulses.      Heart sounds: Normal heart sounds.   Pulmonary:      Effort: Pulmonary effort is normal.      Breath sounds: Normal breath sounds.   Abdominal:      General: Abdomen is flat. Bowel sounds are normal.      Palpations: Abdomen is soft.   Musculoskeletal:         General: Normal range of motion.      Cervical back: Normal range of motion.   Skin:     General: Skin is warm.  Left heel with bandages  Neurological:      General: No focal deficit present.      Mental Status: She is alert.   Psychiatric:         Mood and Affect: Mood normal.   Relevant Results     Relevant Results               Assessment/Plan   This patient currently has cardiac telemetry ordered; if you would like to modify or discontinue the telemetry order, click here to go to the orders activity to modify/discontinue the order.              Principal Problem:    Pneumonia of right lung due to infectious organism, unspecified part of lung  Active Problems:    Atherosclerosis of coronary artery without angina pectoris    Hyperlipidemia    Primary hypertension    Acquired hypothyroidism    Acute on chronic diastolic (congestive) heart failure (CMS/HCC)    Acute on chronic respiratory failure (CMS/HCC)    1.  Acute hypercapnic respiratory failure secondary to CHF reduced ejection fraction currently on 6 L markedly improved elevated lactic.  Will continue monitor for sepsis however no signs of leukocytosis.  2.  CHF is currently on oral Lasix 40 oral twice a day adequate diuresis daily net - 500  3.  COPD currently on Solu-Medrol milligrams IV every 8  4.  Functional quadriplegia continue PT OT therapy  5.  Plan of care continue PT OT therapy.  Likely encourage transfer to stepdown tomorrow            Jn Stapleton MD

## 2024-04-04 NOTE — PROGRESS NOTES
"Akua Almanzar is a 89 y.o. female on day 6 of admission presenting with Pneumonia of right lung due to infectious organism, unspecified part of lung.    Subjective   The patient states she is feeling a little bit better each day.  Much less short of breath.       Objective     Physical Exam  Eyes:      Conjunctiva/sclera: Conjunctivae normal.   Cardiovascular:      Rate and Rhythm: Normal rate and regular rhythm.      Heart sounds:      No gallop.   Pulmonary:      Breath sounds: Normal breath sounds. No wheezing, rhonchi or rales.   Abdominal:      Palpations: Abdomen is soft.   Neurological:      General: No focal deficit present.      Mental Status: She is alert.       .Constitutional:       Appearance: Not in distress.   Eyes:      Conjunctiva/sclera: Conjunctivae normal.   Neck:      Vascular: JVD normal.   Pulmonary:      Breath sounds: Normal breath sounds. No wheezing. No rhonchi. No rales.   Cardiovascular:      Normal rate. Regular rhythm.      Murmurs: There is no murmur.      No gallop.  No click. No rub.   Abdominal:      Palpations: Abdomen is soft.   Neurological:      General: No focal deficit present.      Mental Status: Alert.        Last Recorded Vitals  Blood pressure 126/78, pulse 79, temperature 36.4 °C (97.5 °F), temperature source Temporal, resp. rate 16, height 1.549 m (5' 1\"), weight 73.8 kg (162 lb 11.2 oz), SpO2 99 %.  Intake/Output last 3 Shifts:  I/O last 3 completed shifts:  In: 440 (6 mL/kg) [P.O.:240; IV Piggyback:200]  Out: 3400 (46.1 mL/kg) [Urine:3400 (1.3 mL/kg/hr)]  Weight: 73.8 kg     Relevant Results           This patient currently has cardiac telemetry ordered; if you would like to modify or discontinue the telemetry order, click here to go to the orders activity to modify/discontinue the order.      Results for orders placed or performed during the hospital encounter of 03/29/24 (from the past 24 hour(s))   Blood Culture    Specimen: Peripheral Venipuncture; Blood culture "   Result Value Ref Range    Blood Culture Loaded on Instrument - Culture in progress    Blood Culture    Specimen: Peripheral Venipuncture; Blood culture   Result Value Ref Range    Blood Culture Loaded on Instrument - Culture in progress    Blood Gas Lactic Acid, Venous   Result Value Ref Range    POCT Lactate, Venous 3.7 (H) 0.4 - 2.0 mmol/L   Blood Gas Lactic Acid, Venous   Result Value Ref Range    POCT Lactate, Venous 2.9 (H) 0.4 - 2.0 mmol/L   Comprehensive Metabolic Panel   Result Value Ref Range    Glucose 116 (H) 65 - 99 mg/dL    Sodium 137 133 - 145 mmol/L    Potassium 3.9 3.4 - 5.1 mmol/L    Chloride 91 (L) 97 - 107 mmol/L    Bicarbonate 41 (H) 24 - 31 mmol/L    Urea Nitrogen 18 8 - 25 mg/dL    Creatinine 0.70 0.40 - 1.60 mg/dL    eGFR 80 >60 mL/min/1.73m*2    Calcium 9.1 8.5 - 10.4 mg/dL    Albumin 3.1 (L) 3.5 - 5.0 g/dL    Alkaline Phosphatase 63 35 - 125 U/L    Total Protein 5.5 (L) 5.9 - 7.9 g/dL    AST 28 5 - 40 U/L    Bilirubin, Total 0.3 0.1 - 1.2 mg/dL    ALT 32 5 - 40 U/L    Anion Gap 5 <=19 mmol/L   CBC and Auto Differential   Result Value Ref Range    WBC 7.2 4.4 - 11.3 x10*3/uL    nRBC 0.0 0.0 - 0.0 /100 WBCs    RBC 3.92 (L) 4.00 - 5.20 x10*6/uL    Hemoglobin 11.1 (L) 12.0 - 16.0 g/dL    Hematocrit 34.1 (L) 36.0 - 46.0 %    MCV 87 80 - 100 fL    MCH 28.3 26.0 - 34.0 pg    MCHC 32.6 32.0 - 36.0 g/dL    RDW 14.9 (H) 11.5 - 14.5 %    Platelets 210 150 - 450 x10*3/uL    Neutrophils % 80.4 40.0 - 80.0 %    Immature Granulocytes %, Automated 0.8 0.0 - 0.9 %    Lymphocytes % 12.2 13.0 - 44.0 %    Monocytes % 6.6 2.0 - 10.0 %    Eosinophils % 0.0 0.0 - 6.0 %    Basophils % 0.0 0.0 - 2.0 %    Neutrophils Absolute 5.75 (H) 1.60 - 5.50 x10*3/uL    Immature Granulocytes Absolute, Automated 0.06 0.00 - 0.50 x10*3/uL    Lymphocytes Absolute 0.87 0.80 - 3.00 x10*3/uL    Monocytes Absolute 0.47 0.05 - 0.80 x10*3/uL    Eosinophils Absolute 0.00 0.00 - 0.40 x10*3/uL    Basophils Absolute 0.00 0.00 - 0.10  x10*3/uL               Assessment/Plan   Principal Problem:    Pneumonia of right lung due to infectious organism, unspecified part of lung  Active Problems:    Atherosclerosis of coronary artery without angina pectoris    Hyperlipidemia    Primary hypertension    Acquired hypothyroidism    Acute on chronic diastolic (congestive) heart failure (CMS/HCC)    Acute on chronic respiratory failure (CMS/HCC)      Acute on chronic respiratory failure  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 She did undergo a transcatheter aortic valve replacement 4/9/2021 for severe aortic valvular stenosis. She was recently admitted to [facility] from 3/1/2024 - 3/7/2024 with increased shortness of breath thought to be related to CHF diastolic variety. Her echocardiogram at that time showed a preserved LV ejection fraction and normally functioning TAVR are but moderate mitral valve stenosis and moderately severe pulmonary hypertension estimated PA systolic pressure 59 mmHg. She is currently admitted with recurrent shortness of breath and required transfer to ICU due to worsening respiratory distress and CO2 retention. She has been placed on continuous BiPAP with clinical improvement. Her hemodynamic parameters remain stable. Telemetry monitor shows sinus rhythm with left bundle branch block conduction delay. Her proBNP surprisingly is only modestly elevated despite the significant abnormalities on chest x-ray. For now she will be treated empirically with Lasix 40 mg IV twice daily. Empiric antibiotic therapy has also been instituted including vancomycin, Zosyn and azithromycin. Patient being seen by pulmonology. Patient receiving DuoNebs and IV Solu-Medrol as  well. The patient and family aware of guarded prognosis.      3/31: The patient is awake alert and conversant.  She has been transition to a high flow O2 nasal cannula 30 L/min 80% FiO2.  No respiratory distress with satisfactory O2 saturations.  Telemetry monitor shows a borderline sinus tachycardia at 100/min.  The comprehensive metabolic panel includes a creatinine of 0.6 potassium of 4.2.  Will check repeat chest x-ray tomorrow.  Head CT from yesterday showed only cerebral atrophy and bilateral chronic white matter change.  Input output negative by 915 cc yesterday.  Will continue the empiric IV Lasix.  Question the functional significance of the patient's mitral valve stenosis by echo.  The proBNP was only modestly elevated and patient remains currently on IV Rocephin for suspected pneumonia.     4/1: Patient has been gradually improving on a daily basis.  She is currently receiving antibiotic therapy for suspected pneumonia.  Chest x-ray has been ordered with results still pending at this time.  With her clinical improvement I think we can transition from IV furosemide to oral therapy.  Kidney function remains stable with serum creatinine of 0.7 on morning lab studies.  Continues to slowly improve.     4/2: We transition from IV furosemide to oral therapy yesterday.  There is a -1500 cc fluid balance over the last 24 hours.  Serum creatinine remains stable at 0.7.  Chest x-ray done yesterday revealing improved heart failure findings.  Will continue current therapy and monitor on a clinical basis.     4/3: Patient continues to slowly improve on a daily basis.  Still receiving IV antibiotic therapy.  -750 cc fluid balance over the last 24 hours.  Will decrease the furosemide from twice daily down to 40 mg once daily.  Overall patient continues to improve although still requiring high flow oxygen therapy at this time.    4/4: Overall the patient continues to slowly improve on a regular basis.  Now on oral  furosemide 40 mg once daily.  Receiving IV antibiotic therapy and continues to improve.             I spent 15 minutes in the professional and overall care of this patient.      Pio Alberto, DO

## 2024-04-04 NOTE — CARE PLAN
The patient's goals for the shift include      The clinical goals for the shift include maintain O2 above 94%    Problem: Skin  Goal: Promote/optimize nutrition  Outcome: Not Progressing

## 2024-04-04 NOTE — CARE PLAN
Pt is improving, she is on 6L of 02 at this time; per nursing staff she started getting better last night, up to bedside commode. Chest xray has improved. She is being moved out of ICU today.  Pt son did not wish to discuss discharge planning because his mother gets better then worsens  Will call son to see if he is ready to discuss.    DISCHARGE PLANNING: TBD

## 2024-04-04 NOTE — PROGRESS NOTES
Akua Almanzar is a 89 y.o. female on day 6 of admission presenting with Pneumonia of right lung due to infectious organism, unspecified part of lung.    Subjective   Symptoms (0 - 10, Best to Worst)  [facility] Symptom Assessment System  Pain Score: 0 - No pain    Coughing more today, having increased work of breathing after prolonged coughing. Difficulty clearing thick secretions per patient. Currently down to 6L NC.     Objective     Physical Exam    Constitutional:       General: Patient is in acute distress.  Increased work of breathing and tachypnea noted tearful  HENT:      Head: Normocephalic.      Mouth: Mucous membranes are moist.   Eyes:      Conjunctiva/sclera: Conjunctivae clear, sclerae white. No discharge.     Pupils: Pupils are equal, round, and reactive to light.   Neck:      Vascular: No carotid bruit.   Cardiovascular:      Rate and Rhythm: Normal rate and regular rhythm.  Heart rate 117     Heart sounds: murmur heard.  Pulmonary:      Effort: accessory muscle use noted, tachypneic     Breath sounds: wheezing/Rales noted to bilateral lung bases, moist nonproductive cough  NC6L  Abdominal:      General: There is no distension.  Bowel sounds are present     Tenderness: There is no abdominal tenderness. There is no guarding.   Urology:     Genitalia: Not assessed    Urine: urine yellow and clear Sevilla catheter  Musculoskeletal:         General: No deformity.   Skin:     Coloration: Skin is not jaundiced.  + trace edema BLE, left anterior shin with area of redness noted from urinary catheter tubing, increase sensitivity to touch bilateral lower extremities, no redness warmth or swelling noted  Neurological:      General: No focal deficit present.       Mental Status: oriented to person, place, and time.   Psychiatric:         Behavior: Anxious tearful today, oriented to person place and time        Last Recorded Vitals  Blood pressure 123/67, pulse 90, temperature 36.3 °C (97.3 °F), temperature source  "Temporal, resp. rate 19, height 1.549 m (5' 1\"), weight 73.6 kg (162 lb 3.2 oz), SpO2 (!) 89 %.  Intake/Output last 3 Shifts:  I/O last 3 completed shifts:  In: 440 (6 mL/kg) [P.O.:240; IV Piggyback:200]  Out: 3400 (46.1 mL/kg) [Urine:3400 (1.3 mL/kg/hr)]  Weight: 73.8 kg     Relevant Results  Results for orders placed or performed during the hospital encounter of 03/29/24 (from the past 24 hour(s))   Blood Culture    Specimen: Peripheral Venipuncture; Blood culture   Result Value Ref Range    Blood Culture Loaded on Instrument - Culture in progress    Blood Culture    Specimen: Peripheral Venipuncture; Blood culture   Result Value Ref Range    Blood Culture Loaded on Instrument - Culture in progress    Blood Gas Lactic Acid, Venous   Result Value Ref Range    POCT Lactate, Venous 3.7 (H) 0.4 - 2.0 mmol/L   Blood Gas Lactic Acid, Venous   Result Value Ref Range    POCT Lactate, Venous 2.9 (H) 0.4 - 2.0 mmol/L   Comprehensive Metabolic Panel   Result Value Ref Range    Glucose 116 (H) 65 - 99 mg/dL    Sodium 137 133 - 145 mmol/L    Potassium 3.9 3.4 - 5.1 mmol/L    Chloride 91 (L) 97 - 107 mmol/L    Bicarbonate 41 (H) 24 - 31 mmol/L    Urea Nitrogen 18 8 - 25 mg/dL    Creatinine 0.70 0.40 - 1.60 mg/dL    eGFR 80 >60 mL/min/1.73m*2    Calcium 9.1 8.5 - 10.4 mg/dL    Albumin 3.1 (L) 3.5 - 5.0 g/dL    Alkaline Phosphatase 63 35 - 125 U/L    Total Protein 5.5 (L) 5.9 - 7.9 g/dL    AST 28 5 - 40 U/L    Bilirubin, Total 0.3 0.1 - 1.2 mg/dL    ALT 32 5 - 40 U/L    Anion Gap 5 <=19 mmol/L   CBC and Auto Differential   Result Value Ref Range    WBC 7.2 4.4 - 11.3 x10*3/uL    nRBC 0.0 0.0 - 0.0 /100 WBCs    RBC 3.92 (L) 4.00 - 5.20 x10*6/uL    Hemoglobin 11.1 (L) 12.0 - 16.0 g/dL    Hematocrit 34.1 (L) 36.0 - 46.0 %    MCV 87 80 - 100 fL    MCH 28.3 26.0 - 34.0 pg    MCHC 32.6 32.0 - 36.0 g/dL    RDW 14.9 (H) 11.5 - 14.5 %    Platelets 210 150 - 450 x10*3/uL    Neutrophils % 80.4 40.0 - 80.0 %    Immature Granulocytes %, " Automated 0.8 0.0 - 0.9 %    Lymphocytes % 12.2 13.0 - 44.0 %    Monocytes % 6.6 2.0 - 10.0 %    Eosinophils % 0.0 0.0 - 6.0 %    Basophils % 0.0 0.0 - 2.0 %    Neutrophils Absolute 5.75 (H) 1.60 - 5.50 x10*3/uL    Immature Granulocytes Absolute, Automated 0.06 0.00 - 0.50 x10*3/uL    Lymphocytes Absolute 0.87 0.80 - 3.00 x10*3/uL    Monocytes Absolute 0.47 0.05 - 0.80 x10*3/uL    Eosinophils Absolute 0.00 0.00 - 0.40 x10*3/uL    Basophils Absolute 0.00 0.00 - 0.10 x10*3/uL   Urinalysis with Reflex Culture and Microscopic   Result Value Ref Range    Color, Urine Light-Yellow Light-Yellow, Yellow, Dark-Yellow    Appearance, Urine Ex.Turbid (N) Clear    Specific Gravity, Urine 1.007 1.005 - 1.035    pH, Urine 7.5 5.0, 5.5, 6.0, 6.5, 7.0, 7.5, 8.0    Protein, Urine NEGATIVE NEGATIVE, 10 (TRACE), 20 (TRACE) mg/dL    Glucose, Urine Normal Normal mg/dL    Blood, Urine 0.5 (2+) (A) NEGATIVE    Ketones, Urine NEGATIVE NEGATIVE mg/dL    Bilirubin, Urine NEGATIVE NEGATIVE    Urobilinogen, Urine Normal Normal mg/dL    Nitrite, Urine NEGATIVE NEGATIVE    Leukocyte Esterase, Urine 500 Ipa/µL (A) NEGATIVE   Microscopic Only, Urine   Result Value Ref Range    WBC, Urine 21-50 (A) 1-5, NONE /HPF    RBC, Urine >20 (A) NONE, 1-2, 3-5 /HPF    Bacteria, Urine 3+ (A) NONE SEEN /HPF    Budding Yeast, Urine PRESENT (A) NONE /HPF     No current facility-administered medications on file prior to encounter.     Current Outpatient Medications on File Prior to Encounter   Medication Sig Dispense Refill    acidophilus-pectin, citrus 100 million cell-10 mg capsule Take by mouth.      amLODIPine (Norvasc) 2.5 mg tablet Take 1 tablet (2.5 mg) by mouth once daily.      ascorbic acid (Vitamin C) 250 MG chewable tablet Chew 2 tablets (500 mg). Reports chewable 500mg      aspirin 81 mg EC tablet Take 1 tablet (81 mg) by mouth once daily.      carvedilol (Coreg) 6.25 mg tablet Take 1 tablet twice a day by oral route.      cholecalciferol (Vitamin D-3)  25 MCG (1000 UT) capsule Take by mouth.      clopidogrel (Plavix) 75 mg tablet Take 1 tablet (75 mg) by mouth once daily.      docusate sodium (Colace) 100 mg capsule Take 1 capsule (100 mg) by mouth 2 times a day.  0    furosemide (Lasix) 40 mg tablet Take 1 tablet (40 mg) by mouth once daily.  0    gabapentin (Neurontin) 100 mg capsule Take 1 capsule (100 mg) by mouth once daily at bedtime.  0    hydrOXYzine HCL (Atarax) 25 mg tablet Take 1 tablet (25 mg) by mouth every 6 hours if needed for itching.  0    levothyroxine (Synthroid, Levoxyl) 88 mcg tablet Take 1 tablet (88 mcg) by mouth once daily.      oxygen (O2) gas therapy Inhale 1 each continuously.      simvastatin (Zocor) 20 mg tablet Take 1 tablet (20 mg) by mouth once daily at bedtime.         Assessment/Plan   IMP:  Acute on chronic hypoxic and hypercapnic respiratory failure  -ABGs showing hypercapnia   - HFNC at 50% FiO2/30     2.   Pneumonia  - Currently on Zosyn    3.   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 Left ventricular systolic function is normal.   2. Spectral Doppler shows an impaired relaxation pattern of left ventricular diastolic filling.   3. There is moderate concentric left ventricular hypertrophy.   4. The left atrium is moderately dilated.   5. The mitral valve is moderately thickened.   6. There is moderate mitral annular calcification.   7. There is moderate to severe calcification of the anterior and posterior mitral valve leaflets.   8. Mild to moderate tricuspid regurgitation.   9. Moderate to severely elevated right ventricular systolic pressure.   - ProBNP 1,489     4.   TME  - suspect r/t respiratory status, resolved     5.   Electrolyte imbalance  - hyponatremia, resolved      6.   Anemia  - of chronic disease  - H/H stable 10.4/33.4     7.   Palliative care   DNR CCA DNI  -The patient is a capable decision maker   - DPOA - HC filled out where Ray is primary and  Anitra is secondary. This is in hard chart.   -The patient has 4 children.   Derek Almanzar - [place] Ayoub Mayer [facility] - lives out of [facility]  Ginger Almanzar - lives out of town  Derek and Anitra have been primary contacts.     Remains in disease-modifying mode continue weaning attempts to nasal cannula.  Patient would like to home at discharge this corresponds with the intensity rehab recommendation.  Depending on functional status at time of discharge patient is interested either in home health care and Navigator or if still suffering from shortness of breath and having unpleasant symptoms might also be open for hospice at that time.  Would like to see how she feels when she is off oxygen.     4/3  Increased work of breathing today, SpO2 was 96% on her high flow at 50% however patient is tachypneic with increased Rales, discussed with pulmonology, chest x-ray ordered for further evaluation.  Reached out to the daughter Anitra to provide her updates, daughters will be at bedside this afternoon.    Added lidocaine cream to hypersensitivity to bilateral lower extremities, suspect there is a component of neuropathic pain given the chronicity, continue Tylenol scheduled.    Tentative plan would be to discharge home however daughter is now questioning this given the patient's setback and her overall weakness.  Will plan to discuss with the daughter and son.    4/4  Cont to have moist nonprod cough, difficulty clearing secretions, causing dyspnea and fatigue after prolonged coughing. Start muccomyst, vibrapep. Use robitussin prn. Down to 6L NC.  No co pain currently. CXR 4/3 showing continued improvement, but atelectasis present.     Reached out to son Derek and his spouse, long discussion about goals of care and plan as patient now down to 6L NC.  Therapy at this point now recommending moderate intensity due to the patient's progressive weakness and fatigue.  Given the patient's age and comorbidities doubt that  she will do great rehab facility.  Family was initially planning for home, with the goal the patient eventually moving in with the son Derek closer to [facility], however they are in the process of rehabbing their house and cannot get her home right now.  Patient currently lives with her daughter Anitra, however Anitra has her own health issues and can likely not provide 24-hour care for this patient, I will also concern that the patient will require oxygen upon discharge.  Per prior records patient was already certified for home oxygen after her last discharge in early March.  We discussed varying discharge options from skilled rehab to home health care as well as hospice.  Son at this point is supportive of what ever the patient wants to do.  I set up a family meeting for tomorrow between noon and 1:00 to discuss goals of care and establish a discharge plan.    I spent 55 minutes in the professional and overall care of this patient.      Carmen Vo, APRN-CNP

## 2024-04-05 LAB
ALBUMIN SERPL-MCNC: 3 G/DL (ref 3.5–5)
ALP BLD-CCNC: 64 U/L (ref 35–125)
ALT SERPL-CCNC: 34 U/L (ref 5–40)
ANION GAP SERPL CALC-SCNC: 5 MMOL/L
AST SERPL-CCNC: 27 U/L (ref 5–40)
BACTERIA UR CULT: ABNORMAL
BASOPHILS # BLD AUTO: ABNORMAL 10*3/UL
BASOPHILS NFR BLD AUTO: ABNORMAL %
BILIRUB SERPL-MCNC: 0.3 MG/DL (ref 0.1–1.2)
BUN SERPL-MCNC: 18 MG/DL (ref 8–25)
CALCIUM SERPL-MCNC: 9.4 MG/DL (ref 8.5–10.4)
CHLORIDE SERPL-SCNC: 93 MMOL/L (ref 97–107)
CO2 SERPL-SCNC: 41 MMOL/L (ref 24–31)
CREAT SERPL-MCNC: 0.7 MG/DL (ref 0.4–1.6)
EGFRCR SERPLBLD CKD-EPI 2021: 80 ML/MIN/1.73M*2
EOSINOPHIL # BLD AUTO: 0.09 X10*3/UL (ref 0–0.4)
EOSINOPHIL NFR BLD AUTO: 1.1 %
ERYTHROCYTE [DISTWIDTH] IN BLOOD BY AUTOMATED COUNT: 15 % (ref 11.5–14.5)
GLUCOSE SERPL-MCNC: 88 MG/DL (ref 65–99)
HCT VFR BLD AUTO: 33.3 % (ref 36–46)
HGB BLD-MCNC: 10.8 G/DL (ref 12–16)
IMM GRANULOCYTES # BLD AUTO: 0.12 X10*3/UL (ref 0–0.5)
IMM GRANULOCYTES NFR BLD AUTO: 1.4 % (ref 0–0.9)
LYMPHOCYTES # BLD AUTO: 1.69 X10*3/UL (ref 0.8–3)
LYMPHOCYTES NFR BLD AUTO: 20 %
MCH RBC QN AUTO: 27.9 PG (ref 26–34)
MCHC RBC AUTO-ENTMCNC: 32.4 G/DL (ref 32–36)
MCV RBC AUTO: 86 FL (ref 80–100)
MONOCYTES # BLD AUTO: 0.65 X10*3/UL (ref 0.05–0.8)
MONOCYTES NFR BLD AUTO: 7.7 %
NEUTROPHILS # BLD AUTO: 5.91 X10*3/UL (ref 1.6–5.5)
NEUTROPHILS NFR BLD AUTO: 69.7 %
NRBC BLD-RTO: ABNORMAL /100{WBCS}
OVALOCYTES BLD QL SMEAR: NORMAL
PLATELET # BLD AUTO: 252 X10*3/UL (ref 150–450)
POTASSIUM SERPL-SCNC: 3.7 MMOL/L (ref 3.4–5.1)
PROT SERPL-MCNC: 5.5 G/DL (ref 5.9–7.9)
RBC # BLD AUTO: 3.87 X10*6/UL (ref 4–5.2)
RBC MORPH BLD: NORMAL
SODIUM SERPL-SCNC: 139 MMOL/L (ref 133–145)
TARGETS BLD QL SMEAR: NORMAL
WBC # BLD AUTO: 8.5 X10*3/UL (ref 4.4–11.3)

## 2024-04-05 PROCEDURE — 2500000004 HC RX 250 GENERAL PHARMACY W/ HCPCS (ALT 636 FOR OP/ED): Performed by: INTERNAL MEDICINE

## 2024-04-05 PROCEDURE — 94640 AIRWAY INHALATION TREATMENT: CPT

## 2024-04-05 PROCEDURE — 9420000001 HC RT PATIENT EDUCATION 5 MIN

## 2024-04-05 PROCEDURE — 36415 COLL VENOUS BLD VENIPUNCTURE: CPT | Performed by: STUDENT IN AN ORGANIZED HEALTH CARE EDUCATION/TRAINING PROGRAM

## 2024-04-05 PROCEDURE — 2500000002 HC RX 250 W HCPCS SELF ADMINISTERED DRUGS (ALT 637 FOR MEDICARE OP, ALT 636 FOR OP/ED): Mod: MUE | Performed by: INTERNAL MEDICINE

## 2024-04-05 PROCEDURE — 94668 MNPJ CHEST WALL SBSQ: CPT

## 2024-04-05 PROCEDURE — 2500000001 HC RX 250 WO HCPCS SELF ADMINISTERED DRUGS (ALT 637 FOR MEDICARE OP): Performed by: STUDENT IN AN ORGANIZED HEALTH CARE EDUCATION/TRAINING PROGRAM

## 2024-04-05 PROCEDURE — 2500000002 HC RX 250 W HCPCS SELF ADMINISTERED DRUGS (ALT 637 FOR MEDICARE OP, ALT 636 FOR OP/ED): Mod: MUE | Performed by: STUDENT IN AN ORGANIZED HEALTH CARE EDUCATION/TRAINING PROGRAM

## 2024-04-05 PROCEDURE — 2500000004 HC RX 250 GENERAL PHARMACY W/ HCPCS (ALT 636 FOR OP/ED): Performed by: NURSE PRACTITIONER

## 2024-04-05 PROCEDURE — 2500000004 HC RX 250 GENERAL PHARMACY W/ HCPCS (ALT 636 FOR OP/ED): Performed by: HOSPITALIST

## 2024-04-05 PROCEDURE — 2500000001 HC RX 250 WO HCPCS SELF ADMINISTERED DRUGS (ALT 637 FOR MEDICARE OP): Performed by: INTERNAL MEDICINE

## 2024-04-05 PROCEDURE — 85025 COMPLETE CBC W/AUTO DIFF WBC: CPT | Performed by: STUDENT IN AN ORGANIZED HEALTH CARE EDUCATION/TRAINING PROGRAM

## 2024-04-05 PROCEDURE — 99233 SBSQ HOSP IP/OBS HIGH 50: CPT | Performed by: INTERNAL MEDICINE

## 2024-04-05 PROCEDURE — 2500000002 HC RX 250 W HCPCS SELF ADMINISTERED DRUGS (ALT 637 FOR MEDICARE OP, ALT 636 FOR OP/ED): Performed by: INTERNAL MEDICINE

## 2024-04-05 PROCEDURE — 94799 UNLISTED PULMONARY SVC/PX: CPT

## 2024-04-05 PROCEDURE — 94760 N-INVAS EAR/PLS OXIMETRY 1: CPT

## 2024-04-05 PROCEDURE — 99231 SBSQ HOSP IP/OBS SF/LOW 25: CPT | Performed by: INTERNAL MEDICINE

## 2024-04-05 PROCEDURE — 99232 SBSQ HOSP IP/OBS MODERATE 35: CPT | Performed by: NURSE PRACTITIONER

## 2024-04-05 PROCEDURE — 84075 ASSAY ALKALINE PHOSPHATASE: CPT | Performed by: STUDENT IN AN ORGANIZED HEALTH CARE EDUCATION/TRAINING PROGRAM

## 2024-04-05 PROCEDURE — 2500000004 HC RX 250 GENERAL PHARMACY W/ HCPCS (ALT 636 FOR OP/ED): Mod: MUE | Performed by: NURSE PRACTITIONER

## 2024-04-05 PROCEDURE — 2500000002 HC RX 250 W HCPCS SELF ADMINISTERED DRUGS (ALT 637 FOR MEDICARE OP, ALT 636 FOR OP/ED): Performed by: STUDENT IN AN ORGANIZED HEALTH CARE EDUCATION/TRAINING PROGRAM

## 2024-04-05 PROCEDURE — 1100000001 HC PRIVATE ROOM DAILY

## 2024-04-05 PROCEDURE — 2500000005 HC RX 250 GENERAL PHARMACY W/O HCPCS: Performed by: INTERNAL MEDICINE

## 2024-04-05 PROCEDURE — 2500000004 HC RX 250 GENERAL PHARMACY W/ HCPCS (ALT 636 FOR OP/ED): Mod: MUE | Performed by: INTERNAL MEDICINE

## 2024-04-05 PROCEDURE — 94660 CPAP INITIATION&MGMT: CPT

## 2024-04-05 PROCEDURE — 2500000004 HC RX 250 GENERAL PHARMACY W/ HCPCS (ALT 636 FOR OP/ED): Performed by: STUDENT IN AN ORGANIZED HEALTH CARE EDUCATION/TRAINING PROGRAM

## 2024-04-05 RX ORDER — PREDNISONE 10 MG/1
10 TABLET ORAL 2 TIMES DAILY
Status: DISCONTINUED | OUTPATIENT
Start: 2024-04-05 | End: 2024-04-15 | Stop reason: HOSPADM

## 2024-04-05 RX ADMIN — ACETAMINOPHEN 650 MG: 325 TABLET ORAL at 00:01

## 2024-04-05 RX ADMIN — PREDNISONE 10 MG: 10 TABLET ORAL at 18:00

## 2024-04-05 RX ADMIN — Medication: at 15:00

## 2024-04-05 RX ADMIN — ACETAMINOPHEN 650 MG: 325 TABLET ORAL at 05:29

## 2024-04-05 RX ADMIN — Medication 4 L/MIN: at 23:00

## 2024-04-05 RX ADMIN — PREDNISONE 20 MG: 20 TABLET ORAL at 05:29

## 2024-04-05 RX ADMIN — ASPIRIN 81 MG: 81 TABLET, COATED ORAL at 10:27

## 2024-04-05 RX ADMIN — ACETYLCYSTEINE 600 MG: 200 SOLUTION ORAL; RESPIRATORY (INHALATION) at 11:13

## 2024-04-05 RX ADMIN — ACETAMINOPHEN 650 MG: 325 TABLET ORAL at 18:43

## 2024-04-05 RX ADMIN — IPRATROPIUM BROMIDE AND ALBUTEROL SULFATE 3 ML: 2.5; .5 SOLUTION RESPIRATORY (INHALATION) at 19:49

## 2024-04-05 RX ADMIN — ACETYLCYSTEINE 600 MG: 200 SOLUTION ORAL; RESPIRATORY (INHALATION) at 19:49

## 2024-04-05 RX ADMIN — PIPERACILLIN SODIUM AND TAZOBACTAM SODIUM 3.38 G: 3; .375 INJECTION, SOLUTION INTRAVENOUS at 18:00

## 2024-04-05 RX ADMIN — PIPERACILLIN SODIUM AND TAZOBACTAM SODIUM 3.38 G: 3; .375 INJECTION, SOLUTION INTRAVENOUS at 13:50

## 2024-04-05 RX ADMIN — IPRATROPIUM BROMIDE AND ALBUTEROL SULFATE 3 ML: 2.5; .5 SOLUTION RESPIRATORY (INHALATION) at 14:47

## 2024-04-05 RX ADMIN — PIPERACILLIN SODIUM AND TAZOBACTAM SODIUM 3.38 G: 3; .375 INJECTION, SOLUTION INTRAVENOUS at 00:01

## 2024-04-05 RX ADMIN — GABAPENTIN 100 MG: 100 CAPSULE ORAL at 20:09

## 2024-04-05 RX ADMIN — ACETAMINOPHEN 650 MG: 325 TABLET ORAL at 13:44

## 2024-04-05 RX ADMIN — IPRATROPIUM BROMIDE AND ALBUTEROL SULFATE 3 ML: 2.5; .5 SOLUTION RESPIRATORY (INHALATION) at 11:14

## 2024-04-05 RX ADMIN — PIPERACILLIN SODIUM AND TAZOBACTAM SODIUM 3.38 G: 3; .375 INJECTION, SOLUTION INTRAVENOUS at 05:31

## 2024-04-05 RX ADMIN — ACETYLCYSTEINE 600 MG: 200 SOLUTION ORAL; RESPIRATORY (INHALATION) at 14:47

## 2024-04-05 RX ADMIN — PIPERACILLIN SODIUM AND TAZOBACTAM SODIUM 3.38 G: 3; .375 INJECTION, SOLUTION INTRAVENOUS at 23:12

## 2024-04-05 RX ADMIN — IPRATROPIUM BROMIDE AND ALBUTEROL SULFATE 3 ML: 2.5; .5 SOLUTION RESPIRATORY (INHALATION) at 07:16

## 2024-04-05 RX ADMIN — ENOXAPARIN SODIUM 40 MG: 40 INJECTION SUBCUTANEOUS at 05:29

## 2024-04-05 RX ADMIN — Medication: at 07:16

## 2024-04-05 RX ADMIN — ACETYLCYSTEINE 600 MG: 200 SOLUTION ORAL; RESPIRATORY (INHALATION) at 07:16

## 2024-04-05 RX ADMIN — FUROSEMIDE 40 MG: 40 TABLET ORAL at 10:27

## 2024-04-05 RX ADMIN — SIMVASTATIN 20 MG: 20 TABLET, FILM COATED ORAL at 20:09

## 2024-04-05 RX ADMIN — LEVOTHYROXINE SODIUM 88 MCG: 0.09 TABLET ORAL at 05:29

## 2024-04-05 RX ADMIN — CLOPIDOGREL BISULFATE 75 MG: 75 TABLET ORAL at 10:27

## 2024-04-05 ASSESSMENT — COGNITIVE AND FUNCTIONAL STATUS - GENERAL
MOVING FROM LYING ON BACK TO SITTING ON SIDE OF FLAT BED WITH BEDRAILS: A LOT
CLIMB 3 TO 5 STEPS WITH RAILING: A LOT
PERSONAL GROOMING: A LITTLE
TURNING FROM BACK TO SIDE WHILE IN FLAT BAD: A LOT
HELP NEEDED FOR BATHING: A LITTLE
EATING MEALS: A LITTLE
DAILY ACTIVITIY SCORE: 18
MOBILITY SCORE: 12
WALKING IN HOSPITAL ROOM: A LOT
DRESSING REGULAR LOWER BODY CLOTHING: A LITTLE
DRESSING REGULAR UPPER BODY CLOTHING: A LITTLE
TOILETING: A LITTLE
STANDING UP FROM CHAIR USING ARMS: A LOT
MOVING TO AND FROM BED TO CHAIR: A LOT

## 2024-04-05 ASSESSMENT — PAIN - FUNCTIONAL ASSESSMENT: PAIN_FUNCTIONAL_ASSESSMENT: 0-10

## 2024-04-05 ASSESSMENT — PAIN SCALES - GENERAL
PAINLEVEL_OUTOF10: 0 - NO PAIN
PAINLEVEL_OUTOF10: 0 - NO PAIN

## 2024-04-05 NOTE — CARE PLAN
Pal Med to speak with family again today between noon and 1 pm  PT assessed pt as moderate.  Still trying to wean off 02.    DISCHARGE PLAN: DEBORA

## 2024-04-05 NOTE — PROGRESS NOTES
"Nutrition Follow up Note    Nutrition Assessment      Nutrition History:  Food and Nutrient History: when asked how she has been eating she states \"so far so good\".  Energy Intake: Good > 75 %    Anthropometrics:  Ht: 154.9 cm (5' 1\"), Wt: 73.6 kg (162 lb 3.2 oz), BMI: 30.66  IBW/kg (Dietitian Calculated): 47.73 kg  Percent of IBW: 152 %  Adjusted Body Weight (kg): 54.09 kg    Weight Change:  Daily Weight  04/04/24 : 73.6 kg (162 lb 3.2 oz)  02/29/24 : 72.6 kg (160 lb)  02/23/24 : 82.6 kg (182 lb)  04/26/21 : 71.2 kg (157 lb)  02/15/21 : 72.1 kg (159 lb)     Weight History / % Weight Change: denies wt changes    Nutrition Focused Physical Exam Findings: assessed 3/29/23  Subcutaneous Fat Loss  Orbital Fat Pads: Mild-Moderate (slight dark circles and slight hollowing)  Buccal Fat Pads: Well nourished (full, rounded cheeks)    Muscle Wasting  Temporalis: Well nourished (well-defined muscle)    Nutrition Significant Labs:  Lab Results   Component Value Date    WBC 8.5 04/05/2024    HGB 10.8 (L) 04/05/2024    HCT 33.3 (L) 04/05/2024     04/05/2024    ALT 34 04/05/2024    AST 27 04/05/2024     04/05/2024    K 3.7 04/05/2024    CL 93 (L) 04/05/2024    CREATININE 0.70 04/05/2024    BUN 18 04/05/2024    CO2 41 (H) 04/05/2024    TSH 1.13 02/29/2024    INR 1.2 (H) 04/10/2021     Nutrition Specific Medications:  [MAR Hold] acetaminophen, 650 mg, oral, q6h  [MAR Hold] acetylcysteine, 3 mL, nebulization, 4x daily  [MAR Hold] aspirin, 81 mg, oral, Daily  [MAR Hold] clopidogrel, 75 mg, oral, Daily  [MAR Hold] enoxaparin, 40 mg, subcutaneous, Daily  furosemide, 40 mg, oral, Daily  [MAR Hold] gabapentin, 100 mg, oral, Nightly  [MAR Hold] ipratropium-albuteroL, 3 mL, nebulization, 4x daily  [MAR Hold] levothyroxine, 88 mcg, oral, Daily  [MAR Hold] oxygen, , inhalation, q8h  piperacillin-tazobactam, 3.375 g, intravenous, q6h  predniSONE, 10 mg, oral, BID  [MAR Hold] simvastatin, 20 mg, oral, Nightly  [MAR Hold] sodium " chloride, 1 spray, Each Nostril, 4x daily      Dietary Orders (From admission, onward)       Start     Ordered    03/30/24 1049  Adult diet Cardiac; 70 gm fat; 2 - 3 grams Sodium  Diet effective now        Question Answer Comment   Diet type Cardiac    Fat restriction: 70 gm fat    Sodium restriction: 2 - 3 grams Sodium        03/30/24 1048    03/29/24 0700  May Not Participate in Room Service  Once        Question:  .  Answer:  Yes    03/29/24 0659                  Estimated Needs:   Estimated Energy Needs  Total Energy Estimated Needs (kCal): 1349 kCal  Total Estimated Energy Need per Day (kCal/kg): 25 kCal/kg  Method for Estimating Needs: ABW    Estimated Protein Needs  Total Protein Estimated Needs (g): 65 g  Total Protein Estimated Needs (g/kg): 1.2 g/kg  Method for Estimating Needs: ABW    Estimated Fluid Needs  Method for Estimating Needs: 1 ml/kcal        Nutrition Diagnosis   Nutrition Diagnosis:  Malnutrition Diagnosis  Patient has Malnutrition Diagnosis: No    Nutrition Diagnosis  Patient has Nutrition Diagnosis: Yes  Diagnosis Status (1): Ongoing  Nutrition Diagnosis 1: Increased nutrient needs  Related to (1): increased demand for nutrients  As Evidenced by (1): conditions associated with diagnosis       Nutrition Interventions/Recommendations   Nutrition Interventions and Recommendations:    Nutrition Prescription:  Individualized Nutrition Prescription Provided for : 1349 kcals and 65g protein to be provided via diet    Nutrition Interventions:   Food and/or Nutrient Delivery Interventions  Interventions: Meals and snacks  Meals and Snacks: Mineral-modified diet, Fat-modified diet  Goal: provide as ordered    Education Documentation  No documentation found.           Nutrition Monitoring and Evaluation   Monitoring/Evaluation:   Food/Nutrient Related History Monitoring  Monitoring and Evaluation Plan: Energy intake  Energy Intake: Estimated energy intake  Criteria: pt to consume >/= 75% estimated  needs       Time Spent/Follow-up:   Follow Up  Time Spent (min): 15 minutes  Last Date of Nutrition Visit: 04/05/24  Nutrition Follow-Up Needed?: 7-10 days  Follow up Comment: 4/12/24

## 2024-04-05 NOTE — PROGRESS NOTES
Akua Almanzar is a 89 y.o. female on day 7 of admission presenting with Pneumonia of right lung due to infectious organism, unspecified part of lung.      Subjective   89-year-old female denies shortness of breath or cough or headache or dizziness.  Patient denies fevers or chills.  Patient denies any nausea or vomiting.       Objective     Last Recorded Vitals  /65   Pulse 79   Temp 36.2 °C (97.2 °F) (Temporal)   Resp 15   Wt 73.6 kg (162 lb 3.2 oz)   SpO2 93%   Intake/Output last 3 Shifts:    Intake/Output Summary (Last 24 hours) at 4/5/2024 1039  Last data filed at 4/5/2024 1034  Gross per 24 hour   Intake --   Output 2451 ml   Net -2451 ml       Admission Weight  Weight: 72.7 kg (160 lb 4.4 oz) (03/29/24 0313)    Daily Weight  04/04/24 : 73.6 kg (162 lb 3.2 oz)    Image Results  XR chest 1 view  Narrative: Interpreted By:  Rosa Covington,   STUDY:  XR CHEST 1 VIEW 4/3/2024 10:49 am      INDICATION:  Signs/Symptoms:respiratory failure      COMPARISON:  03/31/2024      ACCESSION NUMBER(S):  [id number]      ORDERING CLINICIAN:  JULIAN CALLE      TECHNIQUE:  AP erect view of the chest      FINDINGS:  Postoperative change from TAVR is seen with heart enlarged. There is  atherosclerosis of the aortic arch and descending thoracic aorta.      When compared with prior study, the CHF has improved significantly  with less interstitial and alveolar edema. Mild pulmonary vascular  congestion persists. There is some platelike atelectasis at the right  lung base.      Impression: Near complete resolution of the CHF.      Right basilar platelike atelectasis.      Status post TAVR.      Signed by: Rosa Covington 4/3/2024 11:06 AM  Dictation workstation:   [id number]      Physical Exam  HENT:      Head: Normocephalic.      Ears: Hard of hearing     Comments: Hard of hearing  Eyes:      Extraocular Movements: Extraocular movements intact.      Pupils: Pupils are equal, round, and reactive to light.   Cardiovascular:       Rate and Rhythm: Normal rate and regular rhythm.      Pulses: Normal pulses.      Heart sounds: Normal heart sounds.   Pulmonary:      Effort: Pulmonary effort is normal.      Breath sounds: Normal breath sounds.   Abdominal:      General: Abdomen is flat. Bowel sounds are normal.      Palpations: Abdomen is soft.   Musculoskeletal:         General: Normal range of motion.      Cervical back: Normal range of motion.   Skin:     General: Skin is warm.  Left heel with bandages  Neurological:      General: No focal deficit present.      Mental Status: She is alert.   Psychiatric:         Mood and Affect: Mood normal.   Relevant Results               Assessment/Plan   This patient currently has cardiac telemetry ordered; if you would like to modify or discontinue the telemetry order, click here to go to the orders activity to modify/discontinue the order.      This patient has a urinary catheter   Reason for the urinary catheter remaining today? urinary retention/bladder outlet obstruction, acute or chronic          Principal Problem:    Pneumonia of right lung due to infectious organism, unspecified part of lung  Active Problems:    Atherosclerosis of coronary artery without angina pectoris    Hyperlipidemia    Primary hypertension    Acquired hypothyroidism    Acute on chronic diastolic (congestive) heart failure (CMS/HCC)    Acute on chronic respiratory failure (CMS/HCC)    1.  Acute hypercapnic respiratory failure secondary to CHF reduced ejection fraction currently on 6 L markedly improved elevated lactic.  Will continue monitor for sepsis however no signs of leukocytosis.  2.  CHF is currently on oral Lasix 40 oral twice a day adequate diuresis daily net - 500  3.  COPD currently on Solu-Medrol milligrams IV every 8  4.  Functional quadriplegia continue PT OT therapy  5.  Plan of care continue PT OT therapy.  L will transfer to stepdown and placed under Dr.Lele Jn Stapleton MD

## 2024-04-05 NOTE — PROGRESS NOTES
"Akua Almanzar is a 89 y.o. female on day 7 of admission presenting with Pneumonia of right lung due to infectious organism, unspecified part of lung.    Subjective   Acceptance note  Patient was transferred to ICU for acute respiratory failure.  She is now on 6 L of oxygen and out of ICU.  She is eating well family is not room and they are discussing placement issues with the palliative care team       Objective     Physical Exam  Vitals reviewed.   Constitutional:       Appearance: Normal appearance.   HENT:      Head: Normocephalic and atraumatic.      Right Ear: Tympanic membrane, ear canal and external ear normal.      Left Ear: Tympanic membrane, ear canal and external ear normal.      Nose: Nose normal.      Mouth/Throat:      Pharynx: Oropharynx is clear.   Eyes:      Extraocular Movements: Extraocular movements intact.      Conjunctiva/sclera: Conjunctivae normal.      Pupils: Pupils are equal, round, and reactive to light.   Cardiovascular:      Rate and Rhythm: Normal rate and regular rhythm.      Pulses: Normal pulses.      Heart sounds: Normal heart sounds.   Pulmonary:      Effort: Pulmonary effort is normal.      Breath sounds: Rales present.   Abdominal:      General: Abdomen is flat. Bowel sounds are normal.      Palpations: Abdomen is soft.   Musculoskeletal:      Cervical back: Normal range of motion and neck supple.   Skin:     General: Skin is warm and dry.   Neurological:      General: No focal deficit present.      Mental Status: She is alert and oriented to person, place, and time.   Psychiatric:         Mood and Affect: Mood normal.         Last Recorded Vitals  Blood pressure 124/73, pulse (!) 113, temperature 37.1 °C (98.8 °F), temperature source Oral, resp. rate 19, height 1.549 m (5' 1\"), weight 73.6 kg (162 lb 3.2 oz), SpO2 97 %.  Intake/Output last 3 Shifts:  I/O last 3 completed shifts:  In: 50 (0.7 mL/kg) [IV Piggyback:50]  Out: 2300 (31.3 mL/kg) [Urine:2300 (0.9 mL/kg/hr)]  Weight: " 73.6 kg     Relevant Results              Scheduled medications  acetaminophen, 650 mg, oral, q6h  acetylcysteine, 3 mL, nebulization, 4x daily  aspirin, 81 mg, oral, Daily  clopidogrel, 75 mg, oral, Daily  enoxaparin, 40 mg, subcutaneous, Daily  furosemide, 40 mg, oral, Daily  gabapentin, 100 mg, oral, Nightly  ipratropium-albuteroL, 3 mL, nebulization, 4x daily  levothyroxine, 88 mcg, oral, Daily  oxygen, , inhalation, q8h  piperacillin-tazobactam, 3.375 g, intravenous, q6h  predniSONE, 10 mg, oral, BID  simvastatin, 20 mg, oral, Nightly  sodium chloride, 1 spray, Each Nostril, 4x daily      Continuous medications     PRN medications  PRN medications: albuterol, bisacodyl, guaiFENesin, hydrOXYzine HCL, lidocaine  Results for orders placed or performed during the hospital encounter of 03/29/24 (from the past 24 hour(s))   Comprehensive Metabolic Panel   Result Value Ref Range    Glucose 88 65 - 99 mg/dL    Sodium 139 133 - 145 mmol/L    Potassium 3.7 3.4 - 5.1 mmol/L    Chloride 93 (L) 97 - 107 mmol/L    Bicarbonate 41 (H) 24 - 31 mmol/L    Urea Nitrogen 18 8 - 25 mg/dL    Creatinine 0.70 0.40 - 1.60 mg/dL    eGFR 80 >60 mL/min/1.73m*2    Calcium 9.4 8.5 - 10.4 mg/dL    Albumin 3.0 (L) 3.5 - 5.0 g/dL    Alkaline Phosphatase 64 35 - 125 U/L    Total Protein 5.5 (L) 5.9 - 7.9 g/dL    AST 27 5 - 40 U/L    Bilirubin, Total 0.3 0.1 - 1.2 mg/dL    ALT 34 5 - 40 U/L    Anion Gap 5 <=19 mmol/L   CBC and Auto Differential   Result Value Ref Range    WBC 8.5 4.4 - 11.3 x10*3/uL    nRBC      RBC 3.87 (L) 4.00 - 5.20 x10*6/uL    Hemoglobin 10.8 (L) 12.0 - 16.0 g/dL    Hematocrit 33.3 (L) 36.0 - 46.0 %    MCV 86 80 - 100 fL    MCH 27.9 26.0 - 34.0 pg    MCHC 32.4 32.0 - 36.0 g/dL    RDW 15.0 (H) 11.5 - 14.5 %    Platelets 252 150 - 450 x10*3/uL    Neutrophils % 69.7 40.0 - 80.0 %    Immature Granulocytes %, Automated 1.4 (H) 0.0 - 0.9 %    Lymphocytes % 20.0 13.0 - 44.0 %    Monocytes % 7.7 2.0 - 10.0 %    Eosinophils % 1.1 0.0  - 6.0 %    Basophils %      Neutrophils Absolute 5.91 (H) 1.60 - 5.50 x10*3/uL    Immature Granulocytes Absolute, Automated 0.12 0.00 - 0.50 x10*3/uL    Lymphocytes Absolute 1.69 0.80 - 3.00 x10*3/uL    Monocytes Absolute 0.65 0.05 - 0.80 x10*3/uL    Eosinophils Absolute 0.09 0.00 - 0.40 x10*3/uL    Basophils Absolute     Morphology   Result Value Ref Range    RBC Morphology See Below     Target Cells Few     Ovalocytes Few                       Assessment/Plan   Principal Problem:    Pneumonia of right lung due to infectious organism, unspecified part of lung  Active Problems:    Atherosclerosis of coronary artery without angina pectoris    Hyperlipidemia    Primary hypertension    Acquired hypothyroidism    Acute on chronic diastolic (congestive) heart failure (CMS/HCC)    Acute on chronic respiratory failure (CMS/HCC)    On Zosyn for pneumonia  Taper to steroids for COPD  On Lasix for CHF  Continue oxygen support  Leg swelling has gone down  Patient is DNR and no intubation  Discussed with the family for discharge planning       I spent  minutes in the professional and overall care of this patient.      Ivone Gilman MD

## 2024-04-05 NOTE — PROGRESS NOTES
Pulmonary Progress Note 04/05/24     Patient seen in follow-up for acute respiratory failure with hypoxia, pneumonia, congestive heart failure    Subjective   Interval History  No significant overnight events.  Remains on nasal cannula.    Objective   Vital signs in last 24 hours:  Temp:  [36.2 °C (97.2 °F)-36.5 °C (97.7 °F)] 36.2 °C (97.2 °F)  Heart Rate:  [] 95  Resp:  [15-27] 23  BP: (105-147)/(57-98) 123/70  FiO2 (%):  [36 %] 36 %    Intake/Output last 3 shifts:  I/O last 3 completed shifts:  In: 50 (0.7 mL/kg) [IV Piggyback:50]  Out: 2300 (31.3 mL/kg) [Urine:2300 (0.9 mL/kg/hr)]  Weight: 73.6 kg   Intake/Output this shift:  I/O this shift:  In: -   Out: 1001 [Urine:1000; Stool:1]    Physical Exam  Vitals reviewed.   Constitutional:       General: She is not in acute distress.     Interventions: Nasal cannula in place.   Eyes:      Conjunctiva/sclera: Conjunctivae normal.   Cardiovascular:      Rate and Rhythm: Normal rate.      Heart sounds: Murmur heard.   Pulmonary:      Effort: Pulmonary effort is normal.      Breath sounds: Rales present. No wheezing.   Musculoskeletal:      Cervical back: Full passive range of motion without pain.      Right lower leg: No edema.      Left lower leg: No edema.   Skin:     General: Skin is warm and dry.   Neurological:      General: No focal deficit present.      Mental Status: She is alert.      Comments: Hard of hearing   Psychiatric:         Mood and Affect: Mood and affect normal.         Scheduled medications  [MAR Hold] acetaminophen, 650 mg, oral, q6h  [MAR Hold] acetylcysteine, 3 mL, nebulization, 4x daily  [MAR Hold] aspirin, 81 mg, oral, Daily  [MAR Hold] clopidogrel, 75 mg, oral, Daily  [MAR Hold] enoxaparin, 40 mg, subcutaneous, Daily  furosemide, 40 mg, oral, Daily  [MAR Hold] gabapentin, 100 mg, oral, Nightly  [MAR Hold] ipratropium-albuteroL, 3 mL, nebulization, 4x daily  [MAR Hold] levothyroxine, 88 mcg, oral, Daily  [MAR Hold] oxygen, ,  inhalation, q8h  piperacillin-tazobactam, 3.375 g, intravenous, q6h  predniSONE, 20 mg, oral, BID  [MAR Hold] simvastatin, 20 mg, oral, Nightly  [MAR Hold] sodium chloride, 1 spray, Each Nostril, 4x daily      Continuous medications       PRN medications  PRN medications: [MAR Hold] albuterol, [MAR Hold] bisacodyl, [MAR Hold] guaiFENesin, [MAR Hold] hydrOXYzine HCL, [MAR Hold] lidocaine     Labs:  Lab Results   Component Value Date     04/05/2024    K 3.7 04/05/2024    CL 93 (L) 04/05/2024    CO2 41 (H) 04/05/2024    BUN 18 04/05/2024    CREATININE 0.70 04/05/2024    GLUCOSE 88 04/05/2024    CALCIUM 9.4 04/05/2024     Lab Results   Component Value Date    WBC 8.5 04/05/2024    HGB 10.8 (L) 04/05/2024    HCT 33.3 (L) 04/05/2024    MCV 86 04/05/2024     04/05/2024       Imaging:  XR chest 1 view    Result Date: 4/1/2024  Interpreted By:  Anthony Walker, STUDY: XR CHEST 1 VIEW; 3/31/2024 9:38 am   INDICATION: Signs/Symptoms:Question of surgery   COMPARISON: 03/30/2020   ACCESSION NUMBER(S): [id number]   ORDERING CLINICIAN: KADI ALLAN   FINDINGS: The study is limited due to rotation, lordotic projection and respiratory motion. The cardiac silhouette appears prominent, exaggerated by the technique. Prosthetic aortic valve is again seen. Atherosclerotic calcifications involve the tortuous aorta. There is left pleural effusion and bilateral interstitial and mild alveolar infiltrates; allowing for differences in technique there is probably some minimal improvement. There is no pneumothorax. Degenerative changes involve the spine and shoulders.       Cardiomegaly and slightly improving congestive heart failure.   Signed by: Anthony Walker 4/1/2024 8:17 AM Dictation workstation:   [id number]    ECG 12 Lead    Result Date: 3/31/2024  Normal sinus rhythm Possible Left atrial enlargement Left bundle branch block Abnormal ECG When compared with ECG of 01-MAR-2024 00:06, No significant change was found  Confirmed by Aryan Ruiz ([id number]) on 3/31/2024 7:18:49 PM    CT head wo IV contrast    Result Date: 3/30/2024  Interpreted By:  Bautista Ruelas, STUDY: CT HEAD WO IV CONTRAST;  3/30/2024 8:10 pm   INDICATION: Signs/Symptoms:headache.   COMPARISON: None   ACCESSION NUMBER(S): [id number]   ORDERING CLINICIAN: BRISEIDA PATINO   TECHNIQUE: Contiguous axial images of the head were obtained without intravenous contrast.   FINDINGS: The examination is limited secondary to patient motion.   BRAIN PARENCHYMA:  There is cerebral atrophy and bowel chronic white matter change. The gray white matter differentiation is preserved. No mass effect or midline shift.   HEMORRHAGE:  No evidence of acute intracranial hemorrhage. VENTRICLES AND EXTRA-AXIAL SPACES:  The ventricles are within normal limits in size for brain volume.  No evidence of abnormal extraaxial fluid collection. EXTRACRANIAL SOFT TISSUES:  Within normal limits. PARANASAL SINUSES/MASTOIDS:  Mild peripheral mucosal thickening of partially imaged right maxillary sinus. CALVARIUM:  No evidence of depressed calvarial fracture.   OTHER FINDINGS:  None       Cerebral atrophy and bilateral chronic white matter change.   No evidence of acute intracranial hemorrhage.   Peripheral mucosal thickening of partially imaged right maxillary sinus.   MACRO: None   Signed by: Bautista Ruelas 3/30/2024 8:36 PM Dictation workstation:   [id number]    XR chest 1 view    Result Date: 3/30/2024  Interpreted By:  Spencer Hoskins, STUDY: XR CHEST 1 VIEW   INDICATION: Signs/Symptoms:Hypoxia/SOB.   COMPARISON: March 29, 2024   ACCESSION NUMBER(S): [id number]   ORDERING CLINICIAN: DORETHA MADDEN   FINDINGS: Significant worsening of the perihilar and basilar edema when compared to prior study.   More conglomerate consolidation in the mid lungs both right and left.   Small bilateral effusions.       Significant worsening of the perihilar and basilar edema when compared to prior study.   More  conglomerate consolidation in the mid lungs both right and left.   Small bilateral effusions.   Signed by: Spencer Hoskins 3/30/2024 8:11 AM Dictation workstation:   [id number]    XR chest 2 views    Result Date: 3/29/2024  Interpreted By:  Carlo Chan, STUDY: XR CHEST 2 VIEWS;  3/29/2024 3:37 am   INDICATION: Signs/Symptoms:sob.   COMPARISON: 02/29/2024   ACCESSION NUMBER(S): [id number]   ORDERING CLINICIAN: ANALI AGUILAR   FINDINGS:     Increasing patchy right basilar airspace opacity. Similar severe cardiomegaly. No pneumothorax. Similar endovascular aortic valve prosthesis. Similar severe diffuse interstitial opacity. Upper abdomen is unremarkable. Diffuse osteopenia. No acute osseous abnormality.       1. Increasing patchy right basilar airspace opacity. Consider pneumonia. 2. Similar severe diffuse interstitial opacity, likely pulmonary edema. 3. Stable cardiomegaly.   Signed by: Carlo Chan 3/29/2024 3:53 AM Dictation workstation:   [id number]    Lower extremity venous duplex left    Result Date: 3/5/2024  Interpreted By:  Jatinder Appiah, STUDY: [facility] LOWER EXTREMITY VENOUS DUPLEX LEFT; 3/5/2024 1:16 pm   INDICATION: Signs/Symptoms:lower extremity pain and edema.   COMPARISON: None   ACCESSION NUMBER(S): [id number]   ORDERING CLINICIAN: BREANNE YOUNG   TECHNIQUE: Black and white as well as color-flow duplex images were obtained along with Doppler spectral analysis of the deep venous system of the lower extremity from the knee to the groin. Attempts were made to image the proximal posterior tibial and peroneal veins of the proximal calf as well. Venous compression was performed.   FINDINGS: There is normal compressibility and flow within the visualized vessels of the deep venous system of the lower extremity.  The contralateral common femoral vein is patent.       No evidence for deep venous thrombosis within the limits of this examination.   MACRO: none   Signed by: Jatinder Appiah  3/5/2024 1:30 PM Dictation workstation:   [id number]              Assessment/Plan   Principal Problem:    Pneumonia of right lung due to infectious organism, unspecified part of lung  Active Problems:    Atherosclerosis of coronary artery without angina pectoris    Hyperlipidemia    Primary hypertension    Acquired hypothyroidism    Acute on chronic diastolic (congestive) heart failure (CMS/HCC)    Acute on chronic respiratory failure (CMS/HCC)      Acute on chronic respiratory failure with hypoxia: Suspect a component of airway disease given x-ray not overly impressive  Nasal cannula, continue to wean off as tolerated  Acute on chronic diastolic heart failure  Oral diuretics  Pulmonary hypertension: Group II and Group III  Diuresis  COPD: Potential component of acute bronchitis.  Oral prednisone but will decrease to 10mg twice a day, recommend stopping after 4days  Pulmonary hygiene: Agree with continuing DuoNebs and addition of Mucomyst  Probable bacterial pneumonia  Piperacillin/tazobactam, stop after today  Prophylaxis  Enoxaparin 40 subcutaneous daily confirmed  CODE STATUS  DNR, DNI noted  Pulmonary disposition  Hopefully in next 24-48 hrs pending 02 requirements, can be discharged    Available to see per you request this weekend    Smith House MD  Pulmonary/CC Medicine  [facility]     LOS: 7 days

## 2024-04-05 NOTE — PROGRESS NOTES
"Akua Almanzar is a 89 y.o. female on day 7 of admission presenting with Pneumonia of right lung due to infectious organism, unspecified part of lung.    Subjective   Symptoms (0 - 10, Best to Worst)  [place] Symptom Assessment System  Pain Score: 0 - No pain    NO acute events overnight. On nasal cannula currently, down to 4L.     Objective     Physical Exam    Constitutional:       General: Patient is in no acute distress.    HENT:      Head: Normocephalic.      Mouth: Mucous membranes are moist.   Eyes:      Conjunctiva/sclera: Conjunctivae clear, sclerae white. No discharge.     Pupils: Pupils are equal, round, and reactive to light.   Neck:      Vascular: No carotid bruit.   Cardiovascular:      Rate and Rhythm: Normal rate and irregular rhythm.  Heart rate 103     Heart sounds: murmur heard.  Pulmonary:      Effort: accessory muscle use noted, tachypneic     Breath sounds: wheezing/Rales noted to bilateral lung bases, moist nonproductive cough  NC4L  Abdominal:      General: There is no distension.  Bowel sounds are present     Tenderness: There is no abdominal tenderness. There is no guarding.   Urology:     Genitalia: Not assessed    Urine: urine yellow and clear [place] catheter  Musculoskeletal:         General: No deformity.   Skin:     Coloration: Skin is not jaundiced.  + trace edema BLE, left anterior shin with area of redness noted from urinary catheter tubing, increase sensitivity to touch bilateral lower extremities, no redness warmth or swelling noted  Neurological:      General: No focal deficit present.       Mental Status: oriented to person, place, and time.   Psychiatric:         Behavior: Anxious tearful today, oriented to person place and time        Last Recorded Vitals  Blood pressure 110/65, pulse 79, temperature 36.2 °C (97.2 °F), temperature source Temporal, resp. rate 15, height 1.549 m (5' 1\"), weight 73.6 kg (162 lb 3.2 oz), SpO2 93 %.  Intake/Output last 3 Shifts:  I/O last 3 " completed shifts:  In: 50 (0.7 mL/kg) [IV Piggyback:50]  Out: 2300 (31.3 mL/kg) [Urine:2300 (0.9 mL/kg/hr)]  Weight: 73.6 kg     Relevant Results  Results for orders placed or performed during the hospital encounter of 03/29/24 (from the past 24 hour(s))   Comprehensive Metabolic Panel   Result Value Ref Range    Glucose 88 65 - 99 mg/dL    Sodium 139 133 - 145 mmol/L    Potassium 3.7 3.4 - 5.1 mmol/L    Chloride 93 (L) 97 - 107 mmol/L    Bicarbonate 41 (H) 24 - 31 mmol/L    Urea Nitrogen 18 8 - 25 mg/dL    Creatinine 0.70 0.40 - 1.60 mg/dL    eGFR 80 >60 mL/min/1.73m*2    Calcium 9.4 8.5 - 10.4 mg/dL    Albumin 3.0 (L) 3.5 - 5.0 g/dL    Alkaline Phosphatase 64 35 - 125 U/L    Total Protein 5.5 (L) 5.9 - 7.9 g/dL    AST 27 5 - 40 U/L    Bilirubin, Total 0.3 0.1 - 1.2 mg/dL    ALT 34 5 - 40 U/L    Anion Gap 5 <=19 mmol/L   CBC and Auto Differential   Result Value Ref Range    WBC 8.5 4.4 - 11.3 x10*3/uL    nRBC      RBC 3.87 (L) 4.00 - 5.20 x10*6/uL    Hemoglobin 10.8 (L) 12.0 - 16.0 g/dL    Hematocrit 33.3 (L) 36.0 - 46.0 %    MCV 86 80 - 100 fL    MCH 27.9 26.0 - 34.0 pg    MCHC 32.4 32.0 - 36.0 g/dL    RDW 15.0 (H) 11.5 - 14.5 %    Platelets 252 150 - 450 x10*3/uL    Neutrophils % 69.7 40.0 - 80.0 %    Immature Granulocytes %, Automated 1.4 (H) 0.0 - 0.9 %    Lymphocytes % 20.0 13.0 - 44.0 %    Monocytes % 7.7 2.0 - 10.0 %    Eosinophils % 1.1 0.0 - 6.0 %    Basophils %      Neutrophils Absolute 5.91 (H) 1.60 - 5.50 x10*3/uL    Immature Granulocytes Absolute, Automated 0.12 0.00 - 0.50 x10*3/uL    Lymphocytes Absolute 1.69 0.80 - 3.00 x10*3/uL    Monocytes Absolute 0.65 0.05 - 0.80 x10*3/uL    Eosinophils Absolute 0.09 0.00 - 0.40 x10*3/uL    Basophils Absolute     Morphology   Result Value Ref Range    RBC Morphology See Below     Target Cells Few     Ovalocytes Few      No current facility-administered medications on file prior to encounter.     Current Outpatient Medications on File Prior to Encounter    Medication Sig Dispense Refill    acidophilus-pectin, citrus 100 million cell-10 mg capsule Take by mouth.      amLODIPine (Norvasc) 2.5 mg tablet Take 1 tablet (2.5 mg) by mouth once daily.      ascorbic acid (Vitamin C) 250 MG chewable tablet Chew 2 tablets (500 mg). Reports chewable 500mg      aspirin 81 mg EC tablet Take 1 tablet (81 mg) by mouth once daily.      carvedilol (Coreg) 6.25 mg tablet Take 1 tablet twice a day by oral route.      cholecalciferol (Vitamin D-3) 25 MCG (1000 UT) capsule Take by mouth.      clopidogrel (Plavix) 75 mg tablet Take 1 tablet (75 mg) by mouth once daily.      docusate sodium (Colace) 100 mg capsule Take 1 capsule (100 mg) by mouth 2 times a day.  0    furosemide (Lasix) 40 mg tablet Take 1 tablet (40 mg) by mouth once daily.  0    gabapentin (Neurontin) 100 mg capsule Take 1 capsule (100 mg) by mouth once daily at bedtime.  0    hydrOXYzine HCL (Atarax) 25 mg tablet Take 1 tablet (25 mg) by mouth every 6 hours if needed for itching.  0    levothyroxine (Synthroid, Levoxyl) 88 mcg tablet Take 1 tablet (88 mcg) by mouth once daily.      oxygen (O2) gas therapy Inhale 1 each continuously.      simvastatin (Zocor) 20 mg tablet Take 1 tablet (20 mg) by mouth once daily at bedtime.         Assessment/Plan   IMP:  Acute on chronic hypoxic and hypercapnic respiratory failure  -ABGs showing hypercapnia   - Down to NC4L, was on home oxygen after 2/2024 hospitalization     2.   Pneumonia  - Currently on Zosyn    3.   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 Left ventricular systolic function is normal.   2. Spectral Doppler shows an impaired relaxation pattern of left ventricular diastolic filling.   3. There is moderate concentric left ventricular hypertrophy.   4. The left atrium is moderately dilated.   5. The mitral valve is moderately thickened.   6. There is moderate mitral annular calcification.   7. There is moderate  to severe calcification of the anterior and posterior mitral valve leaflets.   8. Mild to moderate tricuspid regurgitation.   9. Moderate to severely elevated right ventricular systolic pressure.   - ProBNP 1,489     4.   TME  - suspect r/t respiratory status, resolved     5.   Electrolyte imbalance  - hyponatremia, resolved      6.   Anemia  - of chronic disease     7.   Palliative care   DNR CCA DNI  -The patient is a capable decision maker   - DPOA - HC filled out where Derek is primary and Anitra is secondary. This is in hard chart.   -The patient has 4 children.   Derek Almanzar - Westley Mayer - [facility] - lives out of [facility]  Ginger Almanzar - lives out of town  Derek and Anitra have been primary contacts.     Remains in disease-modifying mode continue weaning attempts to nasal cannula.  Patient would like to home at discharge this corresponds with the intensity rehab recommendation.  Depending on functional status at time of discharge patient is interested either in home health care and Navigator or if still suffering from shortness of breath and having unpleasant symptoms might also be open for hospice at that time.  Would like to see how she feels when she is off oxygen.     4/3  Increased work of breathing today, SpO2 was 96% on her high flow at 50% however patient is tachypneic with increased Rales, discussed with pulmonology, chest x-ray ordered for further evaluation.  Reached out to the daughter Anitra to provide her updates, daughters will be at bedside this afternoon.    Added lidocaine cream to hypersensitivity to bilateral lower extremities, suspect there is a component of neuropathic pain given the chronicity, continue Tylenol scheduled.    Tentative plan would be to discharge home however daughter is now questioning this given the patient's setback and her overall weakness.  Will plan to discuss with the daughter and son.    4/4  Cont to have moist nonprod cough, difficulty clearing  secretions, causing dyspnea and fatigue after prolonged coughing. Start muccomyst, vibrapep. Use robitussin prn. Down to 6L NC.  No co pain currently. CXR 4/3 showing continued improvement, but atelectasis present.     Reached out to son Derek and his spouse, long discussion about goals of care and plan as patient now down to 6L NC.  Therapy at this point now recommending moderate intensity due to the patient's progressive weakness and fatigue.  Given the patient's age and comorbidities doubt that she will do great rehab facility.  Family was initially planning for home, with the goal the patient eventually moving in with the son Derek closer to [facility], however they are in the process of rehabbing their house and cannot get her home right now.  Patient currently lives with her daughter Anitra, however Anitra has her own health issues and can likely not provide 24-hour care for this patient, I will also concern that the patient will require oxygen upon discharge.  Per prior records patient was already certified for home oxygen after her last discharge in early March.  We discussed varying discharge options from skilled rehab to home health care as well as hospice.  Son at this point is supportive of what ever the patient wants to do.  I set up a family meeting for tomorrow between noon and 1:00 to discuss goals of care and establish a discharge plan.    4/5  Family meeting today noon-1pm to discuss overall goals of care and discharge plan. PT now recommending mod intensity upon discharge.     Family meeting with Patient, mina Reed, dtr in [place] Anitra. Long discussion about repeated hospitalizations, age, comorbidities, overall decline. Patient goal at this point is to stay alive until November to vote, however is she has further decline, she is not sure if she would want to come back to the hospital. She really wants to stay at son's house. As noted above, son's house currently under renovation, daughter's house  with stairs that patient cannot navigate. Patient is willing to go to rehab to optimize function, and hopefully by the time she completes rehab, son's house will be ready in [facility]. Family is supportive of whatever patient wishes and are interested in learning more about hospice. I did call referral to [facility] as they have coverage in [place] [facility]. I reached out to care coordination to place referral and provide family choices for SNF. We reconfirmed already established DNRCCA/DNI.     Plan to medically stabilize, discharge to SNF to optimize function, then eventually move to son's house in [facility], potentially with [facility] hosp[facility] for support.        I spent 105 minutes in the professional and overall care of this patient.      Carmen Vo, APRN-CNP

## 2024-04-05 NOTE — PROGRESS NOTES
"Akua Almanzar is a 89 y.o. female on day 7 of admission presenting with Pneumonia of right lung due to infectious organism, unspecified part of lung.    Subjective   The patient states that she feels a little better each day.       Objective     Physical Exam  Eyes:      Conjunctiva/sclera: Conjunctivae normal.   Cardiovascular:      Rate and Rhythm: Normal rate and regular rhythm.      Heart sounds:      No gallop.   Pulmonary:      Breath sounds: Normal breath sounds. No wheezing, rhonchi or rales.   Abdominal:      Palpations: Abdomen is soft.   Neurological:      General: No focal deficit present.      Mental Status: She is alert.       Constitutional:       Appearance: Not in distress.   Eyes:      Conjunctiva/sclera: Conjunctivae normal.   Neck:      Vascular: JVD normal.   Pulmonary:      Breath sounds: Normal breath sounds. No wheezing. No rhonchi. No rales.   Cardiovascular:      Normal rate. Regular rhythm.      Murmurs: There is no murmur.      No gallop.  No click. No rub.   Abdominal:      Palpations: Abdomen is soft.   Neurological:      General: No focal deficit present.      Mental Status: Alert.        Last Recorded Vitals  Blood pressure 110/65, pulse 79, temperature 36.5 °C (97.7 °F), temperature source Temporal, resp. rate 15, height 1.549 m (5' 1\"), weight 73.6 kg (162 lb 3.2 oz), SpO2 95 %.  Intake/Output last 3 Shifts:  I/O last 3 completed shifts:  In: 50 (0.7 mL/kg) [IV Piggyback:50]  Out: 2300 (31.3 mL/kg) [Urine:2300 (0.9 mL/kg/hr)]  Weight: 73.6 kg     Relevant Results           This patient currently has cardiac telemetry ordered; if you would like to modify or discontinue the telemetry order, click here to go to the orders activity to modify/discontinue the order.    This patient has a urinary catheter   Reason for the urinary catheter remaining today?       Results for orders placed or performed during the hospital encounter of 03/29/24 (from the past 24 hour(s))   Urinalysis with " Reflex Culture and Microscopic   Result Value Ref Range    Color, Urine Light-Yellow Light-Yellow, Yellow, Dark-Yellow    Appearance, Urine Ex.Turbid (N) Clear    Specific Gravity, Urine 1.007 1.005 - 1.035    pH, Urine 7.5 5.0, 5.5, 6.0, 6.5, 7.0, 7.5, 8.0    Protein, Urine NEGATIVE NEGATIVE, 10 (TRACE), 20 (TRACE) mg/dL    Glucose, Urine Normal Normal mg/dL    Blood, Urine 0.5 (2+) (A) NEGATIVE    Ketones, Urine NEGATIVE NEGATIVE mg/dL    Bilirubin, Urine NEGATIVE NEGATIVE    Urobilinogen, Urine Normal Normal mg/dL    Nitrite, Urine NEGATIVE NEGATIVE    Leukocyte Esterase, Urine 500 Pia/µL (A) NEGATIVE   Microscopic Only, Urine   Result Value Ref Range    WBC, Urine 21-50 (A) 1-5, NONE /HPF    RBC, Urine >20 (A) NONE, 1-2, 3-5 /HPF    Bacteria, Urine 3+ (A) NONE SEEN /HPF    Budding Yeast, Urine PRESENT (A) NONE /HPF   Comprehensive Metabolic Panel   Result Value Ref Range    Glucose 88 65 - 99 mg/dL    Sodium 139 133 - 145 mmol/L    Potassium 3.7 3.4 - 5.1 mmol/L    Chloride 93 (L) 97 - 107 mmol/L    Bicarbonate 41 (H) 24 - 31 mmol/L    Urea Nitrogen 18 8 - 25 mg/dL    Creatinine 0.70 0.40 - 1.60 mg/dL    eGFR 80 >60 mL/min/1.73m*2    Calcium 9.4 8.5 - 10.4 mg/dL    Albumin 3.0 (L) 3.5 - 5.0 g/dL    Alkaline Phosphatase 64 35 - 125 U/L    Total Protein 5.5 (L) 5.9 - 7.9 g/dL    AST 27 5 - 40 U/L    Bilirubin, Total 0.3 0.1 - 1.2 mg/dL    ALT 34 5 - 40 U/L    Anion Gap 5 <=19 mmol/L             Assessment/Plan   Principal Problem:    Pneumonia of right lung due to infectious organism, unspecified part of lung  Active Problems:    Atherosclerosis of coronary artery without angina pectoris    Hyperlipidemia    Primary hypertension    Acquired hypothyroidism    Acute on chronic diastolic (congestive) heart failure (CMS/HCC)    Acute on chronic respiratory failure (CMS/HCC)        Acute on chronic respiratory failure  Transcatheter aortic valve replacement April 2021  Heart failure preserved ejection  fraction  Degenerative mitral valve stenosis  Pulmonary hypertension  Essential hypertension     3/30: This patient is a 89-year-old white female with a history of COPD requiring nasal oxygen with pulmonary hypertension as well. She did undergo a transcatheter aortic valve replacement 4/9/2021 for severe aortic valvular stenosis. She was recently admitted to [facility] [place] from 3/1/2024 - 3/7/2024 with increased shortness of breath thought to be related to CHF diastolic variety. Her echocardiogram at that time showed a preserved LV ejection fraction and normally functioning TAVR are but moderate mitral valve stenosis and moderately severe pulmonary hypertension estimated PA systolic pressure 59 mmHg. She is currently admitted with recurrent shortness of breath and required transfer to ICU due to worsening respiratory distress and CO2 retention. She has been placed on continuous BiPAP with clinical improvement. Her hemodynamic parameters remain stable. Telemetry monitor shows sinus rhythm with left bundle branch block conduction delay. Her proBNP surprisingly is only modestly elevated despite the significant abnormalities on chest x-ray. For now she will be treated empirically with Lasix 40 mg IV twice daily. Empiric antibiotic therapy has also been instituted including vancomycin, Zosyn and azithromycin. Patient being seen by pulmonology. Patient receiving DuoNebs and IV Solu-Medrol as well. The patient and family aware of guarded prognosis.      3/31: The patient is awake alert and conversant.  She has been transition to a high flow O2 nasal cannula 30 L/min 80% FiO2.  No respiratory distress with satisfactory O2 saturations.  Telemetry monitor shows a borderline sinus tachycardia at 100/min.  The comprehensive metabolic panel includes a creatinine of 0.6 potassium of 4.2.  Will check repeat chest x-ray tomorrow.  Head CT from yesterday showed only cerebral atrophy and bilateral chronic white matter change.   Input output negative by 915 cc yesterday.  Will continue the empiric IV Lasix.  Question the functional significance of the patient's mitral valve stenosis by echo.  The proBNP was only modestly elevated and patient remains currently on IV Rocephin for suspected pneumonia.     4/1: Patient has been gradually improving on a daily basis.  She is currently receiving antibiotic therapy for suspected pneumonia.  Chest x-ray has been ordered with results still pending at this time.  With her clinical improvement I think we can transition from IV furosemide to oral therapy.  Kidney function remains stable with serum creatinine of 0.7 on morning lab studies.  Continues to slowly improve.     4/2: We transition from IV furosemide to oral therapy yesterday.  There is a -1500 cc fluid balance over the last 24 hours.  Serum creatinine remains stable at 0.7.  Chest x-ray done yesterday revealing improved heart failure findings.  Will continue current therapy and monitor on a clinical basis.     4/3: Patient continues to slowly improve on a daily basis.  Still receiving IV antibiotic therapy.  -750 cc fluid balance over the last 24 hours.  Will decrease the furosemide from twice daily down to 40 mg once daily.  Overall patient continues to improve although still requiring high flow oxygen therapy at this time.     4/4: Overall the patient continues to slowly improve on a regular basis.  Now on oral furosemide 40 mg once daily.  Receiving IV antibiotic therapy and continues to improve.    4/5: Patient slowly improving on a daily basis.  On oral diuretic therapy.  Kidney function remains stable.  At this point no active cardiac issues and we will sign off.  The patient has a cardiologist in [facility].  She states that after discharge she will be returning to her son's house in [place] and will reestablish with her local cardiologist there.  Again we will sign off and follow peripherally.  Please call us back with any cardiology  questions or concerns.                I spent 18 minutes in the professional and overall care of this patient.      Pio Alberto, DO

## 2024-04-05 NOTE — PROGRESS NOTES
Seen by Palliative Team and informed this nurse the following: Plan to medically stabilize, discharge to SNF to optimize function, then eventually move to son's house in [facility], [facility].  Family gave 2 choices: 1. [place] 2. [place]. Referral was placed into [facility]. Also referral placed to [facility].

## 2024-04-05 NOTE — PROGRESS NOTES
Physical Therapy                 Therapy Communication Note    Patient Name: Akua Almanzar  MRN: [id number]  Today's Date: 4/5/2024     Discipline: Physical Therapy    Missed Visit Reason: Missed Visit Reason: Other (Comment) (patient attempted x 3; The first 2 attempts, pt and family in with pallative care staff. 3rd attempt family wanting privacy to speak with pt regarding plan of care/discharge plans. Cancel pt for today.)    Missed Time: Attempt    Comment:

## 2024-04-06 LAB
ALBUMIN SERPL-MCNC: 3.2 G/DL (ref 3.5–5)
ALP BLD-CCNC: 68 U/L (ref 35–125)
ALT SERPL-CCNC: 34 U/L (ref 5–40)
ANION GAP SERPL CALC-SCNC: 5 MMOL/L
AST SERPL-CCNC: 22 U/L (ref 5–40)
BASOPHILS # BLD AUTO: 0.01 X10*3/UL (ref 0–0.1)
BASOPHILS NFR BLD AUTO: 0.1 %
BILIRUB SERPL-MCNC: 0.3 MG/DL (ref 0.1–1.2)
BUN SERPL-MCNC: 15 MG/DL (ref 8–25)
CALCIUM SERPL-MCNC: 9.3 MG/DL (ref 8.5–10.4)
CHLORIDE SERPL-SCNC: 96 MMOL/L (ref 97–107)
CO2 SERPL-SCNC: 37 MMOL/L (ref 24–31)
CREAT SERPL-MCNC: 0.8 MG/DL (ref 0.4–1.6)
EGFRCR SERPLBLD CKD-EPI 2021: 68 ML/MIN/1.73M*2
EOSINOPHIL # BLD AUTO: 0.03 X10*3/UL (ref 0–0.4)
EOSINOPHIL NFR BLD AUTO: 0.3 %
ERYTHROCYTE [DISTWIDTH] IN BLOOD BY AUTOMATED COUNT: 15.1 % (ref 11.5–14.5)
GLUCOSE SERPL-MCNC: 113 MG/DL (ref 65–99)
HCT VFR BLD AUTO: 34.1 % (ref 36–46)
HGB BLD-MCNC: 11.4 G/DL (ref 12–16)
IMM GRANULOCYTES # BLD AUTO: 0.15 X10*3/UL (ref 0–0.5)
IMM GRANULOCYTES NFR BLD AUTO: 1.5 % (ref 0–0.9)
LYMPHOCYTES # BLD AUTO: 1.06 X10*3/UL (ref 0.8–3)
LYMPHOCYTES NFR BLD AUTO: 10.4 %
MCH RBC QN AUTO: 28.9 PG (ref 26–34)
MCHC RBC AUTO-ENTMCNC: 33.4 G/DL (ref 32–36)
MCV RBC AUTO: 87 FL (ref 80–100)
MONOCYTES # BLD AUTO: 0.54 X10*3/UL (ref 0.05–0.8)
MONOCYTES NFR BLD AUTO: 5.3 %
NEUTROPHILS # BLD AUTO: 8.38 X10*3/UL (ref 1.6–5.5)
NEUTROPHILS NFR BLD AUTO: 82.4 %
NRBC BLD-RTO: 0 /100 WBCS (ref 0–0)
PLATELET # BLD AUTO: 258 X10*3/UL (ref 150–450)
POTASSIUM SERPL-SCNC: 4.1 MMOL/L (ref 3.4–5.1)
PROT SERPL-MCNC: 5.5 G/DL (ref 5.9–7.9)
RBC # BLD AUTO: 3.94 X10*6/UL (ref 4–5.2)
SODIUM SERPL-SCNC: 138 MMOL/L (ref 133–145)
WBC # BLD AUTO: 10.2 X10*3/UL (ref 4.4–11.3)

## 2024-04-06 PROCEDURE — 2500000004 HC RX 250 GENERAL PHARMACY W/ HCPCS (ALT 636 FOR OP/ED): Performed by: INTERNAL MEDICINE

## 2024-04-06 PROCEDURE — 85025 COMPLETE CBC W/AUTO DIFF WBC: CPT | Performed by: INTERNAL MEDICINE

## 2024-04-06 PROCEDURE — 2500000001 HC RX 250 WO HCPCS SELF ADMINISTERED DRUGS (ALT 637 FOR MEDICARE OP): Performed by: INTERNAL MEDICINE

## 2024-04-06 PROCEDURE — 2500000004 HC RX 250 GENERAL PHARMACY W/ HCPCS (ALT 636 FOR OP/ED): Mod: MUE | Performed by: INTERNAL MEDICINE

## 2024-04-06 PROCEDURE — 2500000002 HC RX 250 W HCPCS SELF ADMINISTERED DRUGS (ALT 637 FOR MEDICARE OP, ALT 636 FOR OP/ED): Mod: MUE | Performed by: INTERNAL MEDICINE

## 2024-04-06 PROCEDURE — 2500000002 HC RX 250 W HCPCS SELF ADMINISTERED DRUGS (ALT 637 FOR MEDICARE OP, ALT 636 FOR OP/ED): Performed by: INTERNAL MEDICINE

## 2024-04-06 PROCEDURE — 94640 AIRWAY INHALATION TREATMENT: CPT

## 2024-04-06 PROCEDURE — 2500000005 HC RX 250 GENERAL PHARMACY W/O HCPCS: Performed by: INTERNAL MEDICINE

## 2024-04-06 PROCEDURE — 99231 SBSQ HOSP IP/OBS SF/LOW 25: CPT | Performed by: INTERNAL MEDICINE

## 2024-04-06 PROCEDURE — 94660 CPAP INITIATION&MGMT: CPT

## 2024-04-06 PROCEDURE — 80053 COMPREHEN METABOLIC PANEL: CPT | Performed by: INTERNAL MEDICINE

## 2024-04-06 PROCEDURE — 94760 N-INVAS EAR/PLS OXIMETRY 1: CPT

## 2024-04-06 PROCEDURE — 94668 MNPJ CHEST WALL SBSQ: CPT

## 2024-04-06 PROCEDURE — 36415 COLL VENOUS BLD VENIPUNCTURE: CPT | Performed by: INTERNAL MEDICINE

## 2024-04-06 PROCEDURE — 9420000001 HC RT PATIENT EDUCATION 5 MIN

## 2024-04-06 PROCEDURE — 1100000001 HC PRIVATE ROOM DAILY

## 2024-04-06 PROCEDURE — 99232 SBSQ HOSP IP/OBS MODERATE 35: CPT | Performed by: NURSE PRACTITIONER

## 2024-04-06 PROCEDURE — 2500000004 HC RX 250 GENERAL PHARMACY W/ HCPCS (ALT 636 FOR OP/ED): Performed by: HOSPITALIST

## 2024-04-06 RX ADMIN — SIMVASTATIN 20 MG: 20 TABLET, FILM COATED ORAL at 21:20

## 2024-04-06 RX ADMIN — ACETAMINOPHEN 650 MG: 325 TABLET ORAL at 05:23

## 2024-04-06 RX ADMIN — ASPIRIN 81 MG: 81 TABLET, COATED ORAL at 09:14

## 2024-04-06 RX ADMIN — CLOPIDOGREL BISULFATE 75 MG: 75 TABLET ORAL at 09:15

## 2024-04-06 RX ADMIN — GABAPENTIN 100 MG: 100 CAPSULE ORAL at 21:20

## 2024-04-06 RX ADMIN — ACETYLCYSTEINE 600 MG: 200 SOLUTION ORAL; RESPIRATORY (INHALATION) at 14:53

## 2024-04-06 RX ADMIN — IPRATROPIUM BROMIDE AND ALBUTEROL SULFATE 3 ML: 2.5; .5 SOLUTION RESPIRATORY (INHALATION) at 19:55

## 2024-04-06 RX ADMIN — ACETYLCYSTEINE 600 MG: 200 SOLUTION ORAL; RESPIRATORY (INHALATION) at 11:02

## 2024-04-06 RX ADMIN — IPRATROPIUM BROMIDE AND ALBUTEROL SULFATE 3 ML: 2.5; .5 SOLUTION RESPIRATORY (INHALATION) at 11:03

## 2024-04-06 RX ADMIN — ACETAMINOPHEN 650 MG: 325 TABLET ORAL at 12:04

## 2024-04-06 RX ADMIN — ENOXAPARIN SODIUM 40 MG: 40 INJECTION SUBCUTANEOUS at 05:23

## 2024-04-06 RX ADMIN — ACETYLCYSTEINE 600 MG: 200 SOLUTION ORAL; RESPIRATORY (INHALATION) at 19:55

## 2024-04-06 RX ADMIN — PREDNISONE 10 MG: 10 TABLET ORAL at 17:13

## 2024-04-06 RX ADMIN — LEVOTHYROXINE SODIUM 88 MCG: 0.09 TABLET ORAL at 05:23

## 2024-04-06 RX ADMIN — IPRATROPIUM BROMIDE AND ALBUTEROL SULFATE 3 ML: 2.5; .5 SOLUTION RESPIRATORY (INHALATION) at 14:54

## 2024-04-06 RX ADMIN — FUROSEMIDE 40 MG: 40 TABLET ORAL at 09:15

## 2024-04-06 RX ADMIN — PIPERACILLIN SODIUM AND TAZOBACTAM SODIUM 3.38 G: 3; .375 INJECTION, SOLUTION INTRAVENOUS at 05:23

## 2024-04-06 RX ADMIN — PREDNISONE 10 MG: 10 TABLET ORAL at 05:23

## 2024-04-06 RX ADMIN — Medication: at 15:00

## 2024-04-06 RX ADMIN — Medication: at 07:00

## 2024-04-06 ASSESSMENT — PAIN SCALES - GENERAL
PAINLEVEL_OUTOF10: 5 - MODERATE PAIN
PAINLEVEL_OUTOF10: 0 - NO PAIN
PAINLEVEL_OUTOF10: 0 - NO PAIN

## 2024-04-06 ASSESSMENT — PAIN - FUNCTIONAL ASSESSMENT
PAIN_FUNCTIONAL_ASSESSMENT: 0-10

## 2024-04-06 ASSESSMENT — COGNITIVE AND FUNCTIONAL STATUS - GENERAL
CLIMB 3 TO 5 STEPS WITH RAILING: A LITTLE
DRESSING REGULAR UPPER BODY CLOTHING: A LITTLE
DRESSING REGULAR LOWER BODY CLOTHING: A LITTLE
MOVING TO AND FROM BED TO CHAIR: A LITTLE
DAILY ACTIVITIY SCORE: 19
HELP NEEDED FOR BATHING: A LITTLE
MOBILITY SCORE: 20
STANDING UP FROM CHAIR USING ARMS: A LITTLE
TOILETING: A LITTLE
PERSONAL GROOMING: A LITTLE
WALKING IN HOSPITAL ROOM: A LITTLE

## 2024-04-06 NOTE — PROGRESS NOTES
"Akua Almanzar is a 89 y.o. female on day 8 of admission presenting with Pneumonia of right lung due to infectious organism, unspecified part of lung.    Subjective   Patient is doing well.  Breathing is improving.  Eating better       Objective     Physical Exam  Vitals reviewed.   Constitutional:       Appearance: Normal appearance.   HENT:      Head: Normocephalic and atraumatic.      Right Ear: Tympanic membrane, ear canal and external ear normal.      Left Ear: Tympanic membrane, ear canal and external ear normal.      Nose: Nose normal.      Mouth/Throat:      Pharynx: Oropharynx is clear.   Eyes:      Extraocular Movements: Extraocular movements intact.      Conjunctiva/sclera: Conjunctivae normal.      Pupils: Pupils are equal, round, and reactive to light.   Cardiovascular:      Rate and Rhythm: Normal rate and regular rhythm.      Pulses: Normal pulses.      Heart sounds: Normal heart sounds.   Pulmonary:      Effort: Pulmonary effort is normal.      Breath sounds: Normal breath sounds.      Comments: Rales at the bases  Abdominal:      General: Abdomen is flat. Bowel sounds are normal.      Palpations: Abdomen is soft.   Musculoskeletal:      Cervical back: Normal range of motion and neck supple.   Skin:     General: Skin is warm and dry.   Neurological:      General: No focal deficit present.      Mental Status: She is alert and oriented to person, place, and time.   Psychiatric:         Mood and Affect: Mood normal.         Last Recorded Vitals  Blood pressure 140/87, pulse 77, temperature 37 °C (98.6 °F), temperature source Oral, resp. rate 20, height 1.549 m (5' 1\"), weight 73.6 kg (162 lb 3.2 oz), SpO2 97 %.  Intake/Output last 3 Shifts:  I/O last 3 completed shifts:  In: 950 (12.9 mL/kg) [P.O.:600; IV Piggyback:350]  Out: 4452 (60.5 mL/kg) [Urine:4450 (1.7 mL/kg/hr); Stool:2]  Weight: 73.6 kg     Relevant Results              Scheduled medications  acetaminophen, 650 mg, oral, q6h  acetylcysteine, " 3 mL, nebulization, 4x daily  aspirin, 81 mg, oral, Daily  clopidogrel, 75 mg, oral, Daily  enoxaparin, 40 mg, subcutaneous, Daily  furosemide, 40 mg, oral, Daily  gabapentin, 100 mg, oral, Nightly  ipratropium-albuteroL, 3 mL, nebulization, 4x daily  levothyroxine, 88 mcg, oral, Daily  oxygen, , inhalation, q8h  predniSONE, 10 mg, oral, BID  simvastatin, 20 mg, oral, Nightly  sodium chloride, 1 spray, Each Nostril, 4x daily      Continuous medications     PRN medications  PRN medications: albuterol, bisacodyl, guaiFENesin, hydrOXYzine HCL, lidocaine  Results for orders placed or performed during the hospital encounter of 03/29/24 (from the past 24 hour(s))   Comprehensive Metabolic Panel   Result Value Ref Range    Glucose 113 (H) 65 - 99 mg/dL    Sodium 138 133 - 145 mmol/L    Potassium 4.1 3.4 - 5.1 mmol/L    Chloride 96 (L) 97 - 107 mmol/L    Bicarbonate 37 (H) 24 - 31 mmol/L    Urea Nitrogen 15 8 - 25 mg/dL    Creatinine 0.80 0.40 - 1.60 mg/dL    eGFR 68 >60 mL/min/1.73m*2    Calcium 9.3 8.5 - 10.4 mg/dL    Albumin 3.2 (L) 3.5 - 5.0 g/dL    Alkaline Phosphatase 68 35 - 125 U/L    Total Protein 5.5 (L) 5.9 - 7.9 g/dL    AST 22 5 - 40 U/L    Bilirubin, Total 0.3 0.1 - 1.2 mg/dL    ALT 34 5 - 40 U/L    Anion Gap 5 <=19 mmol/L   CBC and Auto Differential   Result Value Ref Range    WBC 10.2 4.4 - 11.3 x10*3/uL    nRBC 0.0 0.0 - 0.0 /100 WBCs    RBC 3.94 (L) 4.00 - 5.20 x10*6/uL    Hemoglobin 11.4 (L) 12.0 - 16.0 g/dL    Hematocrit 34.1 (L) 36.0 - 46.0 %    MCV 87 80 - 100 fL    MCH 28.9 26.0 - 34.0 pg    MCHC 33.4 32.0 - 36.0 g/dL    RDW 15.1 (H) 11.5 - 14.5 %    Platelets 258 150 - 450 x10*3/uL    Neutrophils % 82.4 40.0 - 80.0 %    Immature Granulocytes %, Automated 1.5 (H) 0.0 - 0.9 %    Lymphocytes % 10.4 13.0 - 44.0 %    Monocytes % 5.3 2.0 - 10.0 %    Eosinophils % 0.3 0.0 - 6.0 %    Basophils % 0.1 0.0 - 2.0 %    Neutrophils Absolute 8.38 (H) 1.60 - 5.50 x10*3/uL    Immature Granulocytes Absolute, Automated  0.15 0.00 - 0.50 x10*3/uL    Lymphocytes Absolute 1.06 0.80 - 3.00 x10*3/uL    Monocytes Absolute 0.54 0.05 - 0.80 x10*3/uL    Eosinophils Absolute 0.03 0.00 - 0.40 x10*3/uL    Basophils Absolute 0.01 0.00 - 0.10 x10*3/uL                      Assessment/Plan   Principal Problem:    Pneumonia of right lung due to infectious organism, unspecified part of lung  Active Problems:    Atherosclerosis of coronary artery without angina pectoris    Hyperlipidemia    Primary hypertension    Acquired hypothyroidism    Acute on chronic diastolic (congestive) heart failure (CMS/HCC)    Acute on chronic respiratory failure (CMS/HCC)    Pneumonia improving  Continue antibiotic  CHF compensated  Blood pressure stable  Breathing is improving stable.  Down to 4 L  Hyponatremia resolved   Waiting for rehab placement    I spent  minutes in the professional and overall care of this patient.      Ivone Gilman MD

## 2024-04-06 NOTE — PROGRESS NOTES
Akua Almanzar is a 89 y.o. female on day 8 of admission presenting with Pneumonia of right lung due to infectious organism, unspecified part of lung.    Subjective   Symptoms (0 - 10, Best to Worst)  [place] Symptom Assessment System  Pain Score: 5 - Moderate pain  Patient sitting up in chair has been sitting up for a while even went to the bathroom with a walker on her own.  She is quite content with how well she is doing.  Remains on nasal cannula oxygen.  Greatest struggle is being hard of hearing.  Had not not had return of volume of the TV was excited to learn about adjusting the volume on the remote.  Overall feels ready to go to rehab.  Last night had not been interested in using BiPAP but is still doing well this morning without it.  No pain.  In good spirits.  UA growing yeast patient currently asymptomatic       Objective     Physical Exam  Constitutional:       General: Patient is in no acute distress.    HENT:      Head: Normocephalic.      Mouth: Mucous membranes are moist.   Eyes:      Conjunctiva/sclera: Conjunctivae clear, sclerae white. No discharge.     Pupils: Pupils are equal, round, and reactive to light.   Neck:      Vascular: No carotid bruit.   Cardiovascular:      Rate and Rhythm: Normal rate and irregular rhythm.  Heart rate 80s     Heart sounds: murmur heard.  Pulmonary:      Effort: accessory muscle use noted, tachypneic     Breath sounds: wheezing/Rales noted to bilateral lung bases, moist nonproductive cough  NC4L  Abdominal:      General: There is no distension.  Bowel sounds are present     Tenderness: There is no abdominal tenderness. There is no guarding.   Urology:     Genitalia: deferred  Musculoskeletal:         General: No deformity.   Skin:     Coloration: Skin is not jaundiced.  + trace edema BLE, left anterior shin with area of redness noted from urinary catheter tubing, increase sensitivity to touch bilateral lower extremities, no redness warmth or swelling  "noted  Neurological:      General: No focal deficit present.       Mental Status: oriented to person, place, and time.   Psychiatric:         Behavior: In good spirits today     Last Recorded Vitals  Blood pressure 134/76, pulse 87, temperature 37 °C (98.6 °F), temperature source Oral, resp. rate 20, height 1.549 m (5' 1\"), weight 73.6 kg (162 lb 3.2 oz), SpO2 95 %.  Intake/Output last 3 Shifts:  I/O last 3 completed shifts:  In: 950 (12.9 mL/kg) [P.O.:600; IV Piggyback:350]  Out: 4452 (60.5 mL/kg) [Urine:4450 (1.7 mL/kg/hr); Stool:2]  Weight: 73.6 kg     Relevant Results  Results for orders placed or performed during the hospital encounter of 03/29/24 (from the past 24 hour(s))   Comprehensive Metabolic Panel   Result Value Ref Range    Glucose 113 (H) 65 - 99 mg/dL    Sodium 138 133 - 145 mmol/L    Potassium 4.1 3.4 - 5.1 mmol/L    Chloride 96 (L) 97 - 107 mmol/L    Bicarbonate 37 (H) 24 - 31 mmol/L    Urea Nitrogen 15 8 - 25 mg/dL    Creatinine 0.80 0.40 - 1.60 mg/dL    eGFR 68 >60 mL/min/1.73m*2    Calcium 9.3 8.5 - 10.4 mg/dL    Albumin 3.2 (L) 3.5 - 5.0 g/dL    Alkaline Phosphatase 68 35 - 125 U/L    Total Protein 5.5 (L) 5.9 - 7.9 g/dL    AST 22 5 - 40 U/L    Bilirubin, Total 0.3 0.1 - 1.2 mg/dL    ALT 34 5 - 40 U/L    Anion Gap 5 <=19 mmol/L   CBC and Auto Differential   Result Value Ref Range    WBC 10.2 4.4 - 11.3 x10*3/uL    nRBC 0.0 0.0 - 0.0 /100 WBCs    RBC 3.94 (L) 4.00 - 5.20 x10*6/uL    Hemoglobin 11.4 (L) 12.0 - 16.0 g/dL    Hematocrit 34.1 (L) 36.0 - 46.0 %    MCV 87 80 - 100 fL    MCH 28.9 26.0 - 34.0 pg    MCHC 33.4 32.0 - 36.0 g/dL    RDW 15.1 (H) 11.5 - 14.5 %    Platelets 258 150 - 450 x10*3/uL    Neutrophils % 82.4 40.0 - 80.0 %    Immature Granulocytes %, Automated 1.5 (H) 0.0 - 0.9 %    Lymphocytes % 10.4 13.0 - 44.0 %    Monocytes % 5.3 2.0 - 10.0 %    Eosinophils % 0.3 0.0 - 6.0 %    Basophils % 0.1 0.0 - 2.0 %    Neutrophils Absolute 8.38 (H) 1.60 - 5.50 x10*3/uL    Immature " Granulocytes Absolute, Automated 0.15 0.00 - 0.50 x10*3/uL    Lymphocytes Absolute 1.06 0.80 - 3.00 x10*3/uL    Monocytes Absolute 0.54 0.05 - 0.80 x10*3/uL    Eosinophils Absolute 0.03 0.00 - 0.40 x10*3/uL    Basophils Absolute 0.01 0.00 - 0.10 x10*3/uL         Assessment/Plan   IMP:  Acute on chronic hypoxic and hypercapnic respiratory failure  -ABGs showing hypercapnia   - Down to NC4L, was on home oxygen after 2/2024 hospitalization     2.   Pneumonia  - Currently on Zosyn     3.   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 Left ventricular systolic function is normal.   2. Spectral Doppler shows an impaired relaxation pattern of left ventricular diastolic filling.   3. There is moderate concentric left ventricular hypertrophy.   4. The left atrium is moderately dilated.   5. The mitral valve is moderately thickened.   6. There is moderate mitral annular calcification.   7. There is moderate to severe calcification of the anterior and posterior mitral valve leaflets.   8. Mild to moderate tricuspid regurgitation.   9. Moderate to severely elevated right ventricular systolic pressure.   - ProBNP 1,489     4.   TME  - suspect r/t respiratory status, resolved     5.   Electrolyte imbalance  - hyponatremia, resolved      6.   Anemia  - of chronic disease     7.   Palliative care   DNR CCA DNI  -The patient is a capable decision maker   - DPOA - HC filled out where Ray is primary and Anitra is secondary. This is in hard chart.   -The patient has 4 children.   Derek Almanzar - Westley [facility] - lives out of town  Ginger Almanzar - lives out of town  Derek and Anitra have been primary contacts.      Remains in disease-modifying mode  Urine culture growing yeast but patient asymptomatic at this time  Patient in good mood able to tolerate activities of daily living with nasal cannula walking with walker able to independently go to  bathroom  Remains hard of hearing at baseline  Feels she is ready to go to skilled nursing, wants to see how well she can get and then will make decisions on whether to [facility] or not  No further palliative care needs  Palliative care signing off       I spent 40 minutes in the professional and overall care of this patient.      Sulema Galvin, APRN-CNP

## 2024-04-07 LAB
ANION GAP SERPL CALC-SCNC: 5 MMOL/L
BUN SERPL-MCNC: 16 MG/DL (ref 8–25)
CALCIUM SERPL-MCNC: 9.2 MG/DL (ref 8.5–10.4)
CHLORIDE SERPL-SCNC: 97 MMOL/L (ref 97–107)
CO2 SERPL-SCNC: 35 MMOL/L (ref 24–31)
CREAT SERPL-MCNC: 0.7 MG/DL (ref 0.4–1.6)
EGFRCR SERPLBLD CKD-EPI 2021: 80 ML/MIN/1.73M*2
ERYTHROCYTE [DISTWIDTH] IN BLOOD BY AUTOMATED COUNT: 15.4 % (ref 11.5–14.5)
GLUCOSE SERPL-MCNC: 110 MG/DL (ref 65–99)
HCT VFR BLD AUTO: 33.3 % (ref 36–46)
HGB BLD-MCNC: 10.8 G/DL (ref 12–16)
MCH RBC QN AUTO: 28.6 PG (ref 26–34)
MCHC RBC AUTO-ENTMCNC: 32.4 G/DL (ref 32–36)
MCV RBC AUTO: 88 FL (ref 80–100)
NRBC BLD-RTO: 0 /100 WBCS (ref 0–0)
PLATELET # BLD AUTO: 274 X10*3/UL (ref 150–450)
POTASSIUM SERPL-SCNC: 4.4 MMOL/L (ref 3.4–5.1)
RBC # BLD AUTO: 3.78 X10*6/UL (ref 4–5.2)
SODIUM SERPL-SCNC: 137 MMOL/L (ref 133–145)
WBC # BLD AUTO: 11.5 X10*3/UL (ref 4.4–11.3)

## 2024-04-07 PROCEDURE — 2500000002 HC RX 250 W HCPCS SELF ADMINISTERED DRUGS (ALT 637 FOR MEDICARE OP, ALT 636 FOR OP/ED): Mod: MUE | Performed by: INTERNAL MEDICINE

## 2024-04-07 PROCEDURE — 2500000002 HC RX 250 W HCPCS SELF ADMINISTERED DRUGS (ALT 637 FOR MEDICARE OP, ALT 636 FOR OP/ED): Performed by: INTERNAL MEDICINE

## 2024-04-07 PROCEDURE — 2500000004 HC RX 250 GENERAL PHARMACY W/ HCPCS (ALT 636 FOR OP/ED): Performed by: INTERNAL MEDICINE

## 2024-04-07 PROCEDURE — 80048 BASIC METABOLIC PNL TOTAL CA: CPT | Performed by: INTERNAL MEDICINE

## 2024-04-07 PROCEDURE — 99232 SBSQ HOSP IP/OBS MODERATE 35: CPT | Performed by: INTERNAL MEDICINE

## 2024-04-07 PROCEDURE — 2500000005 HC RX 250 GENERAL PHARMACY W/O HCPCS: Performed by: INTERNAL MEDICINE

## 2024-04-07 PROCEDURE — 9420000001 HC RT PATIENT EDUCATION 5 MIN

## 2024-04-07 PROCEDURE — 1100000001 HC PRIVATE ROOM DAILY

## 2024-04-07 PROCEDURE — 36415 COLL VENOUS BLD VENIPUNCTURE: CPT | Performed by: INTERNAL MEDICINE

## 2024-04-07 PROCEDURE — 2500000001 HC RX 250 WO HCPCS SELF ADMINISTERED DRUGS (ALT 637 FOR MEDICARE OP): Performed by: INTERNAL MEDICINE

## 2024-04-07 PROCEDURE — 94668 MNPJ CHEST WALL SBSQ: CPT

## 2024-04-07 PROCEDURE — 94660 CPAP INITIATION&MGMT: CPT

## 2024-04-07 PROCEDURE — 94667 MNPJ CHEST WALL 1ST: CPT

## 2024-04-07 PROCEDURE — 94760 N-INVAS EAR/PLS OXIMETRY 1: CPT

## 2024-04-07 PROCEDURE — 2500000004 HC RX 250 GENERAL PHARMACY W/ HCPCS (ALT 636 FOR OP/ED): Mod: MUE | Performed by: INTERNAL MEDICINE

## 2024-04-07 PROCEDURE — 94640 AIRWAY INHALATION TREATMENT: CPT

## 2024-04-07 PROCEDURE — 85027 COMPLETE CBC AUTOMATED: CPT | Performed by: INTERNAL MEDICINE

## 2024-04-07 RX ORDER — ALBUTEROL SULFATE 0.83 MG/ML
2.5 SOLUTION RESPIRATORY (INHALATION) EVERY 6 HOURS PRN
Status: DISCONTINUED | OUTPATIENT
Start: 2024-04-07 | End: 2024-04-15 | Stop reason: HOSPADM

## 2024-04-07 RX ADMIN — Medication: at 15:00

## 2024-04-07 RX ADMIN — IPRATROPIUM BROMIDE AND ALBUTEROL SULFATE 3 ML: 2.5; .5 SOLUTION RESPIRATORY (INHALATION) at 19:41

## 2024-04-07 RX ADMIN — SIMVASTATIN 20 MG: 20 TABLET, FILM COATED ORAL at 20:45

## 2024-04-07 RX ADMIN — PREDNISONE 10 MG: 10 TABLET ORAL at 18:11

## 2024-04-07 RX ADMIN — PREDNISONE 10 MG: 10 TABLET ORAL at 06:00

## 2024-04-07 RX ADMIN — ACETAMINOPHEN 650 MG: 325 TABLET ORAL at 18:11

## 2024-04-07 RX ADMIN — ACETYLCYSTEINE 600 MG: 200 SOLUTION ORAL; RESPIRATORY (INHALATION) at 11:54

## 2024-04-07 RX ADMIN — Medication: at 07:00

## 2024-04-07 RX ADMIN — ASPIRIN 81 MG: 81 TABLET, COATED ORAL at 09:40

## 2024-04-07 RX ADMIN — CLOPIDOGREL BISULFATE 75 MG: 75 TABLET ORAL at 09:40

## 2024-04-07 RX ADMIN — ENOXAPARIN SODIUM 40 MG: 40 INJECTION SUBCUTANEOUS at 05:59

## 2024-04-07 RX ADMIN — IPRATROPIUM BROMIDE AND ALBUTEROL SULFATE 3 ML: 2.5; .5 SOLUTION RESPIRATORY (INHALATION) at 11:54

## 2024-04-07 RX ADMIN — HYDROXYZINE HYDROCHLORIDE 10 MG: 10 TABLET ORAL at 10:06

## 2024-04-07 RX ADMIN — FUROSEMIDE 40 MG: 40 TABLET ORAL at 09:41

## 2024-04-07 RX ADMIN — ACETYLCYSTEINE 800 MG: 200 SOLUTION ORAL; RESPIRATORY (INHALATION) at 19:41

## 2024-04-07 RX ADMIN — LEVOTHYROXINE SODIUM 88 MCG: 0.09 TABLET ORAL at 06:00

## 2024-04-07 RX ADMIN — Medication 1 SPRAY: at 20:47

## 2024-04-07 RX ADMIN — Medication 1 SPRAY: at 07:00

## 2024-04-07 RX ADMIN — GABAPENTIN 100 MG: 100 CAPSULE ORAL at 20:45

## 2024-04-07 ASSESSMENT — COGNITIVE AND FUNCTIONAL STATUS - GENERAL
MOBILITY SCORE: 20
DAILY ACTIVITIY SCORE: 19
WALKING IN HOSPITAL ROOM: A LITTLE
CLIMB 3 TO 5 STEPS WITH RAILING: A LITTLE
STANDING UP FROM CHAIR USING ARMS: A LITTLE
MOVING TO AND FROM BED TO CHAIR: A LITTLE
PERSONAL GROOMING: A LITTLE
TOILETING: A LITTLE
DRESSING REGULAR UPPER BODY CLOTHING: A LITTLE
HELP NEEDED FOR BATHING: A LITTLE
DRESSING REGULAR LOWER BODY CLOTHING: A LITTLE

## 2024-04-07 ASSESSMENT — PAIN SCALES - GENERAL
PAINLEVEL_OUTOF10: 0 - NO PAIN
PAINLEVEL_OUTOF10: 0 - NO PAIN

## 2024-04-07 ASSESSMENT — PAIN - FUNCTIONAL ASSESSMENT: PAIN_FUNCTIONAL_ASSESSMENT: 0-10

## 2024-04-07 NOTE — PROGRESS NOTES
"Akua Almanzar is a 89 y.o. female on day 9 of admission presenting with Pneumonia of right lung due to infectious organism, unspecified part of lung.    Subjective   Patient had episode of cough and congestion this morning       Objective     Physical Exam  Vitals reviewed.   Constitutional:       Appearance: Normal appearance.   HENT:      Head: Normocephalic and atraumatic.      Right Ear: Tympanic membrane, ear canal and external ear normal.      Left Ear: Tympanic membrane, ear canal and external ear normal.      Nose: Nose normal.      Mouth/Throat:      Pharynx: Oropharynx is clear.   Eyes:      Extraocular Movements: Extraocular movements intact.      Conjunctiva/sclera: Conjunctivae normal.      Pupils: Pupils are equal, round, and reactive to light.   Cardiovascular:      Rate and Rhythm: Normal rate and regular rhythm.      Pulses: Normal pulses.      Heart sounds: Normal heart sounds.   Pulmonary:      Effort: Pulmonary effort is normal.      Breath sounds: Rhonchi and rales present.   Abdominal:      General: Abdomen is flat. Bowel sounds are normal.      Palpations: Abdomen is soft.   Musculoskeletal:      Cervical back: Normal range of motion and neck supple.   Skin:     General: Skin is warm and dry.   Neurological:      General: No focal deficit present.      Mental Status: She is alert and oriented to person, place, and time.   Psychiatric:         Mood and Affect: Mood normal.         Last Recorded Vitals  Blood pressure 116/87, pulse 77, temperature 37 °C (98.6 °F), temperature source Oral, resp. rate 20, height 1.549 m (5' 1\"), weight 73.6 kg (162 lb 3.2 oz), SpO2 97 %.  Intake/Output last 3 Shifts:  I/O last 3 completed shifts:  In: 680 (9.2 mL/kg) [P.O.:680]  Out: 3502 (47.6 mL/kg) [Urine:3502 (1.3 mL/kg/hr)]  Weight: 73.6 kg     Relevant Results              Scheduled medications  acetaminophen, 650 mg, oral, q6h  acetylcysteine, 3 mL, nebulization, 4x daily  aspirin, 81 mg, oral, " Daily  clopidogrel, 75 mg, oral, Daily  enoxaparin, 40 mg, subcutaneous, Daily  furosemide, 40 mg, oral, Daily  gabapentin, 100 mg, oral, Nightly  ipratropium-albuteroL, 3 mL, nebulization, 4x daily  levothyroxine, 88 mcg, oral, Daily  oxygen, , inhalation, q8h  predniSONE, 10 mg, oral, BID  simvastatin, 20 mg, oral, Nightly  sodium chloride, 1 spray, Each Nostril, 4x daily      Continuous medications     PRN medications  PRN medications: albuterol, bisacodyl, guaiFENesin, hydrOXYzine HCL, lidocaine  Results for orders placed or performed during the hospital encounter of 03/29/24 (from the past 24 hour(s))   CBC   Result Value Ref Range    WBC 11.5 (H) 4.4 - 11.3 x10*3/uL    nRBC 0.0 0.0 - 0.0 /100 WBCs    RBC 3.78 (L) 4.00 - 5.20 x10*6/uL    Hemoglobin 10.8 (L) 12.0 - 16.0 g/dL    Hematocrit 33.3 (L) 36.0 - 46.0 %    MCV 88 80 - 100 fL    MCH 28.6 26.0 - 34.0 pg    MCHC 32.4 32.0 - 36.0 g/dL    RDW 15.4 (H) 11.5 - 14.5 %    Platelets 274 150 - 450 x10*3/uL   Basic Metabolic Panel   Result Value Ref Range    Glucose 110 (H) 65 - 99 mg/dL    Sodium 137 133 - 145 mmol/L    Potassium 4.4 3.4 - 5.1 mmol/L    Chloride 97 97 - 107 mmol/L    Bicarbonate 35 (H) 24 - 31 mmol/L    Urea Nitrogen 16 8 - 25 mg/dL    Creatinine 0.70 0.40 - 1.60 mg/dL    eGFR 80 >60 mL/min/1.73m*2    Calcium 9.2 8.5 - 10.4 mg/dL    Anion Gap 5 <=19 mmol/L     No results found.                 Assessment/Plan   Principal Problem:    Pneumonia of right lung due to infectious organism, unspecified part of lung  Active Problems:    Atherosclerosis of coronary artery without angina pectoris    Hyperlipidemia    Primary hypertension    Acquired hypothyroidism    Acute on chronic diastolic (congestive) heart failure (CMS/HCC)    Acute on chronic respiratory failure (CMS/HCC)    Encourage patient to do flutter valve and incentive spirometer  Add albuterol inhaler as needed  place fluid restriction  Continue Lasix  Blood pressure stable         I spent   minutes in the professional and overall care of this patient.      Ivone Gilman MD

## 2024-04-08 LAB
ANION GAP SERPL CALC-SCNC: 5 MMOL/L
BACTERIA BLD CULT: NORMAL
BACTERIA BLD CULT: NORMAL
BUN SERPL-MCNC: 17 MG/DL (ref 8–25)
CALCIUM SERPL-MCNC: 9.6 MG/DL (ref 8.5–10.4)
CHLORIDE SERPL-SCNC: 97 MMOL/L (ref 97–107)
CO2 SERPL-SCNC: 33 MMOL/L (ref 24–31)
CREAT SERPL-MCNC: 0.6 MG/DL (ref 0.4–1.6)
EGFRCR SERPLBLD CKD-EPI 2021: 83 ML/MIN/1.73M*2
ERYTHROCYTE [DISTWIDTH] IN BLOOD BY AUTOMATED COUNT: 15.5 % (ref 11.5–14.5)
GLUCOSE SERPL-MCNC: 105 MG/DL (ref 65–99)
HCT VFR BLD AUTO: 37.1 % (ref 36–46)
HGB BLD-MCNC: 12 G/DL (ref 12–16)
MCH RBC QN AUTO: 28 PG (ref 26–34)
MCHC RBC AUTO-ENTMCNC: 32.3 G/DL (ref 32–36)
MCV RBC AUTO: 87 FL (ref 80–100)
NRBC BLD-RTO: 0 /100 WBCS (ref 0–0)
PLATELET # BLD AUTO: 306 X10*3/UL (ref 150–450)
POTASSIUM SERPL-SCNC: 4.4 MMOL/L (ref 3.4–5.1)
RBC # BLD AUTO: 4.28 X10*6/UL (ref 4–5.2)
SODIUM SERPL-SCNC: 135 MMOL/L (ref 133–145)
WBC # BLD AUTO: 13.1 X10*3/UL (ref 4.4–11.3)

## 2024-04-08 PROCEDURE — 2500000002 HC RX 250 W HCPCS SELF ADMINISTERED DRUGS (ALT 637 FOR MEDICARE OP, ALT 636 FOR OP/ED): Performed by: INTERNAL MEDICINE

## 2024-04-08 PROCEDURE — 2500000004 HC RX 250 GENERAL PHARMACY W/ HCPCS (ALT 636 FOR OP/ED): Performed by: INTERNAL MEDICINE

## 2024-04-08 PROCEDURE — 97116 GAIT TRAINING THERAPY: CPT | Mod: GP

## 2024-04-08 PROCEDURE — 2500000002 HC RX 250 W HCPCS SELF ADMINISTERED DRUGS (ALT 637 FOR MEDICARE OP, ALT 636 FOR OP/ED): Mod: MUE | Performed by: INTERNAL MEDICINE

## 2024-04-08 PROCEDURE — 2500000001 HC RX 250 WO HCPCS SELF ADMINISTERED DRUGS (ALT 637 FOR MEDICARE OP): Performed by: INTERNAL MEDICINE

## 2024-04-08 PROCEDURE — 97110 THERAPEUTIC EXERCISES: CPT | Mod: GP

## 2024-04-08 PROCEDURE — 99231 SBSQ HOSP IP/OBS SF/LOW 25: CPT | Performed by: INTERNAL MEDICINE

## 2024-04-08 PROCEDURE — 80048 BASIC METABOLIC PNL TOTAL CA: CPT | Performed by: INTERNAL MEDICINE

## 2024-04-08 PROCEDURE — 2500000005 HC RX 250 GENERAL PHARMACY W/O HCPCS: Performed by: INTERNAL MEDICINE

## 2024-04-08 PROCEDURE — 94760 N-INVAS EAR/PLS OXIMETRY 1: CPT

## 2024-04-08 PROCEDURE — 97535 SELF CARE MNGMENT TRAINING: CPT | Mod: GO,CO

## 2024-04-08 PROCEDURE — 94664 DEMO&/EVAL PT USE INHALER: CPT

## 2024-04-08 PROCEDURE — 36415 COLL VENOUS BLD VENIPUNCTURE: CPT | Performed by: INTERNAL MEDICINE

## 2024-04-08 PROCEDURE — 94660 CPAP INITIATION&MGMT: CPT

## 2024-04-08 PROCEDURE — 9420000001 HC RT PATIENT EDUCATION 5 MIN

## 2024-04-08 PROCEDURE — 94668 MNPJ CHEST WALL SBSQ: CPT

## 2024-04-08 PROCEDURE — 2500000004 HC RX 250 GENERAL PHARMACY W/ HCPCS (ALT 636 FOR OP/ED): Mod: MUE | Performed by: INTERNAL MEDICINE

## 2024-04-08 PROCEDURE — 94640 AIRWAY INHALATION TREATMENT: CPT

## 2024-04-08 PROCEDURE — 85027 COMPLETE CBC AUTOMATED: CPT | Performed by: INTERNAL MEDICINE

## 2024-04-08 PROCEDURE — 1100000001 HC PRIVATE ROOM DAILY

## 2024-04-08 RX ADMIN — ACETYLCYSTEINE 600 MG: 200 SOLUTION ORAL; RESPIRATORY (INHALATION) at 07:19

## 2024-04-08 RX ADMIN — LEVOTHYROXINE SODIUM 88 MCG: 0.09 TABLET ORAL at 06:23

## 2024-04-08 RX ADMIN — ACETAMINOPHEN 650 MG: 325 TABLET ORAL at 06:24

## 2024-04-08 RX ADMIN — IPRATROPIUM BROMIDE AND ALBUTEROL SULFATE 3 ML: 2.5; .5 SOLUTION RESPIRATORY (INHALATION) at 15:50

## 2024-04-08 RX ADMIN — IPRATROPIUM BROMIDE AND ALBUTEROL SULFATE 3 ML: 2.5; .5 SOLUTION RESPIRATORY (INHALATION) at 19:59

## 2024-04-08 RX ADMIN — PREDNISONE 10 MG: 10 TABLET ORAL at 17:08

## 2024-04-08 RX ADMIN — GABAPENTIN 100 MG: 100 CAPSULE ORAL at 21:31

## 2024-04-08 RX ADMIN — Medication: at 15:00

## 2024-04-08 RX ADMIN — ASPIRIN 81 MG: 81 TABLET, COATED ORAL at 09:02

## 2024-04-08 RX ADMIN — CLOPIDOGREL BISULFATE 75 MG: 75 TABLET ORAL at 09:02

## 2024-04-08 RX ADMIN — IPRATROPIUM BROMIDE AND ALBUTEROL SULFATE 3 ML: 2.5; .5 SOLUTION RESPIRATORY (INHALATION) at 12:14

## 2024-04-08 RX ADMIN — ACETAMINOPHEN 650 MG: 325 TABLET ORAL at 17:08

## 2024-04-08 RX ADMIN — IPRATROPIUM BROMIDE AND ALBUTEROL SULFATE 3 ML: 2.5; .5 SOLUTION RESPIRATORY (INHALATION) at 07:19

## 2024-04-08 RX ADMIN — ACETYLCYSTEINE 600 MG: 200 SOLUTION ORAL; RESPIRATORY (INHALATION) at 19:59

## 2024-04-08 RX ADMIN — ACETYLCYSTEINE 600 MG: 200 SOLUTION ORAL; RESPIRATORY (INHALATION) at 15:50

## 2024-04-08 RX ADMIN — ACETYLCYSTEINE 600 MG: 200 SOLUTION ORAL; RESPIRATORY (INHALATION) at 12:14

## 2024-04-08 RX ADMIN — FUROSEMIDE 40 MG: 40 TABLET ORAL at 09:02

## 2024-04-08 RX ADMIN — Medication: at 07:00

## 2024-04-08 RX ADMIN — Medication 1 SPRAY: at 21:32

## 2024-04-08 RX ADMIN — SIMVASTATIN 20 MG: 20 TABLET, FILM COATED ORAL at 21:31

## 2024-04-08 RX ADMIN — PREDNISONE 10 MG: 10 TABLET ORAL at 06:23

## 2024-04-08 RX ADMIN — ENOXAPARIN SODIUM 40 MG: 40 INJECTION SUBCUTANEOUS at 06:23

## 2024-04-08 ASSESSMENT — COGNITIVE AND FUNCTIONAL STATUS - GENERAL
MOBILITY SCORE: 17
DAILY ACTIVITIY SCORE: 20
DRESSING REGULAR LOWER BODY CLOTHING: A LITTLE
MOVING FROM LYING ON BACK TO SITTING ON SIDE OF FLAT BED WITH BEDRAILS: A LITTLE
WALKING IN HOSPITAL ROOM: A LITTLE
CLIMB 3 TO 5 STEPS WITH RAILING: A LOT
HELP NEEDED FOR BATHING: A LITTLE
WALKING IN HOSPITAL ROOM: A LITTLE
STANDING UP FROM CHAIR USING ARMS: A LITTLE
MOVING FROM LYING ON BACK TO SITTING ON SIDE OF FLAT BED WITH BEDRAILS: A LITTLE
TOILETING: A LITTLE
CLIMB 3 TO 5 STEPS WITH RAILING: A LOT
MOVING TO AND FROM BED TO CHAIR: A LITTLE
MOVING TO AND FROM BED TO CHAIR: A LITTLE
DRESSING REGULAR UPPER BODY CLOTHING: A LITTLE
DRESSING REGULAR UPPER BODY CLOTHING: A LITTLE
PERSONAL GROOMING: A LITTLE
DAILY ACTIVITIY SCORE: 19
TURNING FROM BACK TO SIDE WHILE IN FLAT BAD: A LITTLE
MOBILITY SCORE: 17
DRESSING REGULAR LOWER BODY CLOTHING: A LITTLE
STANDING UP FROM CHAIR USING ARMS: A LITTLE
TURNING FROM BACK TO SIDE WHILE IN FLAT BAD: A LITTLE
HELP NEEDED FOR BATHING: A LITTLE
TOILETING: A LITTLE

## 2024-04-08 ASSESSMENT — PAIN SCALES - GENERAL
PAINLEVEL_OUTOF10: 1
PAINLEVEL_OUTOF10: 0 - NO PAIN

## 2024-04-08 ASSESSMENT — PAIN - FUNCTIONAL ASSESSMENT
PAIN_FUNCTIONAL_ASSESSMENT: 0-10

## 2024-04-08 ASSESSMENT — ACTIVITIES OF DAILY LIVING (ADL)
BATHING_LEVEL_OF_ASSISTANCE: CLOSE SUPERVISION
BATHING_WHERE_ASSESSED: SITTING SINKSIDE

## 2024-04-08 NOTE — PROGRESS NOTES
Music Therapy Note    Akua FOSTER Cross     Therapy Session  Referral Type: New referral this admission  Visit Type: New visit  Session Start Time: 1330  Conflict of Service: Working with other staff               Treatment/Interventions            Narrative  Follow-up: MT will follow-up if Pt remains admitted.    Education Documentation  No documentation found.

## 2024-04-08 NOTE — PROGRESS NOTES
04/08/24 1739   Discharge Planning   Living Arrangements Children   Support Systems Family members;Children   Assistance Needed assistance with ADLs and mobility;Pt presents up in chair with armrests on 3 L of supplemental o2 via NC with alarm on. Pt pleasant and eager to participate in therapy. No SOB reported at start of session with vitals WNL.   Type of Residence Private residence   Home or Post Acute Services Post acute facilities (Rehab/SNF/etc)   Type of Post Acute Facility Services Rehab;Skilled nursing   Type of Home Care Services DME or oxygen   Patient expects to be discharged to: Skilled rehab; patient was accepted to [facility], will need a precert, was started by facility   Does the patient need discharge transport arranged? Yes   RoundTrip coordination needed? Yes   Has discharge transport been arranged? No     2 facilities that daughter, Anitra wanted, declined due to the insurance. Made a referral to [place] and was accepted and asked to start precert.     PLAN: Discharge to [facility], precert started 4/8

## 2024-04-08 NOTE — PROGRESS NOTES
"Akua Almanzar is a 89 y.o. female on day 10 of admission presenting with Pneumonia of right lung due to infectious organism, unspecified part of lung.    Subjective   Breathing is better.  Down to nasal cannula 3 L       Objective     Physical Exam  Vitals reviewed.   Constitutional:       Appearance: Normal appearance.   HENT:      Head: Normocephalic and atraumatic.      Right Ear: Tympanic membrane, ear canal and external ear normal.      Left Ear: Tympanic membrane, ear canal and external ear normal.      Nose: Nose normal.      Mouth/Throat:      Pharynx: Oropharynx is clear.   Eyes:      Extraocular Movements: Extraocular movements intact.      Conjunctiva/sclera: Conjunctivae normal.      Pupils: Pupils are equal, round, and reactive to light.   Cardiovascular:      Rate and Rhythm: Normal rate and regular rhythm.      Pulses: Normal pulses.      Heart sounds: Normal heart sounds.   Pulmonary:      Effort: Pulmonary effort is normal.      Breath sounds: Rales present.   Abdominal:      General: Abdomen is flat. Bowel sounds are normal.      Palpations: Abdomen is soft.   Musculoskeletal:      Cervical back: Normal range of motion and neck supple.   Skin:     General: Skin is warm and dry.   Neurological:      General: No focal deficit present.      Mental Status: She is alert and oriented to person, place, and time.   Psychiatric:         Mood and Affect: Mood normal.         Last Recorded Vitals  Blood pressure 130/66, pulse 75, temperature 36.8 °C (98.2 °F), temperature source Oral, resp. rate 20, height 1.549 m (5' 1\"), weight 73.6 kg (162 lb 3.2 oz), SpO2 97 %.  Intake/Output last 3 Shifts:  I/O last 3 completed shifts:  In: 1040 (14.1 mL/kg) [P.O.:1040]  Out: 203 (2.8 mL/kg) [Urine:202 (0.1 mL/kg/hr); Stool:1]  Weight: 73.6 kg     Relevant Results              Scheduled medications  acetaminophen, 650 mg, oral, q6h  acetylcysteine, 3 mL, nebulization, 4x daily  aspirin, 81 mg, oral, Daily  clopidogrel, " 75 mg, oral, Daily  enoxaparin, 40 mg, subcutaneous, Daily  furosemide, 40 mg, oral, Daily  gabapentin, 100 mg, oral, Nightly  ipratropium-albuteroL, 3 mL, nebulization, 4x daily  levothyroxine, 88 mcg, oral, Daily  oxygen, , inhalation, q8h  predniSONE, 10 mg, oral, BID  simvastatin, 20 mg, oral, Nightly  sodium chloride, 1 spray, Each Nostril, 4x daily      Continuous medications     PRN medications  PRN medications: albuterol, bisacodyl, guaiFENesin, hydrOXYzine HCL, lidocaine  Results for orders placed or performed during the hospital encounter of 03/29/24 (from the past 24 hour(s))   CBC   Result Value Ref Range    WBC 13.1 (H) 4.4 - 11.3 x10*3/uL    nRBC 0.0 0.0 - 0.0 /100 WBCs    RBC 4.28 4.00 - 5.20 x10*6/uL    Hemoglobin 12.0 12.0 - 16.0 g/dL    Hematocrit 37.1 36.0 - 46.0 %    MCV 87 80 - 100 fL    MCH 28.0 26.0 - 34.0 pg    MCHC 32.3 32.0 - 36.0 g/dL    RDW 15.5 (H) 11.5 - 14.5 %    Platelets 306 150 - 450 x10*3/uL   Basic Metabolic Panel   Result Value Ref Range    Glucose 105 (H) 65 - 99 mg/dL    Sodium 135 133 - 145 mmol/L    Potassium 4.4 3.4 - 5.1 mmol/L    Chloride 97 97 - 107 mmol/L    Bicarbonate 33 (H) 24 - 31 mmol/L    Urea Nitrogen 17 8 - 25 mg/dL    Creatinine 0.60 0.40 - 1.60 mg/dL    eGFR 83 >60 mL/min/1.73m*2    Calcium 9.6 8.5 - 10.4 mg/dL    Anion Gap 5 <=19 mmol/L     No results found.                 Assessment/Plan   Principal Problem:    Pneumonia of right lung due to infectious organism, unspecified part of lung  Active Problems:    Atherosclerosis of coronary artery without angina pectoris    Hyperlipidemia    Primary hypertension    Acquired hypothyroidism    Acute on chronic diastolic (congestive) heart failure (CMS/HCC)    Acute on chronic respiratory failure (CMS/HCC)    Clinically improving with pneumonia  Acute respiratory failure improving  Continue fluid restriction and diuretics for CHF  Waiting for rehab placement       I spent  minutes in the professional and overall care  of this patient.      Ivone Gilman MD

## 2024-04-08 NOTE — CARE PLAN
The patient's goals for the shift include  comfort and safety, work with therapy, rest.     The clinical goals for the shift include Monitor labs and VS, decrease O2 demands, maintain safety, no falls, promote rest, IV antibiotic therapy.    No barriers to meeting these goals.       Problem: Pain  Goal: Takes deep breaths with improved pain control throughout the shift  Outcome: Progressing  Goal: Turns in bed with improved pain control throughout the shift  Outcome: Progressing  Goal: Walks with improved pain control throughout the shift  Outcome: Progressing  Goal: Performs ADL's with improved pain control throughout shift  Outcome: Progressing  Goal: Participates in PT with improved pain control throughout the shift  Outcome: Progressing     Problem: Pain - Adult  Goal: Verbalizes/displays adequate comfort level or baseline comfort level  Outcome: Progressing     Problem: Safety - Adult  Goal: Free from fall injury  Outcome: Progressing     Problem: Discharge Planning  Goal: Discharge to home or other facility with appropriate resources  Outcome: Progressing     Problem: Chronic Conditions and Co-morbidities  Goal: Patient's chronic conditions and co-morbidity symptoms are monitored and maintained or improved  Outcome: Progressing     Problem: Respiratory  Goal: Minimize anxiety/maximize coping throughout shift  Outcome: Progressing  Goal: Minimal/no exertional discomfort or dyspnea this shift  Outcome: Progressing  Goal: No signs of respiratory distress (eg. Use of accessory muscles. Peds grunting)  Outcome: Progressing  Goal: Verbalize decreased shortness of breath this shift  Outcome: Progressing  Goal: Wean oxygen to maintain O2 saturation per order/standard this shift  Outcome: Progressing     Problem: Heart Failure  Goal: Improved gas exchange this shift  Outcome: Progressing  Goal: Improved urinary output this shift  Outcome: Progressing  Goal: Reduction in peripheral edema within 24 hours  Outcome:  Progressing  Goal: Report improvement of dyspnea/breathlessness this shift  Outcome: Progressing  Goal: Weight from fluid excess reduced over 2-3 days, then stabilize  Outcome: Progressing  Goal: Increase self care and/or family involvement in 24 hours  Outcome: Progressing     Problem: Skin  Goal: Participates in plan/prevention/treatment measures  Outcome: Progressing  Goal: Prevent/manage excess moisture  Outcome: Progressing  Goal: Prevent/minimize sheer/friction injuries  Outcome: Progressing  Goal: Promote/optimize nutrition  Outcome: Progressing     Problem: Fall/Injury  Goal: Not fall by end of shift  Outcome: Progressing  Goal: Be free from injury by end of the shift  Outcome: Progressing  Goal: Verbalize understanding of personal risk factors for fall in the hospital  Outcome: Progressing  Goal: Verbalize understanding of risk factor reduction measures to prevent injury from fall in the home  Outcome: Progressing  Goal: Use assistive devices by end of the shift  Outcome: Progressing  Goal: Pace activities to prevent fatigue by end of the shift  Outcome: Progressing

## 2024-04-08 NOTE — PROGRESS NOTES
Occupational Therapy    OT Treatment    Patient Name: Akua Almanzar  MRN: [id number]  Today's Date: 4/8/2024  Time Calculation  Start Time: 1411  Stop Time: 1449  Time Calculation (min): 38 min         Assessment:  OT Assessment: Pt presents with fair balance, good activity tolerance, 02 sat 99% on3L post mobility.  End of Session Communication: Bedside nurse  End of Session Patient Position: Up in chair, Alarm on (all needs in reach)  OT Assessment Results: Decreased ADL status, Decreased upper extremity strength, Decreased safe judgment during ADL, Decreased endurance, Decreased functional mobility, Decreased gross motor control, Decreased IADLs  Plan:  Treatment Interventions: Functional transfer training, ADL retraining  OT Frequency: 2 times per week  OT Discharge Recommendations: Low intensity level of continued care, 24 hr supervision due to cognition  Equipment Recommended upon Discharge: Wheeled walker  OT Recommended Transfer Status: Minimal assist  OT - OK to Discharge: Yes  Treatment Interventions: Functional transfer training, ADL retraining    Subjective   Previous Visit Info:  OT Last Visit  OT Received On: 04/08/24  General:  General  Reason for Referral: decline in ADL  Referred By: Dr. JACQUELIN Gilman  Past Medical History Relevant to Rehab: CHF, PNA, HTN, HLD  Prior to Session Communication: Bedside nurse  Patient Position Received: Bed, 3 rail up  General Comment: Agreeable to treatment, pt can hear better in Lt ear.  Precautions:  Medical Precautions: Oxygen therapy device and L/min (3L nc)  Pain:  Pain Assessment  Pain Assessment: 0-10  Pain Score: 0 - No pain    Objective    Cognition:  Cognition  Overall Cognitive Status: Within Functional Limits     Activities of Daily Living: Grooming  Grooming Level of Assistance: Close supervision, Moderate assistance  Grooming Where Assessed: Standing sinkside, Sitting sinkside  Grooming Comments: Pt brushed teeth/washed face in stance with supervison, Mod A to  "wash hair with washrags while seated, pt combed hair, seated.    UE Bathing  UE Bathing Level of Assistance: Close supervision  UE Bathing Where Assessed: Sitting sinkside  UE Bathing Comments: Pt thoroughly sponge bathed UB    LE Bathing  LE Bathing Level of Assistance: Close supervision  LE Bathing Where Assessed: Sitting sinkside  LE Bathing Comments: Pt thoroughly sponge bathed BLE/feet. no cyndi area \" I just did it when I went to the bathroom\"    LE Dressing  Sock Level of Assistance: Setup  LE Dressing Where Assessed: Chair  LE Dressing Comments: Pt doffed/donned socks, figure 4     Bed Mobility/Transfers: Bed Mobility 1  Bed Mobility 1: Supine to sitting  Level of Assistance 1: Distant supervision    Transfer 1  Transfer From 1: Bed to  Transfer to 1: Chair without arms  Technique 1: Sit to stand, Stand to sit  Transfer Device 1: Walker  Transfer Level of Assistance 1: Contact guard  Trials/Comments 1: cues for walker placement at countertop  Transfers 2  Transfer From 2: Chair without arms to  Transfer to 2: Chair with arms  Technique 2: To right  Transfer Device 2: Walker  Transfer Level of Assistance 2: Contact guard  Trials/Comments 2: cues for safe hand/walker placement      Functional Mobility:  Functional Mobility 1  Surface 1: Level tile  Device 1: Rolling walker  Assistance 1: Contact guard  Quality of Functional Mobility 1:  (reciprocal gait, fast pace)  Comments 1: 10 feet x2    Outcome Measures:[facility] Daily Activity  Putting on and taking off regular lower body clothing: A little  Bathing (including washing, rinsing, drying): A little  Putting on and taking off regular upper body clothing: A little  Toileting, which includes using toilet, bedpan or urinal: A little  Taking care of personal grooming such as brushing teeth: None  Eating Meals: None  Daily Activity - Total Score: 20        Education Documentation  ADL Training, taught by KAYLA Noel at 4/8/2024  3:01 PM.  Learner: " Patient  Readiness: Acceptance  Method: Explanation  Response: Verbalizes Understanding, Demonstrated Understanding, Needs Reinforcement    Education Comments  No comments found.    IP EDUCATION:  Education  Individual(s) Educated: Patient  Education Provided: Fall precautons (safe transfer techniques)  Patient Response to Education: Patient/Caregiver Verbalized Understanding of Information, Patient/Caregiver Performed Return Demonstration of Exercises/Activities    Goals:  Encounter Problems       Encounter Problems (Active)       OT Goals       ADLs (Progressing)       Start:  03/29/24    Expected End:  04/19/24       Patient will complete ADL tasks with Mod I, using AE as needed, in order to increase patient's safety and independence with daily tasks.         Functional Transfers (Progressing)       Start:  03/29/24    Expected End:  04/19/24       Patient will complete functional transfers with Mod I in order to increase patient's safety and independence with daily tasks.         B UE Strengthening (Progressing)       Start:  03/29/24    Expected End:  04/19/24       Patient will increase B UE strength to 4+/5 for functional transfers.         Functional Mobility (Progressing)       Start:  03/29/24    Expected End:  04/19/24       Patient will demonstrate the ability to complete item retrieval and functional mobility with Mod I in order to increase patient's safety and independence with daily tasks.

## 2024-04-08 NOTE — DOCUMENTATION CLARIFICATION NOTE
"    PATIENT:               DUNG JACK  ACCT #:                  [id number]  MRN:                       [id number]  :                       3/27/1929  ADMIT DATE:       3/29/2024 3:07 AM  DISCH DATE:  RESPONDING PROVIDER #:        [id number]          PROVIDER RESPONSE TEXT:    UTI ruled out    CDI QUERY TEXT:    Clarification        Instruction:    Based on your assessment of the patient and the clinical information, please provide the requested documentation by clicking on the appropriate radio button and enter any additional information if prompted.    Question: Please further clarify after study the diagnosis of UTI    When answering this query, please exercise your independent professional judgment. The fact that a question is being asked, does not imply that any particular answer is desired or expected.    The patient's clinical indicators include:  Clinical Information: 89 y.o. female on day 9 of admission presenting with Pneumonia of right lung due to infectious organism, unspecified part of lung.    Clinical Indicators:    : \"Collected 2024 09:19    Status: Final result    Specimen Information: Clean Catch/Voided; Urine    Urine Culture  20,000 - 80,000 Candida albicans Abnormal    Urinalysis complete W Reflex Culture panel 2024 09:19    Appearance Ex. Turbid  Bilirubin NEGATIVE  Color Light-Yellow  Erythrocytes 0.5 2 plus  Glucose mg/dL Normal  Ketones mg/dL NEGATIVE  Leukocyte esterase 500 Pia L  Nitrite NEGATIVE  pH 7.5  Protein mg/dL NEGATIVE  Specific gravity 1.007  Urobilinogen mg/dL Normal  Urinalysis microscopic panel 2024 09:19    Bacteria 3 plus  Erythrocytes Greater than 20  Leukocytes  21-50A  Yeast budding  PRESENT    Treatment: IV antibiotics    Risk Factors: fong catherter placed , rehab pt  Options provided:  -- UTI  -- Catheter associated UTI  -- UTI ruled out  -- Other - I will add my own diagnosis  -- Refer to Clinical Documentation Reviewer    Query created by: " Drew Delgado on 4/8/2024 7:30 AM      Electronically signed by:  DORETHA MADDEN MD 4/8/2024 7:28 PM

## 2024-04-08 NOTE — PROGRESS NOTES
"Physical Therapy    Physical Therapy Treatment    Patient Name: Akua Almanzar  MRN: [id number]  Today's Date: 4/8/2024  Time Calculation  Start Time: 0943  Stop Time: 1014  Time Calculation (min): 31 min   Documentation and services provided by student physical therapist while supervised under a licensed physical therapist.   EMMA Valenzuela      Assessment/Plan   PT Assessment  PT Assessment Results: Decreased strength, Decreased endurance, Impaired balance, Decreased mobility, Decreased safety awareness, Impaired hearing  Rehab Prognosis: Good  Medical Staff Made Aware: Yes  End of Session Communication: Bedside nurse  End of Session Patient Position:  (Toilet. Alarm within reach and educated on pulling cord when finished on toilet at end of session.)    Assessment Comment: Pt with improved mobility this date. Had transferred out of ICU \"over the weekend.\" Pt had no LOB and no signs of desaturation with normal vitals during session. Continued anticipation with pt progressing in mobility with resolution of medical condition and continued physical therapy interventions to aid in improved strengthening and mobility, however at this time, remains unsafe to return home. Will cont to monitor progress and mobility.    PT Plan  Inpatient/Swing Bed or Outpatient: Inpatient  PT Plan  Treatment/Interventions: Transfer training, Gait training, Therapeutic exercise, Therapeutic activity, Strengthening, Endurance training  PT Plan: Skilled PT  PT Frequency: 3 times per week  PT Discharge Recommendations: Moderate intensity level of continued care  Equipment Recommended upon Discharge: Wheeled walker  PT Recommended Transfer Status: Assist x1  PT - OK to Discharge: Yes      General Visit Information:   PT  Visit  PT Received On: 04/08/24  Response to Previous Treatment: Patient with no complaints from previous session.  General  Reason for Referral: Impaired mobility  Referred By: Dr. JACQUELIN Gilman  Past Medical History Relevant to " Rehab: CHF, PNA, HTN, HLD  Missed Visit: No  Family/Caregiver Present: No  Prior to Session Communication: Bedside nurse  Patient Position Received: Up in chair, Alarm on  Preferred Learning Style: verbal  General Comment: Pt presents up in chair with armrests on 3 L of supplemental o2 via NC with alarm on. Pt pleasant and eager to participate in therapy. No SOB reported at start of session with vitals WNL.    Subjective   Precautions:  Precautions  Medical Precautions: Fall precautions, Oxygen therapy device and L/min  Vital Signs:  Vital Signs  Heart Rate: 99  SpO2: 95 %    Objective   Pain:  Pain Assessment  Pain Assessment: 0-10  Pain Score: 1  Pain Location: Ankle  Pain Orientation: Other (Comment) (SPT heard left ankle pt pt pointed to R LE.)  Cognition:  Cognition  Overall Cognitive Status: Within Functional Limits (A&Ox 4)  Postural Control:  Postural Control  Posture Comment: forward head and rounded shoulders.  Static Sitting Balance  Static Sitting-Balance Support: Bilateral upper extremity supported, Feet supported  Static Sitting-Level of Assistance: Modified independent  Static Sitting-Comment/Number of Minutes: sitting in chair  Dynamic Sitting Balance  Dynamic Sitting-Balance Support: Bilateral upper extremity supported, Feet supported  Dynamic Sitting-Comments: sitting in chair and commode at end of session with call bell in reach. DistSup to mod I  Static Standing Balance  Static Standing-Balance Support: Bilateral upper extremity supported  Static Standing-Level of Assistance: Contact guard  Static Standing-Comment/Number of Minutes: 2ww  Dynamic Standing Balance  Dynamic Standing-Balance Support: Bilateral upper extremity supported  Dynamic Standing-Balance: Turning  Dynamic Standing-Comments: 2ww and cues for walker management  Extremity/Trunk Assessments:  RLE   RLE : Within Functional Limits  Strength RLE  RLE Overall Strength: Greater than or equal to 3/5 as evidenced by functional mobility      Activity Tolerance:  Activity Tolerance  Endurance: Decreased tolerance for upright activites  Treatments:  Therapeutic Exercise  Therapeutic Exercise Performed: Yes  Therapeutic Exercise Activity 1: 2 x 10 reps ea LAQ, hip flex, pillow squeeze, ankle pumps.    Therapeutic Activity  Therapeutic Activity Performed: Yes  Therapeutic Activity 1: TA to include transfers throughout session in room with reduced cues needed and demonstratingimproved safety awareness and pt demonstrating improved hand placement with transfers.       Ambulation/Gait Training  Ambulation/Gait Training Performed: Yes  Ambulation/Gait Training 1  Surface 1: Level tile  Device 1: Rolling walker  Assistance 1: Contact guard  Quality of Gait 1: Decreased step length, Diminished heel strike, Shuffling gait, Forward flexed posture  Comments/Distance (ft) 1: Constant cues to slow down for improved stability and to prevent LOB. Additional cues for improved walker management to stay within parameter of walker. Amb about 25 ft x 1, about 50 ft x 2 trials. 1 seated rest break, 1 standing rest break.     Transfers  Transfer: Yes  Transfer 1  Transfer From 1: Sit to  Transfer to 1: Stand  Technique 1: Sit to stand  Transfer Device 1: Walker  Transfer Level of Assistance 1: Close supervision, Contact guard  Transfers 2  Transfer From 2: Stand to  Transfer to 2: Sit  Technique 2: Stand to sit  Transfer Device 2: Walker  Transfer Level of Assistance 2: Close supervision, Contact guard  Transfers 3  Transfer From 3: Stand to  Transfer to 3: Toilet  Technique 3: Stand to sit  Transfer Device 3: Walker  Transfer Level of Assistance 3: Minimum assistance, Contact guard  Trials/Comments 3: Increaased assist needed for toilet d/t lower surface and increased control required for decent. Alarm within reach and educated on pulling cord when finished on toilet at end of session.    Outcome Measures:  [facility] Basic Mobility  Turning from your back to your side while in  a flat bed without using bedrails: A little  Moving from lying on your back to sitting on the side of a flat bed without using bedrails: A little  Moving to and from bed to chair (including a wheelchair): A little  Standing up from a chair using your arms (e.g. wheelchair or bedside chair): A little  To walk in hospital room: A little  Climbing 3-5 steps with railing: A lot  Basic Mobility - Total Score: 17    Education Documentation  Body Mechanics, taught by JOSE LUIS Gomez at 4/8/2024 10:53 AM.  Learner: Patient  Readiness: Acceptance  Method: Explanation  Response: Needs Reinforcement    Precautions, taught by JOSE LUIS Gomez at 4/8/2024 10:53 AM.  Learner: Patient  Readiness: Acceptance  Method: Explanation  Response: Needs Reinforcement    Mobility Training, taught by JOSE LUIS Gomez at 4/8/2024 10:53 AM.  Learner: Patient  Readiness: Acceptance  Method: Explanation  Response: Needs Reinforcement    Education Comments  No comments found.        OP EDUCATION:       Encounter Problems       Encounter Problems (Active)       Balance       Dynamic Balance (Progressing)       Start:  03/29/24    Expected End:  04/12/24       Pt to improve dynamic balance to fair (+) with UE assist on 2ww to improve stability and safety to promote safety within home environment.            Mobility       Bed mobility (Progressing)       Start:  03/29/24    Expected End:  04/12/24       Pt to be independent with bed mobility to aid in home going environment.          ambulation (Progressing)       Start:  03/29/24    Expected End:  04/12/24       Pt will ambulate x > 100 feet  at distant sup with UE assist on 2ww to allow for safety within home environment.         Stairs (Progressing)       Start:  03/29/24    Expected End:  04/12/24       Will ascend and descend x 12 steps with one handrail at CGA to facilitate improved dependence and allow for safety with home environment.            PT Transfers       Transfers  (Progressing)       Start:  03/29/24    Expected End:  04/12/24       Pt will complete all transfers at Mod I with UE assist as needed and improved safety awareness to improve functional mobility and promote increased safety awareness.             Pain - Adult

## 2024-04-09 PROCEDURE — 2500000002 HC RX 250 W HCPCS SELF ADMINISTERED DRUGS (ALT 637 FOR MEDICARE OP, ALT 636 FOR OP/ED): Performed by: INTERNAL MEDICINE

## 2024-04-09 PROCEDURE — 99231 SBSQ HOSP IP/OBS SF/LOW 25: CPT | Performed by: INTERNAL MEDICINE

## 2024-04-09 PROCEDURE — 9420000001 HC RT PATIENT EDUCATION 5 MIN

## 2024-04-09 PROCEDURE — 2500000002 HC RX 250 W HCPCS SELF ADMINISTERED DRUGS (ALT 637 FOR MEDICARE OP, ALT 636 FOR OP/ED): Mod: MUE | Performed by: INTERNAL MEDICINE

## 2024-04-09 PROCEDURE — 2500000005 HC RX 250 GENERAL PHARMACY W/O HCPCS: Performed by: INTERNAL MEDICINE

## 2024-04-09 PROCEDURE — 94640 AIRWAY INHALATION TREATMENT: CPT

## 2024-04-09 PROCEDURE — 94760 N-INVAS EAR/PLS OXIMETRY 1: CPT

## 2024-04-09 PROCEDURE — 1100000001 HC PRIVATE ROOM DAILY

## 2024-04-09 PROCEDURE — 2500000004 HC RX 250 GENERAL PHARMACY W/ HCPCS (ALT 636 FOR OP/ED): Performed by: INTERNAL MEDICINE

## 2024-04-09 PROCEDURE — 2500000004 HC RX 250 GENERAL PHARMACY W/ HCPCS (ALT 636 FOR OP/ED): Mod: MUE | Performed by: INTERNAL MEDICINE

## 2024-04-09 PROCEDURE — 2500000001 HC RX 250 WO HCPCS SELF ADMINISTERED DRUGS (ALT 637 FOR MEDICARE OP): Performed by: INTERNAL MEDICINE

## 2024-04-09 PROCEDURE — 97116 GAIT TRAINING THERAPY: CPT | Mod: GP

## 2024-04-09 PROCEDURE — 94668 MNPJ CHEST WALL SBSQ: CPT

## 2024-04-09 PROCEDURE — 97110 THERAPEUTIC EXERCISES: CPT | Mod: GP

## 2024-04-09 PROCEDURE — 94660 CPAP INITIATION&MGMT: CPT

## 2024-04-09 RX ADMIN — FUROSEMIDE 40 MG: 40 TABLET ORAL at 10:05

## 2024-04-09 RX ADMIN — IPRATROPIUM BROMIDE AND ALBUTEROL SULFATE 3 ML: 2.5; .5 SOLUTION RESPIRATORY (INHALATION) at 11:03

## 2024-04-09 RX ADMIN — Medication: at 07:34

## 2024-04-09 RX ADMIN — ACETAMINOPHEN 650 MG: 325 TABLET ORAL at 06:13

## 2024-04-09 RX ADMIN — PREDNISONE 10 MG: 10 TABLET ORAL at 18:24

## 2024-04-09 RX ADMIN — ACETYLCYSTEINE 600 MG: 200 SOLUTION ORAL; RESPIRATORY (INHALATION) at 11:02

## 2024-04-09 RX ADMIN — Medication 3 L/MIN: at 15:00

## 2024-04-09 RX ADMIN — ASPIRIN 81 MG: 81 TABLET, COATED ORAL at 10:05

## 2024-04-09 RX ADMIN — CLOPIDOGREL BISULFATE 75 MG: 75 TABLET ORAL at 10:05

## 2024-04-09 RX ADMIN — IPRATROPIUM BROMIDE AND ALBUTEROL SULFATE 3 ML: 2.5; .5 SOLUTION RESPIRATORY (INHALATION) at 07:34

## 2024-04-09 RX ADMIN — Medication 1 SPRAY: at 12:46

## 2024-04-09 RX ADMIN — LEVOTHYROXINE SODIUM 88 MCG: 0.09 TABLET ORAL at 06:13

## 2024-04-09 RX ADMIN — ALBUTEROL SULFATE 2.5 MG: 2.5 SOLUTION RESPIRATORY (INHALATION) at 20:44

## 2024-04-09 RX ADMIN — Medication 1 SPRAY: at 06:14

## 2024-04-09 RX ADMIN — Medication: at 23:00

## 2024-04-09 RX ADMIN — SIMVASTATIN 20 MG: 20 TABLET, FILM COATED ORAL at 20:56

## 2024-04-09 RX ADMIN — ACETYLCYSTEINE 600 MG: 200 SOLUTION ORAL; RESPIRATORY (INHALATION) at 15:28

## 2024-04-09 RX ADMIN — ACETAMINOPHEN 650 MG: 325 TABLET ORAL at 18:24

## 2024-04-09 RX ADMIN — GABAPENTIN 100 MG: 100 CAPSULE ORAL at 20:56

## 2024-04-09 RX ADMIN — PREDNISONE 10 MG: 10 TABLET ORAL at 06:13

## 2024-04-09 RX ADMIN — IPRATROPIUM BROMIDE AND ALBUTEROL SULFATE 3 ML: 2.5; .5 SOLUTION RESPIRATORY (INHALATION) at 15:28

## 2024-04-09 RX ADMIN — ENOXAPARIN SODIUM 40 MG: 40 INJECTION SUBCUTANEOUS at 06:13

## 2024-04-09 RX ADMIN — ACETYLCYSTEINE 600 MG: 200 SOLUTION ORAL; RESPIRATORY (INHALATION) at 07:32

## 2024-04-09 ASSESSMENT — COGNITIVE AND FUNCTIONAL STATUS - GENERAL
MOBILITY SCORE: 18
STANDING UP FROM CHAIR USING ARMS: A LITTLE
TOILETING: A LITTLE
CLIMB 3 TO 5 STEPS WITH RAILING: A LITTLE
DRESSING REGULAR LOWER BODY CLOTHING: A LITTLE
MOVING FROM LYING ON BACK TO SITTING ON SIDE OF FLAT BED WITH BEDRAILS: A LITTLE
WALKING IN HOSPITAL ROOM: A LITTLE
STANDING UP FROM CHAIR USING ARMS: A LITTLE
CLIMB 3 TO 5 STEPS WITH RAILING: A LITTLE
MOVING TO AND FROM BED TO CHAIR: A LITTLE
MOBILITY SCORE: 18
TURNING FROM BACK TO SIDE WHILE IN FLAT BAD: A LITTLE
DRESSING REGULAR UPPER BODY CLOTHING: A LITTLE
WALKING IN HOSPITAL ROOM: A LITTLE
HELP NEEDED FOR BATHING: A LITTLE
MOVING FROM LYING ON BACK TO SITTING ON SIDE OF FLAT BED WITH BEDRAILS: A LITTLE
TURNING FROM BACK TO SIDE WHILE IN FLAT BAD: A LITTLE
DAILY ACTIVITIY SCORE: 20
MOVING TO AND FROM BED TO CHAIR: A LITTLE

## 2024-04-09 ASSESSMENT — PAIN - FUNCTIONAL ASSESSMENT
PAIN_FUNCTIONAL_ASSESSMENT: 0-10
PAIN_FUNCTIONAL_ASSESSMENT: 0-10

## 2024-04-09 ASSESSMENT — PAIN SCALES - GENERAL
PAINLEVEL_OUTOF10: 0 - NO PAIN

## 2024-04-09 NOTE — NURSING NOTE
Pt request to have BIPAP turned off. Placed on standby, RT notified and pt placed on 2l of O2. Ambulated to BR with SB assist

## 2024-04-09 NOTE — PROGRESS NOTES
Pulmonary Progress Note    Akua Almanzar is a 89 y.o. female on day 11 of admission presenting with Pneumonia of right lung due to infectious organism, unspecified part of lung.    Subjective   No acute events  On 3lpm    Objective   Vital Signs      4/8/2024     7:37 AM 4/8/2024     9:43 AM 4/8/2024     3:00 PM 4/8/2024    11:22 PM 4/9/2024    12:36 AM 4/9/2024     4:27 AM 4/9/2024     7:07 AM   Vitals   Systolic 138  130  123  145   Diastolic 77  66  65  82   Heart Rate 77 99 75  76  84   Temp 37 °C (98.6 °F)  36.8 °C (98.2 °F)  36.8 °C (98.2 °F)  36.9 °C (98.4 °F)   Resp 18  20 24 24 14 14       Oxygen Therapy  SpO2: 100 %  Medical Gas Therapy: Supplemental oxygen  O2 Delivery Method: Nasal cannula    FiO2 (%):  [32 %] 32 %    Intake/Output previous 24 hours:    Intake/Output Summary (Last 24 hours) at 4/9/2024 1117  Last data filed at 4/9/2024 0831  Gross per 24 hour   Intake 560 ml   Output --   Net 560 ml       Physical Exam  ...  Lines and Tubes:  Peripheral IV 03/29/24 20 G Right;Anterior Forearm (Active)   Placement Date/Time: 03/29/24 1150   Size (Gauge): 20 G  Orientation: Right;Anterior  Location: Forearm  Local Anesthetic: None  Placed by: IV Nurse   Number of days: 10         Scheduled medications  acetaminophen, 650 mg, oral, q6h  acetylcysteine, 3 mL, nebulization, 4x daily  aspirin, 81 mg, oral, Daily  clopidogrel, 75 mg, oral, Daily  enoxaparin, 40 mg, subcutaneous, Daily  furosemide, 40 mg, oral, Daily  gabapentin, 100 mg, oral, Nightly  ipratropium-albuteroL, 3 mL, nebulization, 4x daily  levothyroxine, 88 mcg, oral, Daily  oxygen, , inhalation, q8h  predniSONE, 10 mg, oral, BID  simvastatin, 20 mg, oral, Nightly  sodium chloride, 1 spray, Each Nostril, 4x daily      Continuous medications     PRN medications  PRN medications: albuterol, bisacodyl, guaiFENesin, hydrOXYzine HCL, lidocaine    Relevant Results  Results from last 7 days   Lab Units 04/08/24  0431 04/07/24  0420 04/06/24  0444    WBC AUTO x10*3/uL 13.1* 11.5* 10.2   HEMOGLOBIN g/dL 12.0 10.8* 11.4*   HEMATOCRIT % 37.1 33.3* 34.1*   PLATELETS AUTO x10*3/uL 306 274 258      Results from last 7 days   Lab Units 04/08/24  0431 04/07/24  0420 04/06/24  0441   SODIUM mmol/L 135 137 138   POTASSIUM mmol/L 4.4 4.4 4.1   CHLORIDE mmol/L 97 97 96*   CO2 mmol/L 33* 35* 37*   BUN mg/dL 17 16 15   CREATININE mg/dL 0.60 0.70 0.80   GLUCOSE mg/dL 105* 110* 113*   CALCIUM mg/dL 9.6 9.2 9.3          XR chest 1 view 04/03/2024    Narrative  Interpreted By:  Rosa Covington,  STUDY:  XR CHEST 1 VIEW 4/3/2024 10:49 am    INDICATION:  Signs/Symptoms:respiratory failure    COMPARISON:  03/31/2024    ACCESSION NUMBER(S):  [id number]    ORDERING CLINICIAN:  JULIAN CALLE    TECHNIQUE:  AP erect view of the chest    FINDINGS:  Postoperative change from TAVR is seen with heart enlarged. There is  atherosclerosis of the aortic arch and descending thoracic aorta.    When compared with prior study, the CHF has improved significantly  with less interstitial and alveolar edema. Mild pulmonary vascular  congestion persists. There is some platelike atelectasis at the right  lung base.    Impression  Near complete resolution of the CHF.    Right basilar platelike atelectasis.    Status post TAVR.    Signed by: Rosa Covington 4/3/2024 11:06 AM  Dictation workstation:   [id number]      Patient Active Problem List   Diagnosis    Atherosclerosis of coronary artery without angina pectoris    Dependence on supplemental oxygen    History of aortic valve replacement    Hypercalcemia    Dyspnea on exertion    Hyperlipidemia    Primary hypertension    Acquired hypothyroidism    Menopausal osteoporosis    Mitral valve stenosis    Nonrheumatic aortic (valve) stenosis    Pulmonary hypertension (CMS/HCC)    Transient ischemic attack    Vitamin D deficiency    Acute on chronic diastolic (congestive) heart failure (CMS/HCC)    Pneumonia of right lung due to infectious organism, unspecified  part of lung    Acute on chronic respiratory failure (CMS/[facility])     Assessment/Plan       Pneumonia of right lung due to infectious organism, unspecified part of lung    Atherosclerosis of coronary artery without angina pectoris    Hyperlipidemia    Primary hypertension    Acquired hypothyroidism    Acute on chronic diastolic (congestive) heart failure (CMS/[facility])    Acute on chronic respiratory failure (CMS/[facility])          Stable from respiratory standpoint.  Continue wean oxygen  DC prednisone today.  Continue bronchodilators Mucomyst.  Oral diuretics and fluid restriction    Stable discharge from pulmonary standpoint      Joey Mantilla MD  [facility]

## 2024-04-09 NOTE — PROGRESS NOTES
"Music Therapy Note    Akua FOSTER Cross     Therapy Session  Referral Type: New referral this admission  Visit Type: New visit  Session Start Time: 1352  Session End Time: 1438  Intervention Delivery: In-person  Conflict of Service: None     Pre-assessment  Unable to Assess Reason: Outcomes not assessed, Physical limitation  Mood/Affect: Appropriate, Calm, Cooperative         Treatment/Interventions  Areas of Focus: Emotional support, Self-expression, Relaxation, Socialization, Life review  Music Therapy Interventions: Assessment, Empathic listening/validating emotions, Live music listening, Recorded music listening, Music sharing/discussion, Music-assisted life review  Interruption: Yes  Interrupted by: Staff  Interruption Duration (min): 2 minutes  Interruption Outcome: Session resumed  Patient Fell Asleep at End of Session: No    Post-assessment  Unable to Assess Reason: Outcomes not evaluated, Physical limitation  Mood/Affect: Appropriate, Calm, Cooperative, Goal focused  Verbalized Emotional State: Empowerment, Enjoyment, Gratitude, Happiness, Relaxed, Relief  Continue Visiting: Yes  Total Session Time (min): 46 minutes    Narrative  Assessment Detail: Pt found sitting in chair. MT introduced self and services. Pt agreeable to session.  Plan: MT provided live and recorded music to support relaxation, self-expression, socialization, and music-assisted life review.  Intervention: MT played live and recorded Pt preferred songs. MT provided several options for songs, as well as taking Pt requests.  Evaluation: Pt responded to the music by closing her eyes, singing along, smiling, and moving to the music. Pt engaged in discussion about life and memories associated with songs. Pt expressed extreme gratitude for MT and session.  Follow-up: MT will follow-up if Pt remains admitted. MT will provide Pt with written instructions to find \"His Eyes on the Sparrow\" by oMna Nash.  Patient Comments: \"You have made my day! My " "week! This is the happiest I've been in a long time.\"    Education Documentation  No documentation found.          "

## 2024-04-09 NOTE — PROGRESS NOTES
Music Therapy Note    Akua FOSTER Cross     Therapy Session  Referral Type: New referral this admission  Visit Type: New visit  Session Start Time: 1107  Conflict of Service: Working with other staff               Treatment/Interventions            Narrative  Follow-up: MT will follow-up if Pt remains admitted.    Education Documentation  No documentation found.

## 2024-04-09 NOTE — PROGRESS NOTES
Physical Therapy    Physical Therapy Treatment    Patient Name: Akua Almanzar  MRN: [id number]  Today's Date: 4/9/2024  Time Calculation  Start Time: 1137  Stop Time: 1206  Time Calculation (min): 29 min   Documentation and services provided by student physical therapist while supervised under a licensed physical therapist.   Aniket Moon, SPT      Assessment/Plan   PT Assessment  PT Assessment Results: Decreased strength, Decreased endurance, Impaired balance, Decreased mobility, Decreased safety awareness, Impaired hearing  Rehab Prognosis: Good  Medical Staff Made Aware: Yes  End of Session Communication: Bedside nurse  End of Session Patient Position: Up in chair, Alarm on    Assessment Comment: Improved activity tolerance and exercise tolerance demonstrating less rest breaks total but still requires mult seated rest breaks throughout session with cues for improved breathing mechanics and monitoring vitals. Pt had no evidence of desaturation during therapy. Continued functional mobility and transfers w/ supplemental O2 continued at 3 L.    PT Plan  Inpatient/Swing Bed or Outpatient: Inpatient  PT Plan  Treatment/Interventions: Transfer training, Gait training, Strengthening, Endurance training, Therapeutic activity, Therapeutic exercise  PT Plan: Skilled PT  PT Frequency: 3 times per week  PT Discharge Recommendations: Moderate intensity level of continued care  Equipment Recommended upon Discharge: Wheeled walker  PT Recommended Transfer Status: Assist x1, Assistive device  PT - OK to Discharge: Yes      General Visit Information:   PT  Visit  PT Received On: 04/09/24  Response to Previous Treatment: Patient with no complaints from previous session.  General  Reason for Referral: impaired mobility  Referred By: Dr. JACQUELIN Gilman  Past Medical History Relevant to Rehab: CHF, PNA, HTN, HLD  Missed Visit: No  Patient Position Received: Up in chair, Alarm on  Preferred Learning Style: verbal, visual  General Comment:  Agreeable to treatment. Pt presents up in chair with chair alarm on and just finished her appointment with respiratory therapy.    Subjective   Precautions:  Precautions  Hearing/Visual Limitations: [place]  Medical Precautions: Oxygen therapy device and L/min, Fall precautions (3 L O2)  Vital Signs:  Vital Signs  Heart Rate: 84  SpO2: 94 %    Objective   Pain:  Pain Assessment  Pain Assessment: 0-10  Pain Score: 0 - No pain  Cognition:  Cognition  Overall Cognitive Status: Within Functional Limits  Orientation Level: Oriented X4  Postural Control:  Static Sitting Balance  Static Sitting-Balance Support: Bilateral upper extremity supported, Feet supported  Static Sitting-Level of Assistance: Modified independent  Dynamic Sitting Balance  Dynamic Sitting-Balance Support: Bilateral upper extremity supported, Feet supported  Dynamic Sitting-Comments: leaning and reaching from bedside chair to bedside table.  Static Standing Balance  Static Standing-Balance Support: Bilateral upper extremity supported  Static Standing-Level of Assistance: Contact guard  Static Standing-Comment/Number of Minutes: use of 2ww  Dynamic Standing Balance  Dynamic Standing-Balance Support: Bilateral upper extremity supported  Dynamic Standing-Balance: Turning  Dynamic Standing-Comments: use of 2ww at [facility].  Extremity/Trunk Assessments:        Activity Tolerance:  Activity Tolerance  Endurance: Tolerates 30 min exercise with multiple rests  Activity Tolerance Comments: Pt requires mult seated rest breaks throughout session with cues for improved breathing mechanics and monitoring vitals.  Treatments:  Therapeutic Exercise  Therapeutic Exercise Performed: Yes  Therapeutic Exercise Activity 1: 2 x 10 reps ea LAQ, hip flex, pillow squeeze, ankle pumps, heel slides.    Therapeutic Activity  Therapeutic Activity Performed: Yes  Therapeutic Activity 1: transfers mult reps from chair to standing with use of 2ww. Education for improved safety awareness and  cues for pursed lip breathing and reminders to breath in through nose for NC.         Ambulation/Gait Training  Ambulation/Gait Training Performed: Yes  Ambulation/Gait Training 1  Surface 1: Level tile  Device 1: Rolling walker  Assistance 1: Close supervision  Quality of Gait 1: Decreased step length, Diminished heel strike, Shuffling gait, Forward flexed posture  Comments/Distance (ft) 1: 2 x approx 75 feet with use of 2ww requiring frequent cues for improved safety and management of A.D.Monitoring of vitals after walks with no evidence of desaturation.  Transfers  Transfer: Yes  Transfer 1  Transfer From 1: Sit to  Transfer to 1: Stand  Technique 1: Sit to stand  Transfer Device 1: Walker  Transfer Level of Assistance 1: Contact guard  Trials/Comments 1: from chair to standing  Transfers 2  Transfer From 2: Stand to  Transfer to 2: Sit  Technique 2: Stand to sit  Transfer Device 2: Walker  Transfer Level of Assistance 2: Contact guard  Trials/Comments 2: stand to sitting in bedside chair    Stairs  Stairs: Yes  Stairs  Rails 1: Left  Curb Step 1: Yes  Device 1: Railing (bedside railing for LUE support)  Support Devices 1:  (chair behind pt for safety and CGA)  Assistance 1: Minimum assistance  Comment/Number of Steps 1: x 5 reps stepping up to curb with RLE (good leg) then down with LLE (bad leg)    Outcome Measures:  [facility] Basic Mobility  Turning from your back to your side while in a flat bed without using bedrails: A little  Moving from lying on your back to sitting on the side of a flat bed without using bedrails: A little  Moving to and from bed to chair (including a wheelchair): A little  Standing up from a chair using your arms (e.g. wheelchair or bedside chair): A little  To walk in hospital room: A little  Climbing 3-5 steps with railing: A little  Basic Mobility - Total Score: 18    Education Documentation  Body Mechanics, taught by JOSE LUIS Gomez at 4/9/2024  2:10 PM.  Learner:  Patient  Readiness: Acceptance  Method: Explanation  Response: Needs Reinforcement    Precautions, taught by JOSE LUIS Gomez at 4/9/2024  2:10 PM.  Learner: Patient  Readiness: Acceptance  Method: Explanation  Response: Needs Reinforcement    Mobility Training, taught by JOSE LUIS Gomez at 4/9/2024  2:10 PM.  Learner: Patient  Readiness: Acceptance  Method: Explanation  Response: Needs Reinforcement    Education Comments  No comments found.        OP EDUCATION:       Encounter Problems       Encounter Problems (Active)       Balance       Dynamic Balance (Progressing)       Start:  03/29/24    Expected End:  04/12/24       Pt to improve dynamic balance to fair (+) with UE assist on 2ww to improve stability and safety to promote safety within home environment.            Mobility       Bed mobility (Progressing)       Start:  03/29/24    Expected End:  04/12/24       Pt to be independent with bed mobility to aid in home going environment.          ambulation (Progressing)       Start:  03/29/24    Expected End:  04/12/24       Pt will ambulate x > 100 feet  at distant sup with UE assist on 2ww to allow for safety within home environment.         Stairs (Progressing)       Start:  03/29/24    Expected End:  04/12/24       Will ascend and descend x 12 steps with one handrail at CGA to facilitate improved dependence and allow for safety with home environment.            PT Transfers       Transfers (Progressing)       Start:  03/29/24    Expected End:  04/12/24       Pt will complete all transfers at Mod I with UE assist as needed and improved safety awareness to improve functional mobility and promote increased safety awareness.             Pain - Adult

## 2024-04-09 NOTE — CARE PLAN
The patient's goals for the shift include      The clinical goals for the shift include monitor O2, BIPAP    Problem: Pain  Goal: Takes deep breaths with improved pain control throughout the shift  Outcome: Progressing  Goal: Turns in bed with improved pain control throughout the shift  Outcome: Progressing  Goal: Walks with improved pain control throughout the shift  Outcome: Progressing  Goal: Performs ADL's with improved pain control throughout shift  Outcome: Progressing  Goal: Participates in PT with improved pain control throughout the shift  Outcome: Progressing     Problem: Pain - Adult  Goal: Verbalizes/displays adequate comfort level or baseline comfort level  Outcome: Progressing     Problem: Safety - Adult  Goal: Free from fall injury  Outcome: Progressing     Problem: Discharge Planning  Goal: Discharge to home or other facility with appropriate resources  Outcome: Progressing     Problem: Chronic Conditions and Co-morbidities  Goal: Patient's chronic conditions and co-morbidity symptoms are monitored and maintained or improved  Outcome: Progressing     Problem: Respiratory  Goal: Minimize anxiety/maximize coping throughout shift  Outcome: Progressing  Goal: Minimal/no exertional discomfort or dyspnea this shift  Outcome: Progressing  Goal: No signs of respiratory distress (eg. Use of accessory muscles. Peds grunting)  Outcome: Progressing  Goal: Verbalize decreased shortness of breath this shift  Outcome: Progressing  Goal: Wean oxygen to maintain O2 saturation per order/standard this shift  Outcome: Progressing     Problem: Heart Failure  Goal: Improved gas exchange this shift  Outcome: Progressing  Goal: Improved urinary output this shift  Outcome: Progressing  Goal: Reduction in peripheral edema within 24 hours  Outcome: Progressing  Goal: Report improvement of dyspnea/breathlessness this shift  Outcome: Progressing  Goal: Weight from fluid excess reduced over 2-3 days, then stabilize  Outcome:  Progressing  Goal: Increase self care and/or family involvement in 24 hours  Outcome: Progressing

## 2024-04-09 NOTE — PROGRESS NOTES
04/09/24 1211   Discharge Planning   Living Arrangements Children   Support Systems Family members;Children   Assistance Needed Pt with improved mobility this date., improved with ADLs   Type of Residence Private residence   Home or Post Acute Services Post acute facilities (Rehab/SNF/etc)   Type of Post Acute Facility Services Rehab;Skilled nursing   Type of Home Care Services DME or oxygen   Patient expects to be discharged to: [facility], awaiting skilled rehab, awaiting precert, has [facility]   Does the patient need discharge transport arranged? Yes   RoundTrip coordination needed? Yes   Has discharge transport been arranged? No     Patient was accepted to [facility] and awaiting precert, has [facility]. Was started on 4/8/ 24.   PLAN: Discharge to [facility], awaiting precert. Currently does not have a safe discharge due to awaiting precert.

## 2024-04-10 LAB
ANION GAP SERPL CALC-SCNC: 7 MMOL/L
BUN SERPL-MCNC: 27 MG/DL (ref 8–25)
CALCIUM SERPL-MCNC: 9.1 MG/DL (ref 8.5–10.4)
CHLORIDE SERPL-SCNC: 99 MMOL/L (ref 97–107)
CO2 SERPL-SCNC: 30 MMOL/L (ref 24–31)
CREAT SERPL-MCNC: 0.7 MG/DL (ref 0.4–1.6)
EGFRCR SERPLBLD CKD-EPI 2021: 80 ML/MIN/1.73M*2
ERYTHROCYTE [DISTWIDTH] IN BLOOD BY AUTOMATED COUNT: 15.7 % (ref 11.5–14.5)
GLUCOSE SERPL-MCNC: 114 MG/DL (ref 65–99)
HCT VFR BLD AUTO: 32.7 % (ref 36–46)
HGB BLD-MCNC: 10.4 G/DL (ref 12–16)
MCH RBC QN AUTO: 28 PG (ref 26–34)
MCHC RBC AUTO-ENTMCNC: 31.8 G/DL (ref 32–36)
MCV RBC AUTO: 88 FL (ref 80–100)
NRBC BLD-RTO: 0 /100 WBCS (ref 0–0)
PLATELET # BLD AUTO: 287 X10*3/UL (ref 150–450)
POTASSIUM SERPL-SCNC: 4.7 MMOL/L (ref 3.4–5.1)
RBC # BLD AUTO: 3.71 X10*6/UL (ref 4–5.2)
SODIUM SERPL-SCNC: 136 MMOL/L (ref 133–145)
WBC # BLD AUTO: 10.3 X10*3/UL (ref 4.4–11.3)

## 2024-04-10 PROCEDURE — 2500000004 HC RX 250 GENERAL PHARMACY W/ HCPCS (ALT 636 FOR OP/ED): Performed by: INTERNAL MEDICINE

## 2024-04-10 PROCEDURE — 97535 SELF CARE MNGMENT TRAINING: CPT | Mod: GO,CO

## 2024-04-10 PROCEDURE — 2500000002 HC RX 250 W HCPCS SELF ADMINISTERED DRUGS (ALT 637 FOR MEDICARE OP, ALT 636 FOR OP/ED): Mod: MUE | Performed by: INTERNAL MEDICINE

## 2024-04-10 PROCEDURE — 36415 COLL VENOUS BLD VENIPUNCTURE: CPT | Performed by: INTERNAL MEDICINE

## 2024-04-10 PROCEDURE — 2500000002 HC RX 250 W HCPCS SELF ADMINISTERED DRUGS (ALT 637 FOR MEDICARE OP, ALT 636 FOR OP/ED): Performed by: INTERNAL MEDICINE

## 2024-04-10 PROCEDURE — 2500000005 HC RX 250 GENERAL PHARMACY W/O HCPCS: Performed by: INTERNAL MEDICINE

## 2024-04-10 PROCEDURE — 99231 SBSQ HOSP IP/OBS SF/LOW 25: CPT | Performed by: INTERNAL MEDICINE

## 2024-04-10 PROCEDURE — 2500000004 HC RX 250 GENERAL PHARMACY W/ HCPCS (ALT 636 FOR OP/ED): Mod: MUE | Performed by: INTERNAL MEDICINE

## 2024-04-10 PROCEDURE — 2500000001 HC RX 250 WO HCPCS SELF ADMINISTERED DRUGS (ALT 637 FOR MEDICARE OP): Performed by: INTERNAL MEDICINE

## 2024-04-10 PROCEDURE — 94640 AIRWAY INHALATION TREATMENT: CPT

## 2024-04-10 PROCEDURE — 80048 BASIC METABOLIC PNL TOTAL CA: CPT | Performed by: INTERNAL MEDICINE

## 2024-04-10 PROCEDURE — 94668 MNPJ CHEST WALL SBSQ: CPT

## 2024-04-10 PROCEDURE — 97116 GAIT TRAINING THERAPY: CPT | Mod: GP

## 2024-04-10 PROCEDURE — 85027 COMPLETE CBC AUTOMATED: CPT | Performed by: INTERNAL MEDICINE

## 2024-04-10 PROCEDURE — 97110 THERAPEUTIC EXERCISES: CPT | Mod: GP

## 2024-04-10 PROCEDURE — 97110 THERAPEUTIC EXERCISES: CPT | Mod: GO,CO

## 2024-04-10 PROCEDURE — 9420000001 HC RT PATIENT EDUCATION 5 MIN

## 2024-04-10 PROCEDURE — 1100000001 HC PRIVATE ROOM DAILY

## 2024-04-10 RX ADMIN — ACETYLCYSTEINE 600 MG: 200 SOLUTION ORAL; RESPIRATORY (INHALATION) at 07:08

## 2024-04-10 RX ADMIN — GABAPENTIN 100 MG: 100 CAPSULE ORAL at 20:55

## 2024-04-10 RX ADMIN — Medication: at 23:00

## 2024-04-10 RX ADMIN — SIMVASTATIN 20 MG: 20 TABLET, FILM COATED ORAL at 20:55

## 2024-04-10 RX ADMIN — PREDNISONE 10 MG: 10 TABLET ORAL at 05:47

## 2024-04-10 RX ADMIN — ACETAMINOPHEN 325 MG: 325 TABLET ORAL at 18:19

## 2024-04-10 RX ADMIN — ACETYLCYSTEINE 600 MG: 200 SOLUTION ORAL; RESPIRATORY (INHALATION) at 15:41

## 2024-04-10 RX ADMIN — IPRATROPIUM BROMIDE AND ALBUTEROL SULFATE 3 ML: 2.5; .5 SOLUTION RESPIRATORY (INHALATION) at 15:41

## 2024-04-10 RX ADMIN — IPRATROPIUM BROMIDE AND ALBUTEROL SULFATE 3 ML: 2.5; .5 SOLUTION RESPIRATORY (INHALATION) at 19:58

## 2024-04-10 RX ADMIN — PREDNISONE 10 MG: 10 TABLET ORAL at 18:19

## 2024-04-10 RX ADMIN — IPRATROPIUM BROMIDE AND ALBUTEROL SULFATE 3 ML: 2.5; .5 SOLUTION RESPIRATORY (INHALATION) at 07:08

## 2024-04-10 RX ADMIN — ASPIRIN 81 MG: 81 TABLET, COATED ORAL at 09:12

## 2024-04-10 RX ADMIN — ACETYLCYSTEINE 600 MG: 200 SOLUTION ORAL; RESPIRATORY (INHALATION) at 19:59

## 2024-04-10 RX ADMIN — CLOPIDOGREL BISULFATE 75 MG: 75 TABLET ORAL at 09:12

## 2024-04-10 RX ADMIN — ACETYLCYSTEINE 600 MG: 200 SOLUTION ORAL; RESPIRATORY (INHALATION) at 11:14

## 2024-04-10 RX ADMIN — ACETAMINOPHEN 650 MG: 325 TABLET ORAL at 13:30

## 2024-04-10 RX ADMIN — LEVOTHYROXINE SODIUM 88 MCG: 0.09 TABLET ORAL at 05:47

## 2024-04-10 RX ADMIN — ENOXAPARIN SODIUM 40 MG: 40 INJECTION SUBCUTANEOUS at 05:47

## 2024-04-10 RX ADMIN — Medication: at 07:10

## 2024-04-10 RX ADMIN — FUROSEMIDE 40 MG: 40 TABLET ORAL at 09:11

## 2024-04-10 RX ADMIN — Medication: at 15:00

## 2024-04-10 RX ADMIN — IPRATROPIUM BROMIDE AND ALBUTEROL SULFATE 3 ML: 2.5; .5 SOLUTION RESPIRATORY (INHALATION) at 11:15

## 2024-04-10 RX ADMIN — ACETAMINOPHEN 650 MG: 325 TABLET ORAL at 09:00

## 2024-04-10 ASSESSMENT — COGNITIVE AND FUNCTIONAL STATUS - GENERAL
TOILETING: A LITTLE
CLIMB 3 TO 5 STEPS WITH RAILING: A LITTLE
TOILETING: A LITTLE
DAILY ACTIVITIY SCORE: 20
DRESSING REGULAR UPPER BODY CLOTHING: A LITTLE
WALKING IN HOSPITAL ROOM: A LITTLE
DRESSING REGULAR LOWER BODY CLOTHING: A LITTLE
WALKING IN HOSPITAL ROOM: A LITTLE
MOVING TO AND FROM BED TO CHAIR: A LITTLE
DRESSING REGULAR LOWER BODY CLOTHING: A LITTLE
MOVING FROM LYING ON BACK TO SITTING ON SIDE OF FLAT BED WITH BEDRAILS: A LITTLE
TURNING FROM BACK TO SIDE WHILE IN FLAT BAD: A LITTLE
WALKING IN HOSPITAL ROOM: A LITTLE
STANDING UP FROM CHAIR USING ARMS: A LITTLE
CLIMB 3 TO 5 STEPS WITH RAILING: A LITTLE
MOBILITY SCORE: 18
HELP NEEDED FOR BATHING: A LITTLE
MOBILITY SCORE: 18
DRESSING REGULAR UPPER BODY CLOTHING: A LITTLE
TURNING FROM BACK TO SIDE WHILE IN FLAT BAD: A LITTLE
HELP NEEDED FOR BATHING: A LITTLE
STANDING UP FROM CHAIR USING ARMS: A LITTLE
TOILETING: A LITTLE
DRESSING REGULAR UPPER BODY CLOTHING: A LITTLE
MOVING FROM LYING ON BACK TO SITTING ON SIDE OF FLAT BED WITH BEDRAILS: A LITTLE
MOBILITY SCORE: 18
STANDING UP FROM CHAIR USING ARMS: A LITTLE
TURNING FROM BACK TO SIDE WHILE IN FLAT BAD: A LITTLE
DAILY ACTIVITIY SCORE: 20
DRESSING REGULAR LOWER BODY CLOTHING: A LITTLE
TOILETING: A LITTLE
MOVING FROM LYING ON BACK TO SITTING ON SIDE OF FLAT BED WITH BEDRAILS: A LITTLE
HELP NEEDED FOR BATHING: A LITTLE
MOBILITY SCORE: 18
MOVING TO AND FROM BED TO CHAIR: A LITTLE
MOVING FROM LYING ON BACK TO SITTING ON SIDE OF FLAT BED WITH BEDRAILS: A LITTLE
DRESSING REGULAR LOWER BODY CLOTHING: A LITTLE
CLIMB 3 TO 5 STEPS WITH RAILING: A LITTLE
TURNING FROM BACK TO SIDE WHILE IN FLAT BAD: A LITTLE
WALKING IN HOSPITAL ROOM: A LITTLE
DAILY ACTIVITIY SCORE: 20
MOVING TO AND FROM BED TO CHAIR: A LITTLE
HELP NEEDED FOR BATHING: A LITTLE
STANDING UP FROM CHAIR USING ARMS: A LITTLE
CLIMB 3 TO 5 STEPS WITH RAILING: A LITTLE
DRESSING REGULAR UPPER BODY CLOTHING: A LITTLE
MOVING TO AND FROM BED TO CHAIR: A LITTLE
DAILY ACTIVITIY SCORE: 20

## 2024-04-10 ASSESSMENT — PAIN SCALES - GENERAL
PAINLEVEL_OUTOF10: 0 - NO PAIN
PAINLEVEL_OUTOF10: 2
PAINLEVEL_OUTOF10: 0 - NO PAIN

## 2024-04-10 ASSESSMENT — PAIN - FUNCTIONAL ASSESSMENT
PAIN_FUNCTIONAL_ASSESSMENT: 0-10

## 2024-04-10 NOTE — PROGRESS NOTES
04/10/24 1834   Discharge Planning   Living Arrangements Children   Support Systems Family members;Children   Assistance Needed Requires assistance   Type of Residence Private residence   Home or Post Acute Services Post acute facilities (Rehab/SNF/etc)   Type of Post Acute Facility Services Rehab;Skilled nursing   Patient expects to be discharged to: [facility], still awaiting precert, has [facility]   Does the patient need discharge transport arranged? Yes   RoundTrip coordination needed? Yes   Has discharge transport been arranged? No     Patient improving with pneumonia  Continue fluid restriction and diuretics for CHF  PLAN: Discharge to [facility], awaiting precert, has [facility].

## 2024-04-10 NOTE — PROGRESS NOTES
"Akua Almanzar is a 89 y.o. female on day 12 of admission presenting with Pneumonia of right lung due to infectious organism, unspecified part of lung.    Subjective   Breathing is better.  Down to nasal cannula 3 L       Objective     Physical Exam  Vitals reviewed.   Constitutional:       Appearance: Normal appearance.   HENT:      Head: Normocephalic and atraumatic.      Right Ear: Tympanic membrane, ear canal and external ear normal.      Left Ear: Tympanic membrane, ear canal and external ear normal.      Nose: Nose normal.      Mouth/Throat:      Pharynx: Oropharynx is clear.   Eyes:      Extraocular Movements: Extraocular movements intact.      Conjunctiva/sclera: Conjunctivae normal.      Pupils: Pupils are equal, round, and reactive to light.   Cardiovascular:      Rate and Rhythm: Normal rate and regular rhythm.      Pulses: Normal pulses.      Heart sounds: Normal heart sounds.   Pulmonary:      Effort: Pulmonary effort is normal.      Breath sounds: Rales present.   Abdominal:      General: Abdomen is flat. Bowel sounds are normal.      Palpations: Abdomen is soft.   Musculoskeletal:      Cervical back: Normal range of motion and neck supple.   Skin:     General: Skin is warm and dry.   Neurological:      General: No focal deficit present.      Mental Status: She is alert and oriented to person, place, and time.   Psychiatric:         Mood and Affect: Mood normal.         Last Recorded Vitals  Blood pressure 139/73, pulse 92, temperature 36.3 °C (97.3 °F), temperature source Oral, resp. rate 18, height 1.549 m (5' 1\"), weight 73.6 kg (162 lb 3.2 oz), SpO2 98%.  Intake/Output last 3 Shifts:  I/O last 3 completed shifts:  In: 360 (4.9 mL/kg) [P.O.:360]  Out: - (0 mL/kg)   Weight: 73.6 kg     Relevant Results              Scheduled medications  acetaminophen, 650 mg, oral, q6h  acetylcysteine, 3 mL, nebulization, 4x daily  aspirin, 81 mg, oral, Daily  clopidogrel, 75 mg, oral, Daily  enoxaparin, 40 mg, " subcutaneous, Daily  furosemide, 40 mg, oral, Daily  gabapentin, 100 mg, oral, Nightly  ipratropium-albuteroL, 3 mL, nebulization, 4x daily  levothyroxine, 88 mcg, oral, Daily  oxygen, , inhalation, q8h  predniSONE, 10 mg, oral, BID  simvastatin, 20 mg, oral, Nightly  sodium chloride, 1 spray, Each Nostril, 4x daily      Continuous medications     PRN medications  PRN medications: albuterol, bisacodyl, guaiFENesin, hydrOXYzine HCL, lidocaine    Results for orders placed or performed during the hospital encounter of 03/29/24 (from the past 24 hour(s))   CBC   Result Value Ref Range    WBC 10.3 4.4 - 11.3 x10*3/uL    nRBC 0.0 0.0 - 0.0 /100 WBCs    RBC 3.71 (L) 4.00 - 5.20 x10*6/uL    Hemoglobin 10.4 (L) 12.0 - 16.0 g/dL    Hematocrit 32.7 (L) 36.0 - 46.0 %    MCV 88 80 - 100 fL    MCH 28.0 26.0 - 34.0 pg    MCHC 31.8 (L) 32.0 - 36.0 g/dL    RDW 15.7 (H) 11.5 - 14.5 %    Platelets 287 150 - 450 x10*3/uL   Basic Metabolic Panel   Result Value Ref Range    Glucose 114 (H) 65 - 99 mg/dL    Sodium 136 133 - 145 mmol/L    Potassium 4.7 3.4 - 5.1 mmol/L    Chloride 99 97 - 107 mmol/L    Bicarbonate 30 24 - 31 mmol/L    Urea Nitrogen 27 (H) 8 - 25 mg/dL    Creatinine 0.70 0.40 - 1.60 mg/dL    eGFR 80 >60 mL/min/1.73m*2    Calcium 9.1 8.5 - 10.4 mg/dL    Anion Gap 7 <=19 mmol/L                        Assessment/Plan   Principal Problem:    Pneumonia of right lung due to infectious organism, unspecified part of lung  Active Problems:    Atherosclerosis of coronary artery without angina pectoris    Hyperlipidemia    Primary hypertension    Acquired hypothyroidism    Acute on chronic diastolic (congestive) heart failure (CMS/HCC)    Acute on chronic respiratory failure (CMS/HCC)    Clinically improving with pneumonia  Acute respiratory failure improving  Continue fluid restriction and diuretics for CHF  Waiting for rehab placement  Discussed with family       I spent  minutes in the professional and overall care of this  patient.      Ivone Gilman MD

## 2024-04-10 NOTE — PROGRESS NOTES
"Akua Almanzar is a 89 y.o. female on day 11 of admission presenting with Pneumonia of right lung due to infectious organism, unspecified part of lung.    Subjective   Breathing is better.  Down to nasal cannula 3 L       Objective     Physical Exam  Vitals reviewed.   Constitutional:       Appearance: Normal appearance.   HENT:      Head: Normocephalic and atraumatic.      Right Ear: Tympanic membrane, ear canal and external ear normal.      Left Ear: Tympanic membrane, ear canal and external ear normal.      Nose: Nose normal.      Mouth/Throat:      Pharynx: Oropharynx is clear.   Eyes:      Extraocular Movements: Extraocular movements intact.      Conjunctiva/sclera: Conjunctivae normal.      Pupils: Pupils are equal, round, and reactive to light.   Cardiovascular:      Rate and Rhythm: Normal rate and regular rhythm.      Pulses: Normal pulses.      Heart sounds: Normal heart sounds.   Pulmonary:      Effort: Pulmonary effort is normal.      Breath sounds: Rales present.   Abdominal:      General: Abdomen is flat. Bowel sounds are normal.      Palpations: Abdomen is soft.   Musculoskeletal:      Cervical back: Normal range of motion and neck supple.   Skin:     General: Skin is warm and dry.   Neurological:      General: No focal deficit present.      Mental Status: She is alert and oriented to person, place, and time.   Psychiatric:         Mood and Affect: Mood normal.         Last Recorded Vitals  Blood pressure 130/65, pulse 98, temperature 37.1 °C (98.8 °F), temperature source Oral, resp. rate 18, height 1.549 m (5' 1\"), weight 73.6 kg (162 lb 3.2 oz), SpO2 97 %.  Intake/Output last 3 Shifts:  I/O last 3 completed shifts:  In: 800 (10.9 mL/kg) [P.O.:800]  Out: - (0 mL/kg)   Weight: 73.6 kg     Relevant Results              Scheduled medications  acetaminophen, 650 mg, oral, q6h  acetylcysteine, 3 mL, nebulization, 4x daily  aspirin, 81 mg, oral, Daily  clopidogrel, 75 mg, oral, Daily  enoxaparin, 40 mg, " subcutaneous, Daily  furosemide, 40 mg, oral, Daily  gabapentin, 100 mg, oral, Nightly  ipratropium-albuteroL, 3 mL, nebulization, 4x daily  levothyroxine, 88 mcg, oral, Daily  oxygen, , inhalation, q8h  predniSONE, 10 mg, oral, BID  simvastatin, 20 mg, oral, Nightly  sodium chloride, 1 spray, Each Nostril, 4x daily      Continuous medications     PRN medications  PRN medications: albuterol, bisacodyl, guaiFENesin, hydrOXYzine HCL, lidocaine    No results found for this or any previous visit (from the past 24 hour(s)).                   Assessment/Plan   Principal Problem:    Pneumonia of right lung due to infectious organism, unspecified part of lung  Active Problems:    Atherosclerosis of coronary artery without angina pectoris    Hyperlipidemia    Primary hypertension    Acquired hypothyroidism    Acute on chronic diastolic (congestive) heart failure (CMS/HCC)    Acute on chronic respiratory failure (CMS/HCC)    Clinically improving with pneumonia  Acute respiratory failure improving  Continue fluid restriction and diuretics for CHF  Waiting for rehab placement       I spent  minutes in the professional and overall care of this patient.      Ivone Gilman MD

## 2024-04-10 NOTE — PROGRESS NOTES
Occupational Therapy    OT Treatment    Patient Name: Akua Almanzar  MRN: [id number]  Today's Date: 4/10/2024  Time Calculation  Start Time: 1327  Stop Time: 1353  Time Calculation (min): 30 min         Assessment:  OT Assessment: Pt functioning at supervision level with mobility and transfers, presents with fair balance, good activity tolerance.  End of Session Patient Position: Up in chair, Alarm on (all needs in reach.)     Plan:  Treatment Interventions: Functional transfer training, UE strengthening/ROM, ADL retraining  OT Frequency: 2 times per week  OT Discharge Recommendations: Low intensity level of continued care, 24 hr supervision due to cognition  Equipment Recommended upon Discharge: Wheeled walker  OT Recommended Transfer Status: Stand by assist  OT - OK to Discharge: Yes  Treatment Interventions: Functional transfer training, UE strengthening/ROM, ADL retraining    Subjective   Previous Visit Info:  OT Last Visit  OT Received On: 04/10/24  General:  General  Reason for Referral: decline in ADL  Referred By: Dr. JACQUELIN Gilman  Past Medical History Relevant to Rehab: CHF, PNA, HTN, HLD  Patient Position Received: Up in chair, Alarm on  General Comment: Agreeable to treatment.  Precautions:  Hearing/Visual Limitations: [place]  Medical Precautions: Fall precautions, Oxygen therapy device and L/min     Pain:  Pain Assessment  Pain Assessment: 0-10  Pain Score: 0 - No pain    Objective    Cognition:  Cognition  Overall Cognitive Status: Within Functional Limits  Orientation Level: Oriented X4     Activities of Daily Living: Grooming  Grooming Level of Assistance: Close supervision  Grooming Where Assessed: Standing sinkside  Grooming Comments: hand hygiene    Toileting  Toileting Level of Assistance: Distant supervision  Where Assessed: Toilet  Toileting Comments: hygiene after BM, seated, brief managment  Bed Mobility/Transfers: Transfer 1  Transfer From 1: Chair with arms to  Transfer to 1: Chair with  arms  Technique 1: To right  Transfer Device 1: Walker  Transfer Level of Assistance 1: Close supervision    Toilet Transfers  Toilet Transfer From: Walker  Toilet Transfer Type: To and from  Toilet Transfer to: Standard toilet  Toilet Transfer Technique: Ambulating  Toilet Transfers: Supervision  Toilet Transfers Comments: use of grab bar on Rt    Functional Mobility:  Functional Mobility 1  Surface 1: Level tile  Device 1: Rolling walker  Assistance 1: Close supervision  Quality of Functional Mobility 1:  (fast pace, cues to remain inside walker frame)  Comments 1: 10 feet x2     Therapy/Activity: Therapeutic Exercise  Therapeutic Exercise Activity 1: scap protraction/retraction  Therapeutic Exercise Activity 2: bicep curls  Therapeutic Exercise Activity 3: tricep presses  Therapeutic Exercise Activity 4: RUE 1 set x 15 reps, light resistance from therapist  Therapeutic Exercise Activity 5: LUE Pt unable to complete therex due to c/o arthritic pain in Rt shoulder. Therapist instructs pt in 3 self ranging therex to complete. Pt receptive.    Outcome Measures:[facility] Daily Activity  Putting on and taking off regular lower body clothing: A little  Bathing (including washing, rinsing, drying): A little  Putting on and taking off regular upper body clothing: A little  Toileting, which includes using toilet, bedpan or urinal: A little  Taking care of personal grooming such as brushing teeth: None  Eating Meals: None  Daily Activity - Total Score: 20        Education Documentation  Home Exercise Program, taught by KAYLA Noel at 4/10/2024  3:09 PM.  Learner: Patient  Readiness: Acceptance  Method: Explanation, Demonstration  Response: Verbalizes Understanding, Demonstrated Understanding    ADL Training, taught by KAYLA Noel at 4/10/2024  3:09 PM.  Learner: Patient  Readiness: Acceptance  Method: Explanation, Demonstration  Response: Verbalizes Understanding, Demonstrated Understanding    Education Comments  No  comments found.      IP EDUCATION:  Education  Home Program: Strengthening, AROM  Patient Response to Education: Patient/Caregiver Verbalized Understanding of Information, Patient/Caregiver Performed Return Demonstration of Exercises/Activities    Goals:  Encounter Problems       Encounter Problems (Active)       OT Goals       ADLs (Progressing)       Start:  03/29/24    Expected End:  04/19/24       Patient will complete ADL tasks with Mod I, using AE as needed, in order to increase patient's safety and independence with daily tasks.         Functional Transfers (Progressing)       Start:  03/29/24    Expected End:  04/19/24       Patient will complete functional transfers with Mod I in order to increase patient's safety and independence with daily tasks.         B UE Strengthening (Progressing)       Start:  03/29/24    Expected End:  04/19/24       Patient will increase B UE strength to 4+/5 for functional transfers.         Functional Mobility (Progressing)       Start:  03/29/24    Expected End:  04/19/24       Patient will demonstrate the ability to complete item retrieval and functional mobility with Mod I in order to increase patient's safety and independence with daily tasks.

## 2024-04-10 NOTE — PROGRESS NOTES
"Music Therapy Note    Akua FOSTER Cross     Therapy Session  Referral Type: New referral this admission  Visit Type: Follow-up visit  Session Start Time: 1216  Session End Time: 1242  Intervention Delivery: In-person  Conflict of Service: None     Pre-assessment  Pain Score: 0 - No pain  Mood/Affect: Calm, Cooperative, Goal focused  Verbalized Emotional State: Enjoyment, Gratitude, Happiness         Treatment/Interventions  Areas of Focus: Emotional support, Self-expression, Relaxation, Life review, Socialization  Music Therapy Interventions: Assessment, Live music listening, Recorded music listening, Empathic listening/validating emotions, Music sharing/discussion  Interruption: Yes  Interrupted by: Staff  Interruption Duration (min): 2 minutes  Interruption Outcome: Session resumed  Patient Fell Asleep at End of Session: No    Post-assessment  Pain Score: 0 - No pain  Mood/Affect: Appropriate, Calm, Cooperative, Goal focused, Participative  Verbalized Emotional State: Enjoyment, Gratitude, Happiness, Relaxed, Relief  Continue Visiting: Yes  Total Session Time (min): 26 minutes    Narrative  Assessment Detail: Pt found sitting in chair. MT reintroduced self and services. Pt agreeable to session.  Plan: MT provided live and recorded music to support self-expression, socialization, relaxation, and improved mood.  Intervention: MT provided Pt with song choices based on Pt preference. Pt made song decisions as well as requested songs.  Evaluation: Pt responded to the music by smiling, moving to the music, singing along, and closing her eyes. Pt expressed gratitude towards MT, the sessions, and the CD MT made with Pt's song requests.  Follow-up: MT will follow-up if Pt remains admitted.  Patient Comments: \"You are such a blessing. I am so thankful for you, and for my ultimate blessing you gave me.\" (referring to the CD).    Education Documentation  No documentation found.          "

## 2024-04-10 NOTE — CARE PLAN
The patient's goals for the shift include  safety, oob to chair for meals    The clinical goals for the shift include safety    Problem: Safety - Adult  Goal: Free from fall injury  Outcome: Progressing

## 2024-04-10 NOTE — PROGRESS NOTES
"Physical Therapy    Physical Therapy Treatment    Patient Name: Akua Almanzar  MRN: [id number]  Today's Date: 4/10/2024  Time Calculation  Start Time: 1407  Stop Time: 1437  Time Calculation (min): 30 min  Documentation and services provided by student physical therapist while supervised under a licensed physical therapist.   Aniket Moon, SPT       Assessment/Plan   PT Assessment  PT Assessment Results: Decreased strength, Decreased endurance, Impaired balance, Decreased mobility, Decreased safety awareness, Impaired hearing  Rehab Prognosis: Good  End of Session Patient Position: Up in chair, Alarm on    Assessment Comment: Improved activity tolerance and exercise tolerance demonstrating less rest breaks and less seated rest breaks throughout session. Less cues for improved breathing mechanics and safety awarness. Continued to monitor vitals with no signs of desat on supplemental O2 continued at 3 L. Pt's overall mobility progressing well. Discussed with pt's family, pt's current mobility status and progress while being in-house. Pt requires supervision with use of walker for ambulation, and is able to ascend/descend onto 6-inch step with CG/min assist. Family reports concern with bathtub transfers, stairs and \"the house isn't ready yet,\" for patient to return to son's house. Pt would cont to benefit from PT services post acute to maximize indep mobility.       PT Plan  Inpatient/Swing Bed or Outpatient: Inpatient  PT Plan  Treatment/Interventions: Transfer training, Gait training, Stair training, Strengthening, Endurance training, Therapeutic exercise, Therapeutic activity  PT Plan: Skilled PT  PT Frequency: 3 times per week  PT Discharge Recommendations:  Moderate intensity level of continued care.  Equipment Recommended upon Discharge: Wheeled walker  PT Recommended Transfer Status: Assist x1, Assistive device  PT - OK to Discharge: Yes      General Visit Information:   PT  Visit  PT Received On: " 04/10/24  General  Reason for Referral: impaired mobility  Referred By: Dr. JACQUELIN Gilman  Past Medical History Relevant to Rehab: CHF, PNA, HTN, HLD  Missed Visit: No  Family/Caregiver Present: Yes  Caregiver Feedback: Dtr present (and son in law)  Patient Position Received: Up in chair, Alarm on  Preferred Learning Style: verbal  General Comment: Agreeable to treatment. Pt presents up in chair with chair alarm on. Pt visiting with family; daughter and son in-law.    Subjective   Precautions:  Precautions  Medical Precautions: Oxygen therapy device and L/min, Fall precautions  Vital Signs:  Vital Signs  Heart Rate: 101 (101-113)  SpO2: 95 % (95-97)    Objective   Pain:  Pain Assessment  Pain Assessment: 0-10  Pain Score: 0 - No pain  Cognition:  Cognition  Overall Cognitive Status: Within Functional Limits  Orientation Level: Oriented X4  Postural Control:  Static Sitting Balance  Static Sitting-Balance Support: Bilateral upper extremity supported, Feet supported  Static Sitting-Level of Assistance: Modified independent  Dynamic Sitting Balance  Dynamic Sitting-Balance Support: Bilateral upper extremity supported, Feet supported  Dynamic Sitting-Comments: Mod I  Static Standing Balance  Static Standing-Balance Support: Bilateral upper extremity supported  Static Standing-Level of Assistance: Close supervision  Static Standing-Comment/Number of Minutes: 2ww  Dynamic Standing Balance  Dynamic Standing-Balance Support: Bilateral upper extremity supported  Dynamic Standing-Balance: Turning  Dynamic Standing-Comments: close sup/CGA  Extremity/Trunk Assessments:  RLE   RLE : Within Functional Limits  LLE   LLE : Within Functional Limits  Activity Tolerance:  Activity Tolerance  Endurance: Tolerates 30 min exercise with multiple rests  Activity Tolerance Comments: Improved activity tolerance observed but continues to require sup O2 3 L  Treatments:  Therapeutic Exercise  Therapeutic Exercise Performed: Yes  Therapeutic Exercise  Activity 1: reps btw 20-30 reps ea LAQ, pillow squeeze, ankle pumps, step ups on 6 in step with UE support on 2ww x 6 reps ea.    Therapeutic Activity  Therapeutic Activity Performed: Yes  Therapeutic Activity 1: Transfers at SBA / Close SUP     Ambulation/Gait Training  Ambulation/Gait Training Performed: Yes  Ambulation/Gait Training 1  Surface 1: Level tile  Device 1: Rolling walker  Gait Support Devices: Gait belt  Assistance 1: Close supervision  Quality of Gait 1: Decreased step length, Diminished heel strike, Shuffling gait, Forward flexed posture  Comments/Distance (ft) 1: approx 2 x 100 feet at A up to CGA for management of tied hospital gown for improved pt modesty.  Transfers  Transfer: Yes  Transfer 1  Transfer From 1: Sit to  Transfer to 1: Stand  Technique 1: Sit to stand  Transfer Device 1: Walker  Transfer Level of Assistance 1: Contact guard, Close supervision  Transfers 2  Transfer From 2: Stand to  Transfer to 2: Sit  Technique 2: Stand to sit  Transfer Device 2: Walker  Transfer Level of Assistance 2: Contact guard, Close supervision  Trials/Comments 2: reduced cues needed for hand placement    Stairs  Stairs: Yes  Stairs  Rails 1: Bilateral (2ww)  Curb Step 1: Yes  Device 1: Wheeled walker  Support Devices 1: Gait belt  Assistance 1: Contact guard  Comment/Number of Steps 1: x 6 reps ea LE    General observation  Pt continues to benefit from cues for safety and reminders on breathing mechanics secondary top supplemental O2 x 3 L/min continous through session. Improved hand placement and safety awareness noted with reduced tendencies to walk fast with 2ww.     Outcome Measures:  [facility] Basic Mobility  Turning from your back to your side while in a flat bed without using bedrails: A little  Moving from lying on your back to sitting on the side of a flat bed without using bedrails: A little  Moving to and from bed to chair (including a wheelchair): A little  Standing up from a chair using your  arms (e.g. wheelchair or bedside chair): A little  To walk in hospital room: A little  Climbing 3-5 steps with railing: A little  Basic Mobility - Total Score: 18    Education Documentation  Body Mechanics, taught by JOSE LUIS Gomez at 4/10/2024  2:55 PM.  Learner: Patient  Readiness: Nonacceptance  Method: Explanation  Response: Needs Reinforcement    Precautions, taught by JOSE LUIS Gomez at 4/10/2024  2:55 PM.  Learner: Patient  Readiness: Nonacceptance  Method: Explanation  Response: Needs Reinforcement    Mobility Training, taught by JOSE LUIS Gomez at 4/10/2024  2:55 PM.  Learner: Patient  Readiness: Nonacceptance  Method: Explanation  Response: Needs Reinforcement    Education Comments  No comments found.        OP EDUCATION:       Encounter Problems       Encounter Problems (Active)       Balance       Dynamic Balance (Progressing)       Start:  03/29/24    Expected End:  04/12/24       Pt to improve dynamic balance to fair (+) with UE assist on 2ww to improve stability and safety to promote safety within home environment.            Mobility       Bed mobility (Progressing)       Start:  03/29/24    Expected End:  04/12/24       Pt to be independent with bed mobility to aid in home going environment.          ambulation (Progressing)       Start:  03/29/24    Expected End:  04/12/24       Pt will ambulate x > 100 feet  at distant sup with UE assist on 2ww to allow for safety within home environment.         Stairs (Progressing)       Start:  03/29/24    Expected End:  04/12/24       Will ascend and descend x 12 steps with one handrail at [facility] to facilitate improved dependence and allow for safety with home environment.            PT Transfers       Transfers (Progressing)       Start:  03/29/24    Expected End:  04/12/24       Pt will complete all transfers at Mod I with UE assist as needed and improved safety awareness to improve functional mobility and promote increased safety awareness.              Pain - Adult

## 2024-04-10 NOTE — DOCUMENTATION CLARIFICATION NOTE
"    PATIENT:               DUNG JACK  ACCT #:                  [id number]  MRN:                       [id number]  :                       3/27/1929  ADMIT DATE:       3/29/2024 3:07 AM  DISCH DATE:  RESPONDING PROVIDER #:        [id number]          PROVIDER RESPONSE TEXT:    Gram Negative PNA    CDI QUERY TEXT:    Clarification        Instruction:    Based on your assessment of the patient and the clinical information, please provide the requested documentation by clicking on the appropriate radio button and enter any additional information if prompted.    Question: Please further specify the type of pneumonia being treated    When answering this query, please exercise your independent professional judgment. The fact that a question is being asked, does not imply that any particular answer is desired or expected.    The patient's clinical indicators include:  Clinical Information: Patient presents with shortness of breath.    Clinical Indicators:    : ED record: \"Pneumonia of right lung due to infectious organism, unspecified part of lung\" \"Chest x-ray reveals edema as well as likely right-sided pneumonia.  Patient treated with Lasix for heart failure and pulmonary edema as well as antibiotics for pneumonia.\"    : HP: \"  Pneumonia of right lung due to infectious organism, unspecified part of lung\"  \"Breath sounds: Wheezing, rhonchi and rales present.\"    : Pulmonology: \"Pneumonia right base opacity\"    : Pulmonology: \"Probable bacterial pneumonia: Continue with piperacillin/tazobactam.  Total of 7 days of systemic antibiotics\"    : Progress note PCP: \"Pneumonia of right lung due to infectious organism, unspecified part of lung\"    Treatment: IV antibiotics: Zosyn, ceftriaxone, azithromycin, IV steroids, Pulm consult, supplemental oxygen    Risk Factors: Functional quadriplegia, COPD, ECF resident,  Options provided:  -- Aspiration PNA  -- Gram Negative PNA  -- Bacterial PNA unspecified  -- " Other - I will add my own diagnosis  -- Refer to Clinical Documentation Reviewer    Query created by: Drew Delgado on 4/8/2024 7:03 AM      Electronically signed by:  JULIAN CALLE MD 4/10/2024 9:57 AM

## 2024-04-11 LAB
ANION GAP SERPL CALC-SCNC: 8 MMOL/L
BUN SERPL-MCNC: 29 MG/DL (ref 8–25)
CALCIUM SERPL-MCNC: 9.5 MG/DL (ref 8.5–10.4)
CHLORIDE SERPL-SCNC: 99 MMOL/L (ref 97–107)
CO2 SERPL-SCNC: 30 MMOL/L (ref 24–31)
CREAT SERPL-MCNC: 0.6 MG/DL (ref 0.4–1.6)
EGFRCR SERPLBLD CKD-EPI 2021: 83 ML/MIN/1.73M*2
GLUCOSE SERPL-MCNC: 112 MG/DL (ref 65–99)
POTASSIUM SERPL-SCNC: 4.7 MMOL/L (ref 3.4–5.1)
SODIUM SERPL-SCNC: 137 MMOL/L (ref 133–145)

## 2024-04-11 PROCEDURE — 2500000002 HC RX 250 W HCPCS SELF ADMINISTERED DRUGS (ALT 637 FOR MEDICARE OP, ALT 636 FOR OP/ED): Performed by: INTERNAL MEDICINE

## 2024-04-11 PROCEDURE — 2500000001 HC RX 250 WO HCPCS SELF ADMINISTERED DRUGS (ALT 637 FOR MEDICARE OP): Performed by: INTERNAL MEDICINE

## 2024-04-11 PROCEDURE — 2500000004 HC RX 250 GENERAL PHARMACY W/ HCPCS (ALT 636 FOR OP/ED): Performed by: INTERNAL MEDICINE

## 2024-04-11 PROCEDURE — 1100000001 HC PRIVATE ROOM DAILY

## 2024-04-11 PROCEDURE — 2500000005 HC RX 250 GENERAL PHARMACY W/O HCPCS: Performed by: INTERNAL MEDICINE

## 2024-04-11 PROCEDURE — 2500000002 HC RX 250 W HCPCS SELF ADMINISTERED DRUGS (ALT 637 FOR MEDICARE OP, ALT 636 FOR OP/ED): Mod: MUE | Performed by: INTERNAL MEDICINE

## 2024-04-11 PROCEDURE — 9420000001 HC RT PATIENT EDUCATION 5 MIN

## 2024-04-11 PROCEDURE — 94760 N-INVAS EAR/PLS OXIMETRY 1: CPT

## 2024-04-11 PROCEDURE — 94640 AIRWAY INHALATION TREATMENT: CPT

## 2024-04-11 PROCEDURE — 2500000004 HC RX 250 GENERAL PHARMACY W/ HCPCS (ALT 636 FOR OP/ED): Mod: MUE | Performed by: INTERNAL MEDICINE

## 2024-04-11 PROCEDURE — 99231 SBSQ HOSP IP/OBS SF/LOW 25: CPT | Performed by: INTERNAL MEDICINE

## 2024-04-11 PROCEDURE — 80048 BASIC METABOLIC PNL TOTAL CA: CPT | Performed by: INTERNAL MEDICINE

## 2024-04-11 PROCEDURE — 36415 COLL VENOUS BLD VENIPUNCTURE: CPT | Performed by: INTERNAL MEDICINE

## 2024-04-11 PROCEDURE — 94668 MNPJ CHEST WALL SBSQ: CPT

## 2024-04-11 RX ADMIN — ASPIRIN 81 MG: 81 TABLET, COATED ORAL at 10:09

## 2024-04-11 RX ADMIN — Medication: at 07:00

## 2024-04-11 RX ADMIN — ACETYLCYSTEINE 600 MG: 200 SOLUTION ORAL; RESPIRATORY (INHALATION) at 15:43

## 2024-04-11 RX ADMIN — PREDNISONE 10 MG: 10 TABLET ORAL at 17:13

## 2024-04-11 RX ADMIN — Medication 3 L/MIN: at 15:00

## 2024-04-11 RX ADMIN — Medication: at 23:00

## 2024-04-11 RX ADMIN — ACETYLCYSTEINE 600 MG: 200 SOLUTION ORAL; RESPIRATORY (INHALATION) at 11:07

## 2024-04-11 RX ADMIN — IPRATROPIUM BROMIDE AND ALBUTEROL SULFATE 3 ML: 2.5; .5 SOLUTION RESPIRATORY (INHALATION) at 19:02

## 2024-04-11 RX ADMIN — SIMVASTATIN 20 MG: 20 TABLET, FILM COATED ORAL at 20:09

## 2024-04-11 RX ADMIN — ACETYLCYSTEINE 600 MG: 200 SOLUTION ORAL; RESPIRATORY (INHALATION) at 06:59

## 2024-04-11 RX ADMIN — ACETYLCYSTEINE 600 MG: 200 SOLUTION ORAL; RESPIRATORY (INHALATION) at 19:02

## 2024-04-11 RX ADMIN — PREDNISONE 10 MG: 10 TABLET ORAL at 05:51

## 2024-04-11 RX ADMIN — LEVOTHYROXINE SODIUM 88 MCG: 0.09 TABLET ORAL at 05:51

## 2024-04-11 RX ADMIN — IPRATROPIUM BROMIDE AND ALBUTEROL SULFATE 3 ML: 2.5; .5 SOLUTION RESPIRATORY (INHALATION) at 07:00

## 2024-04-11 RX ADMIN — GABAPENTIN 100 MG: 100 CAPSULE ORAL at 20:09

## 2024-04-11 RX ADMIN — ACETAMINOPHEN 650 MG: 325 TABLET ORAL at 05:51

## 2024-04-11 RX ADMIN — ENOXAPARIN SODIUM 40 MG: 40 INJECTION SUBCUTANEOUS at 05:51

## 2024-04-11 RX ADMIN — ACETAMINOPHEN 650 MG: 325 TABLET ORAL at 11:56

## 2024-04-11 RX ADMIN — IPRATROPIUM BROMIDE AND ALBUTEROL SULFATE 3 ML: 2.5; .5 SOLUTION RESPIRATORY (INHALATION) at 11:07

## 2024-04-11 RX ADMIN — IPRATROPIUM BROMIDE AND ALBUTEROL SULFATE 3 ML: 2.5; .5 SOLUTION RESPIRATORY (INHALATION) at 15:44

## 2024-04-11 RX ADMIN — FUROSEMIDE 40 MG: 40 TABLET ORAL at 10:09

## 2024-04-11 RX ADMIN — CLOPIDOGREL BISULFATE 75 MG: 75 TABLET ORAL at 10:09

## 2024-04-11 ASSESSMENT — COGNITIVE AND FUNCTIONAL STATUS - GENERAL
MOVING TO AND FROM BED TO CHAIR: A LITTLE
DRESSING REGULAR LOWER BODY CLOTHING: A LITTLE
EATING MEALS: A LITTLE
MOBILITY SCORE: 18
WALKING IN HOSPITAL ROOM: A LITTLE
DAILY ACTIVITIY SCORE: 18
STANDING UP FROM CHAIR USING ARMS: A LITTLE
PERSONAL GROOMING: A LITTLE
TOILETING: A LITTLE
TURNING FROM BACK TO SIDE WHILE IN FLAT BAD: A LITTLE
HELP NEEDED FOR BATHING: A LITTLE
MOBILITY SCORE: 19
DRESSING REGULAR LOWER BODY CLOTHING: A LITTLE
HELP NEEDED FOR BATHING: A LITTLE
DAILY ACTIVITIY SCORE: 18
WALKING IN HOSPITAL ROOM: A LITTLE
MOVING FROM LYING ON BACK TO SITTING ON SIDE OF FLAT BED WITH BEDRAILS: A LITTLE
EATING MEALS: A LITTLE
DRESSING REGULAR UPPER BODY CLOTHING: A LITTLE
PERSONAL GROOMING: A LITTLE
TURNING FROM BACK TO SIDE WHILE IN FLAT BAD: A LITTLE
DRESSING REGULAR UPPER BODY CLOTHING: A LITTLE
TOILETING: A LITTLE
MOVING TO AND FROM BED TO CHAIR: A LITTLE
CLIMB 3 TO 5 STEPS WITH RAILING: A LITTLE
STANDING UP FROM CHAIR USING ARMS: A LITTLE
CLIMB 3 TO 5 STEPS WITH RAILING: A LITTLE

## 2024-04-11 ASSESSMENT — PAIN SCALES - GENERAL
PAINLEVEL_OUTOF10: 0 - NO PAIN
PAINLEVEL_OUTOF10: 0 - NO PAIN

## 2024-04-11 NOTE — CARE PLAN
Problem: Respiratory  Goal: Minimal/no exertional discomfort or dyspnea this shift  Outcome: Progressing  Goal: No signs of respiratory distress (eg. Use of accessory muscles. Peds grunting)  Outcome: Progressing  Goal: Verbalize decreased shortness of breath this shift  Outcome: Progressing  Goal: Wean oxygen to maintain O2 saturation per order/standard this shift  Outcome: Progressing

## 2024-04-11 NOTE — PROGRESS NOTES
"Music Therapy Note    Akua FOSTER Cross     Therapy Session  Referral Type: New referral this admission  Visit Type: Follow-up visit  Session Start Time: 1015  Session End Time: 1058  Intervention Delivery: In-person  Conflict of Service: None     Pre-assessment  Pain Score: 0 - No pain  Mood/Affect: Appropriate, Calm, Cooperative, Goal focused  Verbalized Emotional State: Enjoyment, Happiness         Treatment/Interventions  Areas of Focus: Emotional support, Relaxation, Self-expression, Socialization, Life review  Music Therapy Interventions: Live music listening, Assessment, Empathic listening/validating emotions, Recorded music listening, Passive musical engagement  Interruption: Yes  Interrupted by: Staff  Interruption Duration (min): 2 minutes  Interruption Outcome: Session resumed  Patient Fell Asleep at End of Session: No    Post-assessment  Pain Score: 0 - No pain  Mood/Affect: Appropriate, Calm, Cooperative, Participative, Goal focused  Verbalized Emotional State: Enjoyment, Gratitude, Happiness, Relaxed, Relief  Continue Visiting: Yes  Total Session Time (min): 43 minutes    Narrative  Assessment Detail: Pt found sitting in chair. MT reintroduced self and services. Pt agreeable to session.  Plan: MT provided live and recorded music to support self-expression, socialization, relaxation, improved mood, and music-assisted life review.  Intervention: MT provided Pt with song choices based on Pt preference. Pt made song decisions as well as requested songs.  Evaluation: Pt responded to the music by smiling, moving to the music, singing along, and closing her eyes. Pt expressed gratitude for MT and session.   Follow-up: MT will follow-up if Pt remains admitted.  Patient Comments: \"You are such a blessing. I believe god sent you to help me.\"     Education Documentation  No documentation found.          "

## 2024-04-11 NOTE — PROGRESS NOTES
"Nutrition Follow up Note    Nutrition Assessment      Continues to eat well. Plan for rehab at discharge - pending precert.     Nutrition History:     Energy Intake: Good > 75 %    Anthropometrics:  Ht: 154.9 cm (5' 1\"), Wt: 73.6 kg (162 lb 3.2 oz), BMI: 30.66  IBW/kg (Dietitian Calculated): 47.73 kg  Percent of IBW: 152 %  Adjusted Body Weight (kg): 54.09 kg    Weight Change:  Daily Weight  04/04/24 : 73.6 kg (162 lb 3.2 oz)  03/29/24 : 72.7 kg (160 lb 4.4 oz)   02/29/24 : 72.6 kg (160 lb)  02/23/24 : 82.6 kg (182 lb)  04/26/21 : 71.2 kg (157 lb)  02/15/21 : 72.1 kg (159 lb)     Weight History / % Weight Change: denies wt changes    Nutrition Focused Physical Exam Findings: assessed 3/29/24  Subcutaneous Fat Loss  Orbital Fat Pads: Mild-Moderate (slight dark circles and slight hollowing)  Buccal Fat Pads: Well nourished (full, rounded cheeks)    Muscle Wasting  Temporalis: Well nourished (well-defined muscle)    Edema  Edema: +1 trace  Edema Location: BLE    Nutrition Significant Labs:  Lab Results   Component Value Date    WBC 10.3 04/10/2024    HGB 10.4 (L) 04/10/2024    HCT 32.7 (L) 04/10/2024     04/10/2024    ALT 34 04/06/2024    AST 22 04/06/2024     04/11/2024    K 4.7 04/11/2024    CL 99 04/11/2024    CREATININE 0.60 04/11/2024    BUN 29 (H) 04/11/2024    CO2 30 04/11/2024    TSH 1.13 02/29/2024    INR 1.2 (H) 04/10/2021     Nutrition Specific Medications:  acetaminophen, 650 mg, oral, q6h  acetylcysteine, 3 mL, nebulization, 4x daily  aspirin, 81 mg, oral, Daily  clopidogrel, 75 mg, oral, Daily  enoxaparin, 40 mg, subcutaneous, Daily  furosemide, 40 mg, oral, Daily  gabapentin, 100 mg, oral, Nightly  ipratropium-albuteroL, 3 mL, nebulization, 4x daily  levothyroxine, 88 mcg, oral, Daily  oxygen, , inhalation, q8h  predniSONE, 10 mg, oral, BID  simvastatin, 20 mg, oral, Nightly  sodium chloride, 1 spray, Each Nostril, 4x daily      Dietary Orders (From admission, onward)       Start     Ordered "    04/07/24 1632  Adult diet Cardiac; 70 gm fat; 2 - 3 grams Sodium; 1200 mL fluid  Diet effective now        Question Answer Comment   Diet type Cardiac    Fat restriction: 70 gm fat    Sodium restriction: 2 - 3 grams Sodium    Dietary fluid restriction / 24h: 1200 mL fluid        04/07/24 1632    03/29/24 0700  May Not Participate in Room Service  Once        Question:  .  Answer:  Yes    03/29/24 0659                  Estimated Needs:   Estimated Energy Needs  Total Energy Estimated Needs (kCal): 1349 kCal  Total Estimated Energy Need per Day (kCal/kg): 25 kCal/kg  Method for Estimating Needs: ABW    Estimated Protein Needs  Total Protein Estimated Needs (g): 65 g  Total Protein Estimated Needs (g/kg): 1.2 g/kg  Method for Estimating Needs: ABW    Estimated Fluid Needs  Method for Estimating Needs: 1 ml/kcal        Nutrition Diagnosis   Nutrition Diagnosis:  Malnutrition Diagnosis  Patient has Malnutrition Diagnosis: No    Nutrition Diagnosis  Patient has Nutrition Diagnosis: Yes  Diagnosis Status (1): Ongoing  Nutrition Diagnosis 1: Increased nutrient needs  Related to (1): increased demand for nutrients  As Evidenced by (1): conditions associated with diagnosis       Nutrition Interventions/Recommendations   Nutrition Interventions and Recommendations:    Nutrition Prescription:  Individualized Nutrition Prescription Provided for : 1349 kcals and 65g protein to be provided via diet    Nutrition Interventions:   Food and/or Nutrient Delivery Interventions  Interventions: Meals and snacks  Meals and Snacks: Mineral-modified diet, Fat-modified diet  Goal: provide as ordered    Education Documentation  No documentation found.           Nutrition Monitoring and Evaluation   Monitoring/Evaluation:   Food/Nutrient Related History Monitoring  Monitoring and Evaluation Plan: Energy intake  Energy Intake: Estimated energy intake  Criteria: pt to consume >/= 75% estimated needs       Time Spent/Follow-up:   Follow  Up  Time Spent (min): 15 minutes  Last Date of Nutrition Visit: 04/11/24  Nutrition Follow-Up Needed?: 7-10 days  Follow up Comment: 4/18/24

## 2024-04-11 NOTE — PROGRESS NOTES
"Akua Almanzar is a 89 y.o. female on day 13 of admission presenting with Pneumonia of right lung due to infectious organism, unspecified part of lung.    Subjective   Breathing is better.  Down to nasal cannula 3 L       Objective     Physical Exam  Vitals reviewed.   Constitutional:       Appearance: Normal appearance.   HENT:      Head: Normocephalic and atraumatic.      Right Ear: Tympanic membrane, ear canal and external ear normal.      Left Ear: Tympanic membrane, ear canal and external ear normal.      Nose: Nose normal.      Mouth/Throat:      Pharynx: Oropharynx is clear.   Eyes:      Extraocular Movements: Extraocular movements intact.      Conjunctiva/sclera: Conjunctivae normal.      Pupils: Pupils are equal, round, and reactive to light.   Cardiovascular:      Rate and Rhythm: Normal rate and regular rhythm.      Pulses: Normal pulses.      Heart sounds: Normal heart sounds.   Pulmonary:      Effort: Pulmonary effort is normal.      Breath sounds: Rales present.   Abdominal:      General: Abdomen is flat. Bowel sounds are normal.      Palpations: Abdomen is soft.   Musculoskeletal:      Cervical back: Normal range of motion and neck supple.   Skin:     General: Skin is warm and dry.   Neurological:      General: No focal deficit present.      Mental Status: She is alert and oriented to person, place, and time.   Psychiatric:         Mood and Affect: Mood normal.         Last Recorded Vitals  Blood pressure 116/70, pulse 93, temperature 37 °C (98.6 °F), temperature source Oral, resp. rate 18, height 1.549 m (5' 1\"), weight 73.6 kg (162 lb 3.2 oz), SpO2 98%.  Intake/Output last 3 Shifts:  I/O last 3 completed shifts:  In: 120 (1.6 mL/kg) [P.O.:120]  Out: - (0 mL/kg)   Weight: 73.6 kg     Relevant Results              Scheduled medications  acetaminophen, 650 mg, oral, q6h  acetylcysteine, 3 mL, nebulization, 4x daily  aspirin, 81 mg, oral, Daily  clopidogrel, 75 mg, oral, Daily  enoxaparin, 40 mg, " subcutaneous, Daily  furosemide, 40 mg, oral, Daily  gabapentin, 100 mg, oral, Nightly  ipratropium-albuteroL, 3 mL, nebulization, 4x daily  levothyroxine, 88 mcg, oral, Daily  oxygen, , inhalation, q8h  predniSONE, 10 mg, oral, BID  simvastatin, 20 mg, oral, Nightly  sodium chloride, 1 spray, Each Nostril, 4x daily      Continuous medications     PRN medications  PRN medications: albuterol, bisacodyl, guaiFENesin, hydrOXYzine HCL, lidocaine    Results for orders placed or performed during the hospital encounter of 03/29/24 (from the past 24 hour(s))   Basic Metabolic Panel   Result Value Ref Range    Glucose 112 (H) 65 - 99 mg/dL    Sodium 137 133 - 145 mmol/L    Potassium 4.7 3.4 - 5.1 mmol/L    Chloride 99 97 - 107 mmol/L    Bicarbonate 30 24 - 31 mmol/L    Urea Nitrogen 29 (H) 8 - 25 mg/dL    Creatinine 0.60 0.40 - 1.60 mg/dL    eGFR 83 >60 mL/min/1.73m*2    Calcium 9.5 8.5 - 10.4 mg/dL    Anion Gap 8 <=19 mmol/L                          Assessment/Plan   Principal Problem:    Pneumonia of right lung due to infectious organism, unspecified part of lung  Active Problems:    Atherosclerosis of coronary artery without angina pectoris    Hyperlipidemia    Primary hypertension    Acquired hypothyroidism    Acute on chronic diastolic (congestive) heart failure (CMS/HCC)    Acute on chronic respiratory failure (CMS/HCC)    Clinically improving with pneumonia  Acute respiratory failure improving  Continue fluid restriction and diuretics for CHF  Waiting for rehab placement         I spent  minutes in the professional and overall care of this patient.      Ivone Gilman MD

## 2024-04-12 PROCEDURE — 94640 AIRWAY INHALATION TREATMENT: CPT

## 2024-04-12 PROCEDURE — 94668 MNPJ CHEST WALL SBSQ: CPT

## 2024-04-12 PROCEDURE — 2500000004 HC RX 250 GENERAL PHARMACY W/ HCPCS (ALT 636 FOR OP/ED): Performed by: INTERNAL MEDICINE

## 2024-04-12 PROCEDURE — 97535 SELF CARE MNGMENT TRAINING: CPT | Mod: GO

## 2024-04-12 PROCEDURE — 1100000001 HC PRIVATE ROOM DAILY

## 2024-04-12 PROCEDURE — 2500000005 HC RX 250 GENERAL PHARMACY W/O HCPCS: Performed by: INTERNAL MEDICINE

## 2024-04-12 PROCEDURE — 94760 N-INVAS EAR/PLS OXIMETRY 1: CPT

## 2024-04-12 PROCEDURE — 2500000002 HC RX 250 W HCPCS SELF ADMINISTERED DRUGS (ALT 637 FOR MEDICARE OP, ALT 636 FOR OP/ED): Performed by: INTERNAL MEDICINE

## 2024-04-12 PROCEDURE — 2500000002 HC RX 250 W HCPCS SELF ADMINISTERED DRUGS (ALT 637 FOR MEDICARE OP, ALT 636 FOR OP/ED): Mod: MUE | Performed by: INTERNAL MEDICINE

## 2024-04-12 PROCEDURE — 99232 SBSQ HOSP IP/OBS MODERATE 35: CPT | Performed by: INTERNAL MEDICINE

## 2024-04-12 PROCEDURE — 2500000001 HC RX 250 WO HCPCS SELF ADMINISTERED DRUGS (ALT 637 FOR MEDICARE OP): Performed by: INTERNAL MEDICINE

## 2024-04-12 PROCEDURE — 2500000004 HC RX 250 GENERAL PHARMACY W/ HCPCS (ALT 636 FOR OP/ED): Mod: MUE | Performed by: INTERNAL MEDICINE

## 2024-04-12 PROCEDURE — 97110 THERAPEUTIC EXERCISES: CPT | Mod: GP,CQ

## 2024-04-12 PROCEDURE — 94660 CPAP INITIATION&MGMT: CPT

## 2024-04-12 PROCEDURE — 9420000001 HC RT PATIENT EDUCATION 5 MIN

## 2024-04-12 PROCEDURE — 97110 THERAPEUTIC EXERCISES: CPT | Mod: GO

## 2024-04-12 PROCEDURE — 97116 GAIT TRAINING THERAPY: CPT | Mod: GP,CQ

## 2024-04-12 RX ADMIN — PREDNISONE 10 MG: 10 TABLET ORAL at 06:17

## 2024-04-12 RX ADMIN — IPRATROPIUM BROMIDE AND ALBUTEROL SULFATE 3 ML: 2.5; .5 SOLUTION RESPIRATORY (INHALATION) at 15:04

## 2024-04-12 RX ADMIN — IPRATROPIUM BROMIDE AND ALBUTEROL SULFATE 3 ML: 2.5; .5 SOLUTION RESPIRATORY (INHALATION) at 07:15

## 2024-04-12 RX ADMIN — ASPIRIN 81 MG: 81 TABLET, COATED ORAL at 09:27

## 2024-04-12 RX ADMIN — PREDNISONE 10 MG: 10 TABLET ORAL at 17:14

## 2024-04-12 RX ADMIN — ENOXAPARIN SODIUM 40 MG: 40 INJECTION SUBCUTANEOUS at 06:17

## 2024-04-12 RX ADMIN — ACETAMINOPHEN 650 MG: 325 TABLET ORAL at 17:14

## 2024-04-12 RX ADMIN — Medication: at 15:00

## 2024-04-12 RX ADMIN — LEVOTHYROXINE SODIUM 88 MCG: 0.09 TABLET ORAL at 06:17

## 2024-04-12 RX ADMIN — FUROSEMIDE 40 MG: 40 TABLET ORAL at 09:27

## 2024-04-12 RX ADMIN — ACETYLCYSTEINE 600 MG: 200 SOLUTION ORAL; RESPIRATORY (INHALATION) at 15:04

## 2024-04-12 RX ADMIN — IPRATROPIUM BROMIDE AND ALBUTEROL SULFATE 3 ML: 2.5; .5 SOLUTION RESPIRATORY (INHALATION) at 11:08

## 2024-04-12 RX ADMIN — SIMVASTATIN 20 MG: 20 TABLET, FILM COATED ORAL at 21:00

## 2024-04-12 RX ADMIN — GABAPENTIN 100 MG: 100 CAPSULE ORAL at 21:00

## 2024-04-12 RX ADMIN — ACETAMINOPHEN 650 MG: 325 TABLET ORAL at 06:17

## 2024-04-12 RX ADMIN — ACETYLCYSTEINE 600 MG: 200 SOLUTION ORAL; RESPIRATORY (INHALATION) at 11:08

## 2024-04-12 RX ADMIN — CLOPIDOGREL BISULFATE 75 MG: 75 TABLET ORAL at 09:27

## 2024-04-12 RX ADMIN — Medication 1 SPRAY: at 21:00

## 2024-04-12 RX ADMIN — ALBUTEROL SULFATE 2.5 MG: 2.5 SOLUTION RESPIRATORY (INHALATION) at 20:42

## 2024-04-12 RX ADMIN — ACETYLCYSTEINE 600 MG: 200 SOLUTION ORAL; RESPIRATORY (INHALATION) at 07:15

## 2024-04-12 RX ADMIN — Medication: at 07:00

## 2024-04-12 ASSESSMENT — COGNITIVE AND FUNCTIONAL STATUS - GENERAL
CLIMB 3 TO 5 STEPS WITH RAILING: A LITTLE
WALKING IN HOSPITAL ROOM: A LITTLE
MOVING FROM LYING ON BACK TO SITTING ON SIDE OF FLAT BED WITH BEDRAILS: A LITTLE
DRESSING REGULAR UPPER BODY CLOTHING: A LITTLE
PERSONAL GROOMING: A LITTLE
TOILETING: A LITTLE
STANDING UP FROM CHAIR USING ARMS: A LITTLE
MOVING TO AND FROM BED TO CHAIR: A LITTLE
MOBILITY SCORE: 18
DRESSING REGULAR LOWER BODY CLOTHING: A LOT
DRESSING REGULAR LOWER BODY CLOTHING: A LITTLE
MOBILITY SCORE: 19
DAILY ACTIVITIY SCORE: 18
STANDING UP FROM CHAIR USING ARMS: A LITTLE
CLIMB 3 TO 5 STEPS WITH RAILING: A LITTLE
PERSONAL GROOMING: A LITTLE
WALKING IN HOSPITAL ROOM: A LITTLE
TURNING FROM BACK TO SIDE WHILE IN FLAT BAD: A LITTLE
TURNING FROM BACK TO SIDE WHILE IN FLAT BAD: A LITTLE
HELP NEEDED FOR BATHING: A LITTLE
DRESSING REGULAR UPPER BODY CLOTHING: A LITTLE
TOILETING: A LITTLE
HELP NEEDED FOR BATHING: A LITTLE
MOVING TO AND FROM BED TO CHAIR: A LITTLE
DAILY ACTIVITIY SCORE: 19

## 2024-04-12 ASSESSMENT — ACTIVITIES OF DAILY LIVING (ADL): HOME_MANAGEMENT_TIME_ENTRY: 10

## 2024-04-12 ASSESSMENT — PAIN - FUNCTIONAL ASSESSMENT
PAIN_FUNCTIONAL_ASSESSMENT: 0-10

## 2024-04-12 ASSESSMENT — PAIN SCALES - GENERAL
PAINLEVEL_OUTOF10: 0 - NO PAIN
PAINLEVEL_OUTOF10: 3
PAINLEVEL_OUTOF10: 0 - NO PAIN
PAINLEVEL_OUTOF10: 0 - NO PAIN

## 2024-04-12 NOTE — PROGRESS NOTES
"Physical Therapy    Physical Therapy Treatment    Patient Name: Akua Almanzar  MRN: [id number]  Today's Date: 4/12/2024  Time Calculation  Start Time: 1430  Stop Time: 1501  Time Calculation (min): 31 min       Assessment/Plan   PT Assessment  Rehab Prognosis: Good  Assessment Comment: Pt needs reminders of pursed lip breathing during activity.  End of Session Patient Position: Up in chair, Alarm on  PT Plan  Inpatient/Swing Bed or Outpatient: Inpatient  PT Plan  Treatment/Interventions: Transfer training, Gait training, Therapeutic exercise, Strengthening, Endurance training  PT Plan: Skilled PT  PT Frequency: 3 times per week  PT Discharge Recommendations: Moderate intensity level of continued care  Equipment Recommended upon Discharge: Wheeled walker  PT Recommended Transfer Status: Assist x1, Assistive device  PT - OK to Discharge: Yes      General Visit Information:   PT  Visit  PT Received On: 04/12/24  General  Prior to Session Communication: Bedside nurse  Patient Position Received: Up in chair, Alarm on  Preferred Learning Style: verbal  General Comment: Cleared per nurse to see pt for therapy. Pt agreeable. \"I have been doing some of my exercises!\"    Subjective   Precautions:  Precautions  Hearing/Visual Limitations: [place]  Medical Precautions: Fall precautions, Oxygen therapy device and 3L/min         Objective   Pain:  Pain Assessment  Pain Assessment: 0-10  Pain Score: 0 - No pain  Cognition:  Cognition  Overall Cognitive Status: Within Functional Limits  Orientation Level: Oriented X4       Treatments:  Therapeutic Exercise  Therapeutic Exercise Activity 1: Pt performed seated bilat LE ankle pumps, heel raises, LAQ, hip flexion, jaron hip adduction and resisted hip abduction 2 x10 reps each. Rest breaks throughout due to fatigue and SOB, verbal cues for pursed lip breathing.    Ambulation/Gait Training 1  Surface 1: Level tile  Device 1: Rolling walker  Assistance 1: Close supervision  Quality of Gait 1: " Decreased step length  Comments/Distance (ft) 1: Pt ambulated with RW and 3L oxygen ~120' x2 close supervision with brief standing rest break between trials. Verbal cues to concentrate on pursed lip breathing.  Transfer 1  Transfer From 1: Chair with arms to  Transfer to 1: Stand  Technique 1: Sit to stand  Transfer Device 1: Walker  Transfer Level of Assistance 1: Close supervision  Transfers 2  Transfer From 2: Stand to  Transfer to 2: Sit, Chair with arms  Technique 2: Stand to sit  Transfer Level of Assistance 2: Close supervision    Outcome Measures:  [facility] Basic Mobility  Turning from your back to your side while in a flat bed without using bedrails: A little  Moving from lying on your back to sitting on the side of a flat bed without using bedrails: A little  Moving to and from bed to chair (including a wheelchair): A little  Standing up from a chair using your arms (e.g. wheelchair or bedside chair): A little  To walk in hospital room: A little  Climbing 3-5 steps with railing: A little  Basic Mobility - Total Score: 18    Education Documentation  Handouts, taught by Kelsea Covarrubias PTA at 4/12/2024  4:02 PM.  Learner: Patient  Readiness: Acceptance  Method: Explanation  Response: Needs Reinforcement    Body Mechanics, taught by Kelsea Covarrubias PTA at 4/12/2024  4:02 PM.  Learner: Patient  Readiness: Acceptance  Method: Explanation  Response: Needs Reinforcement    Precautions, taught by Kelsea Covarrubias PTA at 4/12/2024  4:02 PM.  Learner: Patient  Readiness: Acceptance  Method: Explanation  Response: Needs Reinforcement    Home Exercise Program, taught by Kelsea Covarrubias PTA at 4/12/2024  4:02 PM.  Learner: Patient  Readiness: Acceptance  Method: Explanation  Response: Needs Reinforcement    Mobility Training, taught by Kelsea Covarrubias PTA at 4/12/2024  4:02 PM.  Learner: Patient  Readiness: Acceptance  Method: Explanation  Response: Needs Reinforcement    Education Comments  No comments found.           Encounter  Problems       Encounter Problems (Active)       Balance       Dynamic Balance (Progressing)       Start:  03/29/24    Expected End:  04/12/24       Pt to improve dynamic balance to fair (+) with UE assist on 2ww to improve stability and safety to promote safety within home environment.            Mobility       Bed mobility (Progressing)       Start:  03/29/24    Expected End:  04/12/24       Pt to be independent with bed mobility to aid in home going environment.          ambulation (Progressing)       Start:  03/29/24    Expected End:  04/12/24       Pt will ambulate x > 100 feet  at distant sup with UE assist on 2ww to allow for safety within home environment.         Stairs (Progressing)       Start:  03/29/24    Expected End:  04/12/24       Will ascend and descend x 12 steps with one handrail at [facility] to facilitate improved dependence and allow for safety with home environment.            PT Transfers       Transfers (Progressing)       Start:  03/29/24    Expected End:  04/12/24       Pt will complete all transfers at Mod I with UE assist as needed and improved safety awareness to improve functional mobility and promote increased safety awareness.             Pain - Adult

## 2024-04-12 NOTE — PROGRESS NOTES
Occupational Therapy    OT Treatment    Patient Name: Akua Almanzar  MRN: [id number]  Today's Date: 4/12/2024  Time Calculation  Start Time: 1137  Stop Time: 1201  Time Calculation (min): 24 min         Assessment:  OT Assessment: Pt continues to demonstrate progress toward established goals and was receptive to therapy and instructions throughout.  Medical Staff Made Aware: Yes  End of Session Communication: Bedside nurse  End of Session Patient Position: Up in chair, Alarm on  Medical Staff Made Aware: Yes  Plan:  Treatment Interventions: ADL retraining, UE strengthening/ROM, Endurance training, Patient/family training, Compensatory technique education  OT Frequency: 2 times per week  OT Discharge Recommendations: Low intensity level of continued care, 24 hr supervision due to cognition  Equipment Recommended upon Discharge: Wheeled walker  OT Recommended Transfer Status: Assist of 1  OT - OK to Discharge: Yes  Treatment Interventions: ADL retraining, UE strengthening/ROM, Endurance training, Patient/family training, Compensatory technique education        Subjective     Previous Visit Info:  OT Last Visit  OT Received On: 04/12/24    General:  General  Reason for Referral: impaired ADLs  Past Medical History Relevant to Rehab: CHF, PNA, HTN, HLD  Family/Caregiver Present: No  Prior to Session Communication: Bedside nurse  Patient Position Received: Up in chair, Alarm on  Preferred Learning Style: verbal, visual  General Comment: Pt cleared by nursing and agreeable for therapy.    Precautions:  Medical Precautions: Oxygen therapy device and L/min, Fall precautions (3L via NC)    Pain:  Pain Assessment  Pain Assessment: 0-10  Pain Score: 0 - No pain        Objective      Cognition:  Cognition  Orientation Level: Oriented X4    Activities of Daily Living: UE Bathing  UE Bathing Comments: completed earlier this date    LE Bathing  LE Bathing Comments: completed earlier this date    Therapy/Activity: Therapeutic  Exercise  Therapeutic Exercise Performed: Yes  Therapeutic Exercise Activity 1: Instructed in/performed 5 AROM exercises x 15 reps each incorporating diaphragmatic breathing techniqueswith cues for calvin and technique in order to increase UE strength and functional endurance needed for ADLs.    Outcome Measures:[facility] Daily Activity  Putting on and taking off regular lower body clothing: A lot  Bathing (including washing, rinsing, drying): A little  Putting on and taking off regular upper body clothing: A little  Toileting, which includes using toilet, bedpan or urinal: A little  Taking care of personal grooming such as brushing teeth: A little  Eating Meals: None  Daily Activity - Total Score: 18      OP EDUCATION:  Education  Individual(s) Educated: Patient  Education Provided: Other (UE exercises for progressive strengthening, energy conservation techniques, home safety for fall prevention)  Patient/Caregiver Demonstrated Understanding: yes  Patient Response to Education: Patient/Caregiver Verbalized Understanding of Information, Patient/Caregiver Performed Return Demonstration of Exercises/Activities    Goals:  Encounter Problems       Encounter Problems (Active)       OT Goals       ADLs (Progressing)       Start:  03/29/24    Expected End:  04/19/24       Patient will complete ADL tasks with Mod I, using AE as needed, in order to increase patient's safety and independence with daily tasks.         Functional Transfers (Progressing)       Start:  03/29/24    Expected End:  04/19/24       Patient will complete functional transfers with Mod I in order to increase patient's safety and independence with daily tasks.         B UE Strengthening (Progressing)       Start:  03/29/24    Expected End:  04/19/24       Patient will increase B UE strength to 4+/5 for functional transfers.         Functional Mobility (Progressing)       Start:  03/29/24    Expected End:  04/19/24       Patient will demonstrate the ability  to complete item retrieval and functional mobility with Mod I in order to increase patient's safety and independence with daily tasks.

## 2024-04-12 NOTE — PROGRESS NOTES
04/12/24 1641   Discharge Planning   Living Arrangements Children  (Insurance declined skilled rehab, will be going to her son;s home)   Type of Residence Private residence   Type of Home Care Services Hospice  ([facility])   Patient expects to be discharged to: Home with son, Hospice ([facility]) will follow   Does the patient need discharge transport arranged? No     Insurance declined precert, the peer to perr is scheduled for April 16th and April 17th (as a back up) between 1pm to 4pm they will call Dr Gilman. Patient's sonDerek was called, let him know that patient potentially will be discharged on Monday, as precert was declined. [facility] was also updated and informed through [facility]. Son continues to work on fixing his home to accommodate his mother.     PLAN: Discharge home with sonDerek with [facility] on Monday.

## 2024-04-12 NOTE — PROGRESS NOTES
Music Therapy Note    Akua Almanzar     Therapy Session  Referral Type: New referral this admission  Visit Type: New visit  Session Start Time: 1410  Session End Time: 1415  Intervention Delivery: In-person  Conflict of Service: None     Pre-assessment  Unable to Assess Reason: Outcomes not assessed, Outcomes not applicable  Mood/Affect: Appropriate, Calm  Verbalized Emotional State: Enjoyment         Treatment/Interventions  Areas of Focus: Emotional support, Socialization, Self-expression  Music Therapy Interventions: Assessment    Post-assessment  Total Session Time (min): 5 minutes    Narrative  Follow-up: MT will follow-up if Pt remains admitted.    Education Documentation  No documentation found.

## 2024-04-13 LAB
ANION GAP SERPL CALC-SCNC: 5 MMOL/L
BUN SERPL-MCNC: 30 MG/DL (ref 8–25)
CALCIUM SERPL-MCNC: 9.2 MG/DL (ref 8.5–10.4)
CHLORIDE SERPL-SCNC: 99 MMOL/L (ref 97–107)
CK SERPL-CCNC: 37 U/L (ref 24–195)
CO2 SERPL-SCNC: 31 MMOL/L (ref 24–31)
CREAT SERPL-MCNC: 0.6 MG/DL (ref 0.4–1.6)
EGFRCR SERPLBLD CKD-EPI 2021: 83 ML/MIN/1.73M*2
ERYTHROCYTE [DISTWIDTH] IN BLOOD BY AUTOMATED COUNT: 16.4 % (ref 11.5–14.5)
GLUCOSE SERPL-MCNC: 109 MG/DL (ref 65–99)
HCT VFR BLD AUTO: 30 % (ref 36–46)
HGB BLD-MCNC: 9.8 G/DL (ref 12–16)
MCH RBC QN AUTO: 28.7 PG (ref 26–34)
MCHC RBC AUTO-ENTMCNC: 32.7 G/DL (ref 32–36)
MCV RBC AUTO: 88 FL (ref 80–100)
NRBC BLD-RTO: 0 /100 WBCS (ref 0–0)
PLATELET # BLD AUTO: 274 X10*3/UL (ref 150–450)
POTASSIUM SERPL-SCNC: 4.8 MMOL/L (ref 3.4–5.1)
RBC # BLD AUTO: 3.41 X10*6/UL (ref 4–5.2)
SODIUM SERPL-SCNC: 135 MMOL/L (ref 133–145)
TROPONIN T SERPL-MCNC: 31 NG/L
WBC # BLD AUTO: 10.1 X10*3/UL (ref 4.4–11.3)

## 2024-04-13 PROCEDURE — 2500000004 HC RX 250 GENERAL PHARMACY W/ HCPCS (ALT 636 FOR OP/ED): Performed by: INTERNAL MEDICINE

## 2024-04-13 PROCEDURE — 94640 AIRWAY INHALATION TREATMENT: CPT

## 2024-04-13 PROCEDURE — 82374 ASSAY BLOOD CARBON DIOXIDE: CPT | Performed by: INTERNAL MEDICINE

## 2024-04-13 PROCEDURE — 1100000001 HC PRIVATE ROOM DAILY

## 2024-04-13 PROCEDURE — 9420000001 HC RT PATIENT EDUCATION 5 MIN

## 2024-04-13 PROCEDURE — 36415 COLL VENOUS BLD VENIPUNCTURE: CPT | Performed by: INTERNAL MEDICINE

## 2024-04-13 PROCEDURE — 94668 MNPJ CHEST WALL SBSQ: CPT

## 2024-04-13 PROCEDURE — 2500000005 HC RX 250 GENERAL PHARMACY W/O HCPCS: Performed by: INTERNAL MEDICINE

## 2024-04-13 PROCEDURE — 94760 N-INVAS EAR/PLS OXIMETRY 1: CPT

## 2024-04-13 PROCEDURE — 82550 ASSAY OF CK (CPK): CPT | Performed by: INTERNAL MEDICINE

## 2024-04-13 PROCEDURE — 2500000002 HC RX 250 W HCPCS SELF ADMINISTERED DRUGS (ALT 637 FOR MEDICARE OP, ALT 636 FOR OP/ED): Mod: MUE | Performed by: INTERNAL MEDICINE

## 2024-04-13 PROCEDURE — 2500000004 HC RX 250 GENERAL PHARMACY W/ HCPCS (ALT 636 FOR OP/ED): Mod: MUE | Performed by: INTERNAL MEDICINE

## 2024-04-13 PROCEDURE — 85027 COMPLETE CBC AUTOMATED: CPT | Performed by: INTERNAL MEDICINE

## 2024-04-13 PROCEDURE — 83874 ASSAY OF MYOGLOBIN: CPT | Mod: TRILAB,WESLAB | Performed by: INTERNAL MEDICINE

## 2024-04-13 PROCEDURE — 2500000001 HC RX 250 WO HCPCS SELF ADMINISTERED DRUGS (ALT 637 FOR MEDICARE OP): Performed by: INTERNAL MEDICINE

## 2024-04-13 PROCEDURE — 2500000002 HC RX 250 W HCPCS SELF ADMINISTERED DRUGS (ALT 637 FOR MEDICARE OP, ALT 636 FOR OP/ED): Performed by: INTERNAL MEDICINE

## 2024-04-13 PROCEDURE — 84484 ASSAY OF TROPONIN QUANT: CPT | Performed by: INTERNAL MEDICINE

## 2024-04-13 PROCEDURE — 99232 SBSQ HOSP IP/OBS MODERATE 35: CPT | Performed by: INTERNAL MEDICINE

## 2024-04-13 RX ADMIN — IPRATROPIUM BROMIDE AND ALBUTEROL SULFATE 3 ML: 2.5; .5 SOLUTION RESPIRATORY (INHALATION) at 11:11

## 2024-04-13 RX ADMIN — ACETYLCYSTEINE 600 MG: 200 SOLUTION ORAL; RESPIRATORY (INHALATION) at 11:10

## 2024-04-13 RX ADMIN — HYDROXYZINE HYDROCHLORIDE 10 MG: 10 TABLET ORAL at 11:47

## 2024-04-13 RX ADMIN — ASPIRIN 81 MG: 81 TABLET, COATED ORAL at 08:34

## 2024-04-13 RX ADMIN — ENOXAPARIN SODIUM 40 MG: 40 INJECTION SUBCUTANEOUS at 06:18

## 2024-04-13 RX ADMIN — GABAPENTIN 100 MG: 100 CAPSULE ORAL at 21:46

## 2024-04-13 RX ADMIN — PREDNISONE 10 MG: 10 TABLET ORAL at 06:19

## 2024-04-13 RX ADMIN — IPRATROPIUM BROMIDE AND ALBUTEROL SULFATE 3 ML: 2.5; .5 SOLUTION RESPIRATORY (INHALATION) at 15:25

## 2024-04-13 RX ADMIN — IPRATROPIUM BROMIDE AND ALBUTEROL SULFATE 3 ML: 2.5; .5 SOLUTION RESPIRATORY (INHALATION) at 07:00

## 2024-04-13 RX ADMIN — ACETYLCYSTEINE 600 MG: 200 SOLUTION ORAL; RESPIRATORY (INHALATION) at 19:22

## 2024-04-13 RX ADMIN — Medication 1 SPRAY: at 06:29

## 2024-04-13 RX ADMIN — SIMVASTATIN 20 MG: 20 TABLET, FILM COATED ORAL at 21:46

## 2024-04-13 RX ADMIN — PREDNISONE 10 MG: 10 TABLET ORAL at 18:31

## 2024-04-13 RX ADMIN — Medication 1 SPRAY: at 21:47

## 2024-04-13 RX ADMIN — CLOPIDOGREL BISULFATE 75 MG: 75 TABLET ORAL at 08:34

## 2024-04-13 RX ADMIN — LEVOTHYROXINE SODIUM 88 MCG: 0.09 TABLET ORAL at 06:19

## 2024-04-13 RX ADMIN — ACETAMINOPHEN 650 MG: 325 TABLET ORAL at 06:19

## 2024-04-13 RX ADMIN — Medication: at 07:00

## 2024-04-13 RX ADMIN — ACETYLCYSTEINE 600 MG: 200 SOLUTION ORAL; RESPIRATORY (INHALATION) at 15:25

## 2024-04-13 RX ADMIN — ACETYLCYSTEINE 600 MG: 200 SOLUTION ORAL; RESPIRATORY (INHALATION) at 07:00

## 2024-04-13 RX ADMIN — Medication 2 L/MIN: at 15:00

## 2024-04-13 RX ADMIN — FUROSEMIDE 40 MG: 40 TABLET ORAL at 08:34

## 2024-04-13 RX ADMIN — IPRATROPIUM BROMIDE AND ALBUTEROL SULFATE 3 ML: 2.5; .5 SOLUTION RESPIRATORY (INHALATION) at 19:22

## 2024-04-13 ASSESSMENT — COGNITIVE AND FUNCTIONAL STATUS - GENERAL
DRESSING REGULAR LOWER BODY CLOTHING: A LOT
CLIMB 3 TO 5 STEPS WITH RAILING: A LITTLE
STANDING UP FROM CHAIR USING ARMS: A LITTLE
MOVING FROM LYING ON BACK TO SITTING ON SIDE OF FLAT BED WITH BEDRAILS: A LITTLE
DAILY ACTIVITIY SCORE: 17
HELP NEEDED FOR BATHING: A LOT
PERSONAL GROOMING: A LITTLE
DRESSING REGULAR UPPER BODY CLOTHING: A LITTLE
TOILETING: A LITTLE
WALKING IN HOSPITAL ROOM: A LITTLE
TURNING FROM BACK TO SIDE WHILE IN FLAT BAD: A LITTLE
MOVING TO AND FROM BED TO CHAIR: A LITTLE
MOBILITY SCORE: 18

## 2024-04-13 ASSESSMENT — PAIN SCALES - GENERAL
PAINLEVEL_OUTOF10: 0 - NO PAIN
PAINLEVEL_OUTOF10: 0 - NO PAIN

## 2024-04-13 NOTE — CARE PLAN
Over the shift, the patient did make progress toward the following goals.       Problem: Safety - Adult  Goal: Free from fall injury  Outcome: Progressing     Problem: Chronic Conditions and Co-morbidities  Goal: Patient's chronic conditions and co-morbidity symptoms are monitored and maintained or improved  Outcome: Progressing     Problem: Respiratory  Goal: No signs of respiratory distress (eg. Use of accessory muscles. Peds grunting)  Outcome: Progressing     Problem: Skin  Goal: Participates in plan/prevention/treatment measures  Outcome: Progressing          The clinical goals for the shift include safety

## 2024-04-13 NOTE — PROGRESS NOTES
"Akua Almanzar is a 89 y.o. female on day 14 of admission presenting with Pneumonia of right lung due to infectious organism, unspecified part of lung.    Subjective   Breathing is better.  Down to nasal cannula 3 L       Objective     Physical Exam  Vitals reviewed.   Constitutional:       Appearance: Normal appearance.   HENT:      Head: Normocephalic and atraumatic.      Right Ear: Tympanic membrane, ear canal and external ear normal.      Left Ear: Tympanic membrane, ear canal and external ear normal.      Nose: Nose normal.      Mouth/Throat:      Pharynx: Oropharynx is clear.   Eyes:      Extraocular Movements: Extraocular movements intact.      Conjunctiva/sclera: Conjunctivae normal.      Pupils: Pupils are equal, round, and reactive to light.   Cardiovascular:      Rate and Rhythm: Normal rate and regular rhythm.      Pulses: Normal pulses.      Heart sounds: Normal heart sounds.   Pulmonary:      Effort: Pulmonary effort is normal.      Breath sounds: Rales present.   Abdominal:      General: Abdomen is flat. Bowel sounds are normal.      Palpations: Abdomen is soft.   Musculoskeletal:      Cervical back: Normal range of motion and neck supple.   Skin:     General: Skin is warm and dry.   Neurological:      General: No focal deficit present.      Mental Status: She is alert and oriented to person, place, and time.   Psychiatric:         Mood and Affect: Mood normal.         Last Recorded Vitals  Blood pressure 133/74, pulse 103, temperature 36.4 °C (97.5 °F), temperature source Oral, resp. rate 16, height 1.549 m (5' 1\"), weight 73.6 kg (162 lb 3.2 oz), SpO2 100%.  Intake/Output last 3 Shifts:  I/O last 3 completed shifts:  In: 1480 (20.1 mL/kg) [P.O.:1480]  Out: - (0 mL/kg)   Weight: 73.6 kg     Relevant Results              Scheduled medications  acetaminophen, 650 mg, oral, q6h  acetylcysteine, 3 mL, nebulization, 4x daily  aspirin, 81 mg, oral, Daily  clopidogrel, 75 mg, oral, Daily  enoxaparin, 40 " mg, subcutaneous, Daily  furosemide, 40 mg, oral, Daily  gabapentin, 100 mg, oral, Nightly  ipratropium-albuteroL, 3 mL, nebulization, 4x daily  levothyroxine, 88 mcg, oral, Daily  oxygen, , inhalation, q8h  predniSONE, 10 mg, oral, BID  simvastatin, 20 mg, oral, Nightly  sodium chloride, 1 spray, Each Nostril, 4x daily      Continuous medications     PRN medications  PRN medications: albuterol, bisacodyl, guaiFENesin, hydrOXYzine HCL, lidocaine    No results found for this or any previous visit (from the past 24 hour(s)).    Scheduled medications  acetaminophen, 650 mg, oral, q6h  acetylcysteine, 3 mL, nebulization, 4x daily  aspirin, 81 mg, oral, Daily  clopidogrel, 75 mg, oral, Daily  enoxaparin, 40 mg, subcutaneous, Daily  furosemide, 40 mg, oral, Daily  gabapentin, 100 mg, oral, Nightly  ipratropium-albuteroL, 3 mL, nebulization, 4x daily  levothyroxine, 88 mcg, oral, Daily  oxygen, , inhalation, q8h  predniSONE, 10 mg, oral, BID  simvastatin, 20 mg, oral, Nightly  sodium chloride, 1 spray, Each Nostril, 4x daily      Continuous medications     PRN medications  PRN medications: albuterol, bisacodyl, guaiFENesin, hydrOXYzine HCL, lidocaine             Assessment/Plan   Principal Problem:    Pneumonia of right lung due to infectious organism, unspecified part of lung  Active Problems:    Atherosclerosis of coronary artery without angina pectoris    Hyperlipidemia    Primary hypertension    Acquired hypothyroidism    Acute on chronic diastolic (congestive) heart failure (Multi)    Acute on chronic respiratory failure (Multi)    Clinically improving with pneumonia  Acute respiratory failure improving  Continue fluid restriction and diuretics for CHF  Waiting for rehab placement    Had a long discussion with daughter . Insurance refused approval for rehab  Finally daughter agreed to take her home tomorrow   Wants patient to get shower before dc   SS to help with dc and home care         I spent  minutes in the  professional and overall care of this patient.      Ivone Gilman MD

## 2024-04-13 NOTE — PROGRESS NOTES
Pulmonary Progress Note    Akua Almanzar is a 89 y.o. female on day 14 of admission presenting with Pneumonia of right lung due to infectious organism, unspecified part of lung.    Subjective   No acute events  Up in a chair    Objective   Vital Signs      4/11/2024     3:00 PM 4/11/2024    11:11 PM 4/11/2024    11:30 PM 4/12/2024     3:30 AM 4/12/2024     8:29 AM 4/12/2024     4:41 PM 4/12/2024    10:59 PM   Vitals   Systolic 116 143   127 133 141   Diastolic 70 79   90 74 87   Heart Rate 93 100   97 103 103   Temp 37 °C (98.6 °F) 36.4 °C (97.5 °F)   36.6 °C (97.9 °F) 36.4 °C (97.5 °F) 36.7 °C (98.1 °F)   Resp 18 17 23 18 16 16 16       Oxygen Therapy  SpO2: 99 %  Medical Gas Therapy: Supplemental oxygen (3L)  O2 Delivery Method: Nasal cannula    FiO2 (%):  [32 %] 32 %    Intake/Output previous 24 hours:    Intake/Output Summary (Last 24 hours) at 4/12/2024 2333  Last data filed at 4/12/2024 1300  Gross per 24 hour   Intake 640 ml   Output --   Net 640 ml       Physical Exam  Vitals reviewed.   Constitutional:       Appearance: Normal appearance.   HENT:      Head: Normocephalic and atraumatic.      Mouth/Throat:      Mouth: Mucous membranes are moist.   Eyes:      Extraocular Movements: Extraocular movements intact.      Pupils: Pupils are equal, round, and reactive to light.   Cardiovascular:      Rate and Rhythm: Normal rate and regular rhythm.   Pulmonary:      Effort: Pulmonary effort is normal.      Breath sounds: Normal breath sounds and air entry.   Abdominal:      General: Abdomen is flat. Bowel sounds are normal.      Palpations: Abdomen is soft.   Musculoskeletal:         General: Normal range of motion.      Cervical back: Normal range of motion and neck supple.   Skin:     General: Skin is warm and dry.   Neurological:      General: No focal deficit present.      Mental Status: She is alert and oriented to person, place, and time. Mental status is at baseline.       ...  Lines and  Tubes:  Peripheral IV 03/29/24 20 G Right;Anterior Forearm (Active)   Placement Date/Time: 03/29/24 1150   Size (Gauge): 20 G  Orientation: Right;Anterior  Location: Forearm  Local Anesthetic: None  Placed by: IV Nurse   Number of days: 10         Scheduled medications  acetaminophen, 650 mg, oral, q6h  acetylcysteine, 3 mL, nebulization, 4x daily  aspirin, 81 mg, oral, Daily  clopidogrel, 75 mg, oral, Daily  enoxaparin, 40 mg, subcutaneous, Daily  furosemide, 40 mg, oral, Daily  gabapentin, 100 mg, oral, Nightly  ipratropium-albuteroL, 3 mL, nebulization, 4x daily  levothyroxine, 88 mcg, oral, Daily  oxygen, , inhalation, q8h  predniSONE, 10 mg, oral, BID  simvastatin, 20 mg, oral, Nightly  sodium chloride, 1 spray, Each Nostril, 4x daily      Continuous medications     PRN medications  PRN medications: albuterol, bisacodyl, guaiFENesin, hydrOXYzine HCL, lidocaine    Relevant Results  Results from last 7 days   Lab Units 04/10/24  0425 04/08/24  0431 04/07/24  0420   WBC AUTO x10*3/uL 10.3 13.1* 11.5*   HEMOGLOBIN g/dL 10.4* 12.0 10.8*   HEMATOCRIT % 32.7* 37.1 33.3*   PLATELETS AUTO x10*3/uL 287 306 274      Results from last 7 days   Lab Units 04/11/24  0422 04/10/24  0425 04/08/24  0431   SODIUM mmol/L 137 136 135   POTASSIUM mmol/L 4.7 4.7 4.4   CHLORIDE mmol/L 99 99 97   CO2 mmol/L 30 30 33*   BUN mg/dL 29* 27* 17   CREATININE mg/dL 0.60 0.70 0.60   GLUCOSE mg/dL 112* 114* 105*   CALCIUM mg/dL 9.5 9.1 9.6          XR chest 1 view 04/03/2024    Narrative  Interpreted By:  Rosa Covington,  STUDY:  XR CHEST 1 VIEW 4/3/2024 10:49 am    INDICATION:  Signs/Symptoms:respiratory failure    COMPARISON:  03/31/2024    ACCESSION NUMBER(S):  [id number]    ORDERING CLINICIAN:  JULIAN CALLE    TECHNIQUE:  AP erect view of the chest    FINDINGS:  Postoperative change from TAVR is seen with heart enlarged. There is  atherosclerosis of the aortic arch and descending thoracic aorta.    When compared with prior study, the CHF has  improved significantly  with less interstitial and alveolar edema. Mild pulmonary vascular  congestion persists. There is some platelike atelectasis at the right  lung base.    Impression  Near complete resolution of the CHF.    Right basilar platelike atelectasis.    Status post TAVR.    Signed by: Rosa Covington 4/3/2024 11:06 AM  Dictation workstation:   [id number]      Patient Active Problem List   Diagnosis    Atherosclerosis of coronary artery without angina pectoris    Dependence on supplemental oxygen    History of aortic valve replacement    Hypercalcemia    Dyspnea on exertion    Hyperlipidemia    Primary hypertension    Acquired hypothyroidism    Menopausal osteoporosis    Mitral valve stenosis    Nonrheumatic aortic (valve) stenosis    Pulmonary hypertension (Multi)    Transient ischemic attack    Vitamin D deficiency    Acute on chronic diastolic (congestive) heart failure (Multi)    Pneumonia of right lung due to infectious organism, unspecified part of lung    Acute on chronic respiratory failure (Multi)     Assessment/Plan       Pneumonia of right lung due to infectious organism, unspecified part of lung    Atherosclerosis of coronary artery without angina pectoris    Hyperlipidemia    Primary hypertension    Acquired hypothyroidism    Acute on chronic diastolic (congestive) heart failure (CMS/HCC)    Acute on chronic respiratory failure (CMS/HCC)          Stable from respiratory standpoint.  Continue wean oxygen  Continue bronchodilators Mucomyst.  Oral diuretics and fluid restriction    Stable discharge from pulmonary standpoint      Joey Mantilla MD  [facility]

## 2024-04-13 NOTE — PROGRESS NOTES
"Akua Almanzar is a 89 y.o. female on day 15 of admission presenting with Pneumonia of right lung due to infectious organism, unspecified part of lung.    Subjective   Breathing is better.  Down to nasal cannula 3 L       Objective     Physical Exam  Vitals reviewed.   Constitutional:       Appearance: Normal appearance.   HENT:      Head: Normocephalic and atraumatic.      Right Ear: Tympanic membrane, ear canal and external ear normal.      Left Ear: Tympanic membrane, ear canal and external ear normal.      Nose: Nose normal.      Mouth/Throat:      Pharynx: Oropharynx is clear.   Eyes:      Extraocular Movements: Extraocular movements intact.      Conjunctiva/sclera: Conjunctivae normal.      Pupils: Pupils are equal, round, and reactive to light.   Cardiovascular:      Rate and Rhythm: Normal rate and regular rhythm.      Pulses: Normal pulses.      Heart sounds: Normal heart sounds.   Pulmonary:      Effort: Pulmonary effort is normal.      Breath sounds: Rales present.   Abdominal:      General: Abdomen is flat. Bowel sounds are normal.      Palpations: Abdomen is soft.   Musculoskeletal:      Cervical back: Normal range of motion and neck supple.   Skin:     General: Skin is warm and dry.   Neurological:      General: No focal deficit present.      Mental Status: She is alert and oriented to person, place, and time.   Psychiatric:         Mood and Affect: Mood normal.         Last Recorded Vitals  Blood pressure 131/66, pulse 102, temperature 36.8 °C (98.2 °F), temperature source Oral, resp. rate 19, height 1.549 m (5' 1\"), weight 73.6 kg (162 lb 3.2 oz), SpO2 97%.  Intake/Output last 3 Shifts:  I/O last 3 completed shifts:  In: 640 (8.7 mL/kg) [P.O.:640]  Out: - (0 mL/kg)   Weight: 73.6 kg     Relevant Results              Scheduled medications  acetaminophen, 650 mg, oral, q6h  acetylcysteine, 3 mL, nebulization, 4x daily  aspirin, 81 mg, oral, Daily  clopidogrel, 75 mg, oral, Daily  enoxaparin, 40 mg, " subcutaneous, Daily  furosemide, 40 mg, oral, Daily  gabapentin, 100 mg, oral, Nightly  ipratropium-albuteroL, 3 mL, nebulization, 4x daily  levothyroxine, 88 mcg, oral, Daily  oxygen, , inhalation, q8h  predniSONE, 10 mg, oral, BID  simvastatin, 20 mg, oral, Nightly  sodium chloride, 1 spray, Each Nostril, 4x daily      Continuous medications     PRN medications  PRN medications: albuterol, bisacodyl, guaiFENesin, hydrOXYzine HCL, lidocaine    Results for orders placed or performed during the hospital encounter of 03/29/24 (from the past 24 hour(s))   CBC   Result Value Ref Range    WBC 10.1 4.4 - 11.3 x10*3/uL    nRBC 0.0 0.0 - 0.0 /100 WBCs    RBC 3.41 (L) 4.00 - 5.20 x10*6/uL    Hemoglobin 9.8 (L) 12.0 - 16.0 g/dL    Hematocrit 30.0 (L) 36.0 - 46.0 %    MCV 88 80 - 100 fL    MCH 28.7 26.0 - 34.0 pg    MCHC 32.7 32.0 - 36.0 g/dL    RDW 16.4 (H) 11.5 - 14.5 %    Platelets 274 150 - 450 x10*3/uL   Basic Metabolic Panel   Result Value Ref Range    Glucose 109 (H) 65 - 99 mg/dL    Sodium 135 133 - 145 mmol/L    Potassium 4.8 3.4 - 5.1 mmol/L    Chloride 99 97 - 107 mmol/L    Bicarbonate 31 24 - 31 mmol/L    Urea Nitrogen 30 (H) 8 - 25 mg/dL    Creatinine 0.60 0.40 - 1.60 mg/dL    eGFR 83 >60 mL/min/1.73m*2    Calcium 9.2 8.5 - 10.4 mg/dL    Anion Gap 5 <=19 mmol/L       Scheduled medications  acetaminophen, 650 mg, oral, q6h  acetylcysteine, 3 mL, nebulization, 4x daily  aspirin, 81 mg, oral, Daily  clopidogrel, 75 mg, oral, Daily  enoxaparin, 40 mg, subcutaneous, Daily  furosemide, 40 mg, oral, Daily  gabapentin, 100 mg, oral, Nightly  ipratropium-albuteroL, 3 mL, nebulization, 4x daily  levothyroxine, 88 mcg, oral, Daily  oxygen, , inhalation, q8h  predniSONE, 10 mg, oral, BID  simvastatin, 20 mg, oral, Nightly  sodium chloride, 1 spray, Each Nostril, 4x daily      Continuous medications     PRN medications  PRN medications: albuterol, bisacodyl, guaiFENesin, hydrOXYzine HCL, lidocaine             Assessment/Plan    Principal Problem:    Pneumonia of right lung due to infectious organism, unspecified part of lung  Active Problems:    Atherosclerosis of coronary artery without angina pectoris    Hyperlipidemia    Primary hypertension    Acquired hypothyroidism    Acute on chronic diastolic (congestive) heart failure (Multi)    Acute on chronic respiratory failure (Multi)    Clinically improving with pneumonia  Acute respiratory failure improving  Continue fluid restriction and diuretics for CHF  Insurance denied rehab      Home care will be available on Monday  Patient cannot be discharged to son's house until Monday  Plan for discharge on Monday home with home care         I spent  minutes in the professional and overall care of this patient.      Ivone Gilman MD

## 2024-04-14 PROCEDURE — 2500000004 HC RX 250 GENERAL PHARMACY W/ HCPCS (ALT 636 FOR OP/ED): Mod: MUE | Performed by: INTERNAL MEDICINE

## 2024-04-14 PROCEDURE — 94640 AIRWAY INHALATION TREATMENT: CPT

## 2024-04-14 PROCEDURE — 2500000001 HC RX 250 WO HCPCS SELF ADMINISTERED DRUGS (ALT 637 FOR MEDICARE OP): Performed by: INTERNAL MEDICINE

## 2024-04-14 PROCEDURE — 99231 SBSQ HOSP IP/OBS SF/LOW 25: CPT | Performed by: INTERNAL MEDICINE

## 2024-04-14 PROCEDURE — 2500000004 HC RX 250 GENERAL PHARMACY W/ HCPCS (ALT 636 FOR OP/ED): Performed by: INTERNAL MEDICINE

## 2024-04-14 PROCEDURE — 94760 N-INVAS EAR/PLS OXIMETRY 1: CPT

## 2024-04-14 PROCEDURE — 2500000002 HC RX 250 W HCPCS SELF ADMINISTERED DRUGS (ALT 637 FOR MEDICARE OP, ALT 636 FOR OP/ED): Performed by: INTERNAL MEDICINE

## 2024-04-14 PROCEDURE — 1100000001 HC PRIVATE ROOM DAILY

## 2024-04-14 PROCEDURE — 2500000002 HC RX 250 W HCPCS SELF ADMINISTERED DRUGS (ALT 637 FOR MEDICARE OP, ALT 636 FOR OP/ED): Mod: MUE | Performed by: INTERNAL MEDICINE

## 2024-04-14 PROCEDURE — 94668 MNPJ CHEST WALL SBSQ: CPT

## 2024-04-14 PROCEDURE — 9420000001 HC RT PATIENT EDUCATION 5 MIN

## 2024-04-14 PROCEDURE — 2500000005 HC RX 250 GENERAL PHARMACY W/O HCPCS: Performed by: INTERNAL MEDICINE

## 2024-04-14 RX ORDER — ACETYLCYSTEINE 200 MG/ML
3 SOLUTION ORAL; RESPIRATORY (INHALATION)
Status: DISCONTINUED | OUTPATIENT
Start: 2024-04-15 | End: 2024-04-15 | Stop reason: HOSPADM

## 2024-04-14 RX ORDER — IPRATROPIUM BROMIDE AND ALBUTEROL SULFATE 2.5; .5 MG/3ML; MG/3ML
3 SOLUTION RESPIRATORY (INHALATION)
Status: DISCONTINUED | OUTPATIENT
Start: 2024-04-15 | End: 2024-04-15 | Stop reason: HOSPADM

## 2024-04-14 RX ADMIN — Medication: at 07:00

## 2024-04-14 RX ADMIN — Medication 2 L/MIN: at 15:00

## 2024-04-14 RX ADMIN — PREDNISONE 10 MG: 10 TABLET ORAL at 06:14

## 2024-04-14 RX ADMIN — ACETYLCYSTEINE 600 MG: 200 SOLUTION ORAL; RESPIRATORY (INHALATION) at 11:03

## 2024-04-14 RX ADMIN — ACETYLCYSTEINE 600 MG: 200 SOLUTION ORAL; RESPIRATORY (INHALATION) at 07:06

## 2024-04-14 RX ADMIN — Medication: at 23:00

## 2024-04-14 RX ADMIN — CLOPIDOGREL BISULFATE 75 MG: 75 TABLET ORAL at 08:46

## 2024-04-14 RX ADMIN — PREDNISONE 10 MG: 10 TABLET ORAL at 17:24

## 2024-04-14 RX ADMIN — IPRATROPIUM BROMIDE AND ALBUTEROL SULFATE 3 ML: 2.5; .5 SOLUTION RESPIRATORY (INHALATION) at 19:02

## 2024-04-14 RX ADMIN — ACETAMINOPHEN 650 MG: 325 TABLET ORAL at 20:07

## 2024-04-14 RX ADMIN — Medication 1 SPRAY: at 06:19

## 2024-04-14 RX ADMIN — ACETAMINOPHEN 650 MG: 325 TABLET ORAL at 06:14

## 2024-04-14 RX ADMIN — IPRATROPIUM BROMIDE AND ALBUTEROL SULFATE 3 ML: 2.5; .5 SOLUTION RESPIRATORY (INHALATION) at 15:37

## 2024-04-14 RX ADMIN — ACETYLCYSTEINE 600 MG: 200 SOLUTION ORAL; RESPIRATORY (INHALATION) at 15:37

## 2024-04-14 RX ADMIN — IPRATROPIUM BROMIDE AND ALBUTEROL SULFATE 3 ML: 2.5; .5 SOLUTION RESPIRATORY (INHALATION) at 11:04

## 2024-04-14 RX ADMIN — SIMVASTATIN 20 MG: 20 TABLET, FILM COATED ORAL at 20:02

## 2024-04-14 RX ADMIN — ENOXAPARIN SODIUM 40 MG: 40 INJECTION SUBCUTANEOUS at 06:14

## 2024-04-14 RX ADMIN — IPRATROPIUM BROMIDE AND ALBUTEROL SULFATE 3 ML: 2.5; .5 SOLUTION RESPIRATORY (INHALATION) at 07:06

## 2024-04-14 RX ADMIN — ASPIRIN 81 MG: 81 TABLET, COATED ORAL at 08:46

## 2024-04-14 RX ADMIN — ACETYLCYSTEINE 600 MG: 200 SOLUTION ORAL; RESPIRATORY (INHALATION) at 19:02

## 2024-04-14 RX ADMIN — GABAPENTIN 100 MG: 100 CAPSULE ORAL at 20:02

## 2024-04-14 RX ADMIN — LEVOTHYROXINE SODIUM 88 MCG: 0.09 TABLET ORAL at 06:14

## 2024-04-14 RX ADMIN — FUROSEMIDE 40 MG: 40 TABLET ORAL at 08:46

## 2024-04-14 RX ADMIN — Medication 1 SPRAY: at 20:04

## 2024-04-14 ASSESSMENT — COGNITIVE AND FUNCTIONAL STATUS - GENERAL
PERSONAL GROOMING: A LITTLE
HELP NEEDED FOR BATHING: A LOT
WALKING IN HOSPITAL ROOM: A LITTLE
DAILY ACTIVITIY SCORE: 17
TURNING FROM BACK TO SIDE WHILE IN FLAT BAD: A LITTLE
MOBILITY SCORE: 18
DAILY ACTIVITIY SCORE: 17
MOVING FROM LYING ON BACK TO SITTING ON SIDE OF FLAT BED WITH BEDRAILS: A LITTLE
CLIMB 3 TO 5 STEPS WITH RAILING: A LITTLE
DRESSING REGULAR LOWER BODY CLOTHING: A LOT
DRESSING REGULAR UPPER BODY CLOTHING: A LITTLE
MOVING TO AND FROM BED TO CHAIR: A LITTLE
STANDING UP FROM CHAIR USING ARMS: A LITTLE
DRESSING REGULAR LOWER BODY CLOTHING: A LOT
WALKING IN HOSPITAL ROOM: A LITTLE
MOVING TO AND FROM BED TO CHAIR: A LITTLE
TURNING FROM BACK TO SIDE WHILE IN FLAT BAD: A LITTLE
TOILETING: A LITTLE
MOBILITY SCORE: 18
HELP NEEDED FOR BATHING: A LOT
TOILETING: A LITTLE
MOVING FROM LYING ON BACK TO SITTING ON SIDE OF FLAT BED WITH BEDRAILS: A LITTLE
PERSONAL GROOMING: A LITTLE
CLIMB 3 TO 5 STEPS WITH RAILING: A LITTLE
STANDING UP FROM CHAIR USING ARMS: A LITTLE
DRESSING REGULAR UPPER BODY CLOTHING: A LITTLE

## 2024-04-14 ASSESSMENT — PAIN - FUNCTIONAL ASSESSMENT: PAIN_FUNCTIONAL_ASSESSMENT: 0-10

## 2024-04-14 ASSESSMENT — PAIN SCALES - GENERAL
PAINLEVEL_OUTOF10: 0 - NO PAIN

## 2024-04-14 NOTE — NURSING NOTE
Patient had c/o chest pain while sitting in chair in room. Vitals and EKG completed. EKG results: normal sinus rhythm, vitals stable. Dr. Gilman contacted, received new order for cardiac enzymes. Labs drawn, troponin level: 31. Patient states chest pain has subsided. Currently resting in bed with call light near. Dr. Gilman contacted to notify of troponin level,  unable to reach at this time, voicemail left, will let oncoming shift nurse know.

## 2024-04-14 NOTE — CARE PLAN
Over the shift, the patient did make progress toward the following goals.       Problem: Pain - Adult  Goal: Verbalizes/displays adequate comfort level or baseline comfort level  Outcome: Progressing     Problem: Safety - Adult  Goal: Free from fall injury  Outcome: Progressing     Problem: Chronic Conditions and Co-morbidities  Goal: Patient's chronic conditions and co-morbidity symptoms are monitored and maintained or improved  Outcome: Progressing     The patient's goals for the shift include  rest    The clinical goals for the shift include safety

## 2024-04-14 NOTE — NURSING NOTE
Received call from Dr. Gilman, notified doctor of troponin levels. Reached out to Dr. Alberto to notify of results as well. Will let oncoming shift nurse know.

## 2024-04-14 NOTE — PROGRESS NOTES
"Akua Almanzar is a 89 y.o. female on day 16 of admission presenting with Pneumonia of right lung due to infectious organism, unspecified part of lung.    Subjective   Breathing is better.  Down to nasal cannula 3 L had episode of chest pain yesterday.  Today she is fine       Objective     Physical Exam  Vitals reviewed.   Constitutional:       Appearance: Normal appearance.   HENT:      Head: Normocephalic and atraumatic.      Right Ear: Tympanic membrane, ear canal and external ear normal.      Left Ear: Tympanic membrane, ear canal and external ear normal.      Nose: Nose normal.      Mouth/Throat:      Pharynx: Oropharynx is clear.   Eyes:      Extraocular Movements: Extraocular movements intact.      Conjunctiva/sclera: Conjunctivae normal.      Pupils: Pupils are equal, round, and reactive to light.   Cardiovascular:      Rate and Rhythm: Normal rate and regular rhythm.      Pulses: Normal pulses.      Heart sounds: Normal heart sounds.   Pulmonary:      Effort: Pulmonary effort is normal.      Breath sounds: Rales present.   Abdominal:      General: Abdomen is flat. Bowel sounds are normal.      Palpations: Abdomen is soft.   Musculoskeletal:      Cervical back: Normal range of motion and neck supple.   Skin:     General: Skin is warm and dry.   Neurological:      General: No focal deficit present.      Mental Status: She is alert and oriented to person, place, and time.   Psychiatric:         Mood and Affect: Mood normal.         Last Recorded Vitals  Blood pressure 126/73, pulse 92, temperature 36.5 °C (97.7 °F), temperature source Oral, resp. rate 17, height 1.549 m (5' 1\"), weight 73.6 kg (162 lb 3.2 oz), SpO2 100%.  Intake/Output last 3 Shifts:  I/O last 3 completed shifts:  In: 680 (9.2 mL/kg) [P.O.:680]  Out: 402 (5.5 mL/kg) [Urine:400 (0.2 mL/kg/hr); Stool:2]  Weight: 73.6 kg     Relevant Results              Scheduled medications  acetaminophen, 650 mg, oral, q6h  acetylcysteine, 3 mL, " nebulization, 4x daily  aspirin, 81 mg, oral, Daily  clopidogrel, 75 mg, oral, Daily  enoxaparin, 40 mg, subcutaneous, Daily  furosemide, 40 mg, oral, Daily  gabapentin, 100 mg, oral, Nightly  ipratropium-albuteroL, 3 mL, nebulization, 4x daily  levothyroxine, 88 mcg, oral, Daily  oxygen, , inhalation, q8h  predniSONE, 10 mg, oral, BID  simvastatin, 20 mg, oral, Nightly  sodium chloride, 1 spray, Each Nostril, 4x daily      Continuous medications     PRN medications  PRN medications: albuterol, bisacodyl, guaiFENesin, hydrOXYzine HCL, lidocaine    Results for orders placed or performed during the hospital encounter of 03/29/24 (from the past 24 hour(s))   Troponin T   Result Value Ref Range    Troponin T, High Sensitivity 31 (HH) <=14 ng/L   Creatine Kinase   Result Value Ref Range    Creatine Kinase 37 24 - 195 U/L       Scheduled medications  acetaminophen, 650 mg, oral, q6h  acetylcysteine, 3 mL, nebulization, 4x daily  aspirin, 81 mg, oral, Daily  clopidogrel, 75 mg, oral, Daily  enoxaparin, 40 mg, subcutaneous, Daily  furosemide, 40 mg, oral, Daily  gabapentin, 100 mg, oral, Nightly  ipratropium-albuteroL, 3 mL, nebulization, 4x daily  levothyroxine, 88 mcg, oral, Daily  oxygen, , inhalation, q8h  predniSONE, 10 mg, oral, BID  simvastatin, 20 mg, oral, Nightly  sodium chloride, 1 spray, Each Nostril, 4x daily      Continuous medications     PRN medications  PRN medications: albuterol, bisacodyl, guaiFENesin, hydrOXYzine HCL, lidocaine             Assessment/Plan   Principal Problem:    Pneumonia of right lung due to infectious organism, unspecified part of lung  Active Problems:    Atherosclerosis of coronary artery without angina pectoris    Hyperlipidemia    Primary hypertension    Acquired hypothyroidism    Acute on chronic diastolic (congestive) heart failure (Multi)    Acute on chronic respiratory failure (Multi)    Clinically improving with pneumonia  Acute respiratory failure improving  Continue fluid  restriction and diuretics for CHF  Insurance denied rehab  Chest pain prior cardiology but no signs of acute MI  Home care will be available on Monday  Patient cannot be discharged to son's house until Monday  Plan for discharge on Monday home with home care         I spent  minutes in the professional and overall care of this patient.      Ivone Gilman MD

## 2024-04-15 ENCOUNTER — PHARMACY VISIT (OUTPATIENT)
Dept: PHARMACY | Facility: CLINIC | Age: 89
End: 2024-04-15
Payer: COMMERCIAL

## 2024-04-15 VITALS
HEART RATE: 67 BPM | SYSTOLIC BLOOD PRESSURE: 112 MMHG | WEIGHT: 162.2 LBS | BODY MASS INDEX: 30.62 KG/M2 | TEMPERATURE: 97.9 F | DIASTOLIC BLOOD PRESSURE: 60 MMHG | HEIGHT: 61 IN | RESPIRATION RATE: 16 BRPM | OXYGEN SATURATION: 97 %

## 2024-04-15 LAB — MYOGLOBIN SERPL-MCNC: 25 UG/L

## 2024-04-15 PROCEDURE — 2500000004 HC RX 250 GENERAL PHARMACY W/ HCPCS (ALT 636 FOR OP/ED): Performed by: INTERNAL MEDICINE

## 2024-04-15 PROCEDURE — RXMED WILLOW AMBULATORY MEDICATION CHARGE

## 2024-04-15 PROCEDURE — 2500000002 HC RX 250 W HCPCS SELF ADMINISTERED DRUGS (ALT 637 FOR MEDICARE OP, ALT 636 FOR OP/ED): Performed by: INTERNAL MEDICINE

## 2024-04-15 PROCEDURE — 2500000005 HC RX 250 GENERAL PHARMACY W/O HCPCS: Performed by: INTERNAL MEDICINE

## 2024-04-15 PROCEDURE — 2500000004 HC RX 250 GENERAL PHARMACY W/ HCPCS (ALT 636 FOR OP/ED): Mod: MUE | Performed by: INTERNAL MEDICINE

## 2024-04-15 PROCEDURE — 2500000002 HC RX 250 W HCPCS SELF ADMINISTERED DRUGS (ALT 637 FOR MEDICARE OP, ALT 636 FOR OP/ED): Mod: MUE | Performed by: INTERNAL MEDICINE

## 2024-04-15 PROCEDURE — 97110 THERAPEUTIC EXERCISES: CPT | Mod: GP

## 2024-04-15 PROCEDURE — 9420000001 HC RT PATIENT EDUCATION 5 MIN

## 2024-04-15 PROCEDURE — 94760 N-INVAS EAR/PLS OXIMETRY 1: CPT

## 2024-04-15 PROCEDURE — 94668 MNPJ CHEST WALL SBSQ: CPT

## 2024-04-15 PROCEDURE — 99239 HOSP IP/OBS DSCHRG MGMT >30: CPT | Performed by: INTERNAL MEDICINE

## 2024-04-15 PROCEDURE — 94640 AIRWAY INHALATION TREATMENT: CPT

## 2024-04-15 PROCEDURE — 2500000001 HC RX 250 WO HCPCS SELF ADMINISTERED DRUGS (ALT 637 FOR MEDICARE OP): Performed by: INTERNAL MEDICINE

## 2024-04-15 RX ORDER — LIDOCAINE 50 MG/G
OINTMENT TOPICAL 4 TIMES DAILY PRN
Qty: 283.52 G | Refills: 1 | Status: SHIPPED | OUTPATIENT
Start: 2024-04-15

## 2024-04-15 RX ORDER — PREDNISONE 10 MG/1
10 TABLET ORAL 2 TIMES DAILY
Qty: 20 TABLET | Refills: 0 | Status: SHIPPED | OUTPATIENT
Start: 2024-04-15

## 2024-04-15 RX ORDER — NEBULIZER AND COMPRESSOR
1 EACH MISCELLANEOUS EVERY 4 HOURS PRN
Qty: 1 EACH | Refills: 0 | Status: SHIPPED | OUTPATIENT
Start: 2024-04-15

## 2024-04-15 RX ORDER — IPRATROPIUM BROMIDE AND ALBUTEROL SULFATE 2.5; .5 MG/3ML; MG/3ML
3 SOLUTION RESPIRATORY (INHALATION)
Qty: 270 ML | Refills: 0 | Status: SHIPPED | OUTPATIENT
Start: 2024-04-15

## 2024-04-15 RX ORDER — ALBUTEROL SULFATE 0.83 MG/ML
2.5 SOLUTION RESPIRATORY (INHALATION) EVERY 6 HOURS PRN
Qty: 90 ML | Refills: 11 | Status: SHIPPED | OUTPATIENT
Start: 2024-04-15

## 2024-04-15 RX ADMIN — ACETAMINOPHEN 650 MG: 325 TABLET ORAL at 13:54

## 2024-04-15 RX ADMIN — Medication: at 07:00

## 2024-04-15 RX ADMIN — FUROSEMIDE 40 MG: 40 TABLET ORAL at 08:58

## 2024-04-15 RX ADMIN — ENOXAPARIN SODIUM 40 MG: 40 INJECTION SUBCUTANEOUS at 06:59

## 2024-04-15 RX ADMIN — Medication: at 13:00

## 2024-04-15 RX ADMIN — LEVOTHYROXINE SODIUM 88 MCG: 0.09 TABLET ORAL at 06:59

## 2024-04-15 RX ADMIN — PREDNISONE 10 MG: 10 TABLET ORAL at 06:59

## 2024-04-15 RX ADMIN — CLOPIDOGREL BISULFATE 75 MG: 75 TABLET ORAL at 08:58

## 2024-04-15 RX ADMIN — ACETAMINOPHEN 650 MG: 325 TABLET ORAL at 08:58

## 2024-04-15 RX ADMIN — ACETYLCYSTEINE 600 MG: 200 SOLUTION ORAL; RESPIRATORY (INHALATION) at 09:06

## 2024-04-15 RX ADMIN — Medication 1 SPRAY: at 06:59

## 2024-04-15 RX ADMIN — ASPIRIN 81 MG: 81 TABLET, COATED ORAL at 08:58

## 2024-04-15 RX ADMIN — IPRATROPIUM BROMIDE AND ALBUTEROL SULFATE 3 ML: .5; 3 SOLUTION RESPIRATORY (INHALATION) at 09:05

## 2024-04-15 RX ADMIN — Medication 1 SPRAY: at 13:54

## 2024-04-15 ASSESSMENT — COGNITIVE AND FUNCTIONAL STATUS - GENERAL
TOILETING: A LITTLE
DRESSING REGULAR LOWER BODY CLOTHING: A LITTLE
MOBILITY SCORE: 22
TURNING FROM BACK TO SIDE WHILE IN FLAT BAD: A LITTLE
MOBILITY SCORE: 19
CLIMB 3 TO 5 STEPS WITH RAILING: A LITTLE
DAILY ACTIVITIY SCORE: 19
PERSONAL GROOMING: A LITTLE
WALKING IN HOSPITAL ROOM: A LITTLE
HELP NEEDED FOR BATHING: A LITTLE
WALKING IN HOSPITAL ROOM: A LITTLE
CLIMB 3 TO 5 STEPS WITH RAILING: A LITTLE
MOVING TO AND FROM BED TO CHAIR: A LITTLE
STANDING UP FROM CHAIR USING ARMS: A LITTLE
DRESSING REGULAR UPPER BODY CLOTHING: A LITTLE

## 2024-04-15 ASSESSMENT — PAIN SCALES - GENERAL
PAINLEVEL_OUTOF10: 7
PAINLEVEL_OUTOF10: 3
PAINLEVEL_OUTOF10: 0 - NO PAIN

## 2024-04-15 ASSESSMENT — PAIN DESCRIPTION - LOCATION: LOCATION: LEG

## 2024-04-15 ASSESSMENT — PAIN - FUNCTIONAL ASSESSMENT
PAIN_FUNCTIONAL_ASSESSMENT: 0-10

## 2024-04-15 ASSESSMENT — PAIN DESCRIPTION - ORIENTATION: ORIENTATION: RIGHT;LEFT

## 2024-04-15 NOTE — CARE PLAN
The patient's goals for the shift include      The clinical goals for the shift include maintain safety    Over the shift, the patient did make progress toward the following goals. Barriers to progression include unfamiliar surrounding. Recommendations to address these barriers include use call light for getting up.

## 2024-04-15 NOTE — PROGRESS NOTES
Physical Therapy    Physical Therapy Treatment    Patient Name: Akua Almanzar  MRN: [id number]  Today's Date: 4/15/2024  Time Calculation  Start Time: 1345  Stop Time: 1402  Time Calculation (min): 17 min  Documentation and services provided by student physical therapist while supervised under a licensed physical therapist.   Aniket Moon, EMMA         Assessment/Plan   PT Assessment  PT Assessment Results: Decreased strength, Decreased endurance, Impaired balance, Decreased mobility, Decreased safety awareness, Impaired hearing  Rehab Prognosis: Good  Medical Staff Made Aware: Yes  End of Session Communication: Bedside nurse  End of Session Patient Position: Up in chair    Assessment Comment: Continued to benefit from cues for A.D management to improve safety with pt continuing to requiring reminders to stay in parameters and slow down with ambulation. Pt being d/c this date to son's house. Pt has no concerns with home going. Level of care recommended upon d/c at low intensity evident that pt has negotiated curb step successfully mult times in previous therapy sessions, has appropriate equipment for d/c, and able to safely ambulate >200 feet with 2ww. Continued to recommend 24/7 supervision for safety.    PT Plan  Inpatient/Swing Bed or Outpatient: Inpatient  PT Plan  Treatment/Interventions: Transfer training, Gait training, Endurance training, Therapeutic activity, Therapeutic exercise, Strengthening  PT Plan: Skilled PT  PT Frequency: 3 times per week  PT Discharge Recommendations: Low intensity level of continued care, Other (comment) (24/7 supervision for safety with mobility)  Equipment Recommended upon Discharge: Wheeled walker  PT Recommended Transfer Status: Assist x1, Assistive device  PT - OK to Discharge: Yes      General Visit Information:   PT  Visit  PT Received On: 04/15/24  Response to Previous Treatment: Patient with no complaints from previous session.  General  Reason for Referral: impaired  mobility  Referred By: Dr. JACQUELIN Gilman  Past Medical History Relevant to Rehab: CHF, PNA, HTN, HLD  Missed Visit: No  Family/Caregiver Present: No  Patient Position Received: Up in chair, Alarm off, not on at start of session  Preferred Learning Style: verbal, visual  General Comment: Pt seated in chair with chair alarm off on 2 L/min O2 when physical therapy entered room. Pt pleasant and agreeable to participate in therapy.    Subjective   Precautions:  Precautions  Medical Precautions: Fall precautions, Oxygen therapy device and L/min  Vital Signs:  Vital Signs  Heart Rate: 109 (109-120 increasing post ambulation)  SpO2: 95 %    Objective   Pain:  Pain Assessment  Pain Assessment: 0-10  Pain Score: 7  Pain Type: Chronic pain (nerve pain. Given medication by nursing.)  Pain Location: Leg  Pain Orientation: Left  Cognition:  Cognition  Overall Cognitive Status: Within Functional Limits  Orientation Level: Oriented X4  Postural Control:  Postural Control  Posture Comment: forward head and rounded shoulders.  Static Sitting Balance  Static Sitting-Balance Support: Bilateral upper extremity supported, Feet supported  Static Sitting-Level of Assistance: Modified independent  Dynamic Sitting Balance  Dynamic Sitting-Balance Support: Bilateral upper extremity supported, Feet supported  Dynamic Sitting-Comments: mod I  Static Standing Balance  Static Standing-Balance Support: Bilateral upper extremity supported  Static Standing-Level of Assistance: Close supervision  Static Standing-Comment/Number of Minutes: 2ww  Dynamic Standing Balance  Dynamic Standing-Balance Support: Bilateral upper extremity supported  Dynamic Standing-Balance: Turning  Dynamic Standing-Comments: 2ww at close sup  Extremity/Trunk Assessments:  RLE   RLE : Within Functional Limits  LLE   LLE : Within Functional Limits  Activity Tolerance:  Activity Tolerance  Endurance: Endurance does not limit participation in activity  Treatments:  Therapeutic  Exercise  Therapeutic Exercise Performed: Yes  Therapeutic Exercise Activity 1: Pt performed seated bilat LE ankle pumps, LAQ, hip flexion, jaron hip adduction.  2 x10 reps each. Rest breaks throughout as needed to prevent fatigue and SOB    Therapeutic Activity  Therapeutic Activity Performed: Yes  Therapeutic Activity 1: transfer training with improved safety awareness and 0-25% cues needed for hand placement.    Ambulation/Gait Training  Ambulation/Gait Training Performed: Yes  Ambulation/Gait Training 1  Surface 1: Level tile  Device 1: Rolling walker  Assistance 1: Close supervision  Quality of Gait 1: Decreased step length  Comments/Distance (ft) 1: approx > 200 feet wtih 2ww with cues for A.D management and to stay within parameters. Cues x 1 to slow down to improve energy conservation techniques and prevent SOB.  Transfers  Transfer: Yes  Transfer 1  Transfer From 1: Chair with arms to  Transfer to 1: Stand  Technique 1: Sit to stand  Transfer Device 1: Walker  Transfer Level of Assistance 1: Close supervision  Trials/Comments 1: x 2  Transfers 2  Transfer From 2: Stand to  Transfer to 2: Sit, Chair with arms  Technique 2: Stand to sit  Transfer Device 2: Walker  Transfer Level of Assistance 2: Close supervision  Trials/Comments 2: x 2    Outcome Measures:  [facility] Basic Mobility  Turning from your back to your side while in a flat bed without using bedrails: None  Moving from lying on your back to sitting on the side of a flat bed without using bedrails: None  Moving to and from bed to chair (including a wheelchair): None  Standing up from a chair using your arms (e.g. wheelchair or bedside chair): None  To walk in hospital room: A little  Climbing 3-5 steps with railing: A little  Basic Mobility - Total Score: 22    Education Documentation  No documentation found.  Education Comments  No comments found.        OP EDUCATION:       Encounter Problems       Encounter Problems (Active)       Balance       Dynamic  Balance (Progressing)       Start:  03/29/24    Expected End:  04/12/24       Pt to improve dynamic balance to fair (+) with UE assist on 2ww to improve stability and safety to promote safety within home environment.            Mobility       Bed mobility (Progressing)       Start:  03/29/24    Expected End:  04/12/24       Pt to be independent with bed mobility to aid in home going environment.          ambulation (Progressing)       Start:  03/29/24    Expected End:  04/12/24       Pt will ambulate x > 100 feet  at distant sup with UE assist on 2ww to allow for safety within home environment.         Stairs (Progressing)       Start:  03/29/24    Expected End:  04/12/24       Will ascend and descend x 12 steps with one handrail at [facility] to facilitate improved dependence and allow for safety with home environment.            PT Transfers       Transfers (Progressing)       Start:  03/29/24    Expected End:  04/12/24       Pt will complete all transfers at Mod I with UE assist as needed and improved safety awareness to improve functional mobility and promote increased safety awareness.             Pain - Adult

## 2024-04-15 NOTE — PROGRESS NOTES
04/15/24 1151   Discharge Planning   Living Arrangements Children  (will be living with her sonDerek)   Support Systems Children;Family members   Assistance Needed Assistance with ADLs and mobility   Type of Residence Private residence  (living with sonDerek, fixing house so it is handicap accessible. [facility] will be delivering a be a bed)   Home or Post Acute Services In home services   Type of Home Care Services Hospice;DME or oxygen  ([facility] will be delivering a hospital bed, as of 1200 hospital bed, oxygen, BSC was delivered.)   Patient expects to be discharged to: Home with son in [place] [facility] to be following   Does the patient need discharge transport arranged? No  (daughter will be transporting patient at 1400)     Patient will be discharged to Derek enriquez's home, equipment delivered to house and daughter will transport her mom to Derek's home. [facility] will follow. The Earliest daughterAnitra can come is at 1400. A list of private duty home care was provided in case they need added support.

## 2024-04-15 NOTE — PROGRESS NOTES
04/15/24 1151   Discharge Planning   Living Arrangements Children  (will be living with her sonDerek)   Support Systems Children;Family members   Assistance Needed Assistance with ADLs and mobility   Type of Residence Private residence  (living with sonDerek, fixing house so it is handicap accessible. [facility] will be delivering a be a bed)   Type of Home Care Services Hospice;Other (Comment)  ([facility] will be delivering a hospital bed)   Patient expects to be discharged to: Home with son in [facility] to be following   Does the patient need discharge transport arranged? No     Spoke with son regarding discharge plan, in agreement that patient to be discharged today to his house, awaiting hospital bed from [facility]. Messaged [facility] through [facility] that hospital bed to be delivered to son's home.     PLAN: Discharge to Derek [facility]riquez's Home with [facility] to follow. Awaiting for hospital bed.

## 2024-04-16 ENCOUNTER — TELEPHONE (OUTPATIENT)
Dept: PRIMARY CARE | Facility: CLINIC | Age: 89
End: 2024-04-16
Payer: COMMERCIAL

## 2024-04-16 NOTE — DISCHARGE SUMMARY
Discharge Diagnosis  Pneumonia of right lung due to infectious organism, unspecified part of lung    Issues Requiring Follow-Up  CHF  Ischemic cardiomyopathy    Test Results Pending At Discharge  Pending Labs       Order Current Status    Extra Urine Gray Tube Collected (04/04/24 0919)    Urinalysis with Reflex Culture and Microscopic In process            Hospital Course   Akua Almanzar is a 89 y.o. female presenting with shortness of breath.  Patient was recently discharged from [facility] where she was treated for congestive heart failure and treated aggressively with diuretics.  Patient was sent to rehab.  In the rehab she was there for 2 weeks but slowly her leg started getting more swelling and she started having more shortness of breath.  She started coughing a lot   Patient was treated for pneumonia and CHF with Lasix respiratory distress with oxygen  Patient discharged home with hospice and home care    Pertinent Physical Exam At Time of Discharge  Physical Exam  Vitals reviewed.   Constitutional:       Appearance: Normal appearance.   HENT:      Head: Normocephalic and atraumatic.      Right Ear: Tympanic membrane, ear canal and external ear normal.      Left Ear: Tympanic membrane, ear canal and external ear normal.      Nose: Nose normal.      Mouth/Throat:      Pharynx: Oropharynx is clear.   Eyes:      Extraocular Movements: Extraocular movements intact.      Conjunctiva/sclera: Conjunctivae normal.      Pupils: Pupils are equal, round, and reactive to light.   Cardiovascular:      Rate and Rhythm: Normal rate and regular rhythm.      Pulses: Normal pulses.      Heart sounds: Normal heart sounds.   Pulmonary:      Effort: Pulmonary effort is normal.      Breath sounds: Normal breath sounds.   Abdominal:      General: Abdomen is flat. Bowel sounds are normal.      Palpations: Abdomen is soft.   Musculoskeletal:      Cervical back: Normal range of motion and neck supple.   Skin:     General:  Skin is warm and dry.   Neurological:      General: No focal deficit present.      Mental Status: She is alert and oriented to person, place, and time.   Psychiatric:         Mood and Affect: Mood normal.         Home Medications     Medication List      START taking these medications     albuterol 2.5 mg /3 mL (0.083 %) nebulizer solution; Use 3 mL (2.5 mg)   by nebulization every 6 hours if needed for wheezing.   ipratropium-albuteroL 0.5-2.5 mg/3 mL nebulizer solution; Commonly known   as: Duo-Neb; Use 3 mL by nebulization 3 times a day.   lidocaine 5 % ointment; Commonly known as: Xylocaine; Apply topically 4   times a day as needed (to affected area).   predniSONE 10 mg tablet; Commonly known as: Deltasone; Take 1 tablet (10   mg) by mouth 2 times a day.   PulmoNeb LT Compressor Nebul device; Generic drug: nebulizer and   compressor; Use as directed     CONTINUE taking these medications     acidophilus-pectin, citrus 100 million cell-10 mg capsule   aspirin 81 mg EC tablet   clopidogrel 75 mg tablet; Commonly known as: Plavix   furosemide 40 mg tablet; Commonly known as: Lasix; Take 1 tablet (40 mg)   by mouth once daily.   gabapentin 100 mg capsule; Commonly known as: Neurontin; Take 1 capsule   (100 mg) by mouth once daily at bedtime.   hydrOXYzine HCL 25 mg tablet; Commonly known as: Atarax; Take 1 tablet   (25 mg) by mouth every 6 hours if needed for itching.   levothyroxine 88 mcg tablet; Commonly known as: Synthroid, Levoxyl   oxygen gas therapy; Commonly known as: O2; Inhale 1 each continuously.   simvastatin 20 mg tablet; Commonly known as: Zocor     STOP taking these medications     amLODIPine 2.5 mg tablet; Commonly known as: Norvasc   ascorbic acid 250 MG chewable tablet; Commonly known as: Vitamin C   carvedilol 6.25 mg tablet; Commonly known as: Coreg   cholecalciferol 25 MCG (1000 UT) capsule; Commonly known as: Vitamin D-3   docusate sodium 100 mg capsule; Commonly known as: Colace        Outpatient Follow-Up  No future appointments.    Ivone Gilman MD

## 2024-04-16 NOTE — PROGRESS NOTES
"Akua Almanzar is a 89 y.o. female on day 17 of admission presenting with Pneumonia of right lung due to infectious organism, unspecified part of lung.    Subjective   Doing well.  Breathing stable on 2 L       Objective     Physical Exam  Vitals reviewed.   Constitutional:       Appearance: Normal appearance.   HENT:      Head: Normocephalic and atraumatic.      Right Ear: Tympanic membrane, ear canal and external ear normal.      Left Ear: Tympanic membrane, ear canal and external ear normal.      Nose: Nose normal.      Mouth/Throat:      Pharynx: Oropharynx is clear.   Eyes:      Extraocular Movements: Extraocular movements intact.      Conjunctiva/sclera: Conjunctivae normal.      Pupils: Pupils are equal, round, and reactive to light.   Cardiovascular:      Rate and Rhythm: Normal rate and regular rhythm.      Pulses: Normal pulses.      Heart sounds: Normal heart sounds.   Pulmonary:      Effort: Pulmonary effort is normal.      Breath sounds: Normal breath sounds.   Abdominal:      General: Abdomen is flat. Bowel sounds are normal.      Palpations: Abdomen is soft.   Musculoskeletal:      Cervical back: Normal range of motion and neck supple.   Skin:     General: Skin is warm and dry.   Neurological:      General: No focal deficit present.      Mental Status: She is alert and oriented to person, place, and time.   Psychiatric:         Mood and Affect: Mood normal.         Last Recorded Vitals  Blood pressure 112/60, pulse 67, temperature 36.6 °C (97.9 °F), temperature source Oral, resp. rate 16, height 1.549 m (5' 1\"), weight 73.6 kg (162 lb 3.2 oz), SpO2 97%.  Intake/Output last 3 Shifts:  I/O last 3 completed shifts:  In: 1080 (14.7 mL/kg) [P.O.:1080]  Out: 1703 (23.1 mL/kg) [Urine:1700 (0.6 mL/kg/hr); Stool:3]  Weight: 73.6 kg     Relevant Results                             Assessment/Plan   Principal Problem:    Pneumonia of right lung due to infectious organism, unspecified part of lung  Active " Problems:    Atherosclerosis of coronary artery without angina pectoris    Hyperlipidemia    Primary hypertension    Acquired hypothyroidism    Acute on chronic diastolic (congestive) heart failure (Multi)    Acute on chronic respiratory failure (Multi)    Continue maintenance Lasix  Breathing treatment  Nebulizer machine ordered  Breathing treatments ordered  Discharge papers done       I spent 35 minutes in the professional and overall care of this patient.      Ivone Gilman MD

## 2024-04-23 DIAGNOSIS — G45.9 TRANSIENT ISCHEMIC ATTACK: ICD-10-CM

## 2024-04-23 DIAGNOSIS — I50.33 ACUTE ON CHRONIC DIASTOLIC (CONGESTIVE) HEART FAILURE (MULTI): Primary | ICD-10-CM

## 2024-05-01 ENCOUNTER — HOSPITAL ENCOUNTER (EMERGENCY)
Age: 89
Discharge: HOME | End: 2024-05-02
Attending: EMERGENCY MEDICINE
Payer: MEDICARE

## 2024-05-01 DIAGNOSIS — B02.8 HERPES ZOSTER WITH OTHER COMPLICATION: Primary | ICD-10-CM

## 2024-05-01 PROCEDURE — 6370000000 HC RX 637 (ALT 250 FOR IP): Performed by: EMERGENCY MEDICINE

## 2024-05-01 PROCEDURE — 99284 EMERGENCY DEPT VISIT MOD MDM: CPT

## 2024-05-01 RX ORDER — TRANEXAMIC ACID 100 MG/ML
1000 INJECTION, SOLUTION INTRAVENOUS ONCE
Status: DISCONTINUED | OUTPATIENT
Start: 2024-05-01 | End: 2024-05-02 | Stop reason: HOSPADM

## 2024-05-01 RX ORDER — OXYCODONE HYDROCHLORIDE AND ACETAMINOPHEN 5; 325 MG/1; MG/1
1 TABLET ORAL ONCE
Status: COMPLETED | OUTPATIENT
Start: 2024-05-01 | End: 2024-05-01

## 2024-05-01 RX ORDER — TRANEXAMIC ACID 100 MG/ML
INJECTION, SOLUTION INTRAVENOUS
Status: DISCONTINUED
Start: 2024-05-01 | End: 2024-05-02 | Stop reason: HOSPADM

## 2024-05-01 RX ORDER — MINERAL OIL, PETROLATUM 425; 568 MG/G; MG/G
0.5 OINTMENT OPHTHALMIC NIGHTLY
Qty: 3.5 G | Refills: 0 | Status: SHIPPED | OUTPATIENT
Start: 2024-05-01

## 2024-05-01 RX ADMIN — OXYCODONE AND ACETAMINOPHEN 1 TABLET: 5; 325 TABLET ORAL at 23:47

## 2024-05-01 RX ADMIN — OXYCODONE AND ACETAMINOPHEN 1 TABLET: 5; 325 TABLET ORAL at 18:11

## 2024-05-01 ASSESSMENT — PAIN DESCRIPTION - DESCRIPTORS: DESCRIPTORS: BURNING

## 2024-05-01 ASSESSMENT — PAIN DESCRIPTION - LOCATION: LOCATION: FACE

## 2024-05-01 ASSESSMENT — PAIN SCALES - GENERAL
PAINLEVEL_OUTOF10: 10
PAINLEVEL_OUTOF10: 10

## 2024-05-01 NOTE — ED PROVIDER NOTES
artery disease), Cancer ([facility]) (2002), Cardiac arrhythmia (2005), Fracture of femur ([facility]) (2008), GERD (gastroesophageal reflux disease), Hyperlipidemia, Hypertension, Hypothyroidism, Incontinence, Mass (2003), Mitral valve prolapse (2008), Osteoarthritis, Osteoporosis, Peripheral vascular disease (HCC), Radiation, Sciatica (2003), Thyroid disease, Urinary incontinence, and Vertigo (2009).     EMERGENCY DEPARTMENT COURSE    Vitals:    Vitals:    05/01/24 1711 05/01/24 1712   BP:  118/67   Pulse:  89   Resp:  20   Temp: 97.4 °F (36.3 °C)    TempSrc: Axillary    SpO2:  95%       Patient was given the following medications:  Medications   tranexamic acid (CYKLOKAPRON) 1000 MG/10ML injection (has no administration in time range)   tranexamic acid (CYKLOKAPRON) injection 1,000 mg (has no administration in time range)   oxidized cellulose external pads 1 each (1 each Topical Given 5/1/24 1810)   oxyCODONE-acetaminophen (PERCOCET) 5-325 MG per tablet 1 tablet (1 tablet Oral Given 5/1/24 1811)           Is this patient to be included in the SEP-1 Core Measure due to severe sepsis or septic shock?   No Exclusion criteria - the patient is NOT to be included for SEP-1 Core Measure due to: Infection is not suspected        Medical Decision Making/Differential Diagnosis:    CC/HPI Summary, Social Determinants of health, Records Reviewed, DDx, testing done/not done, ED Course, Reassessment, disposition considerations/shared decision making with patient, consults, disposition:      ED Course as of 05/01/24 2031   Wed May 01, 2024   1747 Patient's family is at bedside.  They showed me a picture of her bleeding from her mouth.  Discussed with him I believe it is coming from the upper lip.  I did clear her mouth out I did not see an active bleeding there but there is active bleeding from the upper lip where there is crusting from the shingles. I will also order pain medicine.  I have applied Surgicel and will see if this can control

## 2024-05-02 VITALS
OXYGEN SATURATION: 96 % | HEART RATE: 83 BPM | TEMPERATURE: 97.4 F | DIASTOLIC BLOOD PRESSURE: 69 MMHG | SYSTOLIC BLOOD PRESSURE: 124 MMHG | RESPIRATION RATE: 16 BRPM

## 2024-05-02 PROCEDURE — 6370000000 HC RX 637 (ALT 250 FOR IP): Performed by: STUDENT IN AN ORGANIZED HEALTH CARE EDUCATION/TRAINING PROGRAM

## 2024-05-02 RX ORDER — OXYCODONE HYDROCHLORIDE 5 MG/1
5 TABLET ORAL ONCE
Status: COMPLETED | OUTPATIENT
Start: 2024-05-02 | End: 2024-05-02

## 2024-05-02 RX ADMIN — OXYCODONE HYDROCHLORIDE 5 MG: 5 TABLET ORAL at 06:04

## 2024-05-02 ASSESSMENT — PAIN DESCRIPTION - LOCATION: LOCATION: FACE

## 2024-05-02 ASSESSMENT — PAIN - FUNCTIONAL ASSESSMENT: PAIN_FUNCTIONAL_ASSESSMENT: PREVENTS OR INTERFERES WITH ALL ACTIVE AND SOME PASSIVE ACTIVITIES

## 2024-05-02 ASSESSMENT — PAIN DESCRIPTION - ORIENTATION: ORIENTATION: RIGHT

## 2024-05-11 DIAGNOSIS — E78.5 HYPERLIPIDEMIA, UNSPECIFIED HYPERLIPIDEMIA TYPE: ICD-10-CM

## 2024-05-13 RX ORDER — SIMVASTATIN 20 MG
TABLET ORAL
Qty: 90 TABLET | Refills: 0 | OUTPATIENT
Start: 2024-05-13

## 2024-07-05 ENCOUNTER — APPOINTMENT (OUTPATIENT)
Dept: CT IMAGING | Age: 89
End: 2024-07-05
Payer: MEDICARE

## 2024-07-05 ENCOUNTER — HOSPITAL ENCOUNTER (EMERGENCY)
Age: 89
Discharge: HOME | End: 2024-07-05
Payer: MEDICARE

## 2024-07-05 ENCOUNTER — APPOINTMENT (OUTPATIENT)
Dept: GENERAL RADIOLOGY | Age: 89
End: 2024-07-05
Payer: MEDICARE

## 2024-07-05 VITALS
HEART RATE: 94 BPM | TEMPERATURE: 97.3 F | OXYGEN SATURATION: 96 % | DIASTOLIC BLOOD PRESSURE: 72 MMHG | BODY MASS INDEX: 27.96 KG/M2 | RESPIRATION RATE: 20 BRPM | SYSTOLIC BLOOD PRESSURE: 133 MMHG | WEIGHT: 148 LBS

## 2024-07-05 DIAGNOSIS — M25.561 PAIN IN BOTH KNEES, UNSPECIFIED CHRONICITY: Primary | ICD-10-CM

## 2024-07-05 DIAGNOSIS — M25.562 PAIN IN BOTH KNEES, UNSPECIFIED CHRONICITY: Primary | ICD-10-CM

## 2024-07-05 DIAGNOSIS — M54.30 SCIATICA, UNSPECIFIED LATERALITY: ICD-10-CM

## 2024-07-05 PROCEDURE — 73562 X-RAY EXAM OF KNEE 3: CPT

## 2024-07-05 PROCEDURE — 6370000000 HC RX 637 (ALT 250 FOR IP): Performed by: PHYSICIAN ASSISTANT

## 2024-07-05 PROCEDURE — 72131 CT LUMBAR SPINE W/O DYE: CPT

## 2024-07-05 PROCEDURE — 99284 EMERGENCY DEPT VISIT MOD MDM: CPT

## 2024-07-05 RX ORDER — FENTANYL 25 UG/1
1 PATCH TRANSDERMAL
COMMUNITY

## 2024-07-05 RX ORDER — ACETAMINOPHEN 325 MG/1
650 TABLET ORAL ONCE
Status: COMPLETED | OUTPATIENT
Start: 2024-07-05 | End: 2024-07-05

## 2024-07-05 RX ORDER — GABAPENTIN 100 MG/1
100 CAPSULE ORAL 2 TIMES DAILY
COMMUNITY

## 2024-07-05 RX ORDER — LIDOCAINE 50 MG/G
1 PATCH TOPICAL EVERY 24 HOURS
Qty: 10 PATCH | Refills: 0 | Status: SHIPPED | OUTPATIENT
Start: 2024-07-05 | End: 2024-07-15

## 2024-07-05 RX ORDER — PREDNISONE 10 MG/1
10 TABLET ORAL 2 TIMES DAILY
COMMUNITY

## 2024-07-05 RX ADMIN — ACETAMINOPHEN 650 MG: 325 TABLET ORAL at 13:23

## 2024-07-05 ASSESSMENT — LIFESTYLE VARIABLES
HOW OFTEN DO YOU HAVE A DRINK CONTAINING ALCOHOL: NEVER
HOW OFTEN DO YOU HAVE A DRINK CONTAINING ALCOHOL: NEVER

## 2024-07-05 ASSESSMENT — PAIN SCALES - GENERAL: PAINLEVEL_OUTOF10: 8

## 2024-07-05 NOTE — ED PROVIDER NOTES
Independent [facility] Visit.      HPI:  7/5/24, Time: 1:02 PM EDT         Jena Kelly is a 89 y.o. female presenting to the ED for left leg pain, bilateral knee pain, beginning weeks  ago.  The complaint has been persistent, moderate in severity, and worsened by movement of bilateral knees , walking.     Patient comes in with complaint of bilateral knee pain.  With pain radiating from the left buttock down to the foot that started 3 weeks ago.  She denies any abdominal pain no urinary or bowel incontinence and saddle paresthesias.  Patient does have history of sciatica in the past no fever or chills.  Patient is able to ambulate with walker.  Patient is presently on prednisone 10      Review of Systems:   A complete review of systems was performed and pertinent positives and negatives are stated within HPI, all other systems reviewed and are negative.          --------------------------------------------- PAST HISTORY ---------------------------------------------  Past Medical History:  has a past medical history of Aortic valve insufficiency, CAD (coronary artery disease), Cancer (HCC), Cardiac arrhythmia, Fracture of femur (HCC), GERD (gastroesophageal reflux disease), Hyperlipidemia, Hypertension, Hypothyroidism, Incontinence, Mass, Mitral valve prolapse, Osteoarthritis, Osteoporosis, Peripheral vascular disease (HCC), Radiation, Sciatica, Thyroid disease, Urinary incontinence, and Vertigo.    Past Surgical History:  has a past surgical history that includes Soft Tissue Tumor Resection (Left, 2002); other surgical history (Left); and Cardiac surgery.    Social History:  reports that she has never smoked. She has never used smokeless tobacco. She reports that she does not drink alcohol and does not use drugs.    Family History: family history includes Cancer in her father and mother; Rheumatologic Disease in her mother; Thyroid Disease in her daughter, sister, and son.     The patient’s home medications have been

## 2024-07-09 DIAGNOSIS — E03.9 ACQUIRED HYPOTHYROIDISM: ICD-10-CM

## 2024-07-11 RX ORDER — SIMVASTATIN 20 MG/1
20 TABLET, FILM COATED ORAL NIGHTLY
Qty: 30 TABLET | Refills: 1 | Status: SHIPPED | OUTPATIENT
Start: 2024-07-11

## 2024-08-19 NOTE — SIGNIFICANT EVENT
I was called by the nurse to see this patient for respiratory distress.  Patient apparently admitted yesterday with shortness of breath recently discharged from [facility].  History and physical dictated by Dr. Ray last night but she has been unreachable for the nurse this morning so I was called.  The patient's oxygen needs escalated dramatically currently she is on 100% high flow cannula plus nonrebreather.  The patient appears in moderate to severe respiratory distress she is awake she is mounting response to name she tells me that she wants to be kept comfortable.  Admits to shortness of breath but denies pain.  History is limited due to her condition  Normocephalic atraumatic EOMI PERRLA  Neck supple  Chest bilateral wheezes and crackles prolonged expiration  Heart regular distant heart sounds  Abdomen soft nontender  Extremities trace edema bilaterally  Neurologic exam limited but gross nonfocal    Impression is for:  1.  Acute respiratory failure with hypercapnia and hypoxia.  Will transfer to ICU initiate BiPAP.  Will try empiric Lasix continue antibiotics.  CODE STATUS confirmed with the daughter to be DNR CCA no intubation no CPR.  Will keep n.p.o. for now.  Dayshift colleagues will follow-up on lab results clinical course and adjust treatment as needed    Critical care time 45 minutes  
RN called RT that pt's SpO2 had dropped to 67%. RN placed pt on NRB mask. RT placed pt on HFNC 100% and 40L, this increased pt SpO2 to 96%. RT notified Dr Armenta and ABG was ordered. Per Dr Armenta RT placed pt on bipap 16/7, 100% FiO2 due to the ABG results.  
Dr. Linda Ott  [address] [place] [id number]  [phone]  Wednesday August 21th @3:30pm